# Patient Record
Sex: FEMALE | Race: BLACK OR AFRICAN AMERICAN | NOT HISPANIC OR LATINO | Employment: OTHER | URBAN - METROPOLITAN AREA
[De-identification: names, ages, dates, MRNs, and addresses within clinical notes are randomized per-mention and may not be internally consistent; named-entity substitution may affect disease eponyms.]

---

## 2019-09-10 ENCOUNTER — OFFICE VISIT (OUTPATIENT)
Dept: INTERNAL MEDICINE | Facility: CLINIC | Age: 44
End: 2019-09-10
Payer: MEDICARE

## 2019-09-10 ENCOUNTER — LAB VISIT (OUTPATIENT)
Dept: LAB | Facility: HOSPITAL | Age: 44
End: 2019-09-10
Attending: FAMILY MEDICINE
Payer: MEDICARE

## 2019-09-10 VITALS
HEIGHT: 68 IN | SYSTOLIC BLOOD PRESSURE: 130 MMHG | OXYGEN SATURATION: 99 % | BODY MASS INDEX: 32.58 KG/M2 | TEMPERATURE: 97 F | DIASTOLIC BLOOD PRESSURE: 72 MMHG | WEIGHT: 214.94 LBS | HEART RATE: 74 BPM

## 2019-09-10 DIAGNOSIS — Z79.4 TYPE 2 DIABETES MELLITUS WITHOUT COMPLICATION, WITH LONG-TERM CURRENT USE OF INSULIN: ICD-10-CM

## 2019-09-10 DIAGNOSIS — I10 ESSENTIAL HYPERTENSION: ICD-10-CM

## 2019-09-10 DIAGNOSIS — E11.9 TYPE 2 DIABETES MELLITUS WITHOUT COMPLICATION, WITH LONG-TERM CURRENT USE OF INSULIN: ICD-10-CM

## 2019-09-10 DIAGNOSIS — M32.9 SYSTEMIC LUPUS ERYTHEMATOSUS, UNSPECIFIED SLE TYPE, UNSPECIFIED ORGAN INVOLVEMENT STATUS: ICD-10-CM

## 2019-09-10 DIAGNOSIS — I10 ESSENTIAL HYPERTENSION: Primary | ICD-10-CM

## 2019-09-10 LAB
ALBUMIN SERPL BCP-MCNC: 4.2 G/DL (ref 3.5–5.2)
ALP SERPL-CCNC: 68 U/L (ref 55–135)
ALT SERPL W/O P-5'-P-CCNC: 12 U/L (ref 10–44)
ANION GAP SERPL CALC-SCNC: 9 MMOL/L (ref 8–16)
AST SERPL-CCNC: 16 U/L (ref 10–40)
BILIRUB SERPL-MCNC: 0.4 MG/DL (ref 0.1–1)
BUN SERPL-MCNC: 10 MG/DL (ref 6–20)
CALCIUM SERPL-MCNC: 9.6 MG/DL (ref 8.7–10.5)
CHLORIDE SERPL-SCNC: 104 MMOL/L (ref 95–110)
CO2 SERPL-SCNC: 26 MMOL/L (ref 23–29)
CREAT SERPL-MCNC: 0.8 MG/DL (ref 0.5–1.4)
EST. GFR  (AFRICAN AMERICAN): >60 ML/MIN/1.73 M^2
EST. GFR  (NON AFRICAN AMERICAN): >60 ML/MIN/1.73 M^2
ESTIMATED AVG GLUCOSE: 137 MG/DL (ref 68–131)
GLUCOSE SERPL-MCNC: 185 MG/DL (ref 70–110)
HBA1C MFR BLD HPLC: 6.4 % (ref 4–5.6)
POTASSIUM SERPL-SCNC: 4.2 MMOL/L (ref 3.5–5.1)
PROT SERPL-MCNC: 7.5 G/DL (ref 6–8.4)
SODIUM SERPL-SCNC: 139 MMOL/L (ref 136–145)

## 2019-09-10 PROCEDURE — 80053 COMPREHEN METABOLIC PANEL: CPT

## 2019-09-10 PROCEDURE — 83036 HEMOGLOBIN GLYCOSYLATED A1C: CPT

## 2019-09-10 PROCEDURE — 3044F PR MOST RECENT HEMOGLOBIN A1C LEVEL <7.0%: ICD-10-PCS | Mod: CPTII,S$GLB,, | Performed by: FAMILY MEDICINE

## 2019-09-10 PROCEDURE — 36415 COLL VENOUS BLD VENIPUNCTURE: CPT

## 2019-09-10 PROCEDURE — 3008F BODY MASS INDEX DOCD: CPT | Mod: CPTII,S$GLB,, | Performed by: FAMILY MEDICINE

## 2019-09-10 PROCEDURE — 99204 PR OFFICE/OUTPT VISIT, NEW, LEVL IV, 45-59 MIN: ICD-10-PCS | Mod: S$GLB,,, | Performed by: FAMILY MEDICINE

## 2019-09-10 PROCEDURE — 99204 OFFICE O/P NEW MOD 45 MIN: CPT | Mod: S$GLB,,, | Performed by: FAMILY MEDICINE

## 2019-09-10 PROCEDURE — 99999 PR PBB SHADOW E&M-NEW PATIENT-LVL III: CPT | Mod: PBBFAC,,, | Performed by: FAMILY MEDICINE

## 2019-09-10 PROCEDURE — 3044F HG A1C LEVEL LT 7.0%: CPT | Mod: CPTII,S$GLB,, | Performed by: FAMILY MEDICINE

## 2019-09-10 PROCEDURE — 3008F PR BODY MASS INDEX (BMI) DOCUMENTED: ICD-10-PCS | Mod: CPTII,S$GLB,, | Performed by: FAMILY MEDICINE

## 2019-09-10 PROCEDURE — 99999 PR PBB SHADOW E&M-NEW PATIENT-LVL III: ICD-10-PCS | Mod: PBBFAC,,, | Performed by: FAMILY MEDICINE

## 2019-09-10 RX ORDER — HYDROXYCHLOROQUINE SULFATE 200 MG/1
200 TABLET, FILM COATED ORAL 2 TIMES DAILY
COMMUNITY
Start: 2019-03-13

## 2019-09-10 RX ORDER — HYDROCORTISONE 25 MG/G
OINTMENT TOPICAL
COMMUNITY
Start: 2018-07-27

## 2019-09-10 RX ORDER — LIDOCAINE HYDROCHLORIDE 10 MG/ML
INJECTION INFILTRATION; PERINEURAL
COMMUNITY
End: 2020-02-05

## 2019-09-10 RX ORDER — CLONIDINE HYDROCHLORIDE 0.1 MG/1
0.1 TABLET ORAL
COMMUNITY
End: 2019-12-12 | Stop reason: SDUPTHER

## 2019-09-10 RX ORDER — ZOLPIDEM TARTRATE 10 MG/1
TABLET ORAL
COMMUNITY
Start: 2017-11-27 | End: 2020-11-25 | Stop reason: SDUPTHER

## 2019-09-10 RX ORDER — GABAPENTIN 800 MG/1
TABLET ORAL
COMMUNITY

## 2019-09-10 RX ORDER — AMLODIPINE BESYLATE 10 MG/1
10 TABLET ORAL DAILY
Refills: 11 | COMMUNITY
Start: 2019-08-19 | End: 2020-11-19 | Stop reason: SDUPTHER

## 2019-09-10 RX ORDER — METHYLPREDNISOLONE ACETATE 40 MG/ML
INJECTION, SUSPENSION INTRA-ARTICULAR; INTRALESIONAL; INTRAMUSCULAR; SOFT TISSUE
COMMUNITY
End: 2020-02-05

## 2019-09-10 RX ORDER — DAPSONE 25 MG/1
100 TABLET ORAL
COMMUNITY
End: 2022-05-12

## 2019-09-10 RX ORDER — TRIAMCINOLONE ACETONIDE 1 MG/G
OINTMENT TOPICAL
COMMUNITY
Start: 2018-07-27 | End: 2020-12-15 | Stop reason: SDUPTHER

## 2019-09-10 RX ORDER — LINACLOTIDE 145 UG/1
CAPSULE, GELATIN COATED ORAL
Refills: 0 | COMMUNITY
Start: 2019-08-20 | End: 2020-11-19 | Stop reason: SDUPTHER

## 2019-09-10 RX ORDER — OLMESARTAN MEDOXOMIL 40 MG/1
40 TABLET ORAL DAILY
Qty: 90 TABLET | Refills: 3 | Status: SHIPPED | OUTPATIENT
Start: 2019-09-10 | End: 2020-01-16 | Stop reason: SDUPTHER

## 2019-09-10 RX ORDER — CARISOPRODOL 350 MG/1
TABLET ORAL
Refills: 0 | COMMUNITY
Start: 2019-08-13 | End: 2020-11-10

## 2019-09-10 RX ORDER — HYDROGEN PEROXIDE 3 %
SOLUTION, NON-ORAL MISCELLANEOUS
Refills: 0 | COMMUNITY
Start: 2019-08-20 | End: 2020-03-23 | Stop reason: SDUPTHER

## 2019-09-10 RX ORDER — TELMISARTAN 40 MG/1
40 TABLET ORAL DAILY
Refills: 3 | COMMUNITY
Start: 2019-07-12 | End: 2019-09-10

## 2019-09-10 RX ORDER — PREDNISONE 10 MG/1
7.5 TABLET ORAL DAILY
COMMUNITY
End: 2022-05-12

## 2019-09-10 RX ORDER — PREDNISONE 10 MG/1
10 TABLET ORAL
COMMUNITY
Start: 2019-04-24 | End: 2020-02-05

## 2019-09-10 RX ORDER — INSULIN ASPART 100 [IU]/ML
INJECTION, SUSPENSION SUBCUTANEOUS
COMMUNITY
Start: 2018-07-23 | End: 2019-12-12 | Stop reason: SDUPTHER

## 2019-09-10 RX ORDER — HYDROCODONE BITARTRATE AND ACETAMINOPHEN 10; 325 MG/1; MG/1
TABLET ORAL
COMMUNITY
Start: 2018-06-06

## 2019-09-10 NOTE — PROGRESS NOTES
Subjective:       Patient ID: Solange Daniel is a 43 y.o. female.    Chief Complaint: Establish Care    Pt is a 43 year old here to establish care. Pt has chronic conditions of SLE and is followed by Rheumatoid as well as pain management. She has HTN and DM. Both of which are under control. Pt does need colonoscopy as well as mammogram    Review of Systems   Constitutional: Negative.    Respiratory: Negative.    Cardiovascular: Negative.    Gastrointestinal: Negative.    Genitourinary: Negative.    Musculoskeletal: Negative.    Neurological: Negative.    Hematological: Negative.    Psychiatric/Behavioral: Negative.        Objective:      Physical Exam   Constitutional: She is oriented to person, place, and time. She appears well-developed and well-nourished.   Cardiovascular: Normal rate and regular rhythm. Exam reveals no friction rub.   No murmur heard.  Pulmonary/Chest: Effort normal and breath sounds normal. No stridor. She has no wheezes.   Abdominal: Soft. Bowel sounds are normal. There is no guarding.   Neurological: She is alert and oriented to person, place, and time.   Psychiatric: She has a normal mood and affect. Her behavior is normal.       Assessment:       1. Essential hypertension    2. Type 2 diabetes mellitus without complication, with long-term current use of insulin    3. Systemic lupus erythematosus, unspecified SLE type, unspecified organ involvement status        Plan:       Essential hypertension  Comments:  Pt BP is well controlled today  Orders:  -     Comprehensive metabolic panel; Future; Expected date: 09/10/2019    Type 2 diabetes mellitus without complication, with long-term current use of insulin  Comments:  Will get a A1C  Orders:  -     Hemoglobin A1c; Future; Expected date: 09/10/2019  -     Microalbumin/creatinine urine ratio; Future; Expected date: 09/10/2019    Systemic lupus erythematosus, unspecified SLE type, unspecified organ involvement status    Other orders  -      olmesartan (BENICAR) 40 MG tablet; Take 1 tablet (40 mg total) by mouth once daily.  Dispense: 90 tablet; Refill: 3

## 2019-09-13 ENCOUNTER — TELEPHONE (OUTPATIENT)
Dept: URGENT CARE | Facility: CLINIC | Age: 44
End: 2019-09-13

## 2019-09-13 NOTE — TELEPHONE ENCOUNTER
I received a call back from the pt regarding her labs and was able to assist her with scheduling a 3 mo f/u . She verbalized understanding . fyi //kah

## 2019-11-01 DIAGNOSIS — E11.9 TYPE 2 DIABETES MELLITUS WITHOUT COMPLICATION: ICD-10-CM

## 2019-11-01 DIAGNOSIS — Z12.39 BREAST CANCER SCREENING: ICD-10-CM

## 2019-12-12 ENCOUNTER — OFFICE VISIT (OUTPATIENT)
Dept: INTERNAL MEDICINE | Facility: CLINIC | Age: 44
End: 2019-12-12
Payer: MEDICARE

## 2019-12-12 VITALS
BODY MASS INDEX: 31.87 KG/M2 | OXYGEN SATURATION: 97 % | HEART RATE: 70 BPM | HEIGHT: 68 IN | DIASTOLIC BLOOD PRESSURE: 88 MMHG | TEMPERATURE: 97 F | SYSTOLIC BLOOD PRESSURE: 130 MMHG | WEIGHT: 210.31 LBS

## 2019-12-12 DIAGNOSIS — E11.9 TYPE 2 DIABETES MELLITUS WITHOUT COMPLICATION, WITH LONG-TERM CURRENT USE OF INSULIN: Primary | ICD-10-CM

## 2019-12-12 DIAGNOSIS — Z79.4 TYPE 2 DIABETES MELLITUS WITHOUT COMPLICATION, WITH LONG-TERM CURRENT USE OF INSULIN: Primary | ICD-10-CM

## 2019-12-12 DIAGNOSIS — I10 ESSENTIAL HYPERTENSION: ICD-10-CM

## 2019-12-12 LAB — GLUCOSE SERPL-MCNC: 135 MG/DL (ref 70–110)

## 2019-12-12 PROCEDURE — 82962 GLUCOSE BLOOD TEST: CPT | Mod: HCNC,S$GLB,, | Performed by: FAMILY MEDICINE

## 2019-12-12 PROCEDURE — 99214 PR OFFICE/OUTPT VISIT, EST, LEVL IV, 30-39 MIN: ICD-10-PCS | Mod: HCNC,S$GLB,, | Performed by: FAMILY MEDICINE

## 2019-12-12 PROCEDURE — 99499 UNLISTED E&M SERVICE: CPT | Mod: HCNC,S$GLB,, | Performed by: FAMILY MEDICINE

## 2019-12-12 PROCEDURE — 3008F PR BODY MASS INDEX (BMI) DOCUMENTED: ICD-10-PCS | Mod: HCNC,CPTII,S$GLB, | Performed by: FAMILY MEDICINE

## 2019-12-12 PROCEDURE — 99999 PR PBB SHADOW E&M-EST. PATIENT-LVL III: ICD-10-PCS | Mod: PBBFAC,HCNC,, | Performed by: FAMILY MEDICINE

## 2019-12-12 PROCEDURE — 3008F BODY MASS INDEX DOCD: CPT | Mod: HCNC,CPTII,S$GLB, | Performed by: FAMILY MEDICINE

## 2019-12-12 PROCEDURE — 3044F HG A1C LEVEL LT 7.0%: CPT | Mod: HCNC,CPTII,S$GLB, | Performed by: FAMILY MEDICINE

## 2019-12-12 PROCEDURE — 3044F PR MOST RECENT HEMOGLOBIN A1C LEVEL <7.0%: ICD-10-PCS | Mod: HCNC,CPTII,S$GLB, | Performed by: FAMILY MEDICINE

## 2019-12-12 PROCEDURE — 82962 POCT GLUCOSE, HAND-HELD DEVICE: ICD-10-PCS | Mod: HCNC,S$GLB,, | Performed by: FAMILY MEDICINE

## 2019-12-12 PROCEDURE — 99499 RISK ADDL DX/OHS AUDIT: ICD-10-PCS | Mod: HCNC,S$GLB,, | Performed by: FAMILY MEDICINE

## 2019-12-12 PROCEDURE — 99999 PR PBB SHADOW E&M-EST. PATIENT-LVL III: CPT | Mod: PBBFAC,HCNC,, | Performed by: FAMILY MEDICINE

## 2019-12-12 PROCEDURE — 99214 OFFICE O/P EST MOD 30 MIN: CPT | Mod: HCNC,S$GLB,, | Performed by: FAMILY MEDICINE

## 2019-12-12 RX ORDER — INSULIN ASPART 100 [IU]/ML
15 INJECTION, SUSPENSION SUBCUTANEOUS 2 TIMES DAILY
Qty: 10 UNITS | Refills: 0 | Status: SHIPPED | OUTPATIENT
Start: 2019-12-12 | End: 2019-12-12 | Stop reason: SDUPTHER

## 2019-12-12 RX ORDER — INSULIN ASPART 100 [IU]/ML
15 INJECTION, SUSPENSION SUBCUTANEOUS 2 TIMES DAILY
Qty: 10 UNITS | Refills: 0 | Status: CANCELLED | OUTPATIENT
Start: 2019-12-12

## 2019-12-12 RX ORDER — CLONIDINE HYDROCHLORIDE 0.1 MG/1
0.1 TABLET ORAL 3 TIMES DAILY
Qty: 90 TABLET | Refills: 1 | Status: SHIPPED | OUTPATIENT
Start: 2019-12-12 | End: 2020-01-16 | Stop reason: SDUPTHER

## 2019-12-12 NOTE — PROGRESS NOTES
Subjective:       Patient ID: Solange Daniel is a 44 y.o. female.    Chief Complaint: Follow-up (3 mon)    Pt is a 44 year old who is here for follow-up. Pt last a1c was 6.4 but she has since last visit gone up on steroids due to the lupus. This has raised her sugars and BP a little. Pt was taking a 70/30 mix as needed.    Review of Systems   Constitutional: Negative.    Respiratory: Negative.    Cardiovascular: Negative.    Gastrointestinal: Negative.    Musculoskeletal: Negative.    Psychiatric/Behavioral: Negative.        Objective:      Physical Exam   Constitutional: She is oriented to person, place, and time. She appears well-developed and well-nourished.   Cardiovascular: Normal rate and regular rhythm. Exam reveals no friction rub.   No murmur heard.  Pulmonary/Chest: Effort normal and breath sounds normal. No stridor. She has no wheezes.   Abdominal: Soft. Bowel sounds are normal. There is no tenderness. There is no guarding.   Neurological: She is alert and oriented to person, place, and time.   Skin: Skin is warm and dry.   Psychiatric: She has a normal mood and affect. Her behavior is normal.       Assessment:       1. Type 2 diabetes mellitus without complication, with long-term current use of insulin    2. Essential hypertension        Plan:       Type 2 diabetes mellitus without complication, with long-term current use of insulin  Comments:  Pt is on elevated prednisone and sugars have been elevated. will move to do regular 10 units of 70/30 twice a day. check sugars and will decide if need to do sliding scale insuline if continue to elevate    Essential hypertension  Comments:  Pt BP is controlled to day. Continue to use the clonidine as needed    Other orders  -     blood sugar diagnostic (TRUE METRIX GLUCOSE TEST STRIP) Strp; test AS DIRECTED 3 TIMES A DAY  Dispense: 300 strip; Refill: 0  -     insulin asp prt-insulin aspart, NOVOLOG 70/30, (NOVOLOG MIX 70-30 U-100 INSULN) 100 unit/mL (70-30)  Soln; Inject 15 Units into the skin 2 (two) times daily.  Dispense: 10 Units; Refill: 0  -     cloNIDine (CATAPRES) 0.1 MG tablet; Take 1 tablet (0.1 mg total) by mouth 3 (three) times daily.  Dispense: 90 tablet; Refill: 1

## 2019-12-13 RX ORDER — INSULIN ASPART 100 [IU]/ML
15 INJECTION, SUSPENSION SUBCUTANEOUS 2 TIMES DAILY
Qty: 10 UNITS | Refills: 2 | Status: SHIPPED | OUTPATIENT
Start: 2019-12-13 | End: 2020-01-16 | Stop reason: SDUPTHER

## 2019-12-13 RX ORDER — INSULIN ASPART 100 [IU]/ML
15 INJECTION, SUSPENSION SUBCUTANEOUS 2 TIMES DAILY
Qty: 10 UNITS | Refills: 2 | Status: SHIPPED | OUTPATIENT
Start: 2019-12-13 | End: 2019-12-13 | Stop reason: SDUPTHER

## 2020-01-16 NOTE — TELEPHONE ENCOUNTER
Type:  RX Refill Request     Who Called: Marilynn   Refill or New Rx: Refill   RX Name and Strength:Accu Chek Karine Plus Meter   How is the patient currently taking it? (ex. 1XDay): n/a   Is this a 30 day or 90 day RX: 90   Preferred Pharmacy with phone number:[facility]   Ph: [phone] and Fax: [phone]   Local or Mail Order: Mail order   Ordering Provider: Dr. Dewey   Would the patient rather a call back or a response via [facility]? Call back   Best Call Back Number: Please call her at [phone]   Additional Information: And all supply's, solution, alcohol swabs and lancets

## 2020-01-16 NOTE — TELEPHONE ENCOUNTER
----- Message from Jeanette Miller sent at 1/16/2020  2:25 PM CST -----  Contact: Marina  Type:  RX Refill Request    Who Called: Sasha  Refill or New Rx: Refill  RX Name and Strength: Clonidine .01mg   How is the patient currently taking it? (ex. 1XDay): n/a  Is this a 30 day or 90 day RX: 90  Preferred Pharmacy with phone number: [facility]  Ph: [phone] And Fax: [phone]  Local or Mail Order: Mail order  Ordering Provider: Dr. Dewey   Would the patient rather a call back or a response via [employer]? Call back   Best Call Back Number: Please call her at [phone]  Additional Information: n/a    Type:  RX Refill Request    Who Called: Sasha  Refill or New Rx: Refill   RX Name and Strength: Novolog mix 7030  How is the patient currently taking it? (ex. 1XDay):n/a  Is this a 30 day or 90 day RX: 90  Preferred Pharmacy with phone number: [facility]  Ph: [phone] and Fax: [phone]  Local or Mail Order: Mail order  Ordering Provider: Dr. Dewey  Would the patient rather a call back or a response via [facility]? Call back   Best Call Back Number: Please call her at [phone]  Additional Information: n/a    Type:  RX Refill Request    Who Called: Sasha  Refill or New Rx: Refill   RX Name and Strength: Olmesartan 40 mg  How is the patient currently taking it? (ex. 1XDay):   Is this a 30 day or 90 day RX: 90  Preferred Pharmacy with phone number: [facility]  Ph: [phone] and Fax: [phone]  Local or Mail Order: Mail order   Ordering Provider: Dr. Dewey  Would the patient rather a call back or a response via [facility]? Call back   Best Call Back Number: Please call her at [phone]  Additional Information: n/a    Type:  RX Refill Request    Who Called: Marilynn  Refill or New Rx: Refill   RX Name and Strength:Accu Chek Karine Plus Meter  How is the patient currently taking it? (ex. 1XDay): n/a  Is this a 30 day or 90 day RX: 90  Preferred Pharmacy with phone number:[facility] order  Ph: [phone] and Fax: [phone]  Local or Mail Order: Mail order   Ordering Provider: Dr. Dewey  Would the patient rather a call back or a response via [facility]? Call back   Best Call Back Number: Please call her at [phone]  Additional Information: And all supply's, solution, alcohol swabs and lancets

## 2020-01-17 RX ORDER — CLONIDINE HYDROCHLORIDE 0.1 MG/1
0.1 TABLET ORAL 3 TIMES DAILY
Qty: 90 TABLET | Refills: 1 | Status: SHIPPED | OUTPATIENT
Start: 2020-01-17 | End: 2020-11-19

## 2020-01-17 RX ORDER — INSULIN ASPART 100 [IU]/ML
15 INJECTION, SUSPENSION SUBCUTANEOUS 2 TIMES DAILY
Qty: 10 UNITS | Refills: 2 | OUTPATIENT
Start: 2020-01-17 | End: 2020-08-21

## 2020-01-17 RX ORDER — OLMESARTAN MEDOXOMIL 40 MG/1
40 TABLET ORAL DAILY
Qty: 90 TABLET | Refills: 3 | Status: SHIPPED | OUTPATIENT
Start: 2020-01-17 | End: 2020-07-30

## 2020-02-05 ENCOUNTER — OFFICE VISIT (OUTPATIENT)
Dept: INTERNAL MEDICINE | Facility: CLINIC | Age: 45
End: 2020-02-05
Payer: MEDICARE

## 2020-02-05 ENCOUNTER — HOSPITAL ENCOUNTER (EMERGENCY)
Facility: HOSPITAL | Age: 45
Discharge: HOME | End: 2020-02-05
Attending: EMERGENCY MEDICINE
Payer: MEDICARE

## 2020-02-05 VITALS
WEIGHT: 217.06 LBS | TEMPERATURE: 98 F | BODY MASS INDEX: 32.9 KG/M2 | HEIGHT: 68 IN | RESPIRATION RATE: 20 BRPM | HEART RATE: 103 BPM | DIASTOLIC BLOOD PRESSURE: 88 MMHG | OXYGEN SATURATION: 95 % | SYSTOLIC BLOOD PRESSURE: 190 MMHG

## 2020-02-05 VITALS
HEIGHT: 68 IN | TEMPERATURE: 98 F | SYSTOLIC BLOOD PRESSURE: 138 MMHG | DIASTOLIC BLOOD PRESSURE: 88 MMHG | BODY MASS INDEX: 32.84 KG/M2 | WEIGHT: 216.69 LBS

## 2020-02-05 DIAGNOSIS — R51.9 NONINTRACTABLE HEADACHE, UNSPECIFIED CHRONICITY PATTERN, UNSPECIFIED HEADACHE TYPE: Primary | ICD-10-CM

## 2020-02-05 DIAGNOSIS — Z79.4 TYPE 2 DIABETES MELLITUS WITHOUT COMPLICATION, WITH LONG-TERM CURRENT USE OF INSULIN: ICD-10-CM

## 2020-02-05 DIAGNOSIS — R06.00 DYSPNEA: ICD-10-CM

## 2020-02-05 DIAGNOSIS — N30.00 ACUTE CYSTITIS WITHOUT HEMATURIA: ICD-10-CM

## 2020-02-05 DIAGNOSIS — R51.9 HEADACHE: ICD-10-CM

## 2020-02-05 DIAGNOSIS — M31.0 LEUKOCYTOCLASTIC VASCULITIS: ICD-10-CM

## 2020-02-05 DIAGNOSIS — D64.9 ANEMIA, UNSPECIFIED TYPE: ICD-10-CM

## 2020-02-05 DIAGNOSIS — I10 HYPERTENSION, UNSPECIFIED TYPE: Primary | ICD-10-CM

## 2020-02-05 DIAGNOSIS — E11.9 TYPE 2 DIABETES MELLITUS WITHOUT COMPLICATION, WITH LONG-TERM CURRENT USE OF INSULIN: ICD-10-CM

## 2020-02-05 LAB
ALBUMIN SERPL BCP-MCNC: 3.7 G/DL (ref 3.5–5.2)
ALP SERPL-CCNC: 89 U/L (ref 55–135)
ALT SERPL W/O P-5'-P-CCNC: 11 U/L (ref 10–44)
ANION GAP SERPL CALC-SCNC: 10 MMOL/L (ref 8–16)
APTT BLDCRRT: 24.2 SEC (ref 21–32)
AST SERPL-CCNC: 19 U/L (ref 10–40)
BACTERIA #/AREA URNS HPF: NORMAL /HPF
BASOPHILS # BLD AUTO: 0.03 K/UL (ref 0–0.2)
BASOPHILS NFR BLD: 0.3 % (ref 0–1.9)
BILIRUB SERPL-MCNC: 0.4 MG/DL (ref 0.1–1)
BILIRUB UR QL STRIP: NEGATIVE
BNP SERPL-MCNC: 109 PG/ML (ref 0–99)
BUN SERPL-MCNC: 10 MG/DL (ref 6–20)
CALCIUM SERPL-MCNC: 9.3 MG/DL (ref 8.7–10.5)
CHLORIDE SERPL-SCNC: 106 MMOL/L (ref 95–110)
CLARITY UR: CLEAR
CO2 SERPL-SCNC: 23 MMOL/L (ref 23–29)
COLOR UR: YELLOW
CREAT SERPL-MCNC: 0.9 MG/DL (ref 0.5–1.4)
D DIMER PPP IA.FEU-MCNC: 1.51 MG/L FEU
DIFFERENTIAL METHOD: ABNORMAL
EOSINOPHIL # BLD AUTO: 0.1 K/UL (ref 0–0.5)
EOSINOPHIL NFR BLD: 1.2 % (ref 0–8)
ERYTHROCYTE [DISTWIDTH] IN BLOOD BY AUTOMATED COUNT: 13.9 % (ref 11.5–14.5)
ERYTHROCYTE [SEDIMENTATION RATE] IN BLOOD BY WESTERGREN METHOD: 15 MM/HR (ref 0–20)
EST. GFR  (AFRICAN AMERICAN): >60 ML/MIN/1.73 M^2
EST. GFR  (NON AFRICAN AMERICAN): >60 ML/MIN/1.73 M^2
GLUCOSE SERPL-MCNC: 187 MG/DL (ref 70–110)
GLUCOSE UR QL STRIP: NEGATIVE
HCT VFR BLD AUTO: 28.5 % (ref 37–48.5)
HGB BLD-MCNC: 9.1 G/DL (ref 12–16)
HGB UR QL STRIP: NEGATIVE
IMM GRANULOCYTES # BLD AUTO: 0.06 K/UL (ref 0–0.04)
IMM GRANULOCYTES NFR BLD AUTO: 0.6 % (ref 0–0.5)
INR PPP: 0.9 (ref 0.8–1.2)
KETONES UR QL STRIP: NEGATIVE
LEUKOCYTE ESTERASE UR QL STRIP: ABNORMAL
LYMPHOCYTES # BLD AUTO: 2.6 K/UL (ref 1–4.8)
LYMPHOCYTES NFR BLD: 24.5 % (ref 18–48)
MCH RBC QN AUTO: 28.5 PG (ref 27–31)
MCHC RBC AUTO-ENTMCNC: 31.9 G/DL (ref 32–36)
MCV RBC AUTO: 89 FL (ref 82–98)
MICROSCOPIC COMMENT: NORMAL
MONOCYTES # BLD AUTO: 0.7 K/UL (ref 0.3–1)
MONOCYTES NFR BLD: 7 % (ref 4–15)
NEUTROPHILS # BLD AUTO: 7 K/UL (ref 1.8–7.7)
NEUTROPHILS NFR BLD: 66.4 % (ref 38–73)
NITRITE UR QL STRIP: NEGATIVE
NRBC BLD-RTO: 0 /100 WBC
PH UR STRIP: 7 [PH] (ref 5–8)
PLATELET # BLD AUTO: 258 K/UL (ref 150–350)
PMV BLD AUTO: 10.9 FL (ref 9.2–12.9)
POTASSIUM SERPL-SCNC: 3.8 MMOL/L (ref 3.5–5.1)
PROT SERPL-MCNC: 7.1 G/DL (ref 6–8.4)
PROT UR QL STRIP: NEGATIVE
PROTHROMBIN TIME: 9.8 SEC (ref 9–12.5)
RBC # BLD AUTO: 3.19 M/UL (ref 4–5.4)
RBC #/AREA URNS HPF: 0 /HPF (ref 0–4)
SODIUM SERPL-SCNC: 139 MMOL/L (ref 136–145)
SP GR UR STRIP: 1.01 (ref 1–1.03)
SQUAMOUS #/AREA URNS HPF: 15 /HPF
TROPONIN I SERPL DL<=0.01 NG/ML-MCNC: <0.006 NG/ML (ref 0–0.03)
URN SPEC COLLECT METH UR: ABNORMAL
UROBILINOGEN UR STRIP-ACNC: NEGATIVE EU/DL
WBC # BLD AUTO: 10.57 K/UL (ref 3.9–12.7)
WBC #/AREA URNS HPF: 0 /HPF (ref 0–5)

## 2020-02-05 PROCEDURE — 3044F HG A1C LEVEL LT 7.0%: CPT | Mod: HCNC,CPTII,S$GLB, | Performed by: FAMILY MEDICINE

## 2020-02-05 PROCEDURE — 85025 COMPLETE CBC W/AUTO DIFF WBC: CPT | Mod: HCNC

## 2020-02-05 PROCEDURE — 99999 PR PBB SHADOW E&M-EST. PATIENT-LVL III: CPT | Mod: PBBFAC,HCNC,, | Performed by: FAMILY MEDICINE

## 2020-02-05 PROCEDURE — 96360 HYDRATION IV INFUSION INIT: CPT | Mod: HCNC

## 2020-02-05 PROCEDURE — 96361 HYDRATE IV INFUSION ADD-ON: CPT | Mod: HCNC

## 2020-02-05 PROCEDURE — 99214 OFFICE O/P EST MOD 30 MIN: CPT | Mod: HCNC,S$GLB,, | Performed by: FAMILY MEDICINE

## 2020-02-05 PROCEDURE — 81000 URINALYSIS NONAUTO W/SCOPE: CPT | Mod: HCNC

## 2020-02-05 PROCEDURE — 25500020 PHARM REV CODE 255: Mod: HCNC | Performed by: EMERGENCY MEDICINE

## 2020-02-05 PROCEDURE — 3075F SYST BP GE 130 - 139MM HG: CPT | Mod: HCNC,CPTII,S$GLB, | Performed by: FAMILY MEDICINE

## 2020-02-05 PROCEDURE — 3075F PR MOST RECENT SYSTOLIC BLOOD PRESS GE 130-139MM HG: ICD-10-PCS | Mod: HCNC,CPTII,S$GLB, | Performed by: FAMILY MEDICINE

## 2020-02-05 PROCEDURE — 80053 COMPREHEN METABOLIC PANEL: CPT | Mod: HCNC

## 2020-02-05 PROCEDURE — 3079F DIAST BP 80-89 MM HG: CPT | Mod: HCNC,CPTII,S$GLB, | Performed by: FAMILY MEDICINE

## 2020-02-05 PROCEDURE — 85730 THROMBOPLASTIN TIME PARTIAL: CPT | Mod: HCNC

## 2020-02-05 PROCEDURE — 85610 PROTHROMBIN TIME: CPT | Mod: HCNC

## 2020-02-05 PROCEDURE — 25000003 PHARM REV CODE 250: Mod: HCNC | Performed by: EMERGENCY MEDICINE

## 2020-02-05 PROCEDURE — 84484 ASSAY OF TROPONIN QUANT: CPT | Mod: HCNC

## 2020-02-05 PROCEDURE — 85379 FIBRIN DEGRADATION QUANT: CPT | Mod: HCNC

## 2020-02-05 PROCEDURE — 85651 RBC SED RATE NONAUTOMATED: CPT | Mod: HCNC

## 2020-02-05 PROCEDURE — 36415 COLL VENOUS BLD VENIPUNCTURE: CPT | Mod: HCNC

## 2020-02-05 PROCEDURE — 3044F PR MOST RECENT HEMOGLOBIN A1C LEVEL <7.0%: ICD-10-PCS | Mod: HCNC,CPTII,S$GLB, | Performed by: FAMILY MEDICINE

## 2020-02-05 PROCEDURE — 3079F PR MOST RECENT DIASTOLIC BLOOD PRESSURE 80-89 MM HG: ICD-10-PCS | Mod: HCNC,CPTII,S$GLB, | Performed by: FAMILY MEDICINE

## 2020-02-05 PROCEDURE — 83880 ASSAY OF NATRIURETIC PEPTIDE: CPT | Mod: HCNC

## 2020-02-05 PROCEDURE — 3008F BODY MASS INDEX DOCD: CPT | Mod: HCNC,CPTII,S$GLB, | Performed by: FAMILY MEDICINE

## 2020-02-05 PROCEDURE — 99285 EMERGENCY DEPT VISIT HI MDM: CPT | Mod: 25,HCNC

## 2020-02-05 PROCEDURE — 63600175 PHARM REV CODE 636 W HCPCS: Mod: HCNC | Performed by: EMERGENCY MEDICINE

## 2020-02-05 PROCEDURE — 99214 PR OFFICE/OUTPT VISIT, EST, LEVL IV, 30-39 MIN: ICD-10-PCS | Mod: HCNC,S$GLB,, | Performed by: FAMILY MEDICINE

## 2020-02-05 PROCEDURE — 3008F PR BODY MASS INDEX (BMI) DOCUMENTED: ICD-10-PCS | Mod: HCNC,CPTII,S$GLB, | Performed by: FAMILY MEDICINE

## 2020-02-05 PROCEDURE — 99999 PR PBB SHADOW E&M-EST. PATIENT-LVL III: ICD-10-PCS | Mod: PBBFAC,HCNC,, | Performed by: FAMILY MEDICINE

## 2020-02-05 RX ORDER — HYDROXYZINE PAMOATE 25 MG/1
CAPSULE ORAL
Qty: 25 CAPSULE | Refills: 0 | Status: SHIPPED | OUTPATIENT
Start: 2020-02-05 | End: 2020-11-19 | Stop reason: SDUPTHER

## 2020-02-05 RX ORDER — SODIUM CHLORIDE 9 MG/ML
1000 INJECTION, SOLUTION INTRAVENOUS
Status: COMPLETED | OUTPATIENT
Start: 2020-02-05 | End: 2020-02-05

## 2020-02-05 RX ORDER — HYDROCODONE BITARTRATE AND ACETAMINOPHEN 10; 325 MG/1; MG/1
1 TABLET ORAL
Status: COMPLETED | OUTPATIENT
Start: 2020-02-05 | End: 2020-02-05

## 2020-02-05 RX ORDER — HYDROXYZINE PAMOATE 25 MG/1
50 CAPSULE ORAL
Status: COMPLETED | OUTPATIENT
Start: 2020-02-05 | End: 2020-02-05

## 2020-02-05 RX ORDER — PREDNISONE 20 MG/1
20 TABLET ORAL
Status: COMPLETED | OUTPATIENT
Start: 2020-02-05 | End: 2020-02-05

## 2020-02-05 RX ADMIN — SODIUM CHLORIDE 500 ML: 0.9 INJECTION, SOLUTION INTRAVENOUS at 07:02

## 2020-02-05 RX ADMIN — PREDNISONE 20 MG: 20 TABLET ORAL at 07:02

## 2020-02-05 RX ADMIN — HYDROCODONE BITARTRATE AND ACETAMINOPHEN 1 TABLET: 10; 325 TABLET ORAL at 07:02

## 2020-02-05 RX ADMIN — HYDROXYZINE PAMOATE 50 MG: 25 CAPSULE ORAL at 10:02

## 2020-02-05 RX ADMIN — SODIUM CHLORIDE 1000 ML: 0.9 INJECTION, SOLUTION INTRAVENOUS at 07:02

## 2020-02-05 RX ADMIN — IOHEXOL 100 ML: 350 INJECTION, SOLUTION INTRAVENOUS at 08:02

## 2020-02-05 NOTE — ED PROVIDER NOTES
"SCRIBE #1 NOTE: I, Allyssa Rowe, am scribing for, and in the presence of, Tania Blanton DO. I have scribed the entire note.       History     Chief Complaint   Patient presents with    General Illness     c/o generalized weakness, H/A, lower abdominal and back pain, urinary frequency, and dizziness beginning on Friday; PMHx of Lupus and anemia     Review of patient's allergies indicates:   Allergen Reactions    Oxycodone Itching         History of Present Illness     HPI    2/5/2020, 5:56 PM  History obtained from the patient      History of Present Illness: Solange Daniel is a 44 y.o. female patient with a PMHx of anemia, arthritis, lupus, HTN, vasculitis, and s/p cardiac ablation who presents to the Emergency Department for evaluation of malaise which onset gradually over the last 6-7 days. Pt reports that she thinks her Lupus is flared-up and "something is wrong" due to multiple symptoms going on. Pt states that she has had a constant HA since Friday. Pt also reports that her vasculitis occurs when she has a Lupus flare-up and noticed the rash to her BLE. Two days ago the pt developed urinary frequency. Yesterday, the pt developed low back pain. Pt is in the process of a steroid titration and was originally on 30 mg Prednisone, but has been on 5 mg Prednisone over the last 2 weeks. Pt recently was discontinued [facility] due to a prior authorization conflict. Symptoms are constant and moderate in severity. No mitigating or exacerbating factors reported. Associated sxs include rash to the BLE, back pain, SOB, chest tightness, blurred vision, HA, feeling lightheaded, and urinary frequency. Patient denies any fever/ chills, cough, CP, palpations, numbness, dizziness, wound, abdominal pain, n/v/d, neck pain, sore throat, congestion, dysuria, hematuria, localized weakness, easily bruising, and all other sxs at this time. Prior Tx includes steroid treatment. No further complaints or concerns at this time. " "    Arrival mode: Personal vehicle      PCP: Abram Dewey MD   Rheumatologist: Dr. Sanchez     Past Medical History:  Past Medical History:   Diagnosis Date    Anemia     Arthritis     Connective tissue disease 1999    Diabetes mellitus     Hypertension     Lupus 1999    Vasculitis        Past Surgical History:  Past Surgical History:   Procedure Laterality Date    ABLATION      RIGHT HEART CATHETERIZATION      TUBAL LIGATION      WRIST SURGERY Bilateral          Family History:  History reviewed. No pertinent family history.    Social History:  Social History     Tobacco Use    Smoking status: Never Smoker   Substance and Sexual Activity    Alcohol use: Never     Frequency: Never    Drug use: Never    Sexual activity: Unknown        Review of Systems     Review of Systems   Constitutional: Negative for chills and fever.        + Feeling malaise    HENT: Negative for congestion and sore throat.    Eyes: Positive for visual disturbance ("blurry vision").   Respiratory: Positive for chest tightness and shortness of breath. Negative for cough.    Cardiovascular: Negative for chest pain and palpitations.   Gastrointestinal: Negative for abdominal pain, diarrhea, nausea and vomiting.   Genitourinary: Positive for frequency. Negative for dysuria and hematuria.   Musculoskeletal: Positive for back pain. Negative for neck pain.   Skin: Positive for rash (BLE). Negative for wound.   Neurological: Positive for light-headedness and headaches. Negative for dizziness, weakness and numbness.   Hematological: Does not bruise/bleed easily.   All other systems reviewed and are negative.     Physical Exam     Initial Vitals [02/05/20 1748]   BP Pulse Resp Temp SpO2   (S) (!) 200/96 88 20 98.4 °F (36.9 °C) 97 %      MAP       --          Physical Exam  Nursing Notes and Vital Signs Reviewed.  Constitutional: Patient is in no acute distress. Well-developed and well-nourished.  Head: Atraumatic. Normocephalic.  Eyes: " "PERRL. EOM intact. Conjunctivae are not pale. No scleral icterus.  ENT: Mucous membranes are moist. Oropharynx is clear and symmetric.    Neck: Supple. Full ROM. No lymphadenopathy.  Cardiovascular: Regular rate. Regular rhythm. No murmurs, rubs, or gallops. Distal pulses are 2+ and symmetric.  Pulmonary/Chest: No respiratory distress. Clear to auscultation bilaterally. No wheezing or rales.  Abdominal: Soft and non-distended.  There is no tenderness.  No rebound, guarding, or rigidity. Good bowel sounds.  Genitourinary: No CVA tenderness  Musculoskeletal: Moves all extremities. No obvious deformities. No edema. No calf tenderness.  Skin: Warm and dry. Slight pallor.   Non-palpable, non-blanchable rash to the BLE. See picture below.           Neurological:  Alert, awake, and appropriate.  Normal speech.  No acute focal neurological deficits are appreciated. Cranial nerves II through XII intact.   GCS is 15.  Psychiatric: Normal affect. Good eye contact. Appropriate in content.     ED Course   Procedures  ED Vital Signs:  Vitals:    02/05/20 1748 02/05/20 1910 02/05/20 2020 02/05/20 2030   BP: (S) (!) 200/96 (!) 179/84 (!) 152/70 (!) 145/85   Pulse: 88 77 70 68   Resp: 20 13 14 19   Temp: 98.4 °F (36.9 °C)      TempSrc: Oral      SpO2: 97% 100% 100% 99%   Weight: 98.5 kg (217 lb 0.7 oz)      Height: 5' 8" (1.727 m)       02/05/20 2100 02/05/20 2215   BP: (!) 166/101 (!) 190/88   Pulse: 72 103   Resp: 20 20   Temp:  98.2 °F (36.8 °C)   TempSrc:  Oral   SpO2: 96% 95%   Weight:     Height:         Abnormal Lab Results:  Labs Reviewed   CBC W/ AUTO DIFFERENTIAL - Abnormal; Notable for the following components:       Result Value    RBC 3.19 (*)     Hemoglobin 9.1 (*)     Hematocrit 28.5 (*)     Mean Corpuscular Hemoglobin Conc 31.9 (*)     Immature Granulocytes 0.6 (*)     Immature Grans (Abs) 0.06 (*)     All other components within normal limits   COMPREHENSIVE METABOLIC PANEL - Abnormal; Notable for the following " components:    Glucose 187 (*)     All other components within normal limits   D DIMER, QUANTITATIVE - Abnormal; Notable for the following components:    D-Dimer 1.51 (*)     All other components within normal limits   B-TYPE NATRIURETIC PEPTIDE - Abnormal; Notable for the following components:     (*)     All other components within normal limits   URINALYSIS, REFLEX TO URINE CULTURE - Abnormal; Notable for the following components:    Leukocytes, UA 1+ (*)     All other components within normal limits    Narrative:     Preferred Collection Type->Urine, Clean Catch   SEDIMENTATION RATE   PROTIME-INR   APTT   TROPONIN I   URINALYSIS MICROSCOPIC    Narrative:     Preferred Collection Type->Urine, Clean Catch        All Lab Results:  Results for orders placed or performed during the hospital encounter of 02/05/20   CBC auto differential   Result Value Ref Range    WBC 10.57 3.90 - 12.70 K/uL    RBC 3.19 (L) 4.00 - 5.40 M/uL    Hemoglobin 9.1 (L) 12.0 - 16.0 g/dL    Hematocrit 28.5 (L) 37.0 - 48.5 %    Mean Corpuscular Volume 89 82 - 98 fL    Mean Corpuscular Hemoglobin 28.5 27.0 - 31.0 pg    Mean Corpuscular Hemoglobin Conc 31.9 (L) 32.0 - 36.0 g/dL    RDW 13.9 11.5 - 14.5 %    Platelets 258 150 - 350 K/uL    MPV 10.9 9.2 - 12.9 fL    Immature Granulocytes 0.6 (H) 0.0 - 0.5 %    Gran # (ANC) 7.0 1.8 - 7.7 K/uL    Immature Grans (Abs) 0.06 (H) 0.00 - 0.04 K/uL    Lymph # 2.6 1.0 - 4.8 K/uL    Mono # 0.7 0.3 - 1.0 K/uL    Eos # 0.1 0.0 - 0.5 K/uL    Baso # 0.03 0.00 - 0.20 K/uL    nRBC 0 0 /100 WBC    Gran% 66.4 38.0 - 73.0 %    Lymph% 24.5 18.0 - 48.0 %    Mono% 7.0 4.0 - 15.0 %    Eosinophil% 1.2 0.0 - 8.0 %    Basophil% 0.3 0.0 - 1.9 %    Differential Method Automated    Comprehensive metabolic panel   Result Value Ref Range    Sodium 139 136 - 145 mmol/L    Potassium 3.8 3.5 - 5.1 mmol/L    Chloride 106 95 - 110 mmol/L    CO2 23 23 - 29 mmol/L    Glucose 187 (H) 70 - 110 mg/dL    BUN, Bld 10 6 - 20 mg/dL     Creatinine 0.9 0.5 - 1.4 mg/dL    Calcium 9.3 8.7 - 10.5 mg/dL    Total Protein 7.1 6.0 - 8.4 g/dL    Albumin 3.7 3.5 - 5.2 g/dL    Total Bilirubin 0.4 0.1 - 1.0 mg/dL    Alkaline Phosphatase 89 55 - 135 U/L    AST 19 10 - 40 U/L    ALT 11 10 - 44 U/L    Anion Gap 10 8 - 16 mmol/L    eGFR if African American >60 >60 mL/min/1.73 m^2    eGFR if non African American >60 >60 mL/min/1.73 m^2   Sedimentation rate   Result Value Ref Range    Sed Rate 15 0 - 20 mm/Hr   D dimer, quantitative   Result Value Ref Range    D-Dimer 1.51 (H) <0.50 mg/L FEU   Protime-INR   Result Value Ref Range    Prothrombin Time 9.8 9.0 - 12.5 sec    INR 0.9 0.8 - 1.2   APTT   Result Value Ref Range    aPTT 24.2 21.0 - 32.0 sec   Troponin I   Result Value Ref Range    Troponin I <0.006 0.000 - 0.026 ng/mL   Brain natriuretic peptide   Result Value Ref Range     (H) 0 - 99 pg/mL   Urinalysis, Reflex to Urine Culture Urine, Clean Catch   Result Value Ref Range    Specimen UA Urine, Clean Catch     Color, UA Yellow Yellow, Straw, Shala    Appearance, UA Clear Clear    pH, UA 7.0 5.0 - 8.0    Specific Gravity, UA 1.015 1.005 - 1.030    Protein, UA Negative Negative    Glucose, UA Negative Negative    Ketones, UA Negative Negative    Bilirubin (UA) Negative Negative    Occult Blood UA Negative Negative    Nitrite, UA Negative Negative    Urobilinogen, UA Negative <2.0 EU/dL    Leukocytes, UA 1+ (A) Negative   Urinalysis Microscopic   Result Value Ref Range    RBC, UA 0 0 - 4 /hpf    WBC, UA 0 0 - 5 /hpf    Bacteria Occasional None-Occ /hpf    Squam Epithel, UA 15 /hpf    Microscopic Comment SEE COMMENT        Imaging Results:  Imaging Results          CTA Chest Non-Coronary (PE Study) (Final result)  Result time 02/05/20 20:58:05    Final result by Carlo Pyle MD (02/05/20 20:58:05)                 Impression:      No CT evidence of pulmonary embolism. No acute findings.    All CT scans at this facility are performed  using dose modulation  techniques as appropriate to performed exam including the following:  automated exposure control; adjustment of mA and/or kV according to the patients size (this includes techniques or standardized protocols for targeted exams where dose is matched to indication/reason for exam: i.e. extremities or head);  iterative reconstruction technique.      Electronically signed by: Carlo Pyle  Date:    02/05/2020  Time:    20:58             Narrative:    EXAMINATION:  CTA CHEST NON CORONARY    CLINICAL HISTORY:  Dyspnea, cardiac origin suspected;    TECHNIQUE:  Exam performed with multiplanar 3D MIP reformations.    FINDINGS:  The lung fields are clear.    Limited opacification of the distal pulmonary arteries.  No CT evidence of pulmonary embolism is identified.  There is no evidence aortic dissection.    No mediastinal adenopathy is identified.    There is no pneumothorax. No acute osseous abnormality is identified.                               X-Ray Chest AP Portable (Final result)  Result time 02/05/20 19:08:57    Final result by Carlo Pyle MD (02/05/20 19:08:57)                 Impression:      No acute findings.      Electronically signed by: Carlo Plye  Date:    02/05/2020  Time:    19:08             Narrative:    EXAMINATION:  XR CHEST AP PORTABLE    CLINICAL HISTORY:  Dyspnea, unspecified    FINDINGS:  Lung fields are clear.    No pleural fluid or pneumothorax.    No adenopathy is identified.    No significant osseous abnormality.                               CT Head Without Contrast (Final result)  Result time 02/05/20 19:08:38    Final result by Carlo Pyle MD (02/05/20 19:08:38)                 Impression:      No acute intracranial findings.    All CT scans at this facility are performed  using dose modulation techniques as appropriate to performed exam including the following:  automated exposure control; adjustment of mA and/or kV according to the patients size (this includes techniques or standardized  protocols for targeted exams where dose is matched to indication/reason for exam: i.e. extremities or head);  iterative reconstruction technique.      Electronically signed by: Carlo Pyle  Date:    02/05/2020  Time:    19:08             Narrative:    EXAMINATION:  CT HEAD WITHOUT CONTRAST    CLINICAL HISTORY:  HeadacheHeadache, acute, norm neuro exam;    FINDINGS:  No intracranial hemorrhage or acute findings are demonstrated. The visualized paranasal sinuses are clear. The calvarium is intact.                                        The Emergency Provider reviewed the vital signs and test results, which are outlined above.     ED Discussion     5:57 PM: Went into pt's room for examination. Pt was not in room at time.     6:40 PM: Discussed pt's case with Dr. Mendez Vick (Rheumatology) who recommends if no serious infection is found the treat pt with 20 mg prednisone and pt can d/u outpatient.    9:54 PM: Reassessed pt at this time.  Pt states her condition has improved at this time. Discussed with pt all pertinent ED information and results. Discussed pt dx and plan of tx. Gave pt all f/u and return to the ED instructions. All questions and concerns were addressed at this time. Pt expresses understanding of information and instructions, and is comfortable with plan to discharge. Pt is stable for discharge.    I discussed with patient and/or family/caretaker that evaluation in the ED does not suggest any emergent or life threatening medical conditions requiring immediate intervention beyond what was provided in the ED, and I believe patient is safe for discharge.  Regardless, an unremarkable evaluation in the ED does not preclude the development or presence of a serious of life threatening condition. As such, patient was instructed to return immediately for any worsening or change in current symptoms.         Medical Decision Making:   Clinical Tests:   Lab Tests: Ordered and Reviewed  Radiological Study: Ordered  and Reviewed           ED Medication(s):  Medications   sodium chloride 0.9% bolus 500 mL (0 mLs Intravenous Stopped 2/5/20 2147)   0.9%  NaCl infusion (0 mLs Intravenous Stopped 2/5/20 2222)   predniSONE tablet 20 mg (20 mg Oral Given 2/5/20 1905)   HYDROcodone-acetaminophen  mg per tablet 1 tablet (1 tablet Oral Given 2/5/20 1905)   iohexol (OMNIPAQUE 350) injection 100 mL (100 mLs Intravenous Given 2/5/20 2048)   hydrOXYzine pamoate capsule 50 mg (50 mg Oral Given 2/5/20 2222)       Discharge Medication List as of 2/5/2020 10:07 PM      START taking these medications    Details   hydrOXYzine pamoate (VISTARIL) 25 MG Cap Take 1 -2 tablets by mouth every 6 hours as needed for nausea/vomiting/insomnia/anxiety, Print             Follow-up Information     Skyler Sanchez MD In 1 day.    Specialties:  Rheumatology, Internal Medicine  Why:  Return to emergency department for:  Severe headache, vomiting, nausea, numbness or weakness to 1 side, chest pain, chest pressure, or other concerns.  Contact information:  [address] [place] [id number] [phone]                       Scribe Attestation:   Scribe #1: I performed the above scribed service and the documentation accurately describes the services I performed. I attest to the accuracy of the note.     Attending:   Physician Attestation Statement for Scribe #1: I, Tania Blanton DO, personally performed the services described in this documentation, as scribed by Allyssa Rowe, in my presence, and it is both accurate and complete.           Clinical Impression       ICD-10-CM ICD-9-CM   1. Hypertension, unspecified type I10 401.9   2. Dyspnea R06.00 786.09   3. Headache R51 784.0   4. Anemia, unspecified type D64.9 285.9       Disposition:   Disposition: Discharged  Condition: Stable         Tania Blanton DO  02/06/20 0424

## 2020-02-05 NOTE — PROGRESS NOTES
Subjective:       Patient ID: Solange Daniel is a 44 y.o. female.    Chief Complaint: Headache and Abdominal Pain    Pt is a 44 year who is presenting with an array of symptoms from HA to pelvic pain to bradycardia with elevated BP and rash that has been going on for at least 2 weeks. Pt has chronic disease of DM well controlled, HTN usually well controlled and Lupus.     Review of Systems   Constitutional: Negative.    Respiratory: Negative.    Cardiovascular: Negative.    Gastrointestinal: Positive for abdominal pain.   Genitourinary: Positive for dysuria and urgency.   Neurological: Positive for dizziness and headaches.   Psychiatric/Behavioral: Negative.        Objective:      Physical Exam   Constitutional: She is oriented to person, place, and time. She appears well-developed and well-nourished.   Cardiovascular: Normal rate and regular rhythm. Exam reveals no friction rub.   No murmur heard.  Pulmonary/Chest: Effort normal and breath sounds normal. No stridor. She has no wheezes.   Abdominal: There is tenderness.   Neurological: She is alert and oriented to person, place, and time.   Skin: Skin is warm and dry.   Psychiatric: She has a normal mood and affect. Her behavior is normal.       Assessment:       1. Nonintractable headache, unspecified chronicity pattern, unspecified headache type    2. Type 2 diabetes mellitus without complication, with long-term current use of insulin    3. Leukocytoclastic vasculitis    4. Acute cystitis without hematuria        Plan:       Nonintractable headache, unspecified chronicity pattern, unspecified headache type  Comments:  Pressure has been up and down per pt. No changes in medications sugars are well controlled  Orders:  -     CBC auto differential; Future; Expected date: 02/05/2020  -     Comprehensive metabolic panel; Future; Expected date: 02/05/2020    Type 2 diabetes mellitus without complication, with long-term current use of insulin  Comments:  DM is well  controlled    Leukocytoclastic vasculitis  Comments:  Rash on skin unclear if this is origin    Acute cystitis without hematuria  Comments:  Possible bladder infection that has gone systemic  Orders:  -     Urinalysis; Future; Expected date: 02/05/2020  -     Urine culture; Future; Expected date: 02/05/2020    Given the array of symptoms and pt coexisting disorders recommend ER evaluation.

## 2020-02-06 PROBLEM — N30.00 ACUTE CYSTITIS WITHOUT HEMATURIA: Status: ACTIVE | Noted: 2020-02-06

## 2020-02-06 PROBLEM — M31.0 LEUKOCYTOCLASTIC VASCULITIS: Status: ACTIVE | Noted: 2020-02-06

## 2020-02-06 PROBLEM — R51.9 NONINTRACTABLE HEADACHE: Status: ACTIVE | Noted: 2020-02-06

## 2020-03-17 ENCOUNTER — TELEPHONE (OUTPATIENT)
Dept: INTERNAL MEDICINE | Facility: CLINIC | Age: 45
End: 2020-03-17

## 2020-03-18 NOTE — TELEPHONE ENCOUNTER
----- Message from Heaven Burks sent at 3/17/2020  3:02 PM CDT -----  Contact: self/[phone]  Type:  Patient Returning Call    Who Called:Solange Daniel  Who Left Message for Patient:nurse  Does the patient know what this is regarding?:lab results  Would the patient rather a call back or a response via [facility]? Call back   Best Call Back Number:[phone]  Additional Information: Patient is waiting on lab results to be fax over Dr Kennedy. Please call back at [phone]. Thanks/ar

## 2020-03-23 ENCOUNTER — OFFICE VISIT (OUTPATIENT)
Dept: INTERNAL MEDICINE | Facility: CLINIC | Age: 45
End: 2020-03-23
Payer: MEDICARE

## 2020-03-23 DIAGNOSIS — K21.9 GASTROESOPHAGEAL REFLUX DISEASE, ESOPHAGITIS PRESENCE NOT SPECIFIED: Primary | ICD-10-CM

## 2020-03-23 DIAGNOSIS — M32.9 SYSTEMIC LUPUS ERYTHEMATOSUS, UNSPECIFIED SLE TYPE, UNSPECIFIED ORGAN INVOLVEMENT STATUS: ICD-10-CM

## 2020-03-23 PROCEDURE — 99214 OFFICE O/P EST MOD 30 MIN: CPT | Mod: HCNC,95,, | Performed by: FAMILY MEDICINE

## 2020-03-23 PROCEDURE — 99214 PR OFFICE/OUTPT VISIT, EST, LEVL IV, 30-39 MIN: ICD-10-PCS | Mod: HCNC,95,, | Performed by: FAMILY MEDICINE

## 2020-03-23 RX ORDER — ESOMEPRAZOLE MAGNESIUM 40 MG/1
40 CAPSULE, DELAYED RELEASE ORAL
Qty: 90 CAPSULE | Refills: 1 | Status: SHIPPED | OUTPATIENT
Start: 2020-03-23 | End: 2020-06-09 | Stop reason: SDUPTHER

## 2020-03-23 RX ORDER — HYDROGEN PEROXIDE 3 %
SOLUTION, NON-ORAL MISCELLANEOUS
Qty: 90 CAPSULE | Refills: 1 | Status: SHIPPED | OUTPATIENT
Start: 2020-03-23 | End: 2020-11-10

## 2020-03-23 NOTE — TELEPHONE ENCOUNTER
I returned call in regards to chest tightness. I informed pt would could get her set up or a video visit. Pt stated she would like to be set up. I sent her the link to her mobile device and she will contact us once set up to schedule appointment. //BJ

## 2020-03-23 NOTE — TELEPHONE ENCOUNTER
----- Message from Smiley Bird sent at 3/23/2020  7:52 AM CDT -----  Contact: self  Type:  Needs Medical Advice    Who Called: Solange  Symptoms (please be specific): constant tightness in chest   How long has patient had these symptoms:  sunday  Pharmacy name and phone #:      [facility] - [facility] [address]  [address] [facility] [address]  Phone: [phone] Fax: [phone]    Would the patient rather a call back or a response via [facility]? call  Best Call Back Number: [phone]  Additional Information:

## 2020-03-24 ENCOUNTER — HOSPITAL ENCOUNTER (EMERGENCY)
Facility: HOSPITAL | Age: 45
Discharge: HOME | End: 2020-03-24
Attending: EMERGENCY MEDICINE
Payer: MEDICARE

## 2020-03-24 ENCOUNTER — TELEPHONE (OUTPATIENT)
Dept: INTERNAL MEDICINE | Facility: CLINIC | Age: 45
End: 2020-03-24

## 2020-03-24 VITALS
HEART RATE: 76 BPM | DIASTOLIC BLOOD PRESSURE: 74 MMHG | WEIGHT: 215.94 LBS | OXYGEN SATURATION: 99 % | SYSTOLIC BLOOD PRESSURE: 136 MMHG | TEMPERATURE: 98 F | BODY MASS INDEX: 32.83 KG/M2 | RESPIRATION RATE: 18 BRPM

## 2020-03-24 DIAGNOSIS — T38.0X5A IMMUNOSUPPRESSION DUE TO CHRONIC STEROID USE: ICD-10-CM

## 2020-03-24 DIAGNOSIS — R07.9 CHEST PAIN, UNSPECIFIED TYPE: Primary | ICD-10-CM

## 2020-03-24 DIAGNOSIS — D72.829 LEUKOCYTOSIS, UNSPECIFIED TYPE: ICD-10-CM

## 2020-03-24 DIAGNOSIS — Z79.52 IMMUNOSUPPRESSION DUE TO CHRONIC STEROID USE: ICD-10-CM

## 2020-03-24 DIAGNOSIS — D84.821 IMMUNOSUPPRESSION DUE TO CHRONIC STEROID USE: ICD-10-CM

## 2020-03-24 DIAGNOSIS — R11.2 NON-INTRACTABLE VOMITING WITH NAUSEA, UNSPECIFIED VOMITING TYPE: ICD-10-CM

## 2020-03-24 DIAGNOSIS — R07.9 CHEST PAIN: ICD-10-CM

## 2020-03-24 LAB
ALBUMIN SERPL BCP-MCNC: 4.3 G/DL (ref 3.5–5.2)
ALP SERPL-CCNC: 91 U/L (ref 55–135)
ALT SERPL W/O P-5'-P-CCNC: 16 U/L (ref 10–44)
ANION GAP SERPL CALC-SCNC: 13 MMOL/L (ref 8–16)
APTT BLDCRRT: <21 SEC (ref 21–32)
AST SERPL-CCNC: 23 U/L (ref 10–40)
BASOPHILS # BLD AUTO: 0.03 K/UL (ref 0–0.2)
BASOPHILS NFR BLD: 0.2 % (ref 0–1.9)
BILIRUB SERPL-MCNC: 0.3 MG/DL (ref 0.1–1)
BILIRUB UR QL STRIP: NEGATIVE
BUN SERPL-MCNC: 10 MG/DL (ref 6–20)
CALCIUM SERPL-MCNC: 10.2 MG/DL (ref 8.7–10.5)
CHLORIDE SERPL-SCNC: 99 MMOL/L (ref 95–110)
CLARITY UR: CLEAR
CO2 SERPL-SCNC: 24 MMOL/L (ref 23–29)
COLOR UR: YELLOW
CREAT SERPL-MCNC: 1 MG/DL (ref 0.5–1.4)
DIFFERENTIAL METHOD: ABNORMAL
EOSINOPHIL # BLD AUTO: 0.2 K/UL (ref 0–0.5)
EOSINOPHIL NFR BLD: 0.8 % (ref 0–8)
ERYTHROCYTE [DISTWIDTH] IN BLOOD BY AUTOMATED COUNT: 13.2 % (ref 11.5–14.5)
EST. GFR  (AFRICAN AMERICAN): >60 ML/MIN/1.73 M^2
EST. GFR  (NON AFRICAN AMERICAN): >60 ML/MIN/1.73 M^2
GLUCOSE SERPL-MCNC: 266 MG/DL (ref 70–110)
GLUCOSE UR QL STRIP: ABNORMAL
HCT VFR BLD AUTO: 31.4 % (ref 37–48.5)
HGB BLD-MCNC: 10.2 G/DL (ref 12–16)
HGB UR QL STRIP: NEGATIVE
IMM GRANULOCYTES # BLD AUTO: 0.09 K/UL (ref 0–0.04)
IMM GRANULOCYTES NFR BLD AUTO: 0.5 % (ref 0–0.5)
INR PPP: 1.1 (ref 0.8–1.2)
KETONES UR QL STRIP: NEGATIVE
LEUKOCYTE ESTERASE UR QL STRIP: NEGATIVE
LIPASE SERPL-CCNC: 14 U/L (ref 4–60)
LYMPHOCYTES # BLD AUTO: 1.2 K/UL (ref 1–4.8)
LYMPHOCYTES NFR BLD: 6 % (ref 18–48)
MCH RBC QN AUTO: 28.6 PG (ref 27–31)
MCHC RBC AUTO-ENTMCNC: 32.5 G/DL (ref 32–36)
MCV RBC AUTO: 88 FL (ref 82–98)
MONOCYTES # BLD AUTO: 0.9 K/UL (ref 0.3–1)
MONOCYTES NFR BLD: 4.4 % (ref 4–15)
NEUTROPHILS # BLD AUTO: 17.5 K/UL (ref 1.8–7.7)
NEUTROPHILS NFR BLD: 88.1 % (ref 38–73)
NITRITE UR QL STRIP: NEGATIVE
NRBC BLD-RTO: 0 /100 WBC
PH UR STRIP: 6 [PH] (ref 5–8)
PLATELET # BLD AUTO: 233 K/UL (ref 150–350)
PMV BLD AUTO: 11.4 FL (ref 9.2–12.9)
POTASSIUM SERPL-SCNC: 4.6 MMOL/L (ref 3.5–5.1)
PROT SERPL-MCNC: 8 G/DL (ref 6–8.4)
PROT UR QL STRIP: NEGATIVE
PROTHROMBIN TIME: 11.4 SEC (ref 9–12.5)
RBC # BLD AUTO: 3.57 M/UL (ref 4–5.4)
SODIUM SERPL-SCNC: 136 MMOL/L (ref 136–145)
SP GR UR STRIP: 1.01 (ref 1–1.03)
TROPONIN I SERPL DL<=0.01 NG/ML-MCNC: 0.01 NG/ML (ref 0–0.03)
URN SPEC COLLECT METH UR: ABNORMAL
UROBILINOGEN UR STRIP-ACNC: NEGATIVE EU/DL
WBC # BLD AUTO: 19.8 K/UL (ref 3.9–12.7)

## 2020-03-24 PROCEDURE — 93005 ELECTROCARDIOGRAM TRACING: CPT | Mod: HCNC

## 2020-03-24 PROCEDURE — 99285 EMERGENCY DEPT VISIT HI MDM: CPT | Mod: 25,HCNC

## 2020-03-24 PROCEDURE — 25000003 PHARM REV CODE 250: Mod: HCNC | Performed by: EMERGENCY MEDICINE

## 2020-03-24 PROCEDURE — 96374 THER/PROPH/DIAG INJ IV PUSH: CPT | Mod: HCNC

## 2020-03-24 PROCEDURE — C9113 INJ PANTOPRAZOLE SODIUM, VIA: HCPCS | Mod: HCNC | Performed by: EMERGENCY MEDICINE

## 2020-03-24 PROCEDURE — 93010 ELECTROCARDIOGRAM REPORT: CPT | Mod: HCNC,,, | Performed by: INTERNAL MEDICINE

## 2020-03-24 PROCEDURE — 80053 COMPREHEN METABOLIC PANEL: CPT | Mod: HCNC

## 2020-03-24 PROCEDURE — 85025 COMPLETE CBC W/AUTO DIFF WBC: CPT | Mod: HCNC

## 2020-03-24 PROCEDURE — 25500020 PHARM REV CODE 255: Mod: HCNC | Performed by: EMERGENCY MEDICINE

## 2020-03-24 PROCEDURE — 83690 ASSAY OF LIPASE: CPT | Mod: HCNC

## 2020-03-24 PROCEDURE — 36415 COLL VENOUS BLD VENIPUNCTURE: CPT | Mod: HCNC

## 2020-03-24 PROCEDURE — 93010 EKG 12-LEAD: ICD-10-PCS | Mod: HCNC,,, | Performed by: INTERNAL MEDICINE

## 2020-03-24 PROCEDURE — 85610 PROTHROMBIN TIME: CPT | Mod: HCNC

## 2020-03-24 PROCEDURE — 84484 ASSAY OF TROPONIN QUANT: CPT | Mod: HCNC

## 2020-03-24 PROCEDURE — 85730 THROMBOPLASTIN TIME PARTIAL: CPT | Mod: HCNC

## 2020-03-24 PROCEDURE — 81003 URINALYSIS AUTO W/O SCOPE: CPT | Mod: HCNC

## 2020-03-24 PROCEDURE — 63600175 PHARM REV CODE 636 W HCPCS: Mod: HCNC | Performed by: EMERGENCY MEDICINE

## 2020-03-24 RX ORDER — BACLOFEN 10 MG/1
TABLET ORAL
COMMUNITY
End: 2020-11-10

## 2020-03-24 RX ORDER — PANTOPRAZOLE SODIUM 40 MG/10ML
40 INJECTION, POWDER, LYOPHILIZED, FOR SOLUTION INTRAVENOUS
Status: COMPLETED | OUTPATIENT
Start: 2020-03-24 | End: 2020-03-24

## 2020-03-24 RX ORDER — METHOCARBAMOL 500 MG/1
TABLET, FILM COATED ORAL
COMMUNITY
End: 2020-03-24

## 2020-03-24 RX ORDER — BUPRENORPHINE HYDROCHLORIDE 300 UG/1
FILM, SOLUBLE BUCCAL
COMMUNITY
End: 2020-03-24

## 2020-03-24 RX ORDER — PENTOXIFYLLINE 400 MG/1
400 TABLET, EXTENDED RELEASE ORAL
COMMUNITY
Start: 2020-02-24 | End: 2022-05-12

## 2020-03-24 RX ORDER — BUPRENORPHINE HCL 450 MCG
FILM, MEDICATED (EA) BUCCAL
COMMUNITY
End: 2020-11-10

## 2020-03-24 RX ORDER — LEVOFLOXACIN 750 MG/1
750 TABLET ORAL DAILY
Qty: 6 TABLET | Refills: 0 | Status: SHIPPED | OUTPATIENT
Start: 2020-03-24 | End: 2020-03-30

## 2020-03-24 RX ORDER — LEVOFLOXACIN 750 MG/1
750 TABLET ORAL
Status: COMPLETED | OUTPATIENT
Start: 2020-03-24 | End: 2020-03-24

## 2020-03-24 RX ORDER — ONDANSETRON 8 MG/1
8 TABLET, ORALLY DISINTEGRATING ORAL
Status: COMPLETED | OUTPATIENT
Start: 2020-03-24 | End: 2020-03-24

## 2020-03-24 RX ADMIN — ONDANSETRON 8 MG: 8 TABLET, ORALLY DISINTEGRATING ORAL at 01:03

## 2020-03-24 RX ADMIN — IOHEXOL 75 ML: 350 INJECTION, SOLUTION INTRAVENOUS at 03:03

## 2020-03-24 RX ADMIN — ALUMINUM HYDROXIDE, MAGNESIUM HYDROXIDE, AND SIMETHICONE: 200; 200; 20 SUSPENSION ORAL at 01:03

## 2020-03-24 RX ADMIN — PANTOPRAZOLE SODIUM 40 MG: 40 INJECTION, POWDER, LYOPHILIZED, FOR SOLUTION INTRAVENOUS at 03:03

## 2020-03-24 RX ADMIN — LEVOFLOXACIN 750 MG: 750 TABLET, FILM COATED ORAL at 05:03

## 2020-03-24 NOTE — TELEPHONE ENCOUNTER
Pt states that she was prescribed nexium yesterday after video visit due to her thinking that she was having gas. Pt ended up going to ER due to symtpoms not going away and she started to have complications with breathing. She states that she did have blood work and urine done in the ER. She stated that her WBC was elevated. She stated that she was prescribed Levaquin after hospital stay. Pt states she would like to know if she needs to be testing for COVID-19. I informed pt I would get her message to Dr. Dewey and give her a call back with his response. //BJ

## 2020-03-24 NOTE — ED PROVIDER NOTES
SCRIBE #1 NOTE: I, Pilar Rivera, am scribing for, and in the presence of, Sanket Young MD. I have scribed the entire note.       History     Chief Complaint   Patient presents with    Chest Pain     Pt c/o sharp chest pain that radiates to back, worse w/ inspiration.      Review of patient's allergies indicates:   Allergen Reactions    Oxycodone Itching         History of Present Illness     HPI    3/24/2020, 12:58 AM  History obtained from the patient      History of Present Illness: Marilou Daniel is a 44 y.o. female patient with a PMHx of anemia, DM, HTN, and Lupus who presents to the Emergency Department for evaluation of nonpositional and nonexertional CP which onset gradually 3 days ago but worsened today. Pt believes sxs are related to gas because she's belching a lot. Symptoms are constant and moderate in severity. Sxs are exacerbated with inspiration. No mitigating factors reported. Associated sxs include SOB, which onset around 6PM today. She reports N/V ever since her pain management doctor increased her Belbuca 300 mcg to 450 mcg a few days ago. She was told n/v may be a side effect. Patient denies any fever, chills, diaphoresis, palpitations, extremity weakness/numbness, leg swelling, dizziness, abdominal pain, cough, and all other sxs at this time. Pt reports heart cath with no blockage about 7 months ago. Pt is on daily steroids. Recently increased steroid from 10 to 20 daily. No further complaints or concerns at this time.         Arrival mode: Personal vehicle      PCP: Abram Dewey MD        Past Medical History:  Past Medical History:   Diagnosis Date    Anemia     Arthritis     Connective tissue disease 1999    Diabetes mellitus     Gastroesophageal reflux disease 3/23/2020    Hypertension     Lupus 1999    Vasculitis        Past Surgical History:  Past Surgical History:   Procedure Laterality Date    ABLATION      RIGHT HEART CATHETERIZATION      TUBAL LIGATION       WRIST SURGERY Bilateral          Family History:  History reviewed. No pertinent family history.    Social History:  Social History     Tobacco Use    Smoking status: Never Smoker   Substance and Sexual Activity    Alcohol use: Never     Frequency: Never    Drug use: Never    Sexual activity: Unknown        Review of Systems     Review of Systems   Constitutional: Negative for chills, diaphoresis and fever.   HENT: Negative for sore throat.    Respiratory: Positive for shortness of breath. Negative for cough.    Cardiovascular: Positive for chest pain. Negative for palpitations and leg swelling.   Gastrointestinal: Positive for nausea and vomiting.   Genitourinary: Negative for dysuria.   Musculoskeletal: Negative for back pain.   Skin: Negative for rash.   Neurological: Negative for dizziness, weakness and numbness.   Hematological: Does not bruise/bleed easily.   All other systems reviewed and are negative.       Physical Exam     Initial Vitals [03/24/20 0044]   BP Pulse Resp Temp SpO2   (!) 173/83 92 (!) 22 98.5 °F (36.9 °C) 98 %      MAP       --          Physical Exam  Nursing Notes and Vital Signs Reviewed.   Constitutional: Patient is in no acute distress. Well-developed and well-nourished.  Head: Atraumatic. Normocephalic.  Eyes: PERRL. EOM intact. Conjunctivae are not pale. No scleral icterus.  ENT: Mucous membranes are moist. Oropharynx is clear and symmetric.    Neck: Supple. Full ROM. No lymphadenopathy.  Cardiovascular: Regular rate. Regular rhythm. No murmurs, rubs, or gallops. Distal pulses are 2+ and symmetric.  Pulmonary/Chest: No respiratory distress. Clear to auscultation bilaterally. No wheezing or rales.  Abdominal: Soft and non-distended.  There is no tenderness.  No rebound, guarding, or rigidity. Good bowel sounds.  Genitourinary: No CVA tenderness  Musculoskeletal: Moves all extremities. No obvious deformities. No edema. No calf tenderness.  Skin: Warm and dry.  Neurological:  Alert,  awake, and appropriate.  Normal speech.  No acute focal neurological deficits are appreciated.  Psychiatric: Normal affect. Good eye contact. Appropriate in content.     ED Course   Procedures  ED Vital Signs:  Vitals:    03/24/20 0044 03/24/20 0110 03/24/20 0202 03/24/20 0230   BP: (!) 173/83  (!) 146/80 (!) 143/86   Pulse: 92 90 89 89   Resp: (!) 22  18 17   Temp: 98.5 °F (36.9 °C)      TempSrc: Oral      SpO2: 98%  99% 96%   Weight: 98 kg (215 lb 15.1 oz)       03/24/20 0300 03/24/20 0330   BP: 110/62 123/72   Pulse: 78 96   Resp: 18 18   Temp:     TempSrc:     SpO2: 97% 97%   Weight:         Abnormal Lab Results:  Labs Reviewed   COMPREHENSIVE METABOLIC PANEL - Abnormal; Notable for the following components:       Result Value    Glucose 266 (*)     All other components within normal limits   CBC W/ AUTO DIFFERENTIAL - Abnormal; Notable for the following components:    WBC 19.80 (*)     RBC 3.57 (*)     Hemoglobin 10.2 (*)     Hematocrit 31.4 (*)     Gran # (ANC) 17.5 (*)     Immature Grans (Abs) 0.09 (*)     Gran% 88.1 (*)     Lymph% 6.0 (*)     All other components within normal limits   URINALYSIS, REFLEX TO URINE CULTURE - Abnormal; Notable for the following components:    Glucose, UA 2+ (*)     All other components within normal limits    Narrative:     Preferred Collection Type->Urine, Clean Catch   TROPONIN I   APTT   PROTIME-INR   LIPASE   LIPASE        All Lab Results:  Results for orders placed or performed during the hospital encounter of 03/24/20   Comprehensive metabolic panel   Result Value Ref Range    Sodium 136 136 - 145 mmol/L    Potassium 4.6 3.5 - 5.1 mmol/L    Chloride 99 95 - 110 mmol/L    CO2 24 23 - 29 mmol/L    Glucose 266 (H) 70 - 110 mg/dL    BUN, Bld 10 6 - 20 mg/dL    Creatinine 1.0 0.5 - 1.4 mg/dL    Calcium 10.2 8.7 - 10.5 mg/dL    Total Protein 8.0 6.0 - 8.4 g/dL    Albumin 4.3 3.5 - 5.2 g/dL    Total Bilirubin 0.3 0.1 - 1.0 mg/dL    Alkaline Phosphatase 91 55 - 135 U/L    AST 23  10 - 40 U/L    ALT 16 10 - 44 U/L    Anion Gap 13 8 - 16 mmol/L    eGFR if African American >60 >60 mL/min/1.73 m^2    eGFR if non African American >60 >60 mL/min/1.73 m^2   Troponin I   Result Value Ref Range    Troponin I 0.007 0.000 - 0.026 ng/mL   APTT   Result Value Ref Range    aPTT <21.0 21.0 - 32.0 sec   Protime-INR   Result Value Ref Range    Prothrombin Time 11.4 9.0 - 12.5 sec    INR 1.1 0.8 - 1.2   CBC auto differential   Result Value Ref Range    WBC 19.80 (H) 3.90 - 12.70 K/uL    RBC 3.57 (L) 4.00 - 5.40 M/uL    Hemoglobin 10.2 (L) 12.0 - 16.0 g/dL    Hematocrit 31.4 (L) 37.0 - 48.5 %    Mean Corpuscular Volume 88 82 - 98 fL    Mean Corpuscular Hemoglobin 28.6 27.0 - 31.0 pg    Mean Corpuscular Hemoglobin Conc 32.5 32.0 - 36.0 g/dL    RDW 13.2 11.5 - 14.5 %    Platelets 233 150 - 350 K/uL    MPV 11.4 9.2 - 12.9 fL    Immature Granulocytes 0.5 0.0 - 0.5 %    Gran # (ANC) 17.5 (H) 1.8 - 7.7 K/uL    Immature Grans (Abs) 0.09 (H) 0.00 - 0.04 K/uL    Lymph # 1.2 1.0 - 4.8 K/uL    Mono # 0.9 0.3 - 1.0 K/uL    Eos # 0.2 0.0 - 0.5 K/uL    Baso # 0.03 0.00 - 0.20 K/uL    nRBC 0 0 /100 WBC    Gran% 88.1 (H) 38.0 - 73.0 %    Lymph% 6.0 (L) 18.0 - 48.0 %    Mono% 4.4 4.0 - 15.0 %    Eosinophil% 0.8 0.0 - 8.0 %    Basophil% 0.2 0.0 - 1.9 %    Differential Method Automated    Lipase   Result Value Ref Range    Lipase 14 4 - 60 U/L   Urinalysis, Reflex to Urine Culture Urine, Clean Catch   Result Value Ref Range    Specimen UA Urine, Clean Catch     Color, UA Yellow Yellow, Straw, Shala    Appearance, UA Clear Clear    pH, UA 6.0 5.0 - 8.0    Specific Gravity, UA 1.010 1.005 - 1.030    Protein, UA Negative Negative    Glucose, UA 2+ (A) Negative    Ketones, UA Negative Negative    Bilirubin (UA) Negative Negative    Occult Blood UA Negative Negative    Nitrite, UA Negative Negative    Urobilinogen, UA Negative <2.0 EU/dL    Leukocytes, UA Negative Negative         Imaging Results:  Imaging Results          CT Abdomen  Pelvis With Contrast (In process)                X-Ray Chest AP Portable (Preliminary result)  Result time 03/24/20 04:43:24    ED Interpretation by Sanket Young MD (03/24/20 04:43:24, [facility] - , Emergency Medicine)    No acute cardiopulmonary process                             4:36 AM: Per STAT radiology, pt's CT results: Mild asymmetric prominence of the R ureter when compared to L, which may represent a recently passes stone. Correlate clinically.    The EKG was ordered, reviewed, and independently interpreted by the ED provider.  Interpretation time: 0105  Rate: 86 BPM  Rhythm: normal sinus rhythm  Interpretation: Normal axis. Normal intervals. No ST depression/elevation. No STEMI.    The Emergency Provider reviewed the vital signs and test results, which are outlined above.     ED Discussion       4:57 AM: Reassessed pt at this time.  Pt states her condition has improved at this time. Discussed with pt all pertinent ED information and results. Discussed pt dx and plan of tx. Gave pt all f/u and return to the ED instructions. All questions and concerns were addressed at this time. Pt expresses understanding of information and instructions, and is comfortable with plan to discharge. Pt is stable for discharge.    I discussed with patient and/or family/caretaker that evaluation in the ED does not suggest any emergent or life threatening medical conditions requiring immediate intervention beyond what was provided in the ED, and I believe patient is safe for discharge.  Regardless, an unremarkable evaluation in the ED does not preclude the development or presence of a serious of life threatening condition. As such, patient was instructed to return immediately for any worsening or change in current symptoms.         Medical Decision Making:   Clinical Tests:   Lab Tests: Ordered and Reviewed  Radiological Study: Ordered and Reviewed  Medical Tests: Ordered and Reviewed  44-year-old female whose  initial chief complaint was pleuritic chest pain with belching; labs revealed leukocytosis of 19; CT of the abdomen was performed given the leukocytosis and reports of vomiting; CT abdomen within normal limits; on re-examination and further discussion with patient, patient does state this is similar to how she felt in the past when she had pneumonia; she denies any cough or fever/chills, but states she did not have the symptoms the last time she had pneumonia; patient is immunosuppressed on daily steroids with a white count 19; no definitive infection including PNA identified in ED workup, but given her symptoms and immunosuppression, will place patient on trial of levofloxacin; vital signs stable; patient is stable for close follow-up with primary care physician; ED return precautions ready for any new or worsening symptoms           ED Medication(s):  Medications   levoFLOXacin tablet 750 mg (has no administration in time range)   ondansetron disintegrating tablet 8 mg (8 mg Oral Given 3/24/20 0127)   GI cocktail Modified (mylanta 30 mL and lidocaine 2% 10 mL) 40 mL ( Oral Given 3/24/20 0154)   pantoprazole injection 40 mg (40 mg Intravenous Given 3/24/20 0330)   iohexoL (OMNIPAQUE 350) injection 100 mL (75 mLs Intravenous Given 3/24/20 0352)       New Prescriptions    LEVOFLOXACIN (LEVAQUIN) 750 MG TABLET    Take 1 tablet (750 mg total) by mouth once daily. for 6 days       Follow-up Information     Abram Dewey MD In 3 days.    Specialty:  Family Medicine  Contact information:  [address]  [place] [address] [phone]             [facility]    Specialty:  Emergency Medicine  Why:  As needed, If symptoms worsen  Contact information:  [address] [address] [phone]                     Scribe Attestation:   Scribe #1: I performed the above scribed service and the documentation accurately describes the services I performed. I attest to  the accuracy of the note.     Attending:   Physician Attestation Statement for Scribe #1: I, Sanket Young MD, personally performed the services described in this documentation, as scribed by Pilar Rivera, in my presence, and it is both accurate and complete.           Clinical Impression       ICD-10-CM ICD-9-CM   1. Chest pain, unspecified type R07.9 786.50   2. Non-intractable vomiting with nausea, unspecified vomiting type R11.2 787.01   3. Leukocytosis, unspecified type D72.829 288.60   4. Immunosuppression due to chronic steroid use Z79.52 V58.69       Disposition:   Disposition: Discharged  Condition: Stable         Sanket Young MD  03/24/20 0553

## 2020-03-24 NOTE — TELEPHONE ENCOUNTER
----- Message from Tran Dumont sent at 3/24/2020 12:53 PM CDT -----  Contact: pt  .Type:  Needs Medical Advice    Who Called: ТАТЬЯНА GARCIA   Symptoms (please be specific):   How long has patient had these symptoms:    Pharmacy name and phone #:    Would the patient rather a call back or a response via [facility]? call  Best Call Back Number: [phone] (home)   Additional Information:   Pt is requesting a call back from the nurse in regards to the pt hospital stay this morning and the pt med please

## 2020-03-25 NOTE — TELEPHONE ENCOUNTER
I s/w pt informing her that Dr. Dewey stated with no fever she does not need testing to monitor symptoms and give us a call back to inform us how she's feeling. //BJ

## 2020-04-01 ENCOUNTER — TELEPHONE (OUTPATIENT)
Dept: INTERNAL MEDICINE | Facility: CLINIC | Age: 45
End: 2020-04-01

## 2020-04-01 NOTE — TELEPHONE ENCOUNTER
I returned pt's call in regards to getting letter stating that she has a compromised immune system so that she would be able to go in to the grocery store on Tuesdays and Thursdays from 7-8 a.m. I informed pt I would get message forward to Dr. Dewey and give her a return call. //BJ

## 2020-04-01 NOTE — TELEPHONE ENCOUNTER
----- Message from Colleen Lowry sent at 4/1/2020  1:38 PM CDT -----  Pt is requesting a call back from the doctor, pt has some question and concern's     Please call and advise              Thank you

## 2020-06-04 ENCOUNTER — TELEPHONE (OUTPATIENT)
Dept: INTERNAL MEDICINE | Facility: CLINIC | Age: 45
End: 2020-06-04

## 2020-06-04 NOTE — TELEPHONE ENCOUNTER
----- Message from Denise Larkin sent at 6/4/2020  9:56 AM CDT -----  Contact: pt  Pt request call back to verify if pt needs to come in office or schedule virtual appt  ... Call back: [phone] (qwsa)

## 2020-06-04 NOTE — TELEPHONE ENCOUNTER
----- Message from Denise Larkin sent at 6/4/2020  9:56 AM CDT -----  Contact: pt  Pt request call back to verify if pt needs to come in office or schedule virtual appt  ... Call back: [phone] (fftv)

## 2020-06-04 NOTE — TELEPHONE ENCOUNTER
----- Message from Daina Dowling sent at 6/4/2020 12:13 PM CDT -----  Contact: pt  Type:  Patient Returning Call    Who Called:pt  Who Left Message for Patient:nurse  Does the patient know what this is regarding?:  Would the patient rather a call back or a response via [facility]? Call back  Best Call Back Number:[phone]  Additional Information:

## 2020-06-05 DIAGNOSIS — Z20.822 CLOSE EXPOSURE TO COVID-19 VIRUS: Primary | ICD-10-CM

## 2020-06-09 ENCOUNTER — OFFICE VISIT (OUTPATIENT)
Dept: GASTROENTEROLOGY | Facility: CLINIC | Age: 45
End: 2020-06-09
Payer: MEDICARE

## 2020-06-09 ENCOUNTER — OFFICE VISIT (OUTPATIENT)
Dept: INTERNAL MEDICINE | Facility: CLINIC | Age: 45
End: 2020-06-09
Payer: MEDICARE

## 2020-06-09 ENCOUNTER — LAB VISIT (OUTPATIENT)
Dept: LAB | Facility: HOSPITAL | Age: 45
End: 2020-06-09
Attending: FAMILY MEDICINE
Payer: MEDICARE

## 2020-06-09 VITALS
DIASTOLIC BLOOD PRESSURE: 76 MMHG | HEIGHT: 68 IN | SYSTOLIC BLOOD PRESSURE: 120 MMHG | RESPIRATION RATE: 78 BRPM | WEIGHT: 213.38 LBS | BODY MASS INDEX: 32.34 KG/M2

## 2020-06-09 VITALS
HEIGHT: 68 IN | DIASTOLIC BLOOD PRESSURE: 68 MMHG | OXYGEN SATURATION: 98 % | BODY MASS INDEX: 32.48 KG/M2 | HEART RATE: 70 BPM | TEMPERATURE: 96 F | SYSTOLIC BLOOD PRESSURE: 118 MMHG | WEIGHT: 214.31 LBS

## 2020-06-09 DIAGNOSIS — M32.9 SYSTEMIC LUPUS ERYTHEMATOSUS, UNSPECIFIED SLE TYPE, UNSPECIFIED ORGAN INVOLVEMENT STATUS: ICD-10-CM

## 2020-06-09 DIAGNOSIS — E11.9 TYPE 2 DIABETES MELLITUS WITHOUT COMPLICATION: ICD-10-CM

## 2020-06-09 DIAGNOSIS — K21.9 GASTROESOPHAGEAL REFLUX DISEASE, ESOPHAGITIS PRESENCE NOT SPECIFIED: ICD-10-CM

## 2020-06-09 DIAGNOSIS — E11.9 TYPE 2 DIABETES MELLITUS WITHOUT COMPLICATION, WITH LONG-TERM CURRENT USE OF INSULIN: ICD-10-CM

## 2020-06-09 DIAGNOSIS — Z12.31 VISIT FOR SCREENING MAMMOGRAM: ICD-10-CM

## 2020-06-09 DIAGNOSIS — R07.89 ATYPICAL CHEST PAIN: ICD-10-CM

## 2020-06-09 DIAGNOSIS — R51.9 NONINTRACTABLE HEADACHE, UNSPECIFIED CHRONICITY PATTERN, UNSPECIFIED HEADACHE TYPE: ICD-10-CM

## 2020-06-09 DIAGNOSIS — Z20.822 CLOSE EXPOSURE TO COVID-19 VIRUS: ICD-10-CM

## 2020-06-09 DIAGNOSIS — I10 ESSENTIAL HYPERTENSION: Primary | ICD-10-CM

## 2020-06-09 DIAGNOSIS — Z12.11 COLON CANCER SCREENING: ICD-10-CM

## 2020-06-09 DIAGNOSIS — K21.9 GASTROESOPHAGEAL REFLUX DISEASE, ESOPHAGITIS PRESENCE NOT SPECIFIED: Primary | ICD-10-CM

## 2020-06-09 DIAGNOSIS — Z79.4 TYPE 2 DIABETES MELLITUS WITHOUT COMPLICATION, WITH LONG-TERM CURRENT USE OF INSULIN: ICD-10-CM

## 2020-06-09 LAB
ALBUMIN SERPL BCP-MCNC: 4 G/DL (ref 3.5–5.2)
ALP SERPL-CCNC: 104 U/L (ref 55–135)
ALT SERPL W/O P-5'-P-CCNC: 12 U/L (ref 10–44)
ANION GAP SERPL CALC-SCNC: 7 MMOL/L (ref 8–16)
AST SERPL-CCNC: 13 U/L (ref 10–40)
BASOPHILS # BLD AUTO: 0.02 K/UL (ref 0–0.2)
BASOPHILS NFR BLD: 0.3 % (ref 0–1.9)
BILIRUB SERPL-MCNC: 0.5 MG/DL (ref 0.1–1)
BUN SERPL-MCNC: 11 MG/DL (ref 6–20)
CALCIUM SERPL-MCNC: 9.4 MG/DL (ref 8.7–10.5)
CHLORIDE SERPL-SCNC: 105 MMOL/L (ref 95–110)
CHOLEST SERPL-MCNC: 145 MG/DL (ref 120–199)
CHOLEST/HDLC SERPL: 3.2 {RATIO} (ref 2–5)
CO2 SERPL-SCNC: 26 MMOL/L (ref 23–29)
CREAT SERPL-MCNC: 0.8 MG/DL (ref 0.5–1.4)
DIFFERENTIAL METHOD: ABNORMAL
EOSINOPHIL # BLD AUTO: 0.3 K/UL (ref 0–0.5)
EOSINOPHIL NFR BLD: 4.1 % (ref 0–8)
ERYTHROCYTE [DISTWIDTH] IN BLOOD BY AUTOMATED COUNT: 12.7 % (ref 11.5–14.5)
EST. GFR  (AFRICAN AMERICAN): >60 ML/MIN/1.73 M^2
EST. GFR  (NON AFRICAN AMERICAN): >60 ML/MIN/1.73 M^2
GLUCOSE SERPL-MCNC: 205 MG/DL (ref 70–110)
HCT VFR BLD AUTO: 34.3 % (ref 37–48.5)
HDLC SERPL-MCNC: 45 MG/DL (ref 40–75)
HDLC SERPL: 31 % (ref 20–50)
HGB BLD-MCNC: 10.5 G/DL (ref 12–16)
IMM GRANULOCYTES # BLD AUTO: 0.01 K/UL (ref 0–0.04)
IMM GRANULOCYTES NFR BLD AUTO: 0.1 % (ref 0–0.5)
LDLC SERPL CALC-MCNC: 87.4 MG/DL (ref 63–159)
LYMPHOCYTES # BLD AUTO: 2.5 K/UL (ref 1–4.8)
LYMPHOCYTES NFR BLD: 31.5 % (ref 18–48)
MCH RBC QN AUTO: 27.9 PG (ref 27–31)
MCHC RBC AUTO-ENTMCNC: 30.6 G/DL (ref 32–36)
MCV RBC AUTO: 91 FL (ref 82–98)
MONOCYTES # BLD AUTO: 0.5 K/UL (ref 0.3–1)
MONOCYTES NFR BLD: 6.8 % (ref 4–15)
NEUTROPHILS # BLD AUTO: 4.5 K/UL (ref 1.8–7.7)
NEUTROPHILS NFR BLD: 57.2 % (ref 38–73)
NONHDLC SERPL-MCNC: 100 MG/DL
NRBC BLD-RTO: 0 /100 WBC
PLATELET # BLD AUTO: 253 K/UL (ref 150–350)
PMV BLD AUTO: 12.4 FL (ref 9.2–12.9)
POTASSIUM SERPL-SCNC: 3.9 MMOL/L (ref 3.5–5.1)
PROT SERPL-MCNC: 7.3 G/DL (ref 6–8.4)
RBC # BLD AUTO: 3.77 M/UL (ref 4–5.4)
SARS-COV-2 IGG SERPLBLD QL IA.RAPID: NEGATIVE
SODIUM SERPL-SCNC: 138 MMOL/L (ref 136–145)
TRIGL SERPL-MCNC: 63 MG/DL (ref 30–150)
WBC # BLD AUTO: 7.78 K/UL (ref 3.9–12.7)

## 2020-06-09 PROCEDURE — 3074F PR MOST RECENT SYSTOLIC BLOOD PRESSURE < 130 MM HG: ICD-10-PCS | Mod: HCNC,CPTII,S$GLB, | Performed by: NURSE PRACTITIONER

## 2020-06-09 PROCEDURE — 83036 HEMOGLOBIN GLYCOSYLATED A1C: CPT | Mod: HCNC

## 2020-06-09 PROCEDURE — 3074F SYST BP LT 130 MM HG: CPT | Mod: HCNC,CPTII,S$GLB, | Performed by: FAMILY MEDICINE

## 2020-06-09 PROCEDURE — 3078F PR MOST RECENT DIASTOLIC BLOOD PRESSURE < 80 MM HG: ICD-10-PCS | Mod: HCNC,CPTII,S$GLB, | Performed by: FAMILY MEDICINE

## 2020-06-09 PROCEDURE — 99499 RISK ADDL DX/OHS AUDIT: ICD-10-PCS | Mod: HCNC,S$GLB,, | Performed by: NURSE PRACTITIONER

## 2020-06-09 PROCEDURE — 3074F SYST BP LT 130 MM HG: CPT | Mod: HCNC,CPTII,S$GLB, | Performed by: NURSE PRACTITIONER

## 2020-06-09 PROCEDURE — 99214 PR OFFICE/OUTPT VISIT, EST, LEVL IV, 30-39 MIN: ICD-10-PCS | Mod: HCNC,S$GLB,, | Performed by: FAMILY MEDICINE

## 2020-06-09 PROCEDURE — 3078F DIAST BP <80 MM HG: CPT | Mod: HCNC,CPTII,S$GLB, | Performed by: NURSE PRACTITIONER

## 2020-06-09 PROCEDURE — 3044F PR MOST RECENT HEMOGLOBIN A1C LEVEL <7.0%: ICD-10-PCS | Mod: HCNC,CPTII,S$GLB, | Performed by: FAMILY MEDICINE

## 2020-06-09 PROCEDURE — 3008F PR BODY MASS INDEX (BMI) DOCUMENTED: ICD-10-PCS | Mod: HCNC,CPTII,S$GLB, | Performed by: FAMILY MEDICINE

## 2020-06-09 PROCEDURE — 99999 PR PBB SHADOW E&M-EST. PATIENT-LVL III: ICD-10-PCS | Mod: PBBFAC,HCNC,, | Performed by: FAMILY MEDICINE

## 2020-06-09 PROCEDURE — 99499 UNLISTED E&M SERVICE: CPT | Mod: HCNC,S$GLB,, | Performed by: NURSE PRACTITIONER

## 2020-06-09 PROCEDURE — 3008F PR BODY MASS INDEX (BMI) DOCUMENTED: ICD-10-PCS | Mod: HCNC,CPTII,S$GLB, | Performed by: NURSE PRACTITIONER

## 2020-06-09 PROCEDURE — 99204 PR OFFICE/OUTPT VISIT, NEW, LEVL IV, 45-59 MIN: ICD-10-PCS | Mod: HCNC,S$GLB,, | Performed by: NURSE PRACTITIONER

## 2020-06-09 PROCEDURE — 86769 SARS-COV-2 COVID-19 ANTIBODY: CPT | Mod: HCNC

## 2020-06-09 PROCEDURE — 99499 RISK ADDL DX/OHS AUDIT: ICD-10-PCS | Mod: HCNC,S$GLB,, | Performed by: FAMILY MEDICINE

## 2020-06-09 PROCEDURE — 3008F BODY MASS INDEX DOCD: CPT | Mod: HCNC,CPTII,S$GLB, | Performed by: FAMILY MEDICINE

## 2020-06-09 PROCEDURE — 99204 OFFICE O/P NEW MOD 45 MIN: CPT | Mod: HCNC,S$GLB,, | Performed by: NURSE PRACTITIONER

## 2020-06-09 PROCEDURE — 99499 UNLISTED E&M SERVICE: CPT | Mod: HCNC,S$GLB,, | Performed by: FAMILY MEDICINE

## 2020-06-09 PROCEDURE — 99999 PR PBB SHADOW E&M-EST. PATIENT-LVL III: CPT | Mod: PBBFAC,HCNC,, | Performed by: FAMILY MEDICINE

## 2020-06-09 PROCEDURE — 3074F PR MOST RECENT SYSTOLIC BLOOD PRESSURE < 130 MM HG: ICD-10-PCS | Mod: HCNC,CPTII,S$GLB, | Performed by: FAMILY MEDICINE

## 2020-06-09 PROCEDURE — 99214 OFFICE O/P EST MOD 30 MIN: CPT | Mod: HCNC,S$GLB,, | Performed by: FAMILY MEDICINE

## 2020-06-09 PROCEDURE — 3078F PR MOST RECENT DIASTOLIC BLOOD PRESSURE < 80 MM HG: ICD-10-PCS | Mod: HCNC,CPTII,S$GLB, | Performed by: NURSE PRACTITIONER

## 2020-06-09 PROCEDURE — 80061 LIPID PANEL: CPT | Mod: HCNC

## 2020-06-09 PROCEDURE — 3044F HG A1C LEVEL LT 7.0%: CPT | Mod: HCNC,CPTII,S$GLB, | Performed by: FAMILY MEDICINE

## 2020-06-09 PROCEDURE — 3078F DIAST BP <80 MM HG: CPT | Mod: HCNC,CPTII,S$GLB, | Performed by: FAMILY MEDICINE

## 2020-06-09 PROCEDURE — 99999 PR PBB SHADOW E&M-EST. PATIENT-LVL III: ICD-10-PCS | Mod: PBBFAC,HCNC,, | Performed by: NURSE PRACTITIONER

## 2020-06-09 PROCEDURE — 80053 COMPREHEN METABOLIC PANEL: CPT | Mod: HCNC

## 2020-06-09 PROCEDURE — 36415 COLL VENOUS BLD VENIPUNCTURE: CPT | Mod: HCNC

## 2020-06-09 PROCEDURE — 85025 COMPLETE CBC W/AUTO DIFF WBC: CPT | Mod: HCNC

## 2020-06-09 PROCEDURE — 99999 PR PBB SHADOW E&M-EST. PATIENT-LVL III: CPT | Mod: PBBFAC,HCNC,, | Performed by: NURSE PRACTITIONER

## 2020-06-09 PROCEDURE — 3008F BODY MASS INDEX DOCD: CPT | Mod: HCNC,CPTII,S$GLB, | Performed by: NURSE PRACTITIONER

## 2020-06-09 RX ORDER — ESOMEPRAZOLE MAGNESIUM 40 MG/1
40 CAPSULE, DELAYED RELEASE ORAL
Qty: 90 CAPSULE | Refills: 1 | Status: SHIPPED | OUTPATIENT
Start: 2020-06-09 | End: 2020-10-26

## 2020-06-09 RX ORDER — SODIUM, POTASSIUM,MAG SULFATES 17.5-3.13G
SOLUTION, RECONSTITUTED, ORAL ORAL
Qty: 354 ML | Refills: 0 | Status: ON HOLD | OUTPATIENT
Start: 2020-06-09 | End: 2020-06-24 | Stop reason: HOSPADM

## 2020-06-09 NOTE — H&P (VIEW-ONLY)
Clinic Consult:  [facility] Consultation Note    Reason for Consult:  The primary encounter diagnosis was Gastroesophageal reflux disease, esophagitis presence not specified. Diagnoses of Atypical chest pain and Colon cancer screening were also pertinent to this visit.    PCP: Abram Dewey   [id number] [facility] / [place] [id number]    HPI:  This is a 44 y.o. female here for evaluation of the above. She presents to clinic with long history of GERD. Previously well controlled on Nexium. She had actually been out of Nexium for a while and became symptomatic. She was placed on Nexium 20 mg daily but patient was taking 40 mg daily as the lower dose was not helping. She reports improvements on daily Nexium 40 mg but she does occasionally get breakthrough symptoms. This occurs when she misses a dose or when she is eating late. She was seen in the ER in March for chest pain which was improved by GI cocktail. She denies any dysphagia, hematochezia,or melena. She has never had an EGD before. She is also due for age related colon cancer screening. She is average risk. No family history of colon cancer.     Review of Systems   Constitutional: Negative for fever, malaise/fatigue and weight loss.   HENT: Negative for sore throat.    Respiratory: Negative for cough and wheezing.    Cardiovascular: Positive for chest pain. Negative for palpitations.   Gastrointestinal: Positive for heartburn. Negative for abdominal pain, blood in stool, constipation, diarrhea, melena, nausea and vomiting.   Genitourinary: Negative for dysuria and frequency.   Musculoskeletal: Negative for back pain, joint pain, myalgias and neck pain.   Skin: Negative for itching and rash.   Neurological: Negative for dizziness, speech change, seizures, loss of consciousness and headaches.   Psychiatric/Behavioral: Negative for depression and substance abuse. The patient is not nervous/anxious.        Medical History:  has a past medical history  of Anemia, Arthritis, Connective tissue disease (1999), Diabetes mellitus, Gastroesophageal reflux disease (3/23/2020), Hypertension, Lupus (1999), and Vasculitis.    Surgical History:  has a past surgical history that includes Wrist surgery (Bilateral); Tubal ligation; Ablation; and Right heart catheterization.    Family History: family history is not on file..     Social History:  reports that she has never smoked. She does not have any smokeless tobacco history on file. She reports that she does not drink alcohol or use drugs.    Allergies: Reviewed    Home Medications:   Current Outpatient Medications on File Prior to Visit   Medication Sig Dispense Refill    amLODIPine (NORVASC) 10 MG tablet Take 10 mg by mouth once daily.  11    baclofen (LIORESAL) 10 MG tablet baclofen 10 mg tablet   Take 1 tablet 3 times a day by oral route as needed for 30 days.      blood sugar diagnostic (TRUE METRIX GLUCOSE TEST STRIP) Strp test AS DIRECTED 3 TIMES A  strip 0    cloNIDine (CATAPRES) 0.1 MG tablet Take 1 tablet (0.1 mg total) by mouth 3 (three) times daily. 90 tablet 1    dapsone 25 MG Tab Take 75 mg by mouth.       esomeprazole (NEXIUM) 40 MG capsule Take 1 capsule (40 mg total) by mouth before breakfast. 90 capsule 1    gabapentin (NEURONTIN) 800 MG tablet Neurontin 800 mg tablet   Take 1 tablet 3 times a day by oral route.      HYDROcodone-acetaminophen (NORCO)  mg per tablet Norco 10 mg-325 mg tablet   Take 1 tablet 3 times a day by oral route as needed.      hydrocortisone 2.5 % ointment APPLY TOPICALLY TO FACE TWICE A DAY AS NEEDED FOR 1 TO 2 WEEKS AT A TIME      hydroxychloroquine (PLAQUENIL) 200 mg tablet Take 200 mg by mouth.      hydrOXYzine pamoate (VISTARIL) 25 MG Cap Take 1 -2 tablets by mouth every 6 hours as needed for nausea/vomiting/insomnia/anxiety 25 capsule 0    insulin asp prt-insulin aspart, NOVOLOG 70/30, (NOVOLOG MIX 70-30 U-100 INSULN) 100 unit/mL (70-30) Soln Inject 15  "Units into the skin 2 (two) times daily. 10 Units 2    LINZESS 145 mcg Cap capsule TAKE 1 CAPSULE BY MOUTH DAILY MD SAID MAKE APPOINTMENT  0    olmesartan (BENICAR) 40 MG tablet Take 1 tablet (40 mg total) by mouth once daily. 90 tablet 3    pentoxifylline (TRENTAL) 400 mg TbSR Take 400 mg by mouth.      predniSONE (DELTASONE) 10 MG tablet Take 10 mg by mouth once daily. Scale from 40 to 10 when having outbreak      triamcinolone acetonide 0.1% (KENALOG) 0.1 % ointment APPLY TOPICALLY TWICE A DAY FOR 1 TO 2 WEEKS DO NOT APPLY TO FACE OR GROIN      zolpidem (AMBIEN) 10 mg Tab Ambien 10 mg tablet   Take 1 tablet every day by oral route at bedtime.      buprenorphine HCL (BELBUCA) 450 mcg Film Belbuca 450 mcg buccal film   Place 1 film twice a day by buccal route.      carisoprodol (SOMA) 350 MG tablet Take 1 tablet(s) 3 times a day by oral route as needed.  0    esomeprazole (NEXIUM) 20 MG capsule TAKE 1 CAPSULE BY MOUTH DAILY MD SAID MAKE APPOINTMENT (Patient not taking: Reported on 6/9/2020) 90 capsule 1    [DISCONTINUED] esomeprazole (NEXIUM) 40 MG capsule Take 1 capsule (40 mg total) by mouth before breakfast. 90 capsule 1     No current facility-administered medications on file prior to visit.        Physical Exam:  /76   Resp (!) 78   Ht 5' 8" (1.727 m)   Wt 96.8 kg (213 lb 6.5 oz)   BMI 32.45 kg/m²   Body mass index is 32.45 kg/m².  Physical Exam   Constitutional: She is oriented to person, place, and time and well-developed, well-nourished, and in no distress. No distress.   HENT:   Head: Normocephalic.   Eyes: Pupils are equal, round, and reactive to light. Conjunctivae are normal.   Cardiovascular: Normal rate, regular rhythm and normal heart sounds.   Pulmonary/Chest: Effort normal and breath sounds normal. No respiratory distress.   Abdominal: Soft. Bowel sounds are normal. She exhibits no distension. There is no tenderness.   Neurological: She is alert and oriented to person, place, " and time. No cranial nerve deficit.   Skin: Skin is warm and dry. No rash noted.   Psychiatric: Mood and affect normal.       Labs: Pertinent labs reviewed.  CRC Screening: due    Assessment and Plan:   Gastroesophageal reflux disease, esophagitis presence not specified  Atypical chest pain  - patient with long history of GERD with worsening symptoms with breakthrough symptoms and development of chest pain.   - plan for EGD for further evaluation.   -     Case request GI: ESOPHAGOGASTRODUODENOSCOPY (EGD), COLONOSCOPY  -     COVID-19 Routine Screening; Future; Expected date: 06/09/2020  -     SUPREP BOWEL PREP KIT 17.5-3.13-1.6 gram SolR; Use as directed  Dispense: 354 mL; Refill: 0    Colon cancer screening  - plan for screening colonoscopy at time of EGD.   -     Case request GI: ESOPHAGOGASTRODUODENOSCOPY (EGD), COLONOSCOPY  -     COVID-19 Routine Screening; Future; Expected date: 06/09/2020  -     SUPREP BOWEL PREP KIT 17.5-3.13-1.6 gram SolR; Use as directed  Dispense: 354 mL; Refill: 0    Follow up to be determined after procedure.    Thank you so much for allowing me to participate in the care of AGNES Hermosillo

## 2020-06-09 NOTE — PROGRESS NOTES
Subjective:       Patient ID: Marilou Daniel is a 44 y.o. female.    Chief Complaint: Follow-up    Pt is a 44 year old who is here to follow-up. Pt has DM that is well controlled. Pt HTN is well controlled. Pt has Lupus and is seen by Rheumatology.    Review of Systems   Constitutional: Negative.    Respiratory: Negative.    Cardiovascular: Negative.    Musculoskeletal: Negative.    Psychiatric/Behavioral: Negative.        Objective:      Physical Exam   Constitutional: She appears well-developed and well-nourished.   Cardiovascular: Normal rate and regular rhythm.   Pulmonary/Chest: Effort normal and breath sounds normal. No stridor. She has no wheezes.   Psychiatric: She has a normal mood and affect. Her behavior is normal.       Assessment:       1. Essential hypertension    2. Type 2 diabetes mellitus without complication, with long-term current use of insulin    3. Systemic lupus erythematosus, unspecified SLE type, unspecified organ involvement status    4. Gastroesophageal reflux disease, esophagitis presence not specified    5. Visit for screening mammogram       Plan:       Essential hypertension  Comments:  BP is well controlled    Type 2 diabetes mellitus without complication, with long-term current use of insulin  Comments:  Will continue with current meds and do HgA1c  Orders:  -     Hemoglobin A1C; Future; Expected date: 06/09/2020  -     Microalbumin/creatinine urine ratio; Future; Expected date: 06/09/2020    Systemic lupus erythematosus, unspecified SLE type, unspecified organ involvement status  Comments:  Stable    Gastroesophageal reflux disease, esophagitis presence not specified  Comments:  Will send pt to GI and continue on the GERD  Orders:  -     Ambulatory referral/consult to Gastroenterology; Future; Expected date: 06/16/2020    Visit for screening mammogram  Comments:  Will continue to do mammogram  Orders:  -     Mammo Digital Screening Bilat; Future; Expected date:  06/09/2020    Other orders  -     esomeprazole (NEXIUM) 40 MG capsule; Take 1 capsule (40 mg total) by mouth before breakfast.  Dispense: 90 capsule; Refill: 1

## 2020-06-09 NOTE — LETTER
June 9, 2020      Abram Dewey MD  [address] [place] [address]           [facility]  [address] [facility] [place] [address]  Phone: [phone]  Fax: [phone]          Patient: Marilou Daniel   MR Number: [id number]   YOB: 1975   Date of Visit: 6/9/2020       Dear Dr. Abram Dewey:    Thank you for referring Marilou Daniel to me for evaluation. Attached you will find relevant portions of my assessment and plan of care.    If you have questions, please do not hesitate to call me. I look forward to following Marilou Daniel along with you.    Sincerely,    Ly Jack, NP    Enclosure  CC:  No Recipients    If you would like to receive this communication electronically, please contact [email or web address] or [phone] to request more information on [employer] Link access.    For providers and/or their staff who would like to refer a patient to [facility], please contact us through our one-stop-shop provider referral line, [facility] , at [phone].    If you feel you have received this communication in error or would no longer like to receive these types of communications, please e-mail [email or web address]

## 2020-06-10 LAB
ESTIMATED AVG GLUCOSE: 157 MG/DL (ref 68–131)
HBA1C MFR BLD HPLC: 7.1 % (ref 4–5.6)

## 2020-06-21 ENCOUNTER — LAB VISIT (OUTPATIENT)
Dept: OTOLARYNGOLOGY | Facility: CLINIC | Age: 45
End: 2020-06-21
Payer: MEDICARE

## 2020-06-21 DIAGNOSIS — R07.89 ATYPICAL CHEST PAIN: ICD-10-CM

## 2020-06-21 DIAGNOSIS — Z12.11 COLON CANCER SCREENING: ICD-10-CM

## 2020-06-21 DIAGNOSIS — K21.9 GASTROESOPHAGEAL REFLUX DISEASE, ESOPHAGITIS PRESENCE NOT SPECIFIED: ICD-10-CM

## 2020-06-21 PROCEDURE — U0003 INFECTIOUS AGENT DETECTION BY NUCLEIC ACID (DNA OR RNA); SEVERE ACUTE RESPIRATORY SYNDROME CORONAVIRUS 2 (SARS-COV-2) (CORONAVIRUS DISEASE [COVID-19]), AMPLIFIED PROBE TECHNIQUE, MAKING USE OF HIGH THROUGHPUT TECHNOLOGIES AS DESCRIBED BY CMS-2020-01-R: HCPCS | Mod: HCNC

## 2020-06-22 ENCOUNTER — HOSPITAL ENCOUNTER (OUTPATIENT)
Dept: RADIOLOGY | Facility: HOSPITAL | Age: 45
Discharge: HOME | End: 2020-06-22
Attending: FAMILY MEDICINE
Payer: MEDICARE

## 2020-06-22 VITALS — BODY MASS INDEX: 32.34 KG/M2 | WEIGHT: 213.38 LBS | HEIGHT: 68 IN

## 2020-06-22 DIAGNOSIS — Z12.31 VISIT FOR SCREENING MAMMOGRAM: ICD-10-CM

## 2020-06-22 LAB — SARS-COV-2 RNA RESP QL NAA+PROBE: NOT DETECTED

## 2020-06-22 PROCEDURE — 77063 MAMMO DIGITAL SCREENING BILAT WITH TOMOSYNTHESIS_CAD: ICD-10-PCS | Mod: 26,HCNC,, | Performed by: RADIOLOGY

## 2020-06-22 PROCEDURE — 77067 MAMMO DIGITAL SCREENING BILAT WITH TOMOSYNTHESIS_CAD: ICD-10-PCS | Mod: 26,HCNC,, | Performed by: RADIOLOGY

## 2020-06-22 PROCEDURE — 77067 SCR MAMMO BI INCL CAD: CPT | Mod: 26,HCNC,, | Performed by: RADIOLOGY

## 2020-06-22 PROCEDURE — 77067 SCR MAMMO BI INCL CAD: CPT | Mod: TC,HCNC

## 2020-06-22 PROCEDURE — 77063 BREAST TOMOSYNTHESIS BI: CPT | Mod: 26,HCNC,, | Performed by: RADIOLOGY

## 2020-06-23 ENCOUNTER — ANESTHESIA EVENT (OUTPATIENT)
Dept: ENDOSCOPY | Facility: HOSPITAL | Age: 45
End: 2020-06-23
Payer: MEDICARE

## 2020-06-23 NOTE — ANESTHESIA PREPROCEDURE EVALUATION
06/23/2020  Marilou Daniel is a 44 y.o., female.    Anesthesia Evaluation    I have reviewed the Patient Summary Reports.    I have reviewed the Nursing Notes. I have reviewed the NPO Status.   I have reviewed the Medications.     Review of Systems  Anesthesia Hx:  No problems with previous Anesthesia  Denies Family Hx of Anesthesia complications.   Denies Personal Hx of Anesthesia complications.   Social:  Non-Smoker    Hematology/Oncology:  Hematology Normal        Cardiovascular:   Hypertension ECG has been reviewed.    Pulmonary:  Pulmonary Normal    Renal/:  Renal/ Normal     Hepatic/GI:   GERD    Musculoskeletal:   SLE.  Connective tissue disorder with vasculitis.   Neurological:   Headaches    Endocrine:   Diabetes    Psych:  Psychiatric Normal           Physical Exam  General:  Obesity    Airway/Jaw/Neck:  Airway Findings: Mouth Opening: Normal Tongue: Normal  General Airway Assessment: Adult  Mallampati: II  TM Distance: Normal, at least 6 cm  Jaw/Neck Findings:  Neck ROM: Normal ROM  Neck Findings:     Eyes/Ears/Nose:  Eyes/Ears/Nose Findings:    Dental:  Dental Findings: In tact   Chest/Lungs:  Chest/Lungs Findings: Clear to auscultation, Normal Respiratory Rate     Heart/Vascular:  Heart Findings: Rate: Normal  Rhythm: Regular Rhythm  Sounds: Normal  Heart murmur: negative Vascular Findings: Normal    Abdomen:  Abdomen Findings: Normal    Musculoskeletal:  Musculoskeletal Findings: Normal   Skin:  Skin Findings: Normal    Mental Status:  Mental Status Findings:  Alert and Oriented         Anesthesia Plan  Type of Anesthesia, risks & benefits discussed:  Anesthesia Type:  general  Patient's Preference:   Intra-op Monitoring Plan: standard ASA monitors  Intra-op Monitoring Plan Comments:   Post Op Pain Control Plan: per primary service following discharge from PACU  Post Op Pain Control  Plan Comments:   Induction:   IV  Beta Blocker:  Patient is not currently on a Beta-Blocker (No further documentation required).       Informed Consent: Patient understands risks and agrees with Anesthesia plan.  Questions answered. Anesthesia consent signed with patient.  ASA Score: 2     Day of Surgery Review of History & Physical:    H&P update referred to the surgeon.         Ready For Surgery From Anesthesia Perspective.

## 2020-06-23 NOTE — PRE-PROCEDURE INSTRUCTIONS
PAT call completed and patient educated on the bowel prep, clear liquid diet and procedure instructions. Medical history discussed and patient informed of arrival times 0830 and 2nd prep dose 0500. Pt will be accompanied by her  and is made aware of limited-visitor policy, and is made aware that  is to remain during entire visit. All questions and concerns addressed. Bowel prep and instructions rev'd. Informed to take AM medications of olmesartan. Patient informed me that she takes clonidine and amlodipine prn. I instructed her to bring these medications in with her. NPO after 2nd bowel prep. Patient verbalizes understanding of teaching and all instructions. Instructed to monitor blood sugars frequently; will only dose per sliding scale as needed (as ordered by MD). Pre-procedure Covid testing complete.

## 2020-06-24 ENCOUNTER — HOSPITAL ENCOUNTER (OUTPATIENT)
Facility: HOSPITAL | Age: 45
Discharge: HOME | End: 2020-06-24
Attending: INTERNAL MEDICINE | Admitting: INTERNAL MEDICINE
Payer: MEDICARE

## 2020-06-24 ENCOUNTER — ANESTHESIA (OUTPATIENT)
Dept: ENDOSCOPY | Facility: HOSPITAL | Age: 45
End: 2020-06-24
Payer: MEDICARE

## 2020-06-24 VITALS
RESPIRATION RATE: 17 BRPM | OXYGEN SATURATION: 99 % | HEART RATE: 67 BPM | WEIGHT: 209.13 LBS | BODY MASS INDEX: 31.79 KG/M2 | DIASTOLIC BLOOD PRESSURE: 79 MMHG | TEMPERATURE: 98 F | SYSTOLIC BLOOD PRESSURE: 122 MMHG

## 2020-06-24 DIAGNOSIS — K21.9 GASTROESOPHAGEAL REFLUX DISEASE WITHOUT ESOPHAGITIS: Primary | ICD-10-CM

## 2020-06-24 DIAGNOSIS — Z12.11 ENCOUNTER FOR SCREENING COLONOSCOPY: ICD-10-CM

## 2020-06-24 LAB — POCT GLUCOSE: 159 MG/DL (ref 70–110)

## 2020-06-24 PROCEDURE — 82962 GLUCOSE BLOOD TEST: CPT | Mod: HCNC | Performed by: INTERNAL MEDICINE

## 2020-06-24 PROCEDURE — 43239 PR EGD, FLEX, W/BIOPSY, SGL/MULTI: ICD-10-PCS | Mod: 51,HCNC,, | Performed by: INTERNAL MEDICINE

## 2020-06-24 PROCEDURE — D9220A PRA ANESTHESIA: Mod: PT,HCNC,CRNA, | Performed by: NURSE ANESTHETIST, CERTIFIED REGISTERED

## 2020-06-24 PROCEDURE — D9220A PRA ANESTHESIA: Mod: PT,HCNC,ANES, | Performed by: ANESTHESIOLOGY

## 2020-06-24 PROCEDURE — 37000009 HC ANESTHESIA EA ADD 15 MINS: Mod: HCNC | Performed by: INTERNAL MEDICINE

## 2020-06-24 PROCEDURE — D9220A PRA ANESTHESIA: ICD-10-PCS | Mod: PT,HCNC,ANES, | Performed by: ANESTHESIOLOGY

## 2020-06-24 PROCEDURE — 43239 EGD BIOPSY SINGLE/MULTIPLE: CPT | Mod: 51,HCNC,, | Performed by: INTERNAL MEDICINE

## 2020-06-24 PROCEDURE — 88305 TISSUE EXAM BY PATHOLOGIST: CPT | Mod: 26,HCNC,, | Performed by: PATHOLOGY

## 2020-06-24 PROCEDURE — 25000003 PHARM REV CODE 250: Mod: HCNC | Performed by: NURSE ANESTHETIST, CERTIFIED REGISTERED

## 2020-06-24 PROCEDURE — 45380 COLONOSCOPY AND BIOPSY: CPT | Mod: 59,HCNC | Performed by: INTERNAL MEDICINE

## 2020-06-24 PROCEDURE — 88305 TISSUE EXAM BY PATHOLOGIST: ICD-10-PCS | Mod: 26,HCNC,, | Performed by: PATHOLOGY

## 2020-06-24 PROCEDURE — 63600175 PHARM REV CODE 636 W HCPCS: Mod: HCNC | Performed by: ANESTHESIOLOGY

## 2020-06-24 PROCEDURE — D9220A PRA ANESTHESIA: ICD-10-PCS | Mod: PT,HCNC,CRNA, | Performed by: NURSE ANESTHETIST, CERTIFIED REGISTERED

## 2020-06-24 PROCEDURE — 45380 COLONOSCOPY AND BIOPSY: CPT | Mod: 59,HCNC,, | Performed by: INTERNAL MEDICINE

## 2020-06-24 PROCEDURE — 88305 TISSUE EXAM BY PATHOLOGIST: CPT | Mod: HCNC | Performed by: PATHOLOGY

## 2020-06-24 PROCEDURE — 43239 EGD BIOPSY SINGLE/MULTIPLE: CPT | Mod: HCNC | Performed by: INTERNAL MEDICINE

## 2020-06-24 PROCEDURE — 45380 PR COLONOSCOPY,BIOPSY: ICD-10-PCS | Mod: 59,HCNC,, | Performed by: INTERNAL MEDICINE

## 2020-06-24 PROCEDURE — 37000008 HC ANESTHESIA 1ST 15 MINUTES: Mod: HCNC | Performed by: INTERNAL MEDICINE

## 2020-06-24 PROCEDURE — 27201012 HC FORCEPS, HOT/COLD, DISP: Mod: HCNC | Performed by: INTERNAL MEDICINE

## 2020-06-24 PROCEDURE — 27201089 HC SNARE, DISP (ANY): Mod: HCNC | Performed by: INTERNAL MEDICINE

## 2020-06-24 PROCEDURE — 45385 COLONOSCOPY W/LESION REMOVAL: CPT | Mod: PT,HCNC,, | Performed by: INTERNAL MEDICINE

## 2020-06-24 PROCEDURE — 45385 COLONOSCOPY W/LESION REMOVAL: CPT | Mod: HCNC | Performed by: INTERNAL MEDICINE

## 2020-06-24 PROCEDURE — 45385 PR COLONOSCOPY,REMV LESN,SNARE: ICD-10-PCS | Mod: PT,HCNC,, | Performed by: INTERNAL MEDICINE

## 2020-06-24 PROCEDURE — 63600175 PHARM REV CODE 636 W HCPCS: Mod: HCNC | Performed by: NURSE ANESTHETIST, CERTIFIED REGISTERED

## 2020-06-24 RX ORDER — LIDOCAINE HYDROCHLORIDE 20 MG/ML
INJECTION, SOLUTION EPIDURAL; INFILTRATION; INTRACAUDAL; PERINEURAL
Status: DISCONTINUED | OUTPATIENT
Start: 2020-06-24 | End: 2020-06-24

## 2020-06-24 RX ORDER — PROPOFOL 10 MG/ML
VIAL (ML) INTRAVENOUS
Status: DISCONTINUED | OUTPATIENT
Start: 2020-06-24 | End: 2020-06-24

## 2020-06-24 RX ORDER — SODIUM CHLORIDE, SODIUM LACTATE, POTASSIUM CHLORIDE, CALCIUM CHLORIDE 600; 310; 30; 20 MG/100ML; MG/100ML; MG/100ML; MG/100ML
INJECTION, SOLUTION INTRAVENOUS CONTINUOUS
Status: DISCONTINUED | OUTPATIENT
Start: 2020-06-24 | End: 2020-06-24 | Stop reason: HOSPADM

## 2020-06-24 RX ORDER — FENTANYL CITRATE 50 UG/ML
INJECTION, SOLUTION INTRAMUSCULAR; INTRAVENOUS
Status: DISCONTINUED | OUTPATIENT
Start: 2020-06-24 | End: 2020-06-24

## 2020-06-24 RX ORDER — ONDANSETRON 2 MG/ML
4 INJECTION INTRAMUSCULAR; INTRAVENOUS DAILY PRN
Status: DISCONTINUED | OUTPATIENT
Start: 2020-06-24 | End: 2020-06-24 | Stop reason: HOSPADM

## 2020-06-24 RX ORDER — LIDOCAINE HYDROCHLORIDE 10 MG/ML
1 INJECTION, SOLUTION EPIDURAL; INFILTRATION; INTRACAUDAL; PERINEURAL ONCE
Status: DISCONTINUED | OUTPATIENT
Start: 2020-06-24 | End: 2020-06-24 | Stop reason: HOSPADM

## 2020-06-24 RX ADMIN — PROPOFOL 30 MG: 10 INJECTION, EMULSION INTRAVENOUS at 10:06

## 2020-06-24 RX ADMIN — PROPOFOL 50 MG: 10 INJECTION, EMULSION INTRAVENOUS at 10:06

## 2020-06-24 RX ADMIN — PROPOFOL 100 MG: 10 INJECTION, EMULSION INTRAVENOUS at 10:06

## 2020-06-24 RX ADMIN — PROPOFOL 200 MG: 10 INJECTION, EMULSION INTRAVENOUS at 10:06

## 2020-06-24 RX ADMIN — SODIUM CHLORIDE, SODIUM LACTATE, POTASSIUM CHLORIDE, AND CALCIUM CHLORIDE: 600; 310; 30; 20 INJECTION, SOLUTION INTRAVENOUS at 10:06

## 2020-06-24 RX ADMIN — FENTANYL CITRATE 50 MCG: 50 INJECTION, SOLUTION INTRAMUSCULAR; INTRAVENOUS at 10:06

## 2020-06-24 RX ADMIN — LIDOCAINE HYDROCHLORIDE 100 MG: 20 INJECTION, SOLUTION EPIDURAL; INFILTRATION; INTRACAUDAL; PERINEURAL at 10:06

## 2020-06-24 NOTE — OR NURSING
EG junction 38, normal  Normal esophagus  Proximal ascending colon polyp  Descending polyps x 2  Internal hemorrhoids

## 2020-06-24 NOTE — PLAN OF CARE
PT is AAOx4. VSS. NAD. IV discontinued. Discharge instructions given, verbalized understanding. Denies pain or nausea. Discharged home with family.

## 2020-06-24 NOTE — PLAN OF CARE
Pt prepped and ready for procedure, all questions and concerns addressed. Will continue to monitor.

## 2020-06-24 NOTE — OR NURSING
Final time out performed, patient and procedure verification agreed on by all staff - RN, Tech, MD and CRNA. Pt still adequately sedated.

## 2020-06-24 NOTE — DISCHARGE INSTRUCTIONS
Colonoscopy     A camera attached to a flexible tube with a viewing lens is used to take video pictures.     Colonoscopy is a test to view the inside of your lower digestive tract (colon and rectum). Sometimes it can show the last part of the small intestine (ileum). During the test, small pieces of tissue may be removed for testing. This is called a biopsy. Small growths, such as polyps, may also be removed.   Why is colonoscopy done?  The test is done to help look for colon cancer. And it can help find the source of abdominal pain, bleeding, and changes in bowel habits. It may be needed once a year, depending on factors such as your:  · Age  · Health history  · Family health history  · Symptoms  · Results from any prior colonoscopy  Risks and possible complications  These include:  · Bleeding               · A puncture or tear in the colon   · Risks of anesthesia  · A cancer lesion not being seen  Getting ready   To prepare for the test:  · Talk with your healthcare provider about the risks of the test (see below). Also ask your healthcare provider about alternatives to the test.  · Tell your healthcare provider about any medicines you take. Also tell him or her about any health conditions you may have.  · Make sure your rectum and colon are empty for the test. Follow the diet and bowel prep instructions exactly. If you dont, the test may need to be rescheduled.  · Plan for a friend or family member to drive you home after the test.     Colonoscopy provides an inside view of the entire colon.     You may discuss the results with your doctor right away or at a future visit.  During the test   The test is usually done in the hospital on an outpatient basis. This means you go home the same day. The procedure takes about 30 minutes. During that time:  · You are given relaxing (sedating) medicine through an IV line. You may be drowsy, or fully asleep.  · The healthcare provider will first give you a physical  exam to check for anal and rectal problems.  · Then the anus is lubricated and the scope inserted.  · If you are awake, you may have a feeling similar to needing to have a bowel movement. You may also feel pressure as air is pumped into the colon. Its OK to pass gas during the procedure.  · Biopsy, polyp removal, or other treatments may be done during the test.  After the test   You may have gas right after the test. It can help to try to pass it to help prevent later bloating. Your healthcare provider may discuss the results with you right away. Or you may need to schedule a follow-up visit to talk about the results. After the test, you can go back to your normal eating and other activities. You may be tired from the sedation and need to rest for a few hours.  Date Last Reviewed: 11/1/2016  © 7216-0777 [employer]. [address] [place] [address]. All rights reserved. This information is not intended as a substitute for professional medical care. Always follow your healthcare professional's instructions.          Upper GI Endoscopy     During endoscopy, a long, flexible tube is used to view the inside of your upper GI tract.      Upper GI endoscopy allows your healthcare provider to look directly into the beginning of your gastrointestinal (GI) tract. The esophagus, stomach, and duodenum (the first part of the small intestine) make up the upper GI tract.   Before the exam  Follow these and any other instructions you are given before your endoscopy. If you dont follow the healthcare providers instructions carefully, the test may need to be canceled or done over:  · Don't eat or drink anything after midnight the night before your exam. If your exam is in the afternoon, drink only clear liquids in the morning. Don't eat or drink anything for 8 hours before the exam. In some cases, you may be able to take medicines with sips of water until 2 hours before the procedure. Speak with your  healthcare provider about this.   · Bring your X-rays and any other test results you have.  · Because you will be sedated, arrange for an adult to drive you home after the exam.  · Tell your healthcare provider before the exam if you are taking any medicines or have any medical problems.  The procedure  Here is what to expect:  · You will lie on the endoscopy table. Usually patients lie on the left side.  · You will be monitored and given oxygen.  · Your throat may be numbed with a spray or gargle. You are given medicine through an intravenous (IV) line that will help you relax and remain comfortable. You may be awake or asleep during the procedure.  · The healthcare provider will put the endoscope in your mouth and down your esophagus. It is thinner than most pieces of food that you swallow. It will not affect your breathing. The medicine helps keep you from gagging.  · Air is put into your GI tract to expand it. It can make you burp.  · During the procedure, the healthcare provider can take biopsies (tissue samples), remove abnormalities, such as polyps, or treat abnormalities through a variety of devices placed through the endoscope. You will not feel this.   · The endoscope carries images of your upper GI tract to a video screen. If you are awake, you may be able to look at the images.  · After the procedure is done, you will rest for a time. An adult must drive you home.  When to call your healthcare provider  Contact your healthcare provider if you have:  · Black or tarry stools, or blood in your stool  · Fever  · Pain in your belly that does not go away  · Nausea and vomiting, or vomiting blood   Date Last Reviewed: 7/1/2016  © 5395-1535 [employer]. [address] [place] [address]. All rights reserved. This information is not intended as a substitute for professional medical care. Always follow your healthcare professional's instructions.          Hemorrhoids    Hemorrhoids are  swollen and inflamed veins inside the rectum and near the anus. The rectum is the last several inches of the colon. The anus is the passage between the rectum and the outside of the body.  Causes  The veins can become swollen due to increased pressure in them. This is most often caused by:  · Chronic constipation or diarrhea  · Straining when having a bowel movement  · Sitting too long on the toilet  · A low-fiber diet  · Pregnancy  Symptoms  · Bleeding from the rectum (this may be noticeable after bowel movements)  · Lump near the anus  · Itching around the anus  · Pain around the anus  There are different types of hemorrhoids. Depending on the type you have and the severity, you may be able to treat yourself at home. In some cases, a procedure may be the best treatment option. Your healthcare provider can tell you more about this, if needed.  Home care  General care  · To get relief from pain or itching, try:  ¨ Topical products. Your healthcare provider may prescribe or recommend creams, ointments, or pads that can be applied to the hemorrhoid. Use these exactly as directed.  ¨ Medicines. Your healthcare provider may recommend stool softeners, suppositories, or laxatives to help manage constipation. Use these exactly as directed.  ¨ Sitz baths. A sitz bath involves sitting in a few inches of warm bath water. Be careful not to make the water so hot that you burn yourself--test it before sitting in it. Soak for about 10 to 15 minutes a few times a day. This may help relieve pain.  Tips to help prevent hemorrhoids  · Eat more fiber. Fiber adds bulk to stool and absorbs water as it moves through your colon. This makes stool softer and easier to pass.  ¨ Increase the fiber in your diet with more fiber-rich foods. These include fresh fruit, vegetables, and whole grains.  ¨ Take a fiber supplement or bulking agent, if advised to by your provider. These include products such as psyllium or methylcellulose.  · Drink  plenty of water, if directed to by your provider. This can help keep stool soft.  · Be more active. Frequent exercise aids digestion and helps prevent constipation. It may also help make bowel movements more regular.  · Dont strain during bowel movements. This can make hemorrhoids more likely. Also, dont sit on the toilet for long periods of time.  Follow-up care  Follow up with your healthcare provider, or as advised. If a culture or imaging tests were done, you will be notified of the results when they are ready. This may take a few days or longer.  When to seek medical advice  Call your healthcare provider right away if any of these occur:  · Increased bleeding from the rectum  · Increased pain around the rectum or anus  · Weakness or dizziness  Call 911  Call 911 or return to the emergency department right away if any of these occur:  · Trouble breathing or swallowing  · Fainting or loss of consciousness  · Unusually fast heart rate  · Vomiting blood  · Large amounts of blood in stool  Date Last Reviewed: 6/22/2015  © 5599-3917 [employer]. [address] [place] [address]. All rights reserved. This information is not intended as a substitute for professional medical care. Always follow your healthcare professional's instructions.        Understanding Colon and Rectal Polyps    The colon (also called the large intestine) is a muscular tube that forms the last part of the digestive tract. It absorbs water and stores food waste. The colon is about 4 to 6 feet long. The rectum is the last 6 inches of the colon. The colon and rectum have a smooth lining composed of millions of cells. Changes in these cells can lead to growths in the colon that can become cancerous and should be removed. Multiple tests are available to screen for colon cancer, but the colonoscopy is the most recommended test. During colonoscopy, these polyps can be removed. How often you need this test depends on many things  including your condition, your family history, symptoms, and what the findings were at the previous colonoscopy.   When the colon lining changes  Changes that happen in the cells that line the colon or rectum can lead to growths called polyps. Over a period of years, polyps can turn cancerous. Removing polyps early may prevent cancer from ever forming.  Polyps  Polyps are fleshy clumps of tissue that form on the lining of the colon or rectum. Small polyps are usually benign (not cancerous). However, over time, cells in a polyp can change and become cancerous. Certain types of polyps known as adenomatous polyps are premalignant. The risk for invasive cancer increases with the size of the polyp and certain cell and gene features. This means that they can become cancerous if they're not removed. Hyperplastic polyps are benign. They can grow quite large and not turn cancerous.   Cancer  Almost all colorectal cancers start when polyp cells begin growing abnormally. As a cancerous tumor grows, it may involve more and more of the colon or rectum. In time, cancer can also grow beyond the colon or rectum and spread to nearby organs or to glands called lymph nodes. The cells can also travel to other parts of the body. This is known as metastasis. The earlier a cancerous tumor is removed, the better the chance of preventing its spread.    Date Last Reviewed: 8/1/2016  © 6261-8241 [employer]. [address] [place] [address], [place] [address]. All rights reserved. This information is not intended as a substitute for professional medical care. Always follow your healthcare professional's instructions.        Upper GI Endoscopy     During endoscopy, a long, flexible tube is used to view the inside of your upper GI tract.      Upper GI endoscopy allows your healthcare provider to look directly into the beginning of your gastrointestinal (GI) tract. The esophagus, stomach, and duodenum (the first part of the small intestine)  make up the upper GI tract.   Before the exam  Follow these and any other instructions you are given before your endoscopy. If you dont follow the healthcare providers instructions carefully, the test may need to be canceled or done over:  · Don't eat or drink anything after midnight the night before your exam. If your exam is in the afternoon, drink only clear liquids in the morning. Don't eat or drink anything for 8 hours before the exam. In some cases, you may be able to take medicines with sips of water until 2 hours before the procedure. Speak with your healthcare provider about this.   · Bring your X-rays and any other test results you have.  · Because you will be sedated, arrange for an adult to drive you home after the exam.  · Tell your healthcare provider before the exam if you are taking any medicines or have any medical problems.  The procedure  Here is what to expect:  · You will lie on the endoscopy table. Usually patients lie on the left side.  · You will be monitored and given oxygen.  · Your throat may be numbed with a spray or gargle. You are given medicine through an intravenous (IV) line that will help you relax and remain comfortable. You may be awake or asleep during the procedure.  · The healthcare provider will put the endoscope in your mouth and down your esophagus. It is thinner than most pieces of food that you swallow. It will not affect your breathing. The medicine helps keep you from gagging.  · Air is put into your GI tract to expand it. It can make you burp.  · During the procedure, the healthcare provider can take biopsies (tissue samples), remove abnormalities, such as polyps, or treat abnormalities through a variety of devices placed through the endoscope. You will not feel this.   · The endoscope carries images of your upper GI tract to a video screen. If you are awake, you may be able to look at the images.  · After the procedure is done, you will rest for a time. An adult must  drive you home.  When to call your healthcare provider  Contact your healthcare provider if you have:  · Black or tarry stools, or blood in your stool  · Fever  · Pain in your belly that does not go away  · Nausea and vomiting, or vomiting blood   Date Last Reviewed: 7/1/2016  © 9070-5983 [employer] [address] [place] [address], [place] [address]. All rights reserved. This information is not intended as a substitute for professional medical care. Always follow your healthcare professional's instructions.

## 2020-06-24 NOTE — [employer] PATIENT INSTRUCTIONS
Discharge Summary/Instructions after an Endoscopic Procedure  Patient Name: Marilou Daniel  Patient MRN: [id number]  Patient YOB: 1975 Wednesday, June 24, 2020  Nevin Penny MD  RESTRICTIONS:  During your procedure today, you received medications for sedation.  These   medications may affect your judgment, balance and coordination.  Therefore,   for 24 hours, you have the following restrictions:   - DO NOT drive a car, operate machinery, make legal/financial decisions,   sign important papers or drink alcohol.    ACTIVITY:  Today: no heavy lifting, straining or running due to procedural   sedation/anesthesia.  The following day: return to full activity including work.  DIET:  Eat and drink normally unless instructed otherwise.     TREATMENT FOR COMMON SIDE EFFECTS:  - Mild abdominal pain, nausea, belching, bloating or excessive gas:  rest,   eat lightly and use a heating pad.  - Sore Throat: treat with throat lozenges and/or gargle with warm salt   water.  - Because air was used during the procedure, expelling large amounts of air   from your rectum or belching is normal.  - If a bowel prep was taken, you may not have a bowel movement for 1-3 days.    This is normal.  SYMPTOMS TO WATCH FOR AND REPORT TO YOUR PHYSICIAN:  1. Abdominal pain or bloating, other than gas cramps.  2. Chest pain.  3. Back pain.  4. Signs of infection such as: chills or fever occurring within 24 hours   after the procedure.  5. Rectal bleeding, which would show as bright red, maroon, or black stools.   (A tablespoon of blood from the rectum is not serious, especially if   hemorrhoids are present.)  6. Vomiting.  7. Weakness or dizziness.  GO DIRECTLY TO THE NEAREST EMERGENCY ROOM IF YOU HAVE ANY OF THE FOLLOWING:      Difficulty breathing              Chills and/or fever over 101 F   Persistent vomiting and/or vomiting blood   Severe abdominal pain   Severe chest pain   Black, tarry stools   Bleeding- more than one  tablespoon   Any other symptom or condition that you feel may need urgent attention  Your doctor recommends these additional instructions:  If any biopsies were taken, your doctors clinic will contact you in 1 to 2   weeks with any results.  - Discharge patient to home (with escort).   - Await pathology results.   - Repeat colonoscopy in 3 years for surveillance based on pathology results.     - Resume previous diet.   - Continue present medications.   - Patient has a contact number available for emergencies.  The signs and   symptoms of potential delayed complications were discussed with the   patient.  Return to normal activities tomorrow.  Written discharge   instructions were provided to the patient.  For questions, problems or results please call your physician Nevin Penny MD at Work:  [phone]  If you have any questions about the above instructions, call the GI   department at [phone] or call the endoscopy unit at [phone]   from 7am until 3 pm.  [facility] [place], EMERGENCY ROOM PHONE NUMBER:   [phone]  IF A COMPLICATION OR EMERGENCY SITUATION ARISES AND YOU ARE UNABLE TO REACH   YOUR PHYSICIAN - GO DIRECTLY TO THE EMERGENCY ROOM.  I have read or have had read to me these discharge instructions for my   procedure and have received a written copy.  I understand these   instructions and will follow-up with my physician if I have any questions.     __________________________________       _____________________________________  Nurse Signature                                          Patient/Designated   Responsible Party Signature  MD Nevin Contreras MD  6/24/2020 10:58:00 AM  This report has been verified and signed electronically.  [employer]

## 2020-06-24 NOTE — PROVATION PATIENT INSTRUCTIONS
Discharge Summary/Instructions after an Endoscopic Procedure  Patient Name: Marilou Daniel  Patient MRN: [id number]  Patient YOB: 1975 Wednesday, June 24, 2020  Nevin Penny MD  RESTRICTIONS:  During your procedure today, you received medications for sedation.  These   medications may affect your judgment, balance and coordination.  Therefore,   for 24 hours, you have the following restrictions:   - DO NOT drive a car, operate machinery, make legal/financial decisions,   sign important papers or drink alcohol.    ACTIVITY:  Today: no heavy lifting, straining or running due to procedural   sedation/anesthesia.  The following day: return to full activity including work.  DIET:  Eat and drink normally unless instructed otherwise.     TREATMENT FOR COMMON SIDE EFFECTS:  - Mild abdominal pain, nausea, belching, bloating or excessive gas:  rest,   eat lightly and use a heating pad.  - Sore Throat: treat with throat lozenges and/or gargle with warm salt   water.  - Because air was used during the procedure, expelling large amounts of air   from your rectum or belching is normal.  - If a bowel prep was taken, you may not have a bowel movement for 1-3 days.    This is normal.  SYMPTOMS TO WATCH FOR AND REPORT TO YOUR PHYSICIAN:  1. Abdominal pain or bloating, other than gas cramps.  2. Chest pain.  3. Back pain.  4. Signs of infection such as: chills or fever occurring within 24 hours   after the procedure.  5. Rectal bleeding, which would show as bright red, maroon, or black stools.   (A tablespoon of blood from the rectum is not serious, especially if   hemorrhoids are present.)  6. Vomiting.  7. Weakness or dizziness.  GO DIRECTLY TO THE NEAREST EMERGENCY ROOM IF YOU HAVE ANY OF THE FOLLOWING:      Difficulty breathing              Chills and/or fever over 101 F   Persistent vomiting and/or vomiting blood   Severe abdominal pain   Severe chest pain   Black, tarry stools   Bleeding- more than one  tablespoon   Any other symptom or condition that you feel may need urgent attention  Your doctor recommends these additional instructions:  If any biopsies were taken, your doctors clinic will contact you in 1 to 2   weeks with any results.  - Await pathology results.   - Discharge patient to home after colonoscopy.   - Proceed with colonoscopy.  - Continue Nexium at current dose.  - Return to GI clinic if symptoms worsen or do not improve.  For questions, problems or results please call your physician Nevin Penny MD at Work:  [phone]  If you have any questions about the above instructions, call the GI   department at [phone] or call the endoscopy unit at [phone]   from 7am until 3 pm.  [facility], EMERGENCY ROOM PHONE NUMBER:   [phone]  IF A COMPLICATION OR EMERGENCY SITUATION ARISES AND YOU ARE UNABLE TO REACH   YOUR PHYSICIAN - GO DIRECTLY TO THE EMERGENCY ROOM.  I have read or have had read to me these discharge instructions for my   procedure and have received a written copy.  I understand these   instructions and will follow-up with my physician if I have any questions.     __________________________________       _____________________________________  Nurse Signature                                          Patient/Designated   Responsible Party Signature  MD Nevin Contreras MD  6/24/2020 10:51:24 AM  This report has been verified and signed electronically.  [employer]

## 2020-06-24 NOTE — TRANSFER OF CARE
Anesthesia Transfer of Care Note    Patient: Marilou Daniel    Procedure(s) Performed: Procedure(s) (LRB):  ESOPHAGOGASTRODUODENOSCOPY (EGD) (N/A)  COLONOSCOPY (N/A)    Anesthesia Type: general    Transport from OR: Transported from OR on room air with adequate spontaneous ventilation    Post pain: adequate analgesia    Post assessment: no apparent anesthetic complications and tolerated procedure well    Post vital signs: stable    Level of consciousness: awake    Nausea/Vomiting: no nausea/vomiting    Complications: none    Transfer of care protocol was followed      Last vitals:   Visit Vitals  BP (P) 104/66   Pulse (P) 81   Temp (P) 36.4 °C (97.5 °F) (Temporal)   Resp (P) 19   Wt 94.8 kg (209 lb 1.7 oz)   SpO2 (P) 100%   Breastfeeding No   BMI 31.79 kg/m²

## 2020-06-24 NOTE — ANESTHESIA POSTPROCEDURE EVALUATION
Anesthesia Post Evaluation    Patient: Marilou Daniel    Procedure(s) Performed: Procedure(s) (LRB):  ESOPHAGOGASTRODUODENOSCOPY (EGD) (N/A)  COLONOSCOPY (N/A)    Final Anesthesia Type: general    Patient location during evaluation: PACU  Patient participation: Yes- Able to Participate  Level of consciousness: awake and alert and oriented  Post-procedure vital signs: reviewed and stable  Pain management: adequate  Airway patency: patent    PONV status at discharge: No PONV  Anesthetic complications: no      Cardiovascular status: blood pressure returned to baseline, stable and hemodynamically stable  Respiratory status: unassisted  Hydration status: euvolemic  Follow-up not needed.          Vitals Value Taken Time   /79 06/24/20 1110   Temp 36.4 °C (97.5 °F) 06/24/20 1048   Pulse 62 06/24/20 1114   Resp 30 06/24/20 1114   SpO2 99 % 06/24/20 1110   Vitals shown include unvalidated device data.      Event Time   Out of Recovery 11:13:02         Pain/Apoorva Score: Apoorva Score: 10 (6/24/2020 11:10 AM)

## 2020-06-26 LAB
FINAL PATHOLOGIC DIAGNOSIS: NORMAL
GROSS: NORMAL

## 2020-06-26 NOTE — PROGRESS NOTES
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 It was completely removed. Recommend repeat colonoscopy in 3 years.

## 2020-07-17 DIAGNOSIS — E11.9 TYPE 2 DIABETES MELLITUS WITHOUT COMPLICATION: ICD-10-CM

## 2020-07-30 RX ORDER — OLMESARTAN MEDOXOMIL 40 MG/1
TABLET ORAL
Qty: 90 TABLET | Refills: 3 | Status: SHIPPED | OUTPATIENT
Start: 2020-07-30 | End: 2020-11-19 | Stop reason: SDUPTHER

## 2020-08-03 ENCOUNTER — PATIENT OUTREACH (OUTPATIENT)
Dept: ADMINISTRATIVE | Facility: OTHER | Age: 45
End: 2020-08-03

## 2020-08-21 RX ORDER — INSULIN ASPART 100 [IU]/ML
INJECTION, SUSPENSION SUBCUTANEOUS
Qty: 10 ML | Refills: 2 | Status: SHIPPED | OUTPATIENT
Start: 2020-08-21 | End: 2020-11-10 | Stop reason: SDUPTHER

## 2020-08-31 NOTE — PROGRESS NOTES
Diabetes Education  Author: Joe Yo RD  Date: 9/1/2020    Diabetes Care Specialist Virtual Visit Note   It was in the patient's best interest to receive diabetes self-management education and support services in this format to prevent the exposure to COVID-19.        The patient location is: home   The chief complaint leading to consultation is: Diabetes  Visit type: audiovisual  Total time spent with patient: 60 min   Each patient to whom he or she provides medical services by telemedicine is:  (1) informed of the relationship between the physician and patient and the respective role of any other health care provider with respect to management of the patient; and (2) notified that he or she may decline to receive medical services by telemedicine and may withdraw from such care at any time.    Diabetes Care Management Summary  Diabetes Education Record Assessment/Progress: Initial  Current Diabetes Risk Level: Low     Referring Provider: Abram Dewey MD  44 y.o. female in clinic today for diabetes education. Patient has not taken diabetes education courses in the past.       no weight since visit was virtual      There is no height or weight on file to calculate BMI.    Lab Results   Component Value Date    HGBA1C 7.1 (H) 06/09/2020       Diabetes Type  Diabetes Type : Type II    Diabetes History  Diabetes Diagnosis: >10 years  Current Treatment: Diet, Insulin(Novolog 70/30, 15units bid but pt doesn't take at night if BG is normal)  Reviewed Problem List with Patient: Yes    Health Maintenance was reviewed today with patient. Discussed with patient importance of routine eye exams, foot exams/foot care, blood work (i.e.: A1c, microalbumin, and lipid), dental visits, yearly flu vaccine, and pneumonia vaccine as indicated by PCP. Patient verbalized understanding.     Health Maintenance Topics with due status: Not Due       Topic Last Completion Date    Lipid Panel 06/09/2020    Hemoglobin A1c 06/09/2020     Mammogram 06/22/2020    Colonoscopy 06/24/2020     Health Maintenance Due   Topic Date Due    Hepatitis C Screening  1975    Foot Exam  11/23/1983    Eye Exam  11/23/1983    HIV Screening  11/23/1988    TETANUS VACCINE  11/23/1991    Pneumococcal Vaccine (Medium Risk) (1 of 1 - PPSV23) 11/23/1992    Low Dose Statin  11/23/1994    Cervical Cancer Screening  11/23/1994    Influenza Vaccine (1) 09/01/2020       Nutrition  Meal Planning: 3 meals per day, water, drinks regular soda(pt has not been feeling well, emesis // was also fasting on fruits and vegetables for [employer]  // normally drinks water and tea with sugar)  What type of beverages do you drink?: juice, water, diet soda/tea  *Yesterday ate cantaloupe and plums but threw up. She has been sick for the past 4 days.   Pt has virtual appt with PCP today      Monitoring   Self Monitoring : checks BG 3x/day // per pt recall, fst usually  // if BG is elevated, it's usully at night  Blood Glucose Logs: No  Do you use a personal continuous glucose monitor?: No  In the last month, how often have you had a low blood sugar reaction?: never(has happened in the past but not in the past month)  Can you tell when your blood sugar is too high?: sometimes  How do you treat high blood sugar?: usually time to take insulin    Exercise   Exercise Type: none(no exercise at the gym since COVID19)    Current Diabetes Treatment   Current Treatment: Diet, Insulin(Novolog 70/30, 15units bid but pt doesn't take at night if BG is normal)    Social History  Primary Support: Self  Occupation: not employed                                Barriers to Change  Barriers to Change: None  Learning Challenges : None    Readiness to Learn   Readiness to Learn : Acceptance    Cultural Influences  Cultural Influences: No    Diabetes Education Assessment/Progress  Diabetes Disease Process (diabetes disease process and treatment options): Discussion, Individual Session  Nutrition  (Incorporating nutritional management into one's lifestyle): Demonstration, Discussion, Individual Session, Written Materials Provided, Comprehends Key Points  Physical Activity (incorporating physical activity into one's lifestyle): Discussion, Individual Session  Medications (states correct name, dose, onset, peak, duration, side effects & timing of meds): Discussion, Instructed, Individual Session, Comprehends Key Points  Monitoring (monitoring blood glucose/other parameters & using results): Discussion, Individual Session, Demonstrates Understanding/Competency (verbalizes/demonstrates), Written Materials Provided  Acute Complications (preventing, detecting, and treating acute complications): Discussion, Instructed, Individual Session, Comprehends Key Points, Written Materials Provided  Chronic Complications (preventing, detecting, and treating chronic complications): Discussion, Individual Session  Clinical (diabetes, other pertinent medical history, and relevant comorbidities reviewed during visit): Discussion, Individual Session  Cognitive (knowledge of self-management skills, functional health literacy): Discussion, Individual Session  Psychosocial (emotional response to diabetes): Discussion, Individual Session  Diabetes Distress and Support Systems: Not Covered/Deferred  Behavioral (readiness for change, lifestyle practices, self-care behaviors): Discussion, Individual Session    Goals  Patient has selected/evaluated goals during today's session: Yes, selected  Healthy Eating: Set(keep carb intake consistent at 2-3 servings per meal // if not feeling hungry, must consume carbs through beverages or fruit to prevent hypoglycemia)  Start Date: 09/01/20  Target Date: 12/01/20  Monitoring: Set(Cont to check BG tid; start keeping log)  Start Date: 09/01/20  Target Date: 12/01/20  Medications: Set(Take Novolog 70/30 bid - Do not skip even when BG is WNL)  Start Date: 09/01/20  Target Date: 12/01/20          Diabetes Care Plan/Intervention  Education Plan/Intervention: Individual Follow-Up MNT, Endocrine Provider Visit Set Up(f/u in 4 wks with BG log // requested appt with diomedes for DM medication managment - feel pt would benefit from taking long-acting and short-acting insulin separately rather than mixed insulin)    Diabetes Meal Plan  Calories: 1800  Carbohydrate Per Meal: 30-45g  Carbohydrate Per Snack : 15-20g    Today's Self-Management Care Plan was developed with the patient's input and is based on barriers identified during today's assessment.    The long and short-term goals in the care plan were written with the patient/caregiver's input. The patient has agreed to work toward these goals to improve her overall diabetes control.      The patient received a copy of today's self-management plan and verbalized understanding of the care plan, goals, and all of today's instructions.      The patient was encouraged to communicate with her physician and care team regarding her condition(s) and treatment.  I provided the patient with my contact information today and encouraged her to contact me via phone or patient portal as needed.     Education Units of Time   Time Spent: 60 min

## 2020-09-01 ENCOUNTER — TELEPHONE (OUTPATIENT)
Dept: INTERNAL MEDICINE | Facility: CLINIC | Age: 45
End: 2020-09-01

## 2020-09-01 ENCOUNTER — CLINICAL SUPPORT (OUTPATIENT)
Dept: DIABETES | Facility: CLINIC | Age: 45
End: 2020-09-01
Payer: MEDICARE

## 2020-09-01 ENCOUNTER — OFFICE VISIT (OUTPATIENT)
Dept: INTERNAL MEDICINE | Facility: CLINIC | Age: 45
End: 2020-09-01
Payer: MEDICARE

## 2020-09-01 DIAGNOSIS — E11.9 TYPE 2 DIABETES MELLITUS WITHOUT COMPLICATION, WITH LONG-TERM CURRENT USE OF INSULIN: Primary | ICD-10-CM

## 2020-09-01 DIAGNOSIS — E11.9 TYPE 2 DIABETES MELLITUS WITHOUT COMPLICATION: ICD-10-CM

## 2020-09-01 DIAGNOSIS — Z79.4 TYPE 2 DIABETES MELLITUS WITHOUT COMPLICATION, WITH LONG-TERM CURRENT USE OF INSULIN: Primary | ICD-10-CM

## 2020-09-01 DIAGNOSIS — R11.2 NAUSEA AND VOMITING, INTRACTABILITY OF VOMITING NOT SPECIFIED, UNSPECIFIED VOMITING TYPE: Primary | ICD-10-CM

## 2020-09-01 PROCEDURE — G0108 PR DIAB MANAGE TRN  PER INDIV: ICD-10-PCS | Mod: HCNC,95,, | Performed by: DIETITIAN, REGISTERED

## 2020-09-01 PROCEDURE — 99214 OFFICE O/P EST MOD 30 MIN: CPT | Mod: HCNC,95,, | Performed by: FAMILY MEDICINE

## 2020-09-01 PROCEDURE — 99214 PR OFFICE/OUTPT VISIT, EST, LEVL IV, 30-39 MIN: ICD-10-PCS | Mod: HCNC,95,, | Performed by: FAMILY MEDICINE

## 2020-09-01 PROCEDURE — G0108 DIAB MANAGE TRN  PER INDIV: HCPCS | Mod: HCNC,95,, | Performed by: DIETITIAN, REGISTERED

## 2020-09-01 RX ORDER — ONDANSETRON 8 MG/1
8 TABLET, ORALLY DISINTEGRATING ORAL EVERY 8 HOURS
Qty: 30 TABLET | Refills: 0 | Status: SHIPPED | OUTPATIENT
Start: 2020-09-01 | End: 2020-11-19 | Stop reason: ALTCHOICE

## 2020-09-01 NOTE — TELEPHONE ENCOUNTER
I returned call to pt in regards to virtual visit, I informed her that it was my mistake that appointment was scheduled as an office visit instead of a virtual. Pt stated she would like to schedule for later this afternoon, pt rescheduled for 2pm. Pt thanked me and ended call. //BJ

## 2020-09-01 NOTE — PROGRESS NOTES
Subjective:       Patient ID: Marilou Daniel is a 44 y.o. female.    Chief Complaint: No chief complaint on file.    The patient location is: home  The chief complaint leading to consultation is: Light-headed    Visit type: audiovisual    Face to Face time with patient: 10  minutes of total time spent on the encounter, which includes face to face time and non-face to face time preparing to see the patient (eg, review of tests), Obtaining and/or reviewing separately obtained history, Documenting clinical information in the electronic or other health record, Independently interpreting results (not separately reported) and communicating results to the patient/family/caregiver, or Care coordination (not separately reported).         Each patient to whom he or she provides medical services by telemedicine is:  (1) informed of the relationship between the physician and patient and the respective role of any other health care provider with respect to management of the patient; and (2) notified that he or she may decline to receive medical services by telemedicine and may withdraw from such care at any time.    Notes: Pt is a 44 year who reports 4 days ago light-headed and fatigued. Pt also started some vomiting over the weekend. Pt reports Sunday not had any symptoms. Pt reports yesterday was feeling weak. No cough, no fever. Pt reports diarrhea without any blood. Pt reports blood in stools in emesis.     Review of Systems   Constitutional: Positive for activity change. Negative for unexpected weight change.   HENT: Negative for hearing loss, rhinorrhea and trouble swallowing.    Eyes: Negative for discharge and visual disturbance.   Respiratory: Negative for chest tightness and wheezing.    Cardiovascular: Negative for chest pain and palpitations.   Gastrointestinal: Positive for diarrhea and vomiting. Negative for blood in stool and constipation.   Endocrine: Negative for polydipsia and polyuria.   Genitourinary:  Negative for difficulty urinating, dysuria, hematuria and menstrual problem.   Musculoskeletal: Positive for arthralgias, joint swelling and neck pain.   Neurological: Positive for weakness and headaches.   Psychiatric/Behavioral: Negative for confusion and dysphoric mood.         Objective:      Physical Exam  Constitutional:       Appearance: Normal appearance.   Neurological:      General: No focal deficit present.      Mental Status: She is alert and oriented to person, place, and time.   Psychiatric:         Mood and Affect: Mood normal.         Behavior: Behavior normal.         Assessment:       1. Nausea and vomiting, intractability of vomiting not specified, unspecified vomiting type        Plan:       Nausea and vomiting, intractability of vomiting not specified, unspecified vomiting type  -     COVID-19 Routine Screening; Future; Expected date: 09/01/2020  -     CBC auto differential; Future; Expected date: 09/01/2020  -     Comprehensive metabolic panel; Future; Expected date: 09/01/2020    Other orders  -     ondansetron (ZOFRAN-ODT) 8 MG TbDL; Take 1 tablet (8 mg total) by mouth every 8 (eight) hours.  Dispense: 30 tablet; Refill: 0

## 2020-09-01 NOTE — TELEPHONE ENCOUNTER
----- Message from Olga Hook sent at 9/1/2020  9:05 AM CDT -----  Regarding: covdi screening  Type:  Needs Medical Advice    Who Called: pt  Symptoms (please be specific): vomiting, not feeling well, dry mouth   How long has patient had these symptoms:  n/a  Pharmacy name and phone #:  n/a  Would the patient rather a call back or a response via [facility]? Call back  Best Call Back Number: [phone]  Additional Information: Please call back.Thanks

## 2020-09-01 NOTE — TELEPHONE ENCOUNTER
----- Message from Olive Ring sent at 9/1/2020 10:40 AM CDT -----  States she was schedule today at 10:20 for a Virtual Visit with Dr Dewey. The appt shows it was to come in the office. Nothing available for a Virtual Visit until 7/9. She would like the nurse to give her a call. Please call pt [phone]. Thank you

## 2020-09-01 NOTE — TELEPHONE ENCOUNTER
I returned call to pt in regards to not feeling well. She stated that she has been feeling bad since Friday. Pt stated that she has been vomiting, light headedness, dry mouth. She states that her BP has been around 112/72 and she has been very fatigue. Pt scheduled virtual visit with Dr. Dewey for today. Pt thanked me for the call and ended call. //BJ

## 2020-09-01 NOTE — LETTER
September 1, 2020        Abram Dewey MD  [address] [place] [address]             [facility]  [address] [facility] [place] [address]  Phone: [phone]  Fax: [phone]   Patient: Marilou Daniel   MR Number: [id number]   YOB: 1975   Date of Visit: 9/1/2020       Dear Dr. Dewey:    Thank you for referring Marilou Daniel to me for evaluation. Below are the relevant portions of my assessment and plan of care.            If you have questions, please do not hesitate to call me. I look forward to following Marilou along with you.    Sincerely,      Joe Yo RD           CC  No Recipients

## 2020-09-02 ENCOUNTER — LAB VISIT (OUTPATIENT)
Dept: LAB | Facility: HOSPITAL | Age: 45
End: 2020-09-02
Attending: FAMILY MEDICINE
Payer: MEDICARE

## 2020-09-02 DIAGNOSIS — R11.2 NAUSEA AND VOMITING, INTRACTABILITY OF VOMITING NOT SPECIFIED, UNSPECIFIED VOMITING TYPE: ICD-10-CM

## 2020-09-02 LAB
ALBUMIN SERPL BCP-MCNC: 4.2 G/DL (ref 3.5–5.2)
ALP SERPL-CCNC: 89 U/L (ref 55–135)
ALT SERPL W/O P-5'-P-CCNC: 16 U/L (ref 10–44)
ANION GAP SERPL CALC-SCNC: 7 MMOL/L (ref 8–16)
AST SERPL-CCNC: 15 U/L (ref 10–40)
BASOPHILS # BLD AUTO: 0.05 K/UL (ref 0–0.2)
BASOPHILS NFR BLD: 0.6 % (ref 0–1.9)
BILIRUB SERPL-MCNC: 0.3 MG/DL (ref 0.1–1)
BUN SERPL-MCNC: 20 MG/DL (ref 6–20)
CALCIUM SERPL-MCNC: 9.5 MG/DL (ref 8.7–10.5)
CHLORIDE SERPL-SCNC: 102 MMOL/L (ref 95–110)
CO2 SERPL-SCNC: 29 MMOL/L (ref 23–29)
CREAT SERPL-MCNC: 1.1 MG/DL (ref 0.5–1.4)
DIFFERENTIAL METHOD: ABNORMAL
EOSINOPHIL # BLD AUTO: 0.2 K/UL (ref 0–0.5)
EOSINOPHIL NFR BLD: 2.5 % (ref 0–8)
ERYTHROCYTE [DISTWIDTH] IN BLOOD BY AUTOMATED COUNT: 12.5 % (ref 11.5–14.5)
EST. GFR  (AFRICAN AMERICAN): >60 ML/MIN/1.73 M^2
EST. GFR  (NON AFRICAN AMERICAN): >60 ML/MIN/1.73 M^2
GLUCOSE SERPL-MCNC: 213 MG/DL (ref 70–110)
HCT VFR BLD AUTO: 35.9 % (ref 37–48.5)
HGB BLD-MCNC: 11.4 G/DL (ref 12–16)
IMM GRANULOCYTES # BLD AUTO: 0.02 K/UL (ref 0–0.04)
IMM GRANULOCYTES NFR BLD AUTO: 0.2 % (ref 0–0.5)
LYMPHOCYTES # BLD AUTO: 3.4 K/UL (ref 1–4.8)
LYMPHOCYTES NFR BLD: 41.6 % (ref 18–48)
MCH RBC QN AUTO: 27.7 PG (ref 27–31)
MCHC RBC AUTO-ENTMCNC: 31.8 G/DL (ref 32–36)
MCV RBC AUTO: 87 FL (ref 82–98)
MONOCYTES # BLD AUTO: 0.6 K/UL (ref 0.3–1)
MONOCYTES NFR BLD: 7.1 % (ref 4–15)
NEUTROPHILS # BLD AUTO: 3.9 K/UL (ref 1.8–7.7)
NEUTROPHILS NFR BLD: 48 % (ref 38–73)
NRBC BLD-RTO: 0 /100 WBC
PLATELET # BLD AUTO: 249 K/UL (ref 150–350)
PMV BLD AUTO: 12.3 FL (ref 9.2–12.9)
POTASSIUM SERPL-SCNC: 4.8 MMOL/L (ref 3.5–5.1)
PROT SERPL-MCNC: 7.6 G/DL (ref 6–8.4)
RBC # BLD AUTO: 4.12 M/UL (ref 4–5.4)
SODIUM SERPL-SCNC: 138 MMOL/L (ref 136–145)
WBC # BLD AUTO: 8.07 K/UL (ref 3.9–12.7)

## 2020-09-02 PROCEDURE — 80053 COMPREHEN METABOLIC PANEL: CPT | Mod: HCNC

## 2020-09-02 PROCEDURE — 36415 COLL VENOUS BLD VENIPUNCTURE: CPT | Mod: HCNC

## 2020-09-02 PROCEDURE — 85025 COMPLETE CBC W/AUTO DIFF WBC: CPT | Mod: HCNC

## 2020-09-10 ENCOUNTER — PATIENT OUTREACH (OUTPATIENT)
Dept: ADMINISTRATIVE | Facility: HOSPITAL | Age: 45
End: 2020-09-10

## 2020-09-10 NOTE — PROGRESS NOTES
Working eye exam report; I called pt to find out where she gets her eye exam done at. Pt states she goes to [facility]. I faxed eye exam request to [facility] [phone].

## 2020-09-23 DIAGNOSIS — Z79.4 TYPE 2 DIABETES MELLITUS WITHOUT COMPLICATION, WITH LONG-TERM CURRENT USE OF INSULIN: Primary | ICD-10-CM

## 2020-09-23 DIAGNOSIS — E11.9 TYPE 2 DIABETES MELLITUS WITHOUT COMPLICATION, WITH LONG-TERM CURRENT USE OF INSULIN: Primary | ICD-10-CM

## 2020-09-28 ENCOUNTER — PATIENT OUTREACH (OUTPATIENT)
Dept: ADMINISTRATIVE | Facility: OTHER | Age: 45
End: 2020-09-28

## 2020-09-29 ENCOUNTER — CLINICAL SUPPORT (OUTPATIENT)
Dept: DIABETES | Facility: CLINIC | Age: 45
End: 2020-09-29
Payer: MEDICARE

## 2020-09-29 ENCOUNTER — PATIENT MESSAGE (OUTPATIENT)
Dept: OTHER | Facility: OTHER | Age: 45
End: 2020-09-29

## 2020-09-29 VITALS — BODY MASS INDEX: 32.98 KG/M2 | WEIGHT: 217.63 LBS | HEIGHT: 68 IN

## 2020-09-29 DIAGNOSIS — E11.9 TYPE 2 DIABETES MELLITUS WITHOUT COMPLICATION, WITH LONG-TERM CURRENT USE OF INSULIN: ICD-10-CM

## 2020-09-29 DIAGNOSIS — Z79.4 TYPE 2 DIABETES MELLITUS WITHOUT COMPLICATION, WITH LONG-TERM CURRENT USE OF INSULIN: ICD-10-CM

## 2020-09-29 PROCEDURE — G0108 DIAB MANAGE TRN  PER INDIV: HCPCS | Mod: HCNC,S$GLB,, | Performed by: DIETITIAN, REGISTERED

## 2020-09-29 PROCEDURE — 99999 PR PBB SHADOW E&M-EST. PATIENT-LVL III: ICD-10-PCS | Mod: PBBFAC,HCNC,, | Performed by: DIETITIAN, REGISTERED

## 2020-09-29 PROCEDURE — G0108 PR DIAB MANAGE TRN  PER INDIV: ICD-10-PCS | Mod: HCNC,S$GLB,, | Performed by: DIETITIAN, REGISTERED

## 2020-09-29 PROCEDURE — 99999 PR PBB SHADOW E&M-EST. PATIENT-LVL III: CPT | Mod: PBBFAC,HCNC,, | Performed by: DIETITIAN, REGISTERED

## 2020-09-29 NOTE — PROGRESS NOTES
"Diabetes Education  Author: Joe Yo, JIMY  Date: 10/1/2020      Diabetes Care Management Summary  Diabetes Education Record Assessment/Progress: (follow up)  Current Diabetes Risk Level: Low     Referring Provider: Abram Dewey MD  44 y.o. female in clinic today for diabetes education f/u.  Pt has lupus, which affects her ability to manage her DM. She is often overwhelmed.      Weight: 98.7 kg (217 lb 9.5 oz)   Height: 5' 8" (172.7 cm)   Body mass index is 33.09 kg/m².    Lab Results   Component Value Date    HGBA1C 7.1 (H) 06/09/2020       Diabetes Type  Diabetes Type : Type II    Diabetes History  Diabetes Diagnosis: >10 years  Current Treatment: Insulin, Diet  Reviewed Problem List with Patient: Yes   CURRENT DM MEDICATIONS:  Novolog 70/30, 15units bid     Health Maintenance was reviewed today with patient. Discussed with patient importance of routine eye exams, foot exams/foot care, blood work (i.e.: A1c, microalbumin, and lipid), dental visits, yearly flu vaccine, and pneumonia vaccine as indicated by PCP. Patient verbalized understanding.     Health Maintenance Topics with due status: Not Due       Topic Last Completion Date    Lipid Panel 06/09/2020    Hemoglobin A1c 06/09/2020    Mammogram 06/22/2020    Colonoscopy 06/24/2020    Eye Exam 09/21/2020     Health Maintenance Due   Topic Date Due    Hepatitis C Screening  1975    Foot Exam  11/23/1983    HIV Screening  11/23/1988    TETANUS VACCINE  11/23/1991    Pneumococcal Vaccine (Medium Risk) (1 of 1 - PPSV23) 11/23/1992    Low Dose Statin  11/23/1994    Cervical Cancer Screening  11/23/1994    Influenza Vaccine (1) 08/01/2020       Nutrition  Meal Planning: skipping meals  What type of beverages do you drink?: juice, water, diet soda/tea  Meal Plan 24 Hour Recall - Breakfast: 8:45a - oatmeal, sausage, 1 cup milk(Took morning insulin before breakfast)  Meal Plan 24 Hour Recall - Lunch: cabbage, cornbread, fried chicken, regular " fruit punch  Meal Plan 24 Hour Recall - Dinner: none  Meal Plan 24 Hour Recall - Snack: 8p - popcorn(took evening insulin after 8p)    Monitoring   Self Monitoring : pt reports checking BG 2-3x/day, but meter review indicates inconsistent data // per meter review, fst 81, 194 // BG readings before evening insulin (varying times) - 202, 145, 145, 252, 153, 183, 203, 137, 142, 119  Blood Glucose Logs: No  Do you use a personal continuous glucose monitor?: No  In the last month, how often have you had a low blood sugar reaction?: never  Can you tell when your blood sugar is too high?: no  Pt takes steroids daily; at times she takes higher doses, which raises her BG.     Exercise   Exercise Type: none    Current Diabetes Treatment   Current Treatment: Insulin, Diet    Social History  Occupation: does not work                                Barriers to Change  Barriers to Change: None  Learning Challenges : None              Diabetes Education Assessment/Progress  Diabetes Disease Process (diabetes disease process and treatment options): Discussion, Individual Session  Nutrition (Incorporating nutritional management into one's lifestyle): Demonstration, Discussion, Individual Session, Written Materials Provided, Comprehends Key Points  Physical Activity (incorporating physical activity into one's lifestyle): Discussion, Individual Session  Medications (states correct name, dose, onset, peak, duration, side effects & timing of meds): Discussion, Instructed, Individual Session, Comprehends Key Points  Monitoring (monitoring blood glucose/other parameters & using results): Discussion, Individual Session, Demonstrates Understanding/Competency (verbalizes/demonstrates), Written Materials Provided  Acute Complications (preventing, detecting, and treating acute complications): Discussion, Instructed, Individual Session, Comprehends Key Points, Written Materials Provided  Chronic Complications (preventing, detecting, and  treating chronic complications): Discussion, Individual Session  Clinical (diabetes, other pertinent medical history, and relevant comorbidities reviewed during visit): Discussion, Individual Session  Cognitive (knowledge of self-management skills, functional health literacy): Discussion, Individual Session  Psychosocial (emotional response to diabetes): Discussion, Individual Session  Diabetes Distress and Support Systems: Not Covered/Deferred  Behavioral (readiness for change, lifestyle practices, self-care behaviors): Discussion, Individual Session    Goals  Patient has selected/evaluated goals during today's session: Yes, selected  Healthy Eating: (keep carb intake consistent at 2-3 servings per meal // if not feeling hungry, must consume carbs through beverages or fruit to prevent hypoglycemia)  Start Date: 09/29/20(Replace sugary beverages with SF drinks or water)  Monitoring: Set(check BG tid and keep log)  Start Date: 09/29/20  Target Date: 12/01/20  Medications: In Progress(Take Novolog 70/30 bid - Do not skip even when BG is WNL)   Pt has done a much better job with taking her insulin consistently.          Diabetes Care Plan/Intervention  Education Plan/Intervention: Individual Follow-Up MNT, Endocrine Provider Visit Set Up(f/u in 2 months // Appt with Ishmael scheduled for 10/30/20 -  feel pt would benefit from taking long-acting and short-acting insulin separately rather than mixed insulin)    Diabetes Meal Plan  Calories: 1800  Carbohydrate Per Meal: 30-45g  Carbohydrate Per Snack : 15-20g    Today's Self-Management Care Plan was developed with the patient's input and is based on barriers identified during today's assessment.    The long and short-term goals in the care plan were written with the patient/caregiver's input. The patient has agreed to work toward these goals to improve her overall diabetes control.      The patient received a copy of today's self-management plan and verbalized  understanding of the care plan, goals, and all of today's instructions.      The patient was encouraged to communicate with her physician and care team regarding her condition(s) and treatment.  I provided the patient with my contact information today and encouraged her to contact me via phone or patient portal as needed.     Education Units of Time   Time Spent: 60 min

## 2020-10-14 ENCOUNTER — TELEPHONE (OUTPATIENT)
Dept: INTERNAL MEDICINE | Facility: CLINIC | Age: 45
End: 2020-10-14

## 2020-10-14 NOTE — TELEPHONE ENCOUNTER
----- Message from Liseth Douglas sent at 10/14/2020  1:32 PM CDT -----  Contact: Carolin Daniel  Is calling rg following up on a medication recommendation that was sent over in the past few days and can be reached at [phone]

## 2020-10-26 RX ORDER — ESOMEPRAZOLE MAGNESIUM 40 MG/1
40 CAPSULE, DELAYED RELEASE ORAL
Qty: 90 CAPSULE | Refills: 1 | Status: SHIPPED | OUTPATIENT
Start: 2020-10-26 | End: 2020-11-19 | Stop reason: SDUPTHER

## 2020-11-02 ENCOUNTER — TELEPHONE (OUTPATIENT)
Dept: DIABETES | Facility: CLINIC | Age: 45
End: 2020-11-02

## 2020-11-02 NOTE — TELEPHONE ENCOUNTER
----- Message from Ivon De La Torre sent at 11/2/2020  8:33 AM CST -----  Regarding: appt  Contact: pt  Type:  Sooner Apoointment Request    Caller is requesting a sooner appointment.  Caller declined first available appointment listed below.  Caller will not accept being placed on the waitlist and is requesting a message be sent to doctor.  Name of Caller:pt  When is the first available appointment? 02/2021  Symptoms: 2mth f/u  Would the patient rather a call back or a response via [facility]? Call back  Best Call Back Number:[phone]  Additional Information: n/a

## 2020-11-03 ENCOUNTER — TELEPHONE (OUTPATIENT)
Dept: INTERNAL MEDICINE | Facility: CLINIC | Age: 45
End: 2020-11-03

## 2020-11-03 NOTE — TELEPHONE ENCOUNTER
----- Message from Olga Hook sent at 11/3/2020 10:09 AM CST -----  Regarding: medication assement  Type:  Needs Medical Advice    Who Called:  Harleen  Symptoms (please be specific): n/a   How long has patient had these symptoms:  n/a  Pharmacy name and phone #:  n/a  Would the patient rather a call back or a response via [facility]? Call back   Best Call Back Number: [phone]  Additional Information: Caller also needs to confirm fax that was sent over. Please call back. To update pt chart. Thanks

## 2020-11-10 ENCOUNTER — OFFICE VISIT (OUTPATIENT)
Dept: INTERNAL MEDICINE | Facility: CLINIC | Age: 45
End: 2020-11-10
Payer: MEDICARE

## 2020-11-10 VITALS
SYSTOLIC BLOOD PRESSURE: 138 MMHG | WEIGHT: 221.56 LBS | HEART RATE: 75 BPM | TEMPERATURE: 99 F | OXYGEN SATURATION: 99 % | BODY MASS INDEX: 33.58 KG/M2 | DIASTOLIC BLOOD PRESSURE: 76 MMHG | HEIGHT: 68 IN

## 2020-11-10 DIAGNOSIS — M54.2 NECK PAIN: Primary | ICD-10-CM

## 2020-11-10 DIAGNOSIS — H91.90 HEARING LOSS, UNSPECIFIED HEARING LOSS TYPE, UNSPECIFIED LATERALITY: ICD-10-CM

## 2020-11-10 PROCEDURE — 99999 PR PBB SHADOW E&M-EST. PATIENT-LVL V: CPT | Mod: PBBFAC,HCNC,, | Performed by: FAMILY MEDICINE

## 2020-11-10 PROCEDURE — 3008F PR BODY MASS INDEX (BMI) DOCUMENTED: ICD-10-PCS | Mod: HCNC,CPTII,S$GLB, | Performed by: FAMILY MEDICINE

## 2020-11-10 PROCEDURE — 99213 PR OFFICE/OUTPT VISIT, EST, LEVL III, 20-29 MIN: ICD-10-PCS | Mod: HCNC,S$GLB,, | Performed by: FAMILY MEDICINE

## 2020-11-10 PROCEDURE — 3078F PR MOST RECENT DIASTOLIC BLOOD PRESSURE < 80 MM HG: ICD-10-PCS | Mod: HCNC,CPTII,S$GLB, | Performed by: FAMILY MEDICINE

## 2020-11-10 PROCEDURE — 99213 OFFICE O/P EST LOW 20 MIN: CPT | Mod: HCNC,S$GLB,, | Performed by: FAMILY MEDICINE

## 2020-11-10 PROCEDURE — 3078F DIAST BP <80 MM HG: CPT | Mod: HCNC,CPTII,S$GLB, | Performed by: FAMILY MEDICINE

## 2020-11-10 PROCEDURE — 99999 PR PBB SHADOW E&M-EST. PATIENT-LVL V: ICD-10-PCS | Mod: PBBFAC,HCNC,, | Performed by: FAMILY MEDICINE

## 2020-11-10 PROCEDURE — 3075F PR MOST RECENT SYSTOLIC BLOOD PRESS GE 130-139MM HG: ICD-10-PCS | Mod: HCNC,CPTII,S$GLB, | Performed by: FAMILY MEDICINE

## 2020-11-10 PROCEDURE — 3075F SYST BP GE 130 - 139MM HG: CPT | Mod: HCNC,CPTII,S$GLB, | Performed by: FAMILY MEDICINE

## 2020-11-10 PROCEDURE — 3008F BODY MASS INDEX DOCD: CPT | Mod: HCNC,CPTII,S$GLB, | Performed by: FAMILY MEDICINE

## 2020-11-10 RX ORDER — SYRING-NEEDL,DISP,INSUL,0.3 ML 30 GX5/16"
SYRINGE, EMPTY DISPOSABLE MISCELLANEOUS
COMMUNITY
Start: 2020-09-23 | End: 2021-05-17

## 2020-11-10 RX ORDER — TIZANIDINE 4 MG/1
TABLET ORAL
COMMUNITY
Start: 2020-10-22

## 2020-11-10 RX ORDER — BACLOFEN 20 MG/1
TABLET ORAL
COMMUNITY
Start: 2020-09-21 | End: 2022-05-12

## 2020-11-10 RX ORDER — LANCETS 33 GAUGE
EACH MISCELLANEOUS
COMMUNITY
Start: 2020-09-23 | End: 2021-06-03 | Stop reason: SDUPTHER

## 2020-11-10 RX ORDER — INSULIN ASPART 100 [IU]/ML
INJECTION, SUSPENSION SUBCUTANEOUS
Qty: 10 ML | Refills: 2 | Status: SHIPPED | OUTPATIENT
Start: 2020-11-10 | End: 2020-11-20

## 2020-11-10 RX ORDER — PREDNISONE 10 MG/1
TABLET ORAL
Qty: 10 TABLET | Refills: 0 | Status: SHIPPED | OUTPATIENT
Start: 2020-11-10 | End: 2021-03-11

## 2020-11-10 RX ORDER — ERENUMAB-AOOE 70 MG/ML
INJECTION SUBCUTANEOUS
COMMUNITY
Start: 2020-10-23 | End: 2022-05-12

## 2020-11-10 NOTE — PROGRESS NOTES
Subjective:       Patient ID: Marilou Daniel is a 44 y.o. female.    Chief Complaint: Neck Pain and Headache    Pt is a 44 year old who is here for neck pain. Pt is seeing pain management. Pt is complaining of neck and feels like a sharp pains down her side. Pt is also complaining of hearing loss. Pt symptoms have been happening for over 3 weeks.     Review of Systems   Constitutional: Negative.    Respiratory: Negative.    Cardiovascular: Negative.    Genitourinary: Negative.    Hematological: Negative.    Psychiatric/Behavioral: Negative.          Objective:      Physical Exam  Constitutional:       Appearance: Normal appearance.   Neck:      Musculoskeletal: Normal range of motion and neck supple.   Cardiovascular:      Rate and Rhythm: Normal rate and regular rhythm.      Pulses: Normal pulses.      Heart sounds: Normal heart sounds.   Abdominal:      General: Abdomen is flat.      Palpations: Abdomen is soft.   Neurological:      Mental Status: She is alert.         Assessment:       1. Neck pain    2. Hearing loss, unspecified hearing loss type, unspecified laterality        Plan:       Neck pain  Comments:  Will send pt back to Pain management    Hearing loss, unspecified hearing loss type, unspecified laterality  Comments:  Will set up with ENT    Other orders  -     predniSONE (DELTASONE) 10 MG tablet; Day 1: take 1 tab every 6 hours  Day 2: take 1 tab every 8 hours  Day 3: take 1 tab every 12 hour  Day 4: take 1 tab in am  Dispense: 10 tablet; Refill: 0  -     insulin asp prt-insulin aspart, NOVOLOG 70/30, (NOVOLOG MIX 70-30 U-100 INSULN) 100 unit/mL (70-30) Soln; INJECT 15 UNITS SUBCUTANEOUSLY TWICE A DAY  Dispense: 10 mL; Refill: 2       Pt symptoms consistent with neck pain and cervical discomfort. Unclear why the hearling loss. No symptoms of congestion but like for ENT to provide 2nd opinion.

## 2020-11-19 ENCOUNTER — INITIAL CONSULT (OUTPATIENT)
Dept: PRIMARY CARE CLINIC | Facility: CLINIC | Age: 45
End: 2020-11-19
Payer: MEDICARE

## 2020-11-19 VITALS
RESPIRATION RATE: 18 BRPM | SYSTOLIC BLOOD PRESSURE: 136 MMHG | DIASTOLIC BLOOD PRESSURE: 72 MMHG | WEIGHT: 211.44 LBS | HEART RATE: 72 BPM | TEMPERATURE: 98 F | OXYGEN SATURATION: 98 % | BODY MASS INDEX: 32.05 KG/M2 | HEIGHT: 68 IN

## 2020-11-19 DIAGNOSIS — Z79.4 TYPE 2 DIABETES MELLITUS WITHOUT COMPLICATION, WITH LONG-TERM CURRENT USE OF INSULIN: ICD-10-CM

## 2020-11-19 DIAGNOSIS — K59.03 DRUG-INDUCED CONSTIPATION: ICD-10-CM

## 2020-11-19 DIAGNOSIS — Z79.52 CURRENT CHRONIC USE OF SYSTEMIC STEROIDS: ICD-10-CM

## 2020-11-19 DIAGNOSIS — H91.90 HEARING LOSS, UNSPECIFIED HEARING LOSS TYPE, UNSPECIFIED LATERALITY: Primary | ICD-10-CM

## 2020-11-19 DIAGNOSIS — F19.982 DRUG-INDUCED INSOMNIA: ICD-10-CM

## 2020-11-19 DIAGNOSIS — Z11.4 ENCOUNTER FOR SCREENING FOR HIV: ICD-10-CM

## 2020-11-19 DIAGNOSIS — E11.9 TYPE 2 DIABETES MELLITUS WITHOUT COMPLICATION, WITH LONG-TERM CURRENT USE OF INSULIN: ICD-10-CM

## 2020-11-19 DIAGNOSIS — K21.9 GASTROESOPHAGEAL REFLUX DISEASE WITHOUT ESOPHAGITIS: ICD-10-CM

## 2020-11-19 DIAGNOSIS — E04.1 THYROID NODULE GREATER THAN OR EQUAL TO 1 CM IN DIAMETER INCIDENTALLY NOTED ON IMAGING STUDY: ICD-10-CM

## 2020-11-19 DIAGNOSIS — I10 ESSENTIAL HYPERTENSION: ICD-10-CM

## 2020-11-19 DIAGNOSIS — M31.0 LEUKOCYTOCLASTIC VASCULITIS: ICD-10-CM

## 2020-11-19 DIAGNOSIS — Z11.59 ENCOUNTER FOR HEPATITIS C SCREENING TEST FOR LOW RISK PATIENT: ICD-10-CM

## 2020-11-19 DIAGNOSIS — M32.9 SYSTEMIC LUPUS ERYTHEMATOSUS, UNSPECIFIED SLE TYPE, UNSPECIFIED ORGAN INVOLVEMENT STATUS: ICD-10-CM

## 2020-11-19 PROCEDURE — 99999 PR PBB SHADOW E&M-EST. PATIENT-LVL V: CPT | Mod: PBBFAC,HCNC,, | Performed by: NURSE PRACTITIONER

## 2020-11-19 PROCEDURE — 99358 PROLONG SERVICE W/O CONTACT: CPT | Mod: HCNC,S$GLB,, | Performed by: NURSE PRACTITIONER

## 2020-11-19 PROCEDURE — 3078F PR MOST RECENT DIASTOLIC BLOOD PRESSURE < 80 MM HG: ICD-10-PCS | Mod: HCNC,CPTII,S$GLB, | Performed by: NURSE PRACTITIONER

## 2020-11-19 PROCEDURE — 3051F PR MOST RECENT HEMOGLOBIN A1C LEVEL 7.0 - < 8.0%: ICD-10-PCS | Mod: HCNC,CPTII,S$GLB, | Performed by: NURSE PRACTITIONER

## 2020-11-19 PROCEDURE — 3078F DIAST BP <80 MM HG: CPT | Mod: HCNC,CPTII,S$GLB, | Performed by: NURSE PRACTITIONER

## 2020-11-19 PROCEDURE — 3075F SYST BP GE 130 - 139MM HG: CPT | Mod: HCNC,CPTII,S$GLB, | Performed by: NURSE PRACTITIONER

## 2020-11-19 PROCEDURE — 99358 PR PROLONGED SERV,NO CONTACT,1ST HR: ICD-10-PCS | Mod: HCNC,S$GLB,, | Performed by: NURSE PRACTITIONER

## 2020-11-19 PROCEDURE — 99499 UNLISTED E&M SERVICE: CPT | Mod: S$PBB,,, | Performed by: NURSE PRACTITIONER

## 2020-11-19 PROCEDURE — 99999 PR PBB SHADOW E&M-EST. PATIENT-LVL V: ICD-10-PCS | Mod: PBBFAC,HCNC,, | Performed by: NURSE PRACTITIONER

## 2020-11-19 PROCEDURE — 3075F PR MOST RECENT SYSTOLIC BLOOD PRESS GE 130-139MM HG: ICD-10-PCS | Mod: HCNC,CPTII,S$GLB, | Performed by: NURSE PRACTITIONER

## 2020-11-19 PROCEDURE — 99215 PR OFFICE/OUTPT VISIT, EST, LEVL V, 40-54 MIN: ICD-10-PCS | Mod: HCNC,S$GLB,, | Performed by: NURSE PRACTITIONER

## 2020-11-19 PROCEDURE — 99499 RISK ADDL DX/OHS AUDIT: ICD-10-PCS | Mod: S$PBB,,, | Performed by: NURSE PRACTITIONER

## 2020-11-19 PROCEDURE — 3051F HG A1C>EQUAL 7.0%<8.0%: CPT | Mod: HCNC,CPTII,S$GLB, | Performed by: NURSE PRACTITIONER

## 2020-11-19 PROCEDURE — 99215 OFFICE O/P EST HI 40 MIN: CPT | Mod: HCNC,S$GLB,, | Performed by: NURSE PRACTITIONER

## 2020-11-19 RX ORDER — CLONIDINE HYDROCHLORIDE 0.1 MG/1
0.1 TABLET ORAL 3 TIMES DAILY
Qty: 90 TABLET | Refills: 1 | Status: SHIPPED | OUTPATIENT
Start: 2020-11-19 | End: 2021-03-11 | Stop reason: DRUGHIGH

## 2020-11-19 RX ORDER — LINACLOTIDE 145 UG/1
145 CAPSULE, GELATIN COATED ORAL DAILY PRN
Qty: 30 CAPSULE | Refills: 5 | Status: SHIPPED | OUTPATIENT
Start: 2020-11-19 | End: 2020-12-19

## 2020-11-19 RX ORDER — HYDROXYZINE PAMOATE 25 MG/1
CAPSULE ORAL
Qty: 25 CAPSULE | Refills: 0 | OUTPATIENT
Start: 2020-11-19 | End: 2021-03-11 | Stop reason: SDUPTHER

## 2020-11-19 RX ORDER — ZOLPIDEM TARTRATE 10 MG/1
10 TABLET ORAL NIGHTLY PRN
Qty: 30 TABLET | Refills: 0 | Status: CANCELLED | OUTPATIENT
Start: 2020-11-19 | End: 2020-12-19

## 2020-11-19 RX ORDER — ESOMEPRAZOLE MAGNESIUM 40 MG/1
40 CAPSULE, DELAYED RELEASE ORAL
Qty: 90 CAPSULE | Refills: 1 | Status: SHIPPED | OUTPATIENT
Start: 2020-11-19 | End: 2021-05-17 | Stop reason: SDUPTHER

## 2020-11-19 RX ORDER — OLMESARTAN MEDOXOMIL 40 MG/1
40 TABLET ORAL DAILY
Qty: 90 TABLET | Refills: 1 | Status: SHIPPED | OUTPATIENT
Start: 2020-11-19 | End: 2021-03-01

## 2020-11-19 RX ORDER — AMLODIPINE BESYLATE 10 MG/1
10 TABLET ORAL DAILY
Qty: 30 TABLET | Refills: 5 | Status: SHIPPED | OUTPATIENT
Start: 2020-11-19 | End: 2021-03-11 | Stop reason: SDUPTHER

## 2020-11-19 RX ORDER — CLONIDINE HYDROCHLORIDE 0.1 MG/1
0.1 TABLET ORAL 3 TIMES DAILY
Qty: 90 TABLET | Refills: 1 | Status: SHIPPED | OUTPATIENT
Start: 2020-11-19 | End: 2020-11-19 | Stop reason: SDUPTHER

## 2020-11-19 NOTE — ASSESSMENT & PLAN NOTE
Prescribed Plaquenil, dapsone and prednisone.   F/U with rheumatology, Dr Elena or Laura at [facility].  DEXA Scan???

## 2020-11-19 NOTE — LETTER
November 19, 2020      Smiley Miller MD  [address] [place] [address]           [address] [address]  Phone: [phone]  Fax: [phone]          Patient: Marilou Daniel   MR Number: [id number]   YOB: 1975   Date of Visit: 11/19/2020       Dear Dr. Smiley Miller:    Thank you for referring Marilou Daniel to me for evaluation. Attached you will find relevant portions of my assessment and plan of care.    If you have questions, please do not hesitate to call me. I look forward to following Marilou Daniel along with you.    Sincerely,    Diane Dominguez, NP    Enclosure  CC:  No Recipients    If you would like to receive this communication electronically, please contact [email or web address] or [phone] to request more information on [employer] Link access.    For providers and/or their staff who would like to refer a patient to [facility], please contact us through our one-stop-shop provider referral line, [facility], at [phone].    If you feel you have received this communication in error or would no longer like to receive these types of communications, please e-mail [email or web address]

## 2020-11-19 NOTE — PROGRESS NOTES
"Primary Care Provider Appointment - CLEM Dominguez, AGUSTIN-RUDDY    Subjective:      Patient ID: Marilou Daniel is a 44 y.o. female with SLE, DMII related to chronic steroid use, HTN,  Peripheral neuropathy, GERD.    Chief Complaint: No chief complaint on file.    Pt seen today to establish care with  Clem due to multiple chronic health problems. She was recently seen by PCP for h/a, neck pain and hearing loss and was prescribed zanaflex, prednisone taper, referred to ENT and pain mgmt.   She has SLE and DMII related to chronic steroid use. States her DMII has been increasingly difficult to manage due to frequent steroid bursts in addition to daily prescribed steroid doses. She checks her CBGs regularly and uses Nov 70/30 BID.     Dx with SLE about 20 years ago. Has tried many other medications but they lowered her immune system too much and she had developed secondary infections. Has significant skin manifestations r/t SLE.    Has chronic headaches. Previously took Soma but can no longer access. Now current POC is less effective. Increased pain causes BP to elevate so she takes clonidine when BP is significantly elevated.     Aimovig not effective for headaches. Some blurry vision, recent eye exam by Dr Milligan was "okay" per pt.     Supposed to use CPAP and has tried to refit it several times but cannot tolerate it.                 Specialty appts:  Rheumatology:  Dr Elena 2/2020 - seen in Dr Dr Taylor's absence.   [facility]    [facility]  [facility] 2019 Lymphadenitis, thyroid nodules      Dermatology  Dr Lemos for SLE      Dr Ortiz for anemia      Dr Clark - Cardiology  Seen for intermittent dyspnea  Stress test, abnormal nuclear stress test, normal heart cath per pt.  Follow up annually.      Pain Management - Dr Qamar izaguirre [facility]  Sees monthly, prescribed Ambien.          Past Surgical History:   Procedure Laterality Date    ABLATION      COLONOSCOPY N/A 6/24/2020    Procedure: COLONOSCOPY; "  Surgeon: Nevin Penny MD;  Location: [facility] ENDO;  Service: Endoscopy;  Laterality: N/A;    ESOPHAGOGASTRODUODENOSCOPY N/A 6/24/2020    Procedure: ESOPHAGOGASTRODUODENOSCOPY (EGD);  Surgeon: Nevin Penny MD;  Location: [facility] ENDO;  Service: Endoscopy;  Laterality: N/A;    RIGHT HEART CATHETERIZATION      TUBAL LIGATION      WRIST SURGERY Bilateral        Past Medical History:   Diagnosis Date    Anemia     Arthritis     Connective tissue disease 1999    Diabetes mellitus     Gastroesophageal reflux disease 3/23/2020    Hypertension     Lupus 1999    Thyroid nodule greater than or equal to 1 cm in diameter incidentally noted on imaging study 11/19/2020    Vasculitis        Social History     Socioeconomic History    Marital status: Single     Spouse name: Not on file    Number of children: Not on file    Years of education: Not on file    Highest education level: Not on file   Occupational History    Not on file   Social Needs    Financial resource strain: Not on file    Food insecurity     Worry: Not on file     Inability: Not on file    Transportation needs     Medical: Not on file     Non-medical: Not on file   Tobacco Use    Smoking status: Never Smoker   Substance and Sexual Activity    Alcohol use: Never     Frequency: Never    Drug use: Never    Sexual activity: Not on file   Lifestyle    Physical activity     Days per week: Not on file     Minutes per session: Not on file    Stress: Not on file   Relationships    Social connections     Talks on phone: Not on file     Gets together: Not on file     Attends Christianity service: Not on file     Active member of club or organization: Not on file     Attends meetings of clubs or organizations: Not on file     Relationship status: Not on file   Other Topics Concern    Not on file   Social History Narrative    Not on file       Review of Systems   HENT: Positive for hearing loss (Right ear hearing muffled, sudden onset about two  weeks ago. ) and mouth sores (h/o stomatitis, starting to get mouth sores again, would like magic mouth again.). Negative for postnasal drip, rhinorrhea, sinus pressure and sore throat.    Eyes: Positive for visual disturbance (Increased blurred vision). Negative for pain.   Respiratory: Negative for cough, shortness of breath and wheezing.    Cardiovascular: Negative for chest pain, palpitations and leg swelling.   Gastrointestinal: Positive for constipation.   Neurological: Positive for headaches (temporal, bilateral, radiates to ears then neck.).   Psychiatric/Behavioral: Positive for sleep disturbance (takes Ambien, relates to chronic steroid use. ). The patient is nervous/anxious (at times).                Objective:   There were no vitals taken for this visit.          There is no height or weight on file to calculate BMI.    Physical Exam    Lab Results   Component Value Date    WBC 8.07 09/02/2020    HGB 11.4 (L) 09/02/2020    HCT 35.9 (L) 09/02/2020     09/02/2020    CHOL 145 06/09/2020    TRIG 63 06/09/2020    HDL 45 06/09/2020    ALT 16 09/02/2020    AST 15 09/02/2020     09/02/2020    K 4.8 09/02/2020     09/02/2020    CREATININE 1.1 09/02/2020    BUN 20 09/02/2020    CO2 29 09/02/2020    INR 1.1 03/24/2020    HGBA1C 7.1 (H) 06/09/2020       Current Outpatient Medications on File Prior to Visit   Medication Sig Dispense Refill    AIMOVIG AUTOINJECTOR 70 mg/mL autoinjector INJECT 1 SRYINGE SUB-Q EVERY MONTH      amLODIPine (NORVASC) 10 MG tablet Take 10 mg by mouth once daily.  11    baclofen (LIORESAL) 20 MG tablet TK 1 T PO Q 8 H PRN      blood sugar diagnostic (TRUE METRIX GLUCOSE TEST STRIP) Strp test AS DIRECTED 3 TIMES A  strip 0    cloNIDine (CATAPRES) 0.1 MG tablet Take 1 tablet (0.1 mg total) by mouth 3 (three) times daily. 90 tablet 1    dapsone 25 MG Tab Take 75 mg by mouth.       esomeprazole (NEXIUM) 40 MG capsule TAKE 1 CAPSULE (40 MG TOTAL) BY MOUTH BEFORE  "BREAKFAST. 90 capsule 1    gabapentin (NEURONTIN) 800 MG tablet Neurontin 800 mg tablet   Take 1 tablet 3 times a day by oral route.      HYDROcodone-acetaminophen (NORCO)  mg per tablet Norco 10 mg-325 mg tablet   Take 1 tablet 3 times a day by oral route as needed.      hydrocortisone 2.5 % ointment APPLY TOPICALLY TO FACE TWICE A DAY AS NEEDED FOR 1 TO 2 WEEKS AT A TIME      hydroxychloroquine (PLAQUENIL) 200 mg tablet Take 200 mg by mouth.      hydrOXYzine pamoate (VISTARIL) 25 MG Cap Take 1 -2 tablets by mouth every 6 hours as needed for nausea/vomiting/insomnia/anxiety 25 capsule 0    insulin asp prt-insulin aspart, NOVOLOG 70/30, (NOVOLOG MIX 70-30 U-100 INSULN) 100 unit/mL (70-30) Soln INJECT 15 UNITS SUBCUTANEOUSLY TWICE A DAY 10 mL 2    LINZESS 145 mcg Cap capsule TAKE 1 CAPSULE BY MOUTH DAILY MD SAID MAKE APPOINTMENT  0    olmesartan (BENICAR) 40 MG tablet TAKE 1 TABLET EVERY DAY 90 tablet 3    ondansetron (ZOFRAN-ODT) 8 MG TbDL Take 1 tablet (8 mg total) by mouth every 8 (eight) hours. 30 tablet 0    pentoxifylline (TRENTAL) 400 mg TbSR Take 400 mg by mouth.      predniSONE (DELTASONE) 10 MG tablet Take 10 mg by mouth once daily. Scale from 40 to 10 when having outbreak      predniSONE (DELTASONE) 10 MG tablet Day 1: take 1 tab every 6 hours  Day 2: take 1 tab every 8 hours  Day 3: take 1 tab every 12 hour  Day 4: take 1 tab in am 10 tablet 0    SURE COMFORT INSULIN SYRINGE 0.5 mL 30 gauge x 5/16" Syrg use as directed      tiZANidine (ZANAFLEX) 4 MG tablet TK 1 T PO TID PRN      triamcinolone acetonide 0.1% (KENALOG) 0.1 % ointment APPLY TOPICALLY TWICE A DAY FOR 1 TO 2 WEEKS DO NOT APPLY TO FACE OR GROIN      TRUEPLUS LANCETS 33 gauge Misc test AS DIRECTED      zolpidem (AMBIEN) 10 mg Tab Ambien 10 mg tablet   Take 1 tablet every day by oral route at bedtime.       No current facility-administered medications on file prior to visit.          Assessment:   44 y.o. female with " multiple co-morbid illnesses here to follow-up with PCP and continue work-up of chronic issues notably SLE, DMII related to chronic steroid use, HTN,  Peripheral neuropathy, GERD.     Plan:     Problem List Items Addressed This Visit        ENT    Hearing loss - Primary     ENT referral            Cardiac/Vascular    Essential hypertension     Prescribed olmesartan, clonidine, amlodipine.  Sept 2020 GFR >60 with creat 1.1.              Immunology/Multi System    Systemic lupus erythematosus     Prescribed Plaquenil, dapsone and prednisone.   F/U with rheumatology, Dr Elena or Laura at [facility].  DEXA Scan???           Leukocytoclastic vasculitis        F/U with rheumatology, Dr Elena or Laura at [facility].            Endocrine    Type 2 diabetes mellitus without complication, with long-term current use of insulin     Most recent A1C 7.1 in June. Repeat.  Continues Nov 70/30 insulin.  Check CBGs BID. Return and review log in 2 weeks.             GI    Gastroesophageal reflux disease     Likely R/T chronic steroid use.  Continue Nexium.            Other    Thyroid nodule greater than or equal to 1 cm in diameter incidentally noted on imaging study     Check TSH, free T3/T4.  Repeat thyroid U/S.           Other Visit Diagnoses     Current chronic use of systemic steroids        Encounter for hepatitis C screening test for low risk patient        Encounter for screening for HIV              Health Maintenance       Date Due Completion Date    Hepatitis C Screening 1973 ---    Foot Exam 11/23/1983 ---    HIV Screening 11/23/1988 ---    TETANUS VACCINE 11/23/1991 ---    Pneumococcal Vaccine (Medium Risk) (1 of 1 - PPSV23) 11/23/1992 ---    Low Dose Statin 11/23/1994 ---    Cervical Cancer Screening 11/23/1994 ---    Influenza Vaccine (1) 08/01/2020 ---    Hemoglobin A1c 12/09/2020 6/9/2020    Lipid Panel 06/09/2021 6/9/2020    Eye Exam 09/21/2021 9/21/2020    Mammogram 06/22/2022 6/22/2020    Colonoscopy  06/24/2023 6/24/2020        Due flu shot today.  Pap?      No follow-ups on file.     Total face-to-face time was 60 min, >50% of this was spent on counseling and coordination of care. The following issues were discussed: SLE, DMII related to chronic steroid use, HTN,  Peripheral neuropathy, GERD.    Health maintenance needs, recent test results and goals of care discussed with pt and questions answered.    Comprehensive chart review completed prior to exam lasting  35 minutes. Review included labs, hospital admissions, medical progress notes from the past two years that were available to me. Findings documented in this note and updated in pt's chart.        AGNES Louis  [facility]  [phone]

## 2020-11-19 NOTE — ASSESSMENT & PLAN NOTE
R/T steroid dependence.   Most recent A1C 7.1 in June.   Change insulin to lantus 20 units daily plus Nov Aspart 4 units TID with meals, hold if CBG <200.   Check CBGs QID. Video visit to f/u on 11/30.

## 2020-11-20 RX ORDER — INSULIN GLARGINE 100 [IU]/ML
20 INJECTION, SOLUTION SUBCUTANEOUS DAILY
Qty: 1 BOX | Refills: 5 | Status: SHIPPED | OUTPATIENT
Start: 2020-11-20 | End: 2020-12-30

## 2020-11-20 RX ORDER — INSULIN ASPART 100 [IU]/ML
4 INJECTION, SOLUTION INTRAVENOUS; SUBCUTANEOUS
Qty: 1 BOX | Refills: 0 | Status: SHIPPED | OUTPATIENT
Start: 2020-11-20 | End: 2020-12-15

## 2020-11-20 RX ORDER — INSULIN ASPART 100 [IU]/ML
4 INJECTION, SOLUTION INTRAVENOUS; SUBCUTANEOUS
Qty: 1 BOX | Refills: 2 | Status: SHIPPED | OUTPATIENT
Start: 2020-11-20 | End: 2020-11-20

## 2020-11-24 ENCOUNTER — HOSPITAL ENCOUNTER (OUTPATIENT)
Dept: RADIOLOGY | Facility: HOSPITAL | Age: 45
Discharge: HOME | End: 2020-11-24
Attending: NURSE PRACTITIONER
Payer: MEDICARE

## 2020-11-24 DIAGNOSIS — E04.1 THYROID NODULE GREATER THAN OR EQUAL TO 1 CM IN DIAMETER INCIDENTALLY NOTED ON IMAGING STUDY: ICD-10-CM

## 2020-11-24 DIAGNOSIS — E11.9 TYPE 2 DIABETES MELLITUS WITHOUT COMPLICATION, WITH LONG-TERM CURRENT USE OF INSULIN: Primary | ICD-10-CM

## 2020-11-24 DIAGNOSIS — Z79.4 TYPE 2 DIABETES MELLITUS WITHOUT COMPLICATION, WITH LONG-TERM CURRENT USE OF INSULIN: Primary | ICD-10-CM

## 2020-11-24 DIAGNOSIS — H91.90 HEARING LOSS, UNSPECIFIED HEARING LOSS TYPE, UNSPECIFIED LATERALITY: ICD-10-CM

## 2020-11-24 PROCEDURE — 76536 US EXAM OF HEAD AND NECK: CPT | Mod: TC,HCNC

## 2020-11-24 PROCEDURE — 76536 US SOFT TISSUE HEAD NECK THYROID: ICD-10-PCS | Mod: 26,HCNC,, | Performed by: RADIOLOGY

## 2020-11-24 PROCEDURE — 76536 US EXAM OF HEAD AND NECK: CPT | Mod: 26,HCNC,, | Performed by: RADIOLOGY

## 2020-11-24 RX ORDER — PEN NEEDLE, DIABETIC 30 GX3/16"
1 NEEDLE, DISPOSABLE MISCELLANEOUS DAILY
Qty: 30 EACH | Refills: 5 | Status: SHIPPED | OUTPATIENT
Start: 2020-11-24 | End: 2020-12-24

## 2020-11-25 DIAGNOSIS — F19.982 DRUG-INDUCED INSOMNIA: Primary | ICD-10-CM

## 2020-11-25 RX ORDER — ZOLPIDEM TARTRATE 10 MG/1
TABLET ORAL
Qty: 30 TABLET | Refills: 1 | Status: SHIPPED | OUTPATIENT
Start: 2020-11-25 | End: 2020-11-25 | Stop reason: SDUPTHER

## 2020-11-25 RX ORDER — ZOLPIDEM TARTRATE 10 MG/1
TABLET ORAL
Qty: 30 TABLET | Refills: 1 | Status: SHIPPED | OUTPATIENT
Start: 2020-11-25 | End: 2020-12-15 | Stop reason: SDUPTHER

## 2020-11-25 RX ORDER — ZOLPIDEM TARTRATE 10 MG/1
TABLET ORAL
Qty: 30 TABLET | Refills: 1 | Status: SHIPPED | OUTPATIENT
Start: 2020-11-25 | End: 2020-11-25

## 2020-11-25 NOTE — PROGRESS NOTES
Pt requires Ambien to sleep at night due to chronic prednisone use. Have discussed risks/benefits.  [place].

## 2020-12-01 RX ORDER — BLOOD-GLUCOSE,RECEIVER,CONT
EACH MISCELLANEOUS
Qty: 1 EACH | Refills: 11 | OUTPATIENT
Start: 2020-12-01 | End: 2021-09-29

## 2020-12-03 ENCOUNTER — PATIENT OUTREACH (OUTPATIENT)
Dept: ADMINISTRATIVE | Facility: HOSPITAL | Age: 45
End: 2020-12-03

## 2020-12-03 NOTE — PROGRESS NOTES
Working [facility] Diabetes/statin report:     Pt previously seeing Dr. Guerrero, but now seeing external pcp. Pt is diabetic, no history of statin.

## 2020-12-14 NOTE — PROGRESS NOTES
"Primary Care Provider Appointment - AGNES Schafer    Subjective:      Patient ID: Marilou Daniel is a 45 y.o. female with thyroid nodules, SLE, DMII related to chronic steroid use, HTN,  Peripheral neuropathy, GERD.     Chief Complaint: No chief complaint on file.    Pt seen in clinic to follow up DMII care and thyroid nodules. Insulin changed to lantus after last visit with TID regular insulin;  She started using lantus 12/7/20, checking CBGs QID. Reports improved control. Still elevated CBGs ac lunch and supper (180s-190s).     She was seen by ENT for thyroid nodules and had FNA done Friday. Had Thyroid FNA done at [facility] last week, specimen not adequate. Bx was very painful during and after procedure.    Started taking PRN prednisone 20 mg daily two days ago for SLE related pain and swelling to BLEs. Elevated CBGs since.     Pain mgmt planning on "burning nerves" in her back. She recently had MRI and is awaiting results.           Past Surgical History:   Procedure Laterality Date    ABLATION      COLONOSCOPY N/A 6/24/2020    Procedure: COLONOSCOPY;  Surgeon: Nevin Penny MD;  Location: [facility];  Service: Endoscopy;  Laterality: N/A;    ESOPHAGOGASTRODUODENOSCOPY N/A 6/24/2020    Procedure: ESOPHAGOGASTRODUODENOSCOPY (EGD);  Surgeon: Nevin Penny MD;  Location: [facility];  Service: Endoscopy;  Laterality: N/A;    RIGHT HEART CATHETERIZATION      TUBAL LIGATION      WRIST SURGERY Bilateral        Past Medical History:   Diagnosis Date    Anemia     Arthritis     Connective tissue disease 1999    Diabetes mellitus     Gastroesophageal reflux disease 3/23/2020    Hypertension     Lupus 1999    Thyroid nodule greater than or equal to 1 cm in diameter incidentally noted on imaging study 11/19/2020    Vasculitis        Social History     Socioeconomic History    Marital status: Single     Spouse name: Not on file    Number of children: Not on file    Years " of education: Not on file    Highest education level: Not on file   Occupational History    Not on file   Social Needs    Financial resource strain: Not on file    Food insecurity     Worry: Not on file     Inability: Not on file    Transportation needs     Medical: Not on file     Non-medical: Not on file   Tobacco Use    Smoking status: Never Smoker   Substance and Sexual Activity    Alcohol use: Never     Frequency: Never    Drug use: Never    Sexual activity: Not on file   Lifestyle    Physical activity     Days per week: Not on file     Minutes per session: Not on file    Stress: Not on file   Relationships    Social connections     Talks on phone: Not on file     Gets together: Not on file     Attends Anglican service: Not on file     Active member of club or organization: Not on file     Attends meetings of clubs or organizations: Not on file     Relationship status: Not on file   Other Topics Concern    Not on file   Social History Narrative    Not on file       Review of Systems   HENT: Positive for hearing loss (Right ear hearing muffled, lss often.) and mouth sores (intermittent stomatitis, would like Kenalog/Orabase - states this is most effective applied on ulcers. ). Negative for postnasal drip, rhinorrhea, sinus pressure and sore throat.    Eyes: Negative for pain and visual disturbance.   Respiratory: Negative for cough, shortness of breath and wheezing.    Cardiovascular: Positive for leg swelling (R/t SLE flare). Negative for chest pain and palpitations.   Gastrointestinal: Positive for constipation.   Musculoskeletal: Positive for arthralgias (bilat feet r/t SLE ).   Neurological: Positive for headaches (temporal, bilateral, radiates to ears then neck., chronic).   Psychiatric/Behavioral: Positive for sleep disturbance (takes Ambien, relates to chronic steroid use. ). The patient is not nervous/anxious (at times).        Objective:   BP (!) 162/90   Temp 96.9 °F (36.1 °C)   Wt  100.6 kg (221 lb 12.5 oz)   BMI 33.72 kg/m²       Weight: 100.6 kg (221 lb 12.5 oz)   Body mass index is 33.72 kg/m².    Physical Exam  Constitutional:       General: She is not in acute distress.     Appearance: She is not ill-appearing.   HENT:      Head: Normocephalic.      Right Ear: Tympanic membrane and ear canal normal.      Left Ear: Tympanic membrane and ear canal normal.      Nose: Nose normal.      Mouth/Throat:      Mouth: Mucous membranes are moist.      Pharynx: No oropharyngeal exudate.   Eyes:      General: No scleral icterus.  Neck:      Musculoskeletal: Muscular tenderness present.      Thyroid: Thyromegaly present.      Comments: Symmetrical thyromegaly that is tender.   Cardiovascular:      Rate and Rhythm: Normal rate and regular rhythm.      Heart sounds: Normal heart sounds. No murmur.   Pulmonary:      Effort: Pulmonary effort is normal.      Breath sounds: Normal breath sounds. No wheezing, rhonchi or rales.   Abdominal:      Tenderness: There is no abdominal tenderness.   Musculoskeletal:         General: No swelling.   Lymphadenopathy:      Cervical: No cervical adenopathy.   Skin:     General: Skin is warm and dry.   Neurological:      Mental Status: She is alert. Mental status is at baseline.   Psychiatric:         Mood and Affect: Mood normal.         Behavior: Behavior normal.         Thought Content: Thought content normal.                 Lab Results   Component Value Date    WBC 8.07 09/02/2020    HGB 11.4 (L) 09/02/2020    HCT 35.9 (L) 09/02/2020     09/02/2020    CHOL 145 06/09/2020    TRIG 63 06/09/2020    HDL 45 06/09/2020    ALT 16 09/02/2020    AST 15 09/02/2020     09/02/2020    K 4.8 09/02/2020     09/02/2020    CREATININE 1.1 09/02/2020    BUN 20 09/02/2020    CO2 29 09/02/2020    INR 1.1 03/24/2020    HGBA1C 7.1 (H) 06/09/2020       Current Outpatient Medications on File Prior to Visit   Medication Sig Dispense Refill    insulin (LANTUS SOLOSTAR U-100  "INSULIN) glargine 100 units/mL (3mL) SubQ pen Inject 20 Units into the skin once daily. 1 Box 5    LINZESS 145 mcg Cap capsule Take 1 capsule (145 mcg total) by mouth daily as needed. 30 capsule 5    olmesartan (BENICAR) 40 MG tablet Take 1 tablet (40 mg total) by mouth once daily. 90 tablet 1    pen needle, diabetic 31 gauge x 5/16" Ndle 1 Units by Misc.(Non-Drug; Combo Route) route once daily. 30 each 5    pentoxifylline (TRENTAL) 400 mg TbSR Take 400 mg by mouth.      predniSONE (DELTASONE) 10 MG tablet Take 10 mg by mouth once daily. Scale from 40 to 10 when having outbreak      TRUEPLUS LANCETS 33 gauge Misc test AS DIRECTED      [DISCONTINUED] insulin aspart U-100 (NOVOLOG FLEXPEN U-100 INSULIN) 100 unit/mL (3 mL) InPn pen Inject 4 Units into the skin 3 (three) times daily with meals. Do not give if before meal blood sugar < 200. 1 Box 0    [DISCONTINUED] triamcinolone acetonide 0.1% (KENALOG) 0.1 % ointment APPLY TOPICALLY TWICE A DAY FOR 1 TO 2 WEEKS DO NOT APPLY TO FACE OR GROIN      [DISCONTINUED] zolpidem (AMBIEN) 10 mg Tab Ambien 10 mg tablet   Take 1 tablet every day by oral route at bedtime as needed for sleep. 30 tablet 1    AIMOVIG AUTOINJECTOR 70 mg/mL autoinjector INJECT 1 SRYINGE SUB-Q EVERY MONTH      amLODIPine (NORVASC) 10 MG tablet Take 1 tablet (10 mg total) by mouth once daily. 30 tablet 5    baclofen (LIORESAL) 20 MG tablet TK 1 T PO Q 8 H PRN      blood sugar diagnostic (TRUE METRIX GLUCOSE TEST STRIP) Strp test AS DIRECTED 3 TIMES A  strip 0    blood-glucose meter,continuous (DEXCOM G6 ) Misc Use DexCom as directed 1 each 11    cloNIDine (CATAPRES) 0.1 MG tablet Take 1 tablet (0.1 mg total) by mouth 3 (three) times daily. 90 tablet 1    dapsone 25 MG Tab Take 75 mg by mouth.       esomeprazole (NEXIUM) 40 MG capsule Take 1 capsule (40 mg total) by mouth before breakfast. 90 capsule 1    gabapentin (NEURONTIN) 800 MG tablet Neurontin 800 mg tablet   Take 1 " "tablet 3 times a day by oral route.      HYDROcodone-acetaminophen (NORCO)  mg per tablet Norco 10 mg-325 mg tablet   Take 1 tablet 3 times a day by oral route as needed.      hydrocortisone 2.5 % ointment APPLY TOPICALLY TO FACE TWICE A DAY AS NEEDED FOR 1 TO 2 WEEKS AT A TIME      hydroxychloroquine (PLAQUENIL) 200 mg tablet Take 200 mg by mouth.      hydrOXYzine pamoate (VISTARIL) 25 MG Cap Take 1 -2 tablets by mouth every 6 hours as needed for nausea/vomiting/insomnia/anxiety 25 capsule 0    predniSONE (DELTASONE) 10 MG tablet Day 1: take 1 tab every 6 hours  Day 2: take 1 tab every 8 hours  Day 3: take 1 tab every 12 hour  Day 4: take 1 tab in am (Patient not taking: Reported on 11/19/2020) 10 tablet 0    SURE COMFORT INSULIN SYRINGE 0.5 mL 30 gauge x 5/16" Syrg use as directed      tiZANidine (ZANAFLEX) 4 MG tablet TK 1 T PO TID PRN       No current facility-administered medications on file prior to visit.          Assessment:   45 y.o. female with multiple co-morbid illnesses here to follow-up with PCP and continue work-up of chronic issues notably thyroid nodules, SLE, DMII related to chronic steroid use, HTN,  Peripheral neuropathy, GERD.       Plan:     Problem List Items Addressed This Visit        Psychiatric    Drug induced insomnia     Sx relieved by Ambien.  Discussed risks/benefits of Ambien.   Sleep hygiene.            ENT    Hearing loss     Recent evaluation by ENT at  Clinic, she will schedule f/u appt to discuss recent biopsy.            Immunology/Multi System    Systemic lupus erythematosus     With acute flare.  Continue POC per rheumatology.         Leukocytoclastic vasculitis     With current flare.   Managed with oral prednisone.             Endocrine    Type 2 diabetes mellitus without complication, with long-term current use of insulin     Continue QID CBGs.  Change SS to 2u SubQ TID, hold if CBG < 150.    Would benefit from continuous monitoring since pt takes varied " doses of steroids and requires frequent CBG monitoring and insulin dose adjustments.         Relevant Medications    insulin aspart U-100 (NOVOLOG FLEXPEN U-100 INSULIN) 100 unit/mL (3 mL) InPn pen    blood-glucose meter,continuous (DEXCOM G6 ) Misc       Other    Thyroid nodule greater than or equal to 1 cm in diameter incidentally noted on imaging study     FNA very painful, would like sedation if needs to be repeated.  Check thyroid studies.  Endocrinology referral.           Other Visit Diagnoses     Goiter    -  Primary    Relevant Orders    Ambulatory referral/consult to Endocrinology    Iron deficiency anemia, unspecified iron deficiency anemia type        Relevant Orders    CBC Auto Differential    Stomatitis        Relevant Medications    triamcinolone acetonide 0.1% (KENALOG) 0.1 % paste    Insomnia, unspecified type        Relevant Medications    zolpidem (AMBIEN) 10 mg Tab          Health Maintenance       Date Due Completion Date    Hepatitis C Screening 1973 ---    Foot Exam 11/23/1983 ---    HIV Screening 11/23/1988 ---    TETANUS VACCINE 11/23/1991 ---    Pneumococcal Vaccine (Medium Risk) (1 of 1 - PPSV23) 11/23/1992 ---    Low Dose Statin 11/23/1994 ---    Cervical Cancer Screening 11/23/1994 ---    Influenza Vaccine (1) 08/01/2020 ---    Hemoglobin A1c 12/09/2020 6/9/2020    Lipid Panel 06/09/2021 6/9/2020    Eye Exam 09/21/2021 9/21/2020    Mammogram 06/22/2022 6/22/2020    Colonoscopy 06/24/2023 6/24/2020        Does not usually take vaccines - felt ill after most recent vaccine.    Foot exam done in February at podiatrist in [place].  Opth - Dr Travis Milligan on [address].      Follow up in about 4 weeks (around 1/12/2021).     Total face-to-face time was 60 min, >50% of this was spent on counseling and coordination of care.     The following issues were discussed: thyroid nodules, SLE, DMII related to chronic steroid use, HTN,  Peripheral neuropathy, GERD.     Health maintenance  needs, recent test results and goals of care discussed with pt and questions answered.          AGUSTIN Louis-C  [facility]  [phone]

## 2020-12-15 ENCOUNTER — LAB VISIT (OUTPATIENT)
Dept: LAB | Facility: HOSPITAL | Age: 45
End: 2020-12-15
Attending: NURSE PRACTITIONER
Payer: MEDICARE

## 2020-12-15 ENCOUNTER — OFFICE VISIT (OUTPATIENT)
Dept: PRIMARY CARE CLINIC | Facility: CLINIC | Age: 45
End: 2020-12-15
Payer: MEDICARE

## 2020-12-15 VITALS
TEMPERATURE: 97 F | BODY MASS INDEX: 33.72 KG/M2 | WEIGHT: 221.81 LBS | DIASTOLIC BLOOD PRESSURE: 90 MMHG | SYSTOLIC BLOOD PRESSURE: 162 MMHG

## 2020-12-15 DIAGNOSIS — E04.1 THYROID NODULE GREATER THAN OR EQUAL TO 1 CM IN DIAMETER INCIDENTALLY NOTED ON IMAGING STUDY: ICD-10-CM

## 2020-12-15 DIAGNOSIS — F19.982 DRUG INDUCED INSOMNIA: ICD-10-CM

## 2020-12-15 DIAGNOSIS — Z79.4 TYPE 2 DIABETES MELLITUS WITHOUT COMPLICATION, WITH LONG-TERM CURRENT USE OF INSULIN: ICD-10-CM

## 2020-12-15 DIAGNOSIS — E11.9 TYPE 2 DIABETES MELLITUS WITHOUT COMPLICATION, WITH LONG-TERM CURRENT USE OF INSULIN: ICD-10-CM

## 2020-12-15 DIAGNOSIS — K12.1 STOMATITIS: ICD-10-CM

## 2020-12-15 DIAGNOSIS — D50.9 IRON DEFICIENCY ANEMIA, UNSPECIFIED IRON DEFICIENCY ANEMIA TYPE: ICD-10-CM

## 2020-12-15 DIAGNOSIS — M32.8 OTHER FORMS OF SYSTEMIC LUPUS ERYTHEMATOSUS, UNSPECIFIED ORGAN INVOLVEMENT STATUS: ICD-10-CM

## 2020-12-15 DIAGNOSIS — G47.00 INSOMNIA, UNSPECIFIED TYPE: ICD-10-CM

## 2020-12-15 DIAGNOSIS — H91.90 HEARING LOSS, UNSPECIFIED HEARING LOSS TYPE, UNSPECIFIED LATERALITY: ICD-10-CM

## 2020-12-15 DIAGNOSIS — E04.9 GOITER: Primary | ICD-10-CM

## 2020-12-15 DIAGNOSIS — M31.0 LEUKOCYTOCLASTIC VASCULITIS: ICD-10-CM

## 2020-12-15 LAB
BASOPHILS # BLD AUTO: 0.03 K/UL (ref 0–0.2)
BASOPHILS NFR BLD: 0.3 % (ref 0–1.9)
DIFFERENTIAL METHOD: ABNORMAL
EOSINOPHIL # BLD AUTO: 0 K/UL (ref 0–0.5)
EOSINOPHIL NFR BLD: 0.4 % (ref 0–8)
ERYTHROCYTE [DISTWIDTH] IN BLOOD BY AUTOMATED COUNT: 13.6 % (ref 11.5–14.5)
ESTIMATED AVG GLUCOSE: 111 MG/DL (ref 68–131)
HBA1C MFR BLD HPLC: 5.5 % (ref 4–5.6)
HCT VFR BLD AUTO: 32 % (ref 37–48.5)
HGB BLD-MCNC: 9.9 G/DL (ref 12–16)
IMM GRANULOCYTES # BLD AUTO: 0.04 K/UL (ref 0–0.04)
IMM GRANULOCYTES NFR BLD AUTO: 0.4 % (ref 0–0.5)
LYMPHOCYTES # BLD AUTO: 1.6 K/UL (ref 1–4.8)
LYMPHOCYTES NFR BLD: 16.4 % (ref 18–48)
MCH RBC QN AUTO: 28.1 PG (ref 27–31)
MCHC RBC AUTO-ENTMCNC: 30.9 G/DL (ref 32–36)
MCV RBC AUTO: 91 FL (ref 82–98)
MONOCYTES # BLD AUTO: 0.4 K/UL (ref 0.3–1)
MONOCYTES NFR BLD: 4.1 % (ref 4–15)
NEUTROPHILS # BLD AUTO: 7.6 K/UL (ref 1.8–7.7)
NEUTROPHILS NFR BLD: 78.4 % (ref 38–73)
NRBC BLD-RTO: 0 /100 WBC
PLATELET # BLD AUTO: 288 K/UL (ref 150–350)
PMV BLD AUTO: 11.8 FL (ref 9.2–12.9)
RBC # BLD AUTO: 3.52 M/UL (ref 4–5.4)
WBC # BLD AUTO: 9.68 K/UL (ref 3.9–12.7)

## 2020-12-15 PROCEDURE — 84443 ASSAY THYROID STIM HORMONE: CPT | Mod: HCNC

## 2020-12-15 PROCEDURE — 3080F DIAST BP >= 90 MM HG: CPT | Mod: HCNC,CPTII,S$GLB, | Performed by: NURSE PRACTITIONER

## 2020-12-15 PROCEDURE — 3051F HG A1C>EQUAL 7.0%<8.0%: CPT | Mod: HCNC,CPTII,S$GLB, | Performed by: NURSE PRACTITIONER

## 2020-12-15 PROCEDURE — 3008F PR BODY MASS INDEX (BMI) DOCUMENTED: ICD-10-PCS | Mod: HCNC,CPTII,S$GLB, | Performed by: NURSE PRACTITIONER

## 2020-12-15 PROCEDURE — 36415 COLL VENOUS BLD VENIPUNCTURE: CPT | Mod: HCNC

## 2020-12-15 PROCEDURE — 1125F AMNT PAIN NOTED PAIN PRSNT: CPT | Mod: HCNC,S$GLB,, | Performed by: NURSE PRACTITIONER

## 2020-12-15 PROCEDURE — 3077F SYST BP >= 140 MM HG: CPT | Mod: HCNC,CPTII,S$GLB, | Performed by: NURSE PRACTITIONER

## 2020-12-15 PROCEDURE — 3077F PR MOST RECENT SYSTOLIC BLOOD PRESSURE >= 140 MM HG: ICD-10-PCS | Mod: HCNC,CPTII,S$GLB, | Performed by: NURSE PRACTITIONER

## 2020-12-15 PROCEDURE — 83036 HEMOGLOBIN GLYCOSYLATED A1C: CPT | Mod: HCNC

## 2020-12-15 PROCEDURE — 3008F BODY MASS INDEX DOCD: CPT | Mod: HCNC,CPTII,S$GLB, | Performed by: NURSE PRACTITIONER

## 2020-12-15 PROCEDURE — 85025 COMPLETE CBC W/AUTO DIFF WBC: CPT | Mod: HCNC

## 2020-12-15 PROCEDURE — 3051F PR MOST RECENT HEMOGLOBIN A1C LEVEL 7.0 - < 8.0%: ICD-10-PCS | Mod: HCNC,CPTII,S$GLB, | Performed by: NURSE PRACTITIONER

## 2020-12-15 PROCEDURE — 99999 PR PBB SHADOW E&M-EST. PATIENT-LVL III: ICD-10-PCS | Mod: PBBFAC,HCNC,, | Performed by: NURSE PRACTITIONER

## 2020-12-15 PROCEDURE — 99215 PR OFFICE/OUTPT VISIT, EST, LEVL V, 40-54 MIN: ICD-10-PCS | Mod: HCNC,S$GLB,, | Performed by: NURSE PRACTITIONER

## 2020-12-15 PROCEDURE — 1125F PR PAIN SEVERITY QUANTIFIED, PAIN PRESENT: ICD-10-PCS | Mod: HCNC,S$GLB,, | Performed by: NURSE PRACTITIONER

## 2020-12-15 PROCEDURE — 84481 FREE ASSAY (FT-3): CPT | Mod: HCNC

## 2020-12-15 PROCEDURE — 84439 ASSAY OF FREE THYROXINE: CPT | Mod: HCNC

## 2020-12-15 PROCEDURE — 3080F PR MOST RECENT DIASTOLIC BLOOD PRESSURE >= 90 MM HG: ICD-10-PCS | Mod: HCNC,CPTII,S$GLB, | Performed by: NURSE PRACTITIONER

## 2020-12-15 PROCEDURE — 99999 PR PBB SHADOW E&M-EST. PATIENT-LVL III: CPT | Mod: PBBFAC,HCNC,, | Performed by: NURSE PRACTITIONER

## 2020-12-15 PROCEDURE — 99215 OFFICE O/P EST HI 40 MIN: CPT | Mod: HCNC,S$GLB,, | Performed by: NURSE PRACTITIONER

## 2020-12-15 RX ORDER — ZOLPIDEM TARTRATE 10 MG/1
10 TABLET ORAL NIGHTLY PRN
Qty: 30 TABLET | Refills: 5 | Status: SHIPPED | OUTPATIENT
Start: 2020-12-15 | End: 2021-05-17

## 2020-12-15 RX ORDER — TRIAMCINOLONE ACETONIDE 1 MG/G
PASTE DENTAL 2 TIMES DAILY
Qty: 5 G | Refills: 11 | Status: SHIPPED | OUTPATIENT
Start: 2020-12-15 | End: 2021-01-14

## 2020-12-15 RX ORDER — TRIAMCINOLONE ACETONIDE 1 MG/G
PASTE DENTAL 2 TIMES DAILY
Qty: 5 G | Refills: 11 | Status: SHIPPED | OUTPATIENT
Start: 2020-12-15 | End: 2020-12-15 | Stop reason: SDUPTHER

## 2020-12-15 RX ORDER — INSULIN ASPART 100 [IU]/ML
2 INJECTION, SOLUTION INTRAVENOUS; SUBCUTANEOUS
Qty: 1 BOX | Refills: 0 | Status: SHIPPED | OUTPATIENT
Start: 2020-12-15 | End: 2021-03-11 | Stop reason: SDUPTHER

## 2020-12-15 RX ORDER — BLOOD-GLUCOSE,RECEIVER,CONT
1 EACH MISCELLANEOUS CONTINUOUS
Qty: 1 EACH | Refills: 0 | Status: SHIPPED | OUTPATIENT
Start: 2020-12-15 | End: 2023-09-08 | Stop reason: SDUPTHER

## 2020-12-15 NOTE — PATIENT INSTRUCTIONS
Diabtetes:  · Continue Lantus same dose.    · Change Novolog (short-acting) insulin to 2 units SubQ with meals three times daily if blood sugar less than 150.   · Continue blood sugar checks and call me in one week with results.      Thyroid nodules:  · Checking thyroid labs  · Will contact you regarding further testing.   · Referral to endocrinology place. (Dr Jarvis?)    Get labs done today.    Call me in one week or send message on portal with blood sugar results.  Schedule virtual visit in one month.

## 2020-12-15 NOTE — ASSESSMENT & PLAN NOTE
FNA very painful, would like sedation if needs to be repeated.  Check thyroid studies.  Endocrinology referral.

## 2020-12-15 NOTE — ASSESSMENT & PLAN NOTE
Continue QID CBGs.  Change SS to 2u SubQ TID, hold if CBG < 150.    Would benefit from continuous monitoring since pt takes varied doses of steroids and requires frequent CBG monitoring and insulin dose adjustments to manage drug induced DMII.    Dexcom reordered, orders faxed.

## 2020-12-16 ENCOUNTER — TELEPHONE (OUTPATIENT)
Dept: PRIMARY CARE CLINIC | Facility: CLINIC | Age: 45
End: 2020-12-16

## 2020-12-16 ENCOUNTER — PATIENT MESSAGE (OUTPATIENT)
Dept: PRIMARY CARE CLINIC | Facility: CLINIC | Age: 45
End: 2020-12-16

## 2020-12-16 DIAGNOSIS — E04.1 THYROID NODULE GREATER THAN OR EQUAL TO 1 CM IN DIAMETER INCIDENTALLY NOTED ON IMAGING STUDY: ICD-10-CM

## 2020-12-16 DIAGNOSIS — D64.9 ANEMIA, UNSPECIFIED TYPE: ICD-10-CM

## 2020-12-16 DIAGNOSIS — E04.9 GOITER: Primary | ICD-10-CM

## 2020-12-16 LAB
T3FREE SERPL-MCNC: 2.8 PG/ML (ref 2.3–4.2)
T4 FREE SERPL-MCNC: 0.92 NG/DL (ref 0.71–1.51)
TSH SERPL DL<=0.005 MIU/L-ACNC: 0.26 UIU/ML (ref 0.4–4)

## 2020-12-16 NOTE — TELEPHONE ENCOUNTER
Discussed lab results, management options with pt and addressed questions/concerns. Will schedule thyroid uptake scan, referral to endocrinology. Additional labs ordered for anemia.

## 2020-12-22 ENCOUNTER — PES CALL (OUTPATIENT)
Dept: ADMINISTRATIVE | Facility: CLINIC | Age: 45
End: 2020-12-22

## 2020-12-30 ENCOUNTER — TELEPHONE (OUTPATIENT)
Dept: PRIMARY CARE CLINIC | Facility: CLINIC | Age: 45
End: 2020-12-30

## 2020-12-30 DIAGNOSIS — Z79.4 TYPE 2 DIABETES MELLITUS WITHOUT COMPLICATION, WITH LONG-TERM CURRENT USE OF INSULIN: ICD-10-CM

## 2020-12-30 DIAGNOSIS — E11.9 TYPE 2 DIABETES MELLITUS WITHOUT COMPLICATION, WITH LONG-TERM CURRENT USE OF INSULIN: ICD-10-CM

## 2020-12-30 RX ORDER — INSULIN GLARGINE 100 [IU]/ML
22 INJECTION, SOLUTION SUBCUTANEOUS DAILY
Qty: 1 BOX | Refills: 5 | Status: SHIPPED | OUTPATIENT
Start: 2020-12-30 | End: 2021-05-17 | Stop reason: SDUPTHER

## 2020-12-30 NOTE — TELEPHONE ENCOUNTER
Pt reports CBGs in 200s lately.   She is taking oral prednisone for SL plus has received ESIs lately for herniated disc.    CBGs as follows:       12/25 12/26 12/27 12/28 12/29 12/30     98 118 128 132 128  Lunch 175 105 170 177 204 205   188 202 241 240      Advised to increase lantus to 22u subQ daily and continue additional 2u regular if CBG>150.    Pt still has not received DexCom device. Samm Cueva with [employer] contacted and is following up.

## 2021-01-06 ENCOUNTER — TELEPHONE (OUTPATIENT)
Dept: PRIMARY CARE CLINIC | Facility: CLINIC | Age: 46
End: 2021-01-06

## 2021-01-06 DIAGNOSIS — M32.8 OTHER FORMS OF SYSTEMIC LUPUS ERYTHEMATOSUS, UNSPECIFIED ORGAN INVOLVEMENT STATUS: Primary | ICD-10-CM

## 2021-01-06 RX ORDER — COLCHICINE 0.6 MG/1
0.6 TABLET ORAL DAILY
Qty: 30 TABLET | Refills: 1 | Status: SHIPPED | OUTPATIENT
Start: 2021-01-06 | End: 2021-08-25

## 2021-01-07 ENCOUNTER — LAB VISIT (OUTPATIENT)
Dept: LAB | Facility: HOSPITAL | Age: 46
End: 2021-01-07
Attending: NURSE PRACTITIONER
Payer: MEDICARE

## 2021-01-07 DIAGNOSIS — E04.9 GOITER: ICD-10-CM

## 2021-01-07 DIAGNOSIS — D64.9 ANEMIA, UNSPECIFIED TYPE: ICD-10-CM

## 2021-01-07 LAB
ALBUMIN SERPL BCP-MCNC: 4.5 G/DL (ref 3.5–5.2)
ANION GAP SERPL CALC-SCNC: 12 MMOL/L (ref 8–16)
BUN SERPL-MCNC: 19 MG/DL (ref 6–20)
CALCIUM SERPL-MCNC: 9.8 MG/DL (ref 8.7–10.5)
CHLORIDE SERPL-SCNC: 103 MMOL/L (ref 95–110)
CO2 SERPL-SCNC: 24 MMOL/L (ref 23–29)
CREAT SERPL-MCNC: 1.3 MG/DL (ref 0.5–1.4)
EST. GFR  (AFRICAN AMERICAN): 56 ML/MIN/1.73 M^2
EST. GFR  (NON AFRICAN AMERICAN): 49 ML/MIN/1.73 M^2
GLUCOSE SERPL-MCNC: 185 MG/DL (ref 70–110)
PHOSPHATE SERPL-MCNC: 4.1 MG/DL (ref 2.7–4.5)
POTASSIUM SERPL-SCNC: 4.6 MMOL/L (ref 3.5–5.1)
RETICS/RBC NFR AUTO: 1 % (ref 0.5–2.5)
SODIUM SERPL-SCNC: 139 MMOL/L (ref 136–145)

## 2021-01-07 PROCEDURE — 83540 ASSAY OF IRON: CPT | Mod: HCNC

## 2021-01-07 PROCEDURE — 85045 AUTOMATED RETICULOCYTE COUNT: CPT | Mod: HCNC

## 2021-01-07 PROCEDURE — 82728 ASSAY OF FERRITIN: CPT | Mod: HCNC

## 2021-01-07 PROCEDURE — 80069 RENAL FUNCTION PANEL: CPT | Mod: HCNC

## 2021-01-07 PROCEDURE — 83921 ORGANIC ACID SINGLE QUANT: CPT | Mod: HCNC

## 2021-01-07 PROCEDURE — 36415 COLL VENOUS BLD VENIPUNCTURE: CPT | Mod: HCNC

## 2021-01-08 LAB
FERRITIN SERPL-MCNC: 373 NG/ML (ref 20–300)
IRON SERPL-MCNC: 41 UG/DL (ref 30–160)
SATURATED IRON: 12 % (ref 20–50)
TOTAL IRON BINDING CAPACITY: 339 UG/DL (ref 250–450)
TRANSFERRIN SERPL-MCNC: 229 MG/DL (ref 200–375)

## 2021-01-12 LAB — METHYLMALONATE SERPL-SCNC: 0.2 UMOL/L

## 2021-01-13 ENCOUNTER — HOSPITAL ENCOUNTER (OUTPATIENT)
Dept: RADIOLOGY | Facility: HOSPITAL | Age: 46
Discharge: HOME | End: 2021-01-13
Attending: NURSE PRACTITIONER
Payer: MEDICARE

## 2021-01-13 DIAGNOSIS — E04.9 GOITER: ICD-10-CM

## 2021-01-13 PROCEDURE — A9509 IODINE I-123 SOD IODIDE MIL: HCPCS | Mod: HCNC

## 2021-01-14 ENCOUNTER — PATIENT MESSAGE (OUTPATIENT)
Dept: INTERNAL MEDICINE | Facility: CLINIC | Age: 46
End: 2021-01-14

## 2021-01-15 ENCOUNTER — OFFICE VISIT (OUTPATIENT)
Dept: PRIMARY CARE CLINIC | Facility: CLINIC | Age: 46
End: 2021-01-15
Payer: MEDICARE

## 2021-01-15 DIAGNOSIS — E04.1 THYROID NODULE GREATER THAN OR EQUAL TO 1 CM IN DIAMETER INCIDENTALLY NOTED ON IMAGING STUDY: ICD-10-CM

## 2021-01-15 DIAGNOSIS — Z79.4 TYPE 2 DIABETES MELLITUS WITHOUT COMPLICATION, WITH LONG-TERM CURRENT USE OF INSULIN: ICD-10-CM

## 2021-01-15 DIAGNOSIS — E11.9 TYPE 2 DIABETES MELLITUS WITHOUT COMPLICATION, WITH LONG-TERM CURRENT USE OF INSULIN: ICD-10-CM

## 2021-01-15 PROCEDURE — 99214 OFFICE O/P EST MOD 30 MIN: CPT | Mod: HCNC,95,, | Performed by: NURSE PRACTITIONER

## 2021-01-15 PROCEDURE — 99214 PR OFFICE/OUTPT VISIT, EST, LEVL IV, 30-39 MIN: ICD-10-PCS | Mod: HCNC,95,, | Performed by: NURSE PRACTITIONER

## 2021-01-15 PROCEDURE — 3044F PR MOST RECENT HEMOGLOBIN A1C LEVEL <7.0%: ICD-10-PCS | Mod: HCNC,CPTII,95, | Performed by: NURSE PRACTITIONER

## 2021-01-15 PROCEDURE — 3044F HG A1C LEVEL LT 7.0%: CPT | Mod: HCNC,CPTII,95, | Performed by: NURSE PRACTITIONER

## 2021-01-22 ENCOUNTER — PATIENT OUTREACH (OUTPATIENT)
Dept: ADMINISTRATIVE | Facility: HOSPITAL | Age: 46
End: 2021-01-22

## 2021-01-22 ENCOUNTER — TELEPHONE (OUTPATIENT)
Dept: INTERNAL MEDICINE | Facility: CLINIC | Age: 46
End: 2021-01-22

## 2021-01-22 VITALS — DIASTOLIC BLOOD PRESSURE: 76 MMHG | SYSTOLIC BLOOD PRESSURE: 122 MMHG

## 2021-02-04 ENCOUNTER — OFFICE VISIT (OUTPATIENT)
Dept: PRIMARY CARE CLINIC | Facility: CLINIC | Age: 46
End: 2021-02-04
Payer: MEDICARE

## 2021-02-04 ENCOUNTER — HOSPITAL ENCOUNTER (OUTPATIENT)
Dept: RADIOLOGY | Facility: HOSPITAL | Age: 46
Discharge: HOME | End: 2021-02-04
Attending: NURSE PRACTITIONER
Payer: MEDICARE

## 2021-02-04 DIAGNOSIS — F19.982 DRUG INDUCED INSOMNIA: ICD-10-CM

## 2021-02-04 DIAGNOSIS — R05.9 COUGH: ICD-10-CM

## 2021-02-04 DIAGNOSIS — U07.1 COVID-19 IN IMMUNOCOMPROMISED PATIENT: ICD-10-CM

## 2021-02-04 DIAGNOSIS — D84.9 COVID-19 IN IMMUNOCOMPROMISED PATIENT: ICD-10-CM

## 2021-02-04 DIAGNOSIS — Z79.4 TYPE 2 DIABETES MELLITUS WITHOUT COMPLICATION, WITH LONG-TERM CURRENT USE OF INSULIN: ICD-10-CM

## 2021-02-04 DIAGNOSIS — Z20.822 SUSPECTED COVID-19 VIRUS INFECTION: ICD-10-CM

## 2021-02-04 DIAGNOSIS — E11.9 TYPE 2 DIABETES MELLITUS WITHOUT COMPLICATION, WITH LONG-TERM CURRENT USE OF INSULIN: ICD-10-CM

## 2021-02-04 DIAGNOSIS — M31.0 LEUKOCYTOCLASTIC VASCULITIS: ICD-10-CM

## 2021-02-04 DIAGNOSIS — R05.9 COUGH: Primary | ICD-10-CM

## 2021-02-04 DIAGNOSIS — R11.2 NAUSEA AND VOMITING, INTRACTABILITY OF VOMITING NOT SPECIFIED, UNSPECIFIED VOMITING TYPE: ICD-10-CM

## 2021-02-04 PROCEDURE — 3044F HG A1C LEVEL LT 7.0%: CPT | Mod: CPTII,S$GLB,, | Performed by: NURSE PRACTITIONER

## 2021-02-04 PROCEDURE — 71046 X-RAY EXAM CHEST 2 VIEWS: CPT | Mod: TC,HCNC

## 2021-02-04 PROCEDURE — U0003 INFECTIOUS AGENT DETECTION BY NUCLEIC ACID (DNA OR RNA); SEVERE ACUTE RESPIRATORY SYNDROME CORONAVIRUS 2 (SARS-COV-2) (CORONAVIRUS DISEASE [COVID-19]), AMPLIFIED PROBE TECHNIQUE, MAKING USE OF HIGH THROUGHPUT TECHNOLOGIES AS DESCRIBED BY CMS-2020-01-R: HCPCS

## 2021-02-04 PROCEDURE — 99215 PR OFFICE/OUTPT VISIT, EST, LEVL V, 40-54 MIN: ICD-10-PCS | Mod: S$GLB,,, | Performed by: NURSE PRACTITIONER

## 2021-02-04 PROCEDURE — 99999 PR PBB SHADOW E&M-EST. PATIENT-LVL V: ICD-10-PCS | Mod: PBBFAC,,, | Performed by: NURSE PRACTITIONER

## 2021-02-04 PROCEDURE — 99999 PR PBB SHADOW E&M-EST. PATIENT-LVL V: CPT | Mod: PBBFAC,,, | Performed by: NURSE PRACTITIONER

## 2021-02-04 PROCEDURE — 3044F PR MOST RECENT HEMOGLOBIN A1C LEVEL <7.0%: ICD-10-PCS | Mod: CPTII,S$GLB,, | Performed by: NURSE PRACTITIONER

## 2021-02-04 PROCEDURE — 71046 X-RAY EXAM CHEST 2 VIEWS: CPT | Mod: 26,HCNC,, | Performed by: RADIOLOGY

## 2021-02-04 PROCEDURE — 71046 XR CHEST PA AND LATERAL: ICD-10-PCS | Mod: 26,HCNC,, | Performed by: RADIOLOGY

## 2021-02-04 PROCEDURE — 99215 OFFICE O/P EST HI 40 MIN: CPT | Mod: S$GLB,,, | Performed by: NURSE PRACTITIONER

## 2021-02-04 RX ORDER — ONDANSETRON 4 MG/1
4 TABLET, ORALLY DISINTEGRATING ORAL EVERY 12 HOURS PRN
Qty: 14 TABLET | Refills: 0 | Status: SHIPPED | OUTPATIENT
Start: 2021-02-04 | End: 2021-02-11

## 2021-02-05 ENCOUNTER — TELEPHONE (OUTPATIENT)
Dept: ADMINISTRATIVE | Facility: CLINIC | Age: 46
End: 2021-02-05

## 2021-02-05 ENCOUNTER — PATIENT MESSAGE (OUTPATIENT)
Dept: ADMINISTRATIVE | Facility: OTHER | Age: 46
End: 2021-02-05

## 2021-02-05 VITALS
SYSTOLIC BLOOD PRESSURE: 138 MMHG | TEMPERATURE: 99 F | DIASTOLIC BLOOD PRESSURE: 80 MMHG | HEART RATE: 98 BPM | RESPIRATION RATE: 20 BRPM | OXYGEN SATURATION: 95 %

## 2021-02-05 PROBLEM — U07.1 COVID-19 IN IMMUNOCOMPROMISED PATIENT: Status: ACTIVE | Noted: 2021-02-05

## 2021-02-05 PROBLEM — D84.9 COVID-19 IN IMMUNOCOMPROMISED PATIENT: Status: ACTIVE | Noted: 2021-02-05

## 2021-02-05 PROBLEM — Z20.822 SUSPECTED COVID-19 VIRUS INFECTION: Status: ACTIVE | Noted: 2021-02-05

## 2021-02-05 LAB — SARS-COV-2 RNA RESP QL NAA+PROBE: DETECTED

## 2021-02-06 ENCOUNTER — NURSE TRIAGE (OUTPATIENT)
Dept: ADMINISTRATIVE | Facility: CLINIC | Age: 46
End: 2021-02-06

## 2021-02-06 ENCOUNTER — INFUSION (OUTPATIENT)
Dept: INFECTIOUS DISEASES | Facility: HOSPITAL | Age: 46
End: 2021-02-06
Attending: NURSE PRACTITIONER
Payer: MEDICARE

## 2021-02-06 ENCOUNTER — PATIENT MESSAGE (OUTPATIENT)
Dept: ADMINISTRATIVE | Facility: OTHER | Age: 46
End: 2021-02-06

## 2021-02-06 ENCOUNTER — PATIENT MESSAGE (OUTPATIENT)
Dept: ADMINISTRATIVE | Facility: CLINIC | Age: 46
End: 2021-02-06

## 2021-02-06 VITALS
SYSTOLIC BLOOD PRESSURE: 157 MMHG | DIASTOLIC BLOOD PRESSURE: 74 MMHG | RESPIRATION RATE: 18 BRPM | HEART RATE: 86 BPM | OXYGEN SATURATION: 99 % | TEMPERATURE: 98 F

## 2021-02-06 DIAGNOSIS — U07.1 COVID-19 IN IMMUNOCOMPROMISED PATIENT: Primary | ICD-10-CM

## 2021-02-06 DIAGNOSIS — D84.9 COVID-19 IN IMMUNOCOMPROMISED PATIENT: Primary | ICD-10-CM

## 2021-02-06 PROCEDURE — 25000003 PHARM REV CODE 250: Performed by: INTERNAL MEDICINE

## 2021-02-06 PROCEDURE — M0239 BAMLANIVIMAB-XXXX INFUSION: HCPCS | Performed by: INTERNAL MEDICINE

## 2021-02-06 PROCEDURE — 63600175 PHARM REV CODE 636 W HCPCS: Performed by: INTERNAL MEDICINE

## 2021-02-06 RX ORDER — DIPHENHYDRAMINE HYDROCHLORIDE 50 MG/ML
25 INJECTION INTRAMUSCULAR; INTRAVENOUS ONCE AS NEEDED
Status: DISCONTINUED | OUTPATIENT
Start: 2021-02-06 | End: 2021-03-11

## 2021-02-06 RX ORDER — ONDANSETRON 4 MG/1
4 TABLET, ORALLY DISINTEGRATING ORAL ONCE AS NEEDED
Status: DISCONTINUED | OUTPATIENT
Start: 2021-02-06 | End: 2021-03-11

## 2021-02-06 RX ORDER — EPINEPHRINE 0.1 MG/ML
0.3 INJECTION INTRAVENOUS
Status: DISCONTINUED | OUTPATIENT
Start: 2021-02-06 | End: 2021-03-11

## 2021-02-06 RX ORDER — SODIUM CHLORIDE 0.9 % (FLUSH) 0.9 %
10 SYRINGE (ML) INJECTION
Status: DISCONTINUED | OUTPATIENT
Start: 2021-02-06 | End: 2021-03-11

## 2021-02-06 RX ORDER — ACETAMINOPHEN 325 MG/1
650 TABLET ORAL ONCE AS NEEDED
Status: DISCONTINUED | OUTPATIENT
Start: 2021-02-06 | End: 2023-07-14

## 2021-02-06 RX ORDER — ALBUTEROL SULFATE 90 UG/1
2 AEROSOL, METERED RESPIRATORY (INHALATION)
Status: DISCONTINUED | OUTPATIENT
Start: 2021-02-06 | End: 2022-03-29

## 2021-02-06 RX ADMIN — SODIUM CHLORIDE: 0.9 INJECTION, SOLUTION INTRAVENOUS at 12:02

## 2021-02-06 RX ADMIN — SODIUM CHLORIDE 700 MG: 0.9 INJECTION, SOLUTION INTRAVENOUS at 12:02

## 2021-02-07 ENCOUNTER — PATIENT MESSAGE (OUTPATIENT)
Dept: ADMINISTRATIVE | Facility: OTHER | Age: 46
End: 2021-02-07

## 2021-02-08 ENCOUNTER — TELEPHONE (OUTPATIENT)
Dept: ADMINISTRATIVE | Facility: CLINIC | Age: 46
End: 2021-02-08

## 2021-03-01 ENCOUNTER — TELEPHONE (OUTPATIENT)
Dept: PRIMARY CARE CLINIC | Facility: CLINIC | Age: 46
End: 2021-03-01

## 2021-03-10 ENCOUNTER — TELEPHONE (OUTPATIENT)
Dept: PRIMARY CARE CLINIC | Facility: CLINIC | Age: 46
End: 2021-03-10

## 2021-03-11 ENCOUNTER — OFFICE VISIT (OUTPATIENT)
Dept: PRIMARY CARE CLINIC | Facility: CLINIC | Age: 46
End: 2021-03-11
Payer: MEDICARE

## 2021-03-11 VITALS
SYSTOLIC BLOOD PRESSURE: 136 MMHG | HEIGHT: 68 IN | HEART RATE: 83 BPM | BODY MASS INDEX: 32.58 KG/M2 | DIASTOLIC BLOOD PRESSURE: 82 MMHG | WEIGHT: 214.94 LBS | TEMPERATURE: 97 F | OXYGEN SATURATION: 96 %

## 2021-03-11 DIAGNOSIS — G89.4 CHRONIC PAIN SYNDROME: ICD-10-CM

## 2021-03-11 DIAGNOSIS — Z79.4 TYPE 2 DIABETES MELLITUS WITHOUT COMPLICATION, WITH LONG-TERM CURRENT USE OF INSULIN: Primary | ICD-10-CM

## 2021-03-11 DIAGNOSIS — K21.9 GASTROESOPHAGEAL REFLUX DISEASE WITHOUT ESOPHAGITIS: ICD-10-CM

## 2021-03-11 DIAGNOSIS — Z11.59 ENCOUNTER FOR HEPATITIS C SCREENING TEST FOR LOW RISK PATIENT: ICD-10-CM

## 2021-03-11 DIAGNOSIS — E11.9 TYPE 2 DIABETES MELLITUS WITHOUT COMPLICATION, WITH LONG-TERM CURRENT USE OF INSULIN: Primary | ICD-10-CM

## 2021-03-11 DIAGNOSIS — M32.8 OTHER FORMS OF SYSTEMIC LUPUS ERYTHEMATOSUS, UNSPECIFIED ORGAN INVOLVEMENT STATUS: ICD-10-CM

## 2021-03-11 DIAGNOSIS — I10 ESSENTIAL HYPERTENSION: ICD-10-CM

## 2021-03-11 DIAGNOSIS — M31.0 LEUKOCYTOCLASTIC VASCULITIS: ICD-10-CM

## 2021-03-11 DIAGNOSIS — Z11.4 ENCOUNTER FOR SCREENING FOR HIV: ICD-10-CM

## 2021-03-11 PROBLEM — N30.00 ACUTE CYSTITIS WITHOUT HEMATURIA: Status: RESOLVED | Noted: 2020-02-06 | Resolved: 2021-03-11

## 2021-03-11 PROCEDURE — 3075F SYST BP GE 130 - 139MM HG: CPT | Mod: CPTII,S$GLB,, | Performed by: NURSE PRACTITIONER

## 2021-03-11 PROCEDURE — 3008F PR BODY MASS INDEX (BMI) DOCUMENTED: ICD-10-PCS | Mod: CPTII,S$GLB,, | Performed by: NURSE PRACTITIONER

## 2021-03-11 PROCEDURE — 3044F HG A1C LEVEL LT 7.0%: CPT | Mod: CPTII,S$GLB,, | Performed by: NURSE PRACTITIONER

## 2021-03-11 PROCEDURE — 99999 PR PBB SHADOW E&M-EST. PATIENT-LVL V: ICD-10-PCS | Mod: PBBFAC,,, | Performed by: NURSE PRACTITIONER

## 2021-03-11 PROCEDURE — 99999 PR PBB SHADOW E&M-EST. PATIENT-LVL V: CPT | Mod: PBBFAC,,, | Performed by: NURSE PRACTITIONER

## 2021-03-11 PROCEDURE — 3075F PR MOST RECENT SYSTOLIC BLOOD PRESS GE 130-139MM HG: ICD-10-PCS | Mod: CPTII,S$GLB,, | Performed by: NURSE PRACTITIONER

## 2021-03-11 PROCEDURE — 1125F PR PAIN SEVERITY QUANTIFIED, PAIN PRESENT: ICD-10-PCS | Mod: S$GLB,,, | Performed by: NURSE PRACTITIONER

## 2021-03-11 PROCEDURE — 3008F BODY MASS INDEX DOCD: CPT | Mod: CPTII,S$GLB,, | Performed by: NURSE PRACTITIONER

## 2021-03-11 PROCEDURE — 99215 OFFICE O/P EST HI 40 MIN: CPT | Mod: S$GLB,,, | Performed by: NURSE PRACTITIONER

## 2021-03-11 PROCEDURE — 3079F DIAST BP 80-89 MM HG: CPT | Mod: CPTII,S$GLB,, | Performed by: NURSE PRACTITIONER

## 2021-03-11 PROCEDURE — 99499 RISK ADDL DX/OHS AUDIT: ICD-10-PCS | Mod: S$PBB,HCNC,, | Performed by: NURSE PRACTITIONER

## 2021-03-11 PROCEDURE — 99499 UNLISTED E&M SERVICE: CPT | Mod: S$PBB,HCNC,, | Performed by: NURSE PRACTITIONER

## 2021-03-11 PROCEDURE — 3044F PR MOST RECENT HEMOGLOBIN A1C LEVEL <7.0%: ICD-10-PCS | Mod: CPTII,S$GLB,, | Performed by: NURSE PRACTITIONER

## 2021-03-11 PROCEDURE — 99215 PR OFFICE/OUTPT VISIT, EST, LEVL V, 40-54 MIN: ICD-10-PCS | Mod: S$GLB,,, | Performed by: NURSE PRACTITIONER

## 2021-03-11 PROCEDURE — 1125F AMNT PAIN NOTED PAIN PRSNT: CPT | Mod: S$GLB,,, | Performed by: NURSE PRACTITIONER

## 2021-03-11 PROCEDURE — 3079F PR MOST RECENT DIASTOLIC BLOOD PRESSURE 80-89 MM HG: ICD-10-PCS | Mod: CPTII,S$GLB,, | Performed by: NURSE PRACTITIONER

## 2021-03-11 RX ORDER — HYDROXYZINE PAMOATE 25 MG/1
CAPSULE ORAL
Qty: 25 CAPSULE | Refills: 0 | Status: SHIPPED | OUTPATIENT
Start: 2021-03-11 | End: 2022-05-12

## 2021-03-11 RX ORDER — HYDROXYZINE PAMOATE 25 MG/1
CAPSULE ORAL
Qty: 25 CAPSULE | Refills: 0 | Status: SHIPPED | OUTPATIENT
Start: 2021-03-11 | End: 2021-03-11

## 2021-03-11 RX ORDER — SUB-Q INSULIN DEVICE, 40 UNIT
1 EACH MISCELLANEOUS DAILY
Qty: 30 EACH | Refills: 11 | Status: SHIPPED | OUTPATIENT
Start: 2021-03-11 | End: 2021-05-17

## 2021-03-11 RX ORDER — AMLODIPINE BESYLATE 10 MG/1
10 TABLET ORAL DAILY
Qty: 30 TABLET | Refills: 5 | Status: SHIPPED | OUTPATIENT
Start: 2021-03-11 | End: 2021-03-11

## 2021-03-11 RX ORDER — INSULIN ASPART 100 [IU]/ML
INJECTION, SOLUTION INTRAVENOUS; SUBCUTANEOUS
Qty: 6 VIAL | Refills: 3 | Status: SHIPPED | OUTPATIENT
Start: 2021-03-11 | End: 2021-03-23 | Stop reason: SDUPTHER

## 2021-03-11 RX ORDER — CLONIDINE HYDROCHLORIDE 0.1 MG/1
0.1 TABLET ORAL 2 TIMES DAILY
Qty: 60 TABLET | Refills: 11 | Status: SHIPPED | OUTPATIENT
Start: 2021-03-11 | End: 2021-03-11

## 2021-03-17 ENCOUNTER — TELEPHONE (OUTPATIENT)
Dept: PRIMARY CARE CLINIC | Facility: CLINIC | Age: 46
End: 2021-03-17

## 2021-03-22 ENCOUNTER — TELEPHONE (OUTPATIENT)
Dept: PRIMARY CARE CLINIC | Facility: CLINIC | Age: 46
End: 2021-03-22

## 2021-03-23 ENCOUNTER — TELEPHONE (OUTPATIENT)
Dept: INTERNAL MEDICINE | Facility: CLINIC | Age: 46
End: 2021-03-23

## 2021-03-23 ENCOUNTER — TELEPHONE (OUTPATIENT)
Dept: DIABETES | Facility: CLINIC | Age: 46
End: 2021-03-23

## 2021-03-23 DIAGNOSIS — Z79.4 TYPE 2 DIABETES MELLITUS WITHOUT COMPLICATION, WITH LONG-TERM CURRENT USE OF INSULIN: ICD-10-CM

## 2021-03-23 DIAGNOSIS — E11.9 TYPE 2 DIABETES MELLITUS WITHOUT COMPLICATION, WITH LONG-TERM CURRENT USE OF INSULIN: ICD-10-CM

## 2021-03-23 RX ORDER — INSULIN ASPART 100 [IU]/ML
INJECTION, SOLUTION INTRAVENOUS; SUBCUTANEOUS
Qty: 60 ML | Refills: 3 | Status: SHIPPED | OUTPATIENT
Start: 2021-03-23 | End: 2021-05-17

## 2021-03-24 ENCOUNTER — CLINICAL SUPPORT (OUTPATIENT)
Dept: DIABETES | Facility: CLINIC | Age: 46
End: 2021-03-24
Payer: MEDICARE

## 2021-03-24 VITALS — HEIGHT: 68 IN | WEIGHT: 211.63 LBS | BODY MASS INDEX: 32.07 KG/M2

## 2021-03-24 DIAGNOSIS — Z79.4 TYPE 2 DIABETES MELLITUS WITHOUT COMPLICATION, WITH LONG-TERM CURRENT USE OF INSULIN: Primary | ICD-10-CM

## 2021-03-24 DIAGNOSIS — E11.9 TYPE 2 DIABETES MELLITUS WITHOUT COMPLICATION, WITH LONG-TERM CURRENT USE OF INSULIN: Primary | ICD-10-CM

## 2021-03-24 PROCEDURE — 99999 PR PBB SHADOW E&M-EST. PATIENT-LVL II: ICD-10-PCS | Mod: PBBFAC,,, | Performed by: DIETITIAN, REGISTERED

## 2021-03-24 PROCEDURE — 99999 PR PBB SHADOW E&M-EST. PATIENT-LVL II: CPT | Mod: PBBFAC,,, | Performed by: DIETITIAN, REGISTERED

## 2021-03-24 PROCEDURE — G0108 PR DIAB MANAGE TRN  PER INDIV: ICD-10-PCS | Mod: S$GLB,,, | Performed by: DIETITIAN, REGISTERED

## 2021-03-24 PROCEDURE — G0108 DIAB MANAGE TRN  PER INDIV: HCPCS | Mod: S$GLB,,, | Performed by: DIETITIAN, REGISTERED

## 2021-03-29 ENCOUNTER — OFFICE VISIT (OUTPATIENT)
Dept: PRIMARY CARE CLINIC | Facility: CLINIC | Age: 46
End: 2021-03-29
Payer: MEDICARE

## 2021-03-29 DIAGNOSIS — M31.0 LEUKOCYTOCLASTIC VASCULITIS: ICD-10-CM

## 2021-03-29 DIAGNOSIS — E11.9 TYPE 2 DIABETES MELLITUS WITHOUT COMPLICATION, WITH LONG-TERM CURRENT USE OF INSULIN: ICD-10-CM

## 2021-03-29 DIAGNOSIS — Z79.4 TYPE 2 DIABETES MELLITUS WITHOUT COMPLICATION, WITH LONG-TERM CURRENT USE OF INSULIN: ICD-10-CM

## 2021-03-29 DIAGNOSIS — M32.8 OTHER FORMS OF SYSTEMIC LUPUS ERYTHEMATOSUS, UNSPECIFIED ORGAN INVOLVEMENT STATUS: ICD-10-CM

## 2021-03-29 DIAGNOSIS — I10 ESSENTIAL HYPERTENSION: Primary | ICD-10-CM

## 2021-03-29 PROCEDURE — 99214 PR OFFICE/OUTPT VISIT, EST, LEVL IV, 30-39 MIN: ICD-10-PCS | Mod: 95,,, | Performed by: NURSE PRACTITIONER

## 2021-03-29 PROCEDURE — 3044F HG A1C LEVEL LT 7.0%: CPT | Mod: CPTII,95,, | Performed by: NURSE PRACTITIONER

## 2021-03-29 PROCEDURE — 99214 OFFICE O/P EST MOD 30 MIN: CPT | Mod: 95,,, | Performed by: NURSE PRACTITIONER

## 2021-03-29 PROCEDURE — 3044F PR MOST RECENT HEMOGLOBIN A1C LEVEL <7.0%: ICD-10-PCS | Mod: CPTII,95,, | Performed by: NURSE PRACTITIONER

## 2021-03-29 RX ORDER — CLONIDINE HYDROCHLORIDE 0.1 MG/1
0.1 TABLET ORAL 3 TIMES DAILY
Qty: 90 TABLET | Refills: 11 | Status: SHIPPED | OUTPATIENT
Start: 2021-03-29 | End: 2021-05-13 | Stop reason: SDUPTHER

## 2021-03-29 RX ORDER — AMLODIPINE BESYLATE 10 MG/1
10 TABLET ORAL DAILY
Qty: 90 TABLET | Refills: 3 | Status: SHIPPED | OUTPATIENT
Start: 2021-03-29 | End: 2021-08-24

## 2021-03-30 ENCOUNTER — PATIENT MESSAGE (OUTPATIENT)
Dept: PRIMARY CARE CLINIC | Facility: CLINIC | Age: 46
End: 2021-03-30

## 2021-03-30 DIAGNOSIS — I10 ESSENTIAL HYPERTENSION: Primary | ICD-10-CM

## 2021-03-30 RX ORDER — DOXAZOSIN 1 MG/1
1 TABLET ORAL NIGHTLY
Qty: 30 TABLET | Refills: 11 | Status: SHIPPED | OUTPATIENT
Start: 2021-03-30 | End: 2021-08-24 | Stop reason: SDUPTHER

## 2021-03-31 ENCOUNTER — PATIENT MESSAGE (OUTPATIENT)
Dept: PRIMARY CARE CLINIC | Facility: CLINIC | Age: 46
End: 2021-03-31

## 2021-04-07 ENCOUNTER — CLINICAL SUPPORT (OUTPATIENT)
Dept: DIABETES | Facility: CLINIC | Age: 46
End: 2021-04-07
Payer: MEDICARE

## 2021-04-07 DIAGNOSIS — E11.9 TYPE 2 DIABETES MELLITUS WITHOUT COMPLICATION, WITH LONG-TERM CURRENT USE OF INSULIN: Primary | ICD-10-CM

## 2021-04-07 DIAGNOSIS — Z79.4 TYPE 2 DIABETES MELLITUS WITHOUT COMPLICATION, WITH LONG-TERM CURRENT USE OF INSULIN: Primary | ICD-10-CM

## 2021-04-07 PROCEDURE — G0108 DIAB MANAGE TRN  PER INDIV: HCPCS | Mod: 95,,, | Performed by: DIETITIAN, REGISTERED

## 2021-04-07 PROCEDURE — G0108 PR DIAB MANAGE TRN  PER INDIV: ICD-10-PCS | Mod: 95,,, | Performed by: DIETITIAN, REGISTERED

## 2021-04-27 ENCOUNTER — TELEPHONE (OUTPATIENT)
Dept: PRIMARY CARE CLINIC | Facility: CLINIC | Age: 46
End: 2021-04-27

## 2021-04-29 ENCOUNTER — TELEPHONE (OUTPATIENT)
Dept: PRIMARY CARE CLINIC | Facility: CLINIC | Age: 46
End: 2021-04-29

## 2021-04-29 ENCOUNTER — PATIENT MESSAGE (OUTPATIENT)
Dept: PRIMARY CARE CLINIC | Facility: CLINIC | Age: 46
End: 2021-04-29

## 2021-04-29 DIAGNOSIS — R60.9 EDEMA, UNSPECIFIED TYPE: Primary | ICD-10-CM

## 2021-04-30 ENCOUNTER — HOSPITAL ENCOUNTER (OUTPATIENT)
Dept: RADIOLOGY | Facility: HOSPITAL | Age: 46
Discharge: HOME | End: 2021-04-30
Attending: NURSE PRACTITIONER
Payer: MEDICARE

## 2021-04-30 DIAGNOSIS — R60.9 EDEMA, UNSPECIFIED TYPE: ICD-10-CM

## 2021-04-30 PROCEDURE — 93970 US LOWER EXTREMITY VEINS BILATERAL: ICD-10-PCS | Mod: 26,,, | Performed by: RADIOLOGY

## 2021-04-30 PROCEDURE — 93970 EXTREMITY STUDY: CPT | Mod: 26,,, | Performed by: RADIOLOGY

## 2021-04-30 PROCEDURE — 93970 EXTREMITY STUDY: CPT | Mod: TC

## 2021-05-13 DIAGNOSIS — I10 ESSENTIAL HYPERTENSION: ICD-10-CM

## 2021-05-13 RX ORDER — PEN NEEDLE, DIABETIC 30 GX3/16"
1 NEEDLE, DISPOSABLE MISCELLANEOUS 4 TIMES DAILY
Qty: 360 EACH | Refills: 3 | Status: SHIPPED | OUTPATIENT
Start: 2021-05-13 | End: 2021-05-17 | Stop reason: SDUPTHER

## 2021-05-13 RX ORDER — CLONIDINE HYDROCHLORIDE 0.1 MG/1
0.1 TABLET ORAL 3 TIMES DAILY
Qty: 90 TABLET | Refills: 11 | Status: SHIPPED | OUTPATIENT
Start: 2021-05-13 | End: 2021-05-17 | Stop reason: SDUPTHER

## 2021-05-13 RX ORDER — PEN NEEDLE, DIABETIC 30 GX3/16"
NEEDLE, DISPOSABLE MISCELLANEOUS
COMMUNITY
End: 2021-05-13 | Stop reason: SDUPTHER

## 2021-05-17 DIAGNOSIS — E11.9 TYPE 2 DIABETES MELLITUS WITHOUT COMPLICATION, WITH LONG-TERM CURRENT USE OF INSULIN: ICD-10-CM

## 2021-05-17 DIAGNOSIS — F19.982 DRUG-INDUCED INSOMNIA: Primary | ICD-10-CM

## 2021-05-17 DIAGNOSIS — K21.9 GASTROESOPHAGEAL REFLUX DISEASE WITHOUT ESOPHAGITIS: ICD-10-CM

## 2021-05-17 DIAGNOSIS — I10 ESSENTIAL HYPERTENSION: ICD-10-CM

## 2021-05-17 DIAGNOSIS — K59.03 DRUG-INDUCED CONSTIPATION: ICD-10-CM

## 2021-05-17 DIAGNOSIS — Z79.4 TYPE 2 DIABETES MELLITUS WITHOUT COMPLICATION, WITH LONG-TERM CURRENT USE OF INSULIN: ICD-10-CM

## 2021-05-17 RX ORDER — ESOMEPRAZOLE MAGNESIUM 40 MG/1
40 CAPSULE, DELAYED RELEASE ORAL
Qty: 90 CAPSULE | Refills: 1 | Status: SHIPPED | OUTPATIENT
Start: 2021-05-17 | End: 2021-08-26

## 2021-05-17 RX ORDER — INSULIN GLARGINE 100 [IU]/ML
22 INJECTION, SOLUTION SUBCUTANEOUS DAILY
Qty: 1 BOX | Refills: 5 | Status: SHIPPED | OUTPATIENT
Start: 2021-05-17 | End: 2021-07-26

## 2021-05-17 RX ORDER — INSULIN ASPART 100 [IU]/ML
4 INJECTION, SOLUTION INTRAVENOUS; SUBCUTANEOUS
Qty: 3.6 ML | Refills: 11 | Status: SHIPPED | OUTPATIENT
Start: 2021-05-17 | End: 2021-08-24

## 2021-05-17 RX ORDER — PEN NEEDLE, DIABETIC 30 GX3/16"
1 NEEDLE, DISPOSABLE MISCELLANEOUS 4 TIMES DAILY
Qty: 360 EACH | Refills: 3 | Status: SHIPPED | OUTPATIENT
Start: 2021-05-17 | End: 2021-05-25 | Stop reason: SDUPTHER

## 2021-05-17 RX ORDER — ZOLPIDEM TARTRATE 10 MG/1
10 TABLET ORAL NIGHTLY PRN
Qty: 30 TABLET | Refills: 5 | Status: SHIPPED | OUTPATIENT
Start: 2021-05-17 | End: 2021-12-03 | Stop reason: SDUPTHER

## 2021-05-17 RX ORDER — CLONIDINE HYDROCHLORIDE 0.1 MG/1
0.1 TABLET ORAL 3 TIMES DAILY
Qty: 90 TABLET | Refills: 11 | Status: SHIPPED | OUTPATIENT
Start: 2021-05-17 | End: 2022-03-29 | Stop reason: SDUPTHER

## 2021-05-17 RX ORDER — SUB-Q INSULIN DEVICE, 40 UNIT
1 EACH MISCELLANEOUS DAILY
Qty: 30 EACH | Refills: 11 | Status: SHIPPED | OUTPATIENT
Start: 2021-05-17 | End: 2021-08-24

## 2021-05-25 DIAGNOSIS — Z79.4 TYPE 2 DIABETES MELLITUS WITHOUT COMPLICATION, WITH LONG-TERM CURRENT USE OF INSULIN: ICD-10-CM

## 2021-05-25 DIAGNOSIS — E11.9 TYPE 2 DIABETES MELLITUS WITHOUT COMPLICATION, WITH LONG-TERM CURRENT USE OF INSULIN: ICD-10-CM

## 2021-05-25 RX ORDER — PEN NEEDLE, DIABETIC 30 GX3/16"
1 NEEDLE, DISPOSABLE MISCELLANEOUS 4 TIMES DAILY
Qty: 360 EACH | Refills: 3 | Status: SHIPPED | OUTPATIENT
Start: 2021-05-25 | End: 2021-06-03 | Stop reason: SDUPTHER

## 2021-06-03 ENCOUNTER — TELEPHONE (OUTPATIENT)
Dept: PRIMARY CARE CLINIC | Facility: CLINIC | Age: 46
End: 2021-06-03

## 2021-06-03 DIAGNOSIS — Z79.4 TYPE 2 DIABETES MELLITUS WITHOUT COMPLICATION, WITH LONG-TERM CURRENT USE OF INSULIN: Primary | ICD-10-CM

## 2021-06-03 DIAGNOSIS — E11.9 TYPE 2 DIABETES MELLITUS WITHOUT COMPLICATION, WITH LONG-TERM CURRENT USE OF INSULIN: ICD-10-CM

## 2021-06-03 DIAGNOSIS — E11.9 TYPE 2 DIABETES MELLITUS WITHOUT COMPLICATION, WITH LONG-TERM CURRENT USE OF INSULIN: Primary | ICD-10-CM

## 2021-06-03 DIAGNOSIS — Z79.4 TYPE 2 DIABETES MELLITUS WITHOUT COMPLICATION, WITH LONG-TERM CURRENT USE OF INSULIN: ICD-10-CM

## 2021-06-03 RX ORDER — BLOOD-GLUCOSE SENSOR
1 EACH MISCELLANEOUS
Qty: 3 EACH | Refills: 11 | Status: SHIPPED | OUTPATIENT
Start: 2021-06-03 | End: 2021-07-09 | Stop reason: SDUPTHER

## 2021-06-03 RX ORDER — LANCETS 33 GAUGE
1 EACH MISCELLANEOUS 4 TIMES DAILY
Qty: 100 EACH | Refills: 11 | Status: SHIPPED | OUTPATIENT
Start: 2021-06-03

## 2021-06-03 RX ORDER — PEN NEEDLE, DIABETIC 30 GX3/16"
1 NEEDLE, DISPOSABLE MISCELLANEOUS 4 TIMES DAILY
Qty: 300 EACH | Refills: 3 | Status: SHIPPED | OUTPATIENT
Start: 2021-06-03 | End: 2022-06-03

## 2021-06-06 NOTE — PROGRESS NOTES
Patient Education     Cat Bite    A cat bite can cause a wound deep enough to break the skin. In such cases, the wound is cleaned and then sometimes closed. If the wound is closed it is usually not closed completely. This is so that fluid can drain if the wound becomes infected. Often the wound is left open to heal. In addition to wound care, a tetanus shot may be given, if needed.  Home care    Wash your hands well with soap and warm water before and after caring for the wound. This helps lower the risk of infection.    Care for the wound as directed. If a dressing was applied to the wound, be sure to change it as directed.    If the wound bleeds, place a clean, soft cloth on the wound. Then firmly apply pressure until the bleeding stops. This may take up to 5 minutes. Don't release the pressure and look at the wound during this time.    Always get medical attention for cat bites on the hand. They are highly likely to become infected.    Most wounds heal within 10 days. But an infection can occur even with proper treatment. So be sure to check the wound daily for signs of infection (see below).    Antibiotics may be prescribed. These help prevent or treat infection. If you re given antibiotics, take them as directed. Also be sure to complete the medicines.  Rabies prevention  Rabies is a virus that can be carried in certain animals. These can include domestic animals such as cats and dogs. Pets fully vaccinated against rabies (2 shots) are at very low risk of infection. But because human rabies is almost always fatal, any biting pet should be confined for 10 days as an extra precaution. In general, if there is a risk for rabies, the following steps may need to be taken:    If someone s pet cat has bitten you, it should be kept in a secure area for the next 10 days to watch for signs of illness. If the pet owner won t allow this, contact your local animal control center. If the cat becomes ill or dies during that  Clinic Consult:  [facility] Consultation Note    Reason for Consult:  The primary encounter diagnosis was Gastroesophageal reflux disease, esophagitis presence not specified. Diagnoses of Atypical chest pain and Colon cancer screening were also pertinent to this visit.    PCP: Abram Dewey   [id number] [facility] / [place] [id number]    HPI:  This is a 44 y.o. female here for evaluation of the above. She presents to clinic with long history of GERD. Previously well controlled on Nexium. She had actually been out of Nexium for a while and became symptomatic. She was placed on Nexium 20 mg daily but patient was taking 40 mg daily as the lower dose was not helping. She reports improvements on daily Nexium 40 mg but she does occasionally get breakthrough symptoms. This occurs when she misses a dose or when she is eating late. She was seen in the ER in March for chest pain which was improved by GI cocktail. She denies any dysphagia, hematochezia,or melena. She has never had an EGD before. She is also due for age related colon cancer screening. She is average risk. No family history of colon cancer.     Review of Systems   Constitutional: Negative for fever, malaise/fatigue and weight loss.   HENT: Negative for sore throat.    Respiratory: Negative for cough and wheezing.    Cardiovascular: Positive for chest pain. Negative for palpitations.   Gastrointestinal: Positive for heartburn. Negative for abdominal pain, blood in stool, constipation, diarrhea, melena, nausea and vomiting.   Genitourinary: Negative for dysuria and frequency.   Musculoskeletal: Negative for back pain, joint pain, myalgias and neck pain.   Skin: Negative for itching and rash.   Neurological: Negative for dizziness, speech change, seizures, loss of consciousness and headaches.   Psychiatric/Behavioral: Negative for depression and substance abuse. The patient is not nervous/anxious.        Medical History:  has a past medical history  of Anemia, Arthritis, Connective tissue disease (1999), Diabetes mellitus, Gastroesophageal reflux disease (3/23/2020), Hypertension, Lupus (1999), and Vasculitis.    Surgical History:  has a past surgical history that includes Wrist surgery (Bilateral); Tubal ligation; Ablation; and Right heart catheterization.    Family History: family history is not on file..     Social History:  reports that she has never smoked. She does not have any smokeless tobacco history on file. She reports that she does not drink alcohol or use drugs.    Allergies: Reviewed    Home Medications:   Current Outpatient Medications on File Prior to Visit   Medication Sig Dispense Refill    amLODIPine (NORVASC) 10 MG tablet Take 10 mg by mouth once daily.  11    baclofen (LIORESAL) 10 MG tablet baclofen 10 mg tablet   Take 1 tablet 3 times a day by oral route as needed for 30 days.      blood sugar diagnostic (TRUE METRIX GLUCOSE TEST STRIP) Strp test AS DIRECTED 3 TIMES A  strip 0    cloNIDine (CATAPRES) 0.1 MG tablet Take 1 tablet (0.1 mg total) by mouth 3 (three) times daily. 90 tablet 1    dapsone 25 MG Tab Take 75 mg by mouth.       esomeprazole (NEXIUM) 40 MG capsule Take 1 capsule (40 mg total) by mouth before breakfast. 90 capsule 1    gabapentin (NEURONTIN) 800 MG tablet Neurontin 800 mg tablet   Take 1 tablet 3 times a day by oral route.      HYDROcodone-acetaminophen (NORCO)  mg per tablet Norco 10 mg-325 mg tablet   Take 1 tablet 3 times a day by oral route as needed.      hydrocortisone 2.5 % ointment APPLY TOPICALLY TO FACE TWICE A DAY AS NEEDED FOR 1 TO 2 WEEKS AT A TIME      hydroxychloroquine (PLAQUENIL) 200 mg tablet Take 200 mg by mouth.      hydrOXYzine pamoate (VISTARIL) 25 MG Cap Take 1 -2 tablets by mouth every 6 hours as needed for nausea/vomiting/insomnia/anxiety 25 capsule 0    insulin asp prt-insulin aspart, NOVOLOG 70/30, (NOVOLOG MIX 70-30 U-100 INSULN) 100 unit/mL (70-30) Soln Inject 15  time, contact your local animal control center at once so the animal may be tested for rabies. If the cat stays healthy for the next 10 days, there is no danger of rabies in the animal or you.    If a stray cat bit you, contact your local animal control center. They can give information on capture, quarantine, and animal rabies testing.    If you can t find the animal that bit you in the next 2 days, and if rabies exists in your area, you may need to receive the rabies vaccine series. Call your healthcare provider right away. Or return to the emergency department promptly.    All animal bites should be reported to the local animal control center. If you were not given a form to fill out, you can report this yourself.  Follow-up care  Follow up with your healthcare provider, or as directed.  When to seek medical advice  Call your healthcare provider right away if any of these occur:    Signs of infection:  ? Spreading redness or warmth from the wound  ? Increased pain or swelling  ? Fever of 100.4 F (38 C) or higher, or as directed by your healthcare provider  ? Colored fluid or pus draining from the wound  ? Enlarged lymph nodes above the area that was bitten, such as lymph nodes in the armpit if you were bitten on the hand or arm. This may be a sign of cat-scratch disease (cat-scratch fever).    Signs of rabies infection:  ? Headache  ? Confusion  ? Strange behavior  ? Increased salivating or drooling  ? Seizure    Decreased ability to move any body part near the bite area    Bleeding that can't be stopped after 5 minutes of firm pressure  [employer] last reviewed this educational content on 5/1/2018 2000-2021 [employer]. All rights reserved. This information is not intended as a substitute for professional medical care. Always follow your healthcare professional's instructions.            "Units into the skin 2 (two) times daily. 10 Units 2    LINZESS 145 mcg Cap capsule TAKE 1 CAPSULE BY MOUTH DAILY MD SAID MAKE APPOINTMENT  0    olmesartan (BENICAR) 40 MG tablet Take 1 tablet (40 mg total) by mouth once daily. 90 tablet 3    pentoxifylline (TRENTAL) 400 mg TbSR Take 400 mg by mouth.      predniSONE (DELTASONE) 10 MG tablet Take 10 mg by mouth once daily. Scale from 40 to 10 when having outbreak      triamcinolone acetonide 0.1% (KENALOG) 0.1 % ointment APPLY TOPICALLY TWICE A DAY FOR 1 TO 2 WEEKS DO NOT APPLY TO FACE OR GROIN      zolpidem (AMBIEN) 10 mg Tab Ambien 10 mg tablet   Take 1 tablet every day by oral route at bedtime.      buprenorphine HCL (BELBUCA) 450 mcg Film Belbuca 450 mcg buccal film   Place 1 film twice a day by buccal route.      carisoprodol (SOMA) 350 MG tablet Take 1 tablet(s) 3 times a day by oral route as needed.  0    esomeprazole (NEXIUM) 20 MG capsule TAKE 1 CAPSULE BY MOUTH DAILY MD SAID MAKE APPOINTMENT (Patient not taking: Reported on 6/9/2020) 90 capsule 1    [DISCONTINUED] esomeprazole (NEXIUM) 40 MG capsule Take 1 capsule (40 mg total) by mouth before breakfast. 90 capsule 1     No current facility-administered medications on file prior to visit.        Physical Exam:  /76   Resp (!) 78   Ht 5' 8" (1.727 m)   Wt 96.8 kg (213 lb 6.5 oz)   BMI 32.45 kg/m²   Body mass index is 32.45 kg/m².  Physical Exam   Constitutional: She is oriented to person, place, and time and well-developed, well-nourished, and in no distress. No distress.   HENT:   Head: Normocephalic.   Eyes: Pupils are equal, round, and reactive to light. Conjunctivae are normal.   Cardiovascular: Normal rate, regular rhythm and normal heart sounds.   Pulmonary/Chest: Effort normal and breath sounds normal. No respiratory distress.   Abdominal: Soft. Bowel sounds are normal. She exhibits no distension. There is no tenderness.   Neurological: She is alert and oriented to person, place, " and time. No cranial nerve deficit.   Skin: Skin is warm and dry. No rash noted.   Psychiatric: Mood and affect normal.       Labs: Pertinent labs reviewed.  CRC Screening: due    Assessment and Plan:   Gastroesophageal reflux disease, esophagitis presence not specified  Atypical chest pain  - patient with long history of GERD with worsening symptoms with breakthrough symptoms and development of chest pain.   - plan for EGD for further evaluation.   -     Case request GI: ESOPHAGOGASTRODUODENOSCOPY (EGD), COLONOSCOPY  -     COVID-19 Routine Screening; Future; Expected date: 06/09/2020  -     SUPREP BOWEL PREP KIT 17.5-3.13-1.6 gram SolR; Use as directed  Dispense: 354 mL; Refill: 0    Colon cancer screening  - plan for screening colonoscopy at time of EGD.   -     Case request GI: ESOPHAGOGASTRODUODENOSCOPY (EGD), COLONOSCOPY  -     COVID-19 Routine Screening; Future; Expected date: 06/09/2020  -     SUPREP BOWEL PREP KIT 17.5-3.13-1.6 gram SolR; Use as directed  Dispense: 354 mL; Refill: 0    Follow up to be determined after procedure.    Thank you so much for allowing me to participate in the care of AGNES Hermosillo

## 2021-06-16 DIAGNOSIS — E11.9 TYPE 2 DIABETES MELLITUS WITHOUT COMPLICATION: ICD-10-CM

## 2021-06-30 DIAGNOSIS — E11.9 TYPE 2 DIABETES MELLITUS WITHOUT COMPLICATION: ICD-10-CM

## 2021-07-09 ENCOUNTER — TELEPHONE (OUTPATIENT)
Dept: PRIMARY CARE CLINIC | Facility: CLINIC | Age: 46
End: 2021-07-09

## 2021-07-09 DIAGNOSIS — E11.9 TYPE 2 DIABETES MELLITUS WITHOUT COMPLICATION, WITH LONG-TERM CURRENT USE OF INSULIN: ICD-10-CM

## 2021-07-09 DIAGNOSIS — Z79.4 TYPE 2 DIABETES MELLITUS WITHOUT COMPLICATION, WITH LONG-TERM CURRENT USE OF INSULIN: ICD-10-CM

## 2021-07-09 RX ORDER — BLOOD-GLUCOSE SENSOR
1 EACH MISCELLANEOUS
Qty: 3 EACH | Refills: 11 | Status: SHIPPED | OUTPATIENT
Start: 2021-07-09 | End: 2023-09-08 | Stop reason: SDUPTHER

## 2021-07-09 RX ORDER — BLOOD-GLUCOSE TRANSMITTER
1 EACH MISCELLANEOUS
Qty: 1 DEVICE | Refills: 3 | Status: SHIPPED | OUTPATIENT
Start: 2021-07-09 | End: 2023-09-08 | Stop reason: SDUPTHER

## 2021-07-15 DIAGNOSIS — Z79.4 TYPE 2 DIABETES MELLITUS WITHOUT COMPLICATION, WITH LONG-TERM CURRENT USE OF INSULIN: Primary | ICD-10-CM

## 2021-07-15 DIAGNOSIS — E11.9 TYPE 2 DIABETES MELLITUS WITHOUT COMPLICATION, WITH LONG-TERM CURRENT USE OF INSULIN: Primary | ICD-10-CM

## 2021-07-26 ENCOUNTER — TELEPHONE (OUTPATIENT)
Dept: PRIMARY CARE CLINIC | Facility: CLINIC | Age: 46
End: 2021-07-26

## 2021-07-26 ENCOUNTER — OFFICE VISIT (OUTPATIENT)
Dept: PRIMARY CARE CLINIC | Facility: CLINIC | Age: 46
End: 2021-07-26
Payer: MEDICARE

## 2021-07-26 DIAGNOSIS — E11.9 TYPE 2 DIABETES MELLITUS WITHOUT COMPLICATION, WITH LONG-TERM CURRENT USE OF INSULIN: Primary | ICD-10-CM

## 2021-07-26 DIAGNOSIS — Z12.39 ENCOUNTER FOR SCREENING FOR MALIGNANT NEOPLASM OF BREAST, UNSPECIFIED SCREENING MODALITY: ICD-10-CM

## 2021-07-26 DIAGNOSIS — M32.8 OTHER FORMS OF SYSTEMIC LUPUS ERYTHEMATOSUS, UNSPECIFIED ORGAN INVOLVEMENT STATUS: ICD-10-CM

## 2021-07-26 DIAGNOSIS — Z12.31 ENCOUNTER FOR SCREENING MAMMOGRAM FOR MALIGNANT NEOPLASM OF BREAST: ICD-10-CM

## 2021-07-26 DIAGNOSIS — Z79.4 TYPE 2 DIABETES MELLITUS WITHOUT COMPLICATION, WITH LONG-TERM CURRENT USE OF INSULIN: Primary | ICD-10-CM

## 2021-07-26 DIAGNOSIS — R51.9 NONINTRACTABLE HEADACHE, UNSPECIFIED CHRONICITY PATTERN, UNSPECIFIED HEADACHE TYPE: ICD-10-CM

## 2021-07-26 DIAGNOSIS — E04.1 THYROID NODULE GREATER THAN OR EQUAL TO 1 CM IN DIAMETER INCIDENTALLY NOTED ON IMAGING STUDY: ICD-10-CM

## 2021-07-26 PROCEDURE — 99213 OFFICE O/P EST LOW 20 MIN: CPT | Mod: 95,,, | Performed by: NURSE PRACTITIONER

## 2021-07-26 PROCEDURE — 99213 PR OFFICE/OUTPT VISIT, EST, LEVL III, 20-29 MIN: ICD-10-PCS | Mod: 95,,, | Performed by: NURSE PRACTITIONER

## 2021-07-26 RX ORDER — INSULIN GLARGINE 100 [IU]/ML
30 INJECTION, SOLUTION SUBCUTANEOUS DAILY
Qty: 1 BOX | Refills: 11 | Status: SHIPPED | OUTPATIENT
Start: 2021-07-26 | End: 2021-08-24

## 2021-07-29 ENCOUNTER — PATIENT MESSAGE (OUTPATIENT)
Dept: PRIMARY CARE CLINIC | Facility: CLINIC | Age: 46
End: 2021-07-29

## 2021-08-04 ENCOUNTER — PATIENT MESSAGE (OUTPATIENT)
Dept: ADMINISTRATIVE | Facility: HOSPITAL | Age: 46
End: 2021-08-04

## 2021-08-12 ENCOUNTER — HOSPITAL ENCOUNTER (OUTPATIENT)
Dept: RADIOLOGY | Facility: HOSPITAL | Age: 46
Discharge: HOME | End: 2021-08-12
Attending: NURSE PRACTITIONER
Payer: MEDICARE

## 2021-08-12 DIAGNOSIS — Z12.31 ENCOUNTER FOR SCREENING MAMMOGRAM FOR MALIGNANT NEOPLASM OF BREAST: ICD-10-CM

## 2021-08-12 DIAGNOSIS — Z12.39 ENCOUNTER FOR SCREENING FOR MALIGNANT NEOPLASM OF BREAST, UNSPECIFIED SCREENING MODALITY: ICD-10-CM

## 2021-08-12 PROCEDURE — 77067 SCR MAMMO BI INCL CAD: CPT | Mod: TC

## 2021-08-12 PROCEDURE — 77063 BREAST TOMOSYNTHESIS BI: CPT | Mod: 26,,, | Performed by: RADIOLOGY

## 2021-08-12 PROCEDURE — 77067 SCR MAMMO BI INCL CAD: CPT | Mod: 26,,, | Performed by: RADIOLOGY

## 2021-08-12 PROCEDURE — 77067 MAMMO DIGITAL SCREENING BILAT WITH TOMO: ICD-10-PCS | Mod: 26,,, | Performed by: RADIOLOGY

## 2021-08-12 PROCEDURE — 77063 MAMMO DIGITAL SCREENING BILAT WITH TOMO: ICD-10-PCS | Mod: 26,,, | Performed by: RADIOLOGY

## 2021-08-24 ENCOUNTER — TELEPHONE (OUTPATIENT)
Dept: RHEUMATOLOGY | Facility: CLINIC | Age: 46
End: 2021-08-24

## 2021-08-24 ENCOUNTER — OFFICE VISIT (OUTPATIENT)
Dept: PRIMARY CARE CLINIC | Facility: CLINIC | Age: 46
End: 2021-08-24
Payer: MEDICARE

## 2021-08-24 VITALS
DIASTOLIC BLOOD PRESSURE: 86 MMHG | HEIGHT: 68 IN | SYSTOLIC BLOOD PRESSURE: 148 MMHG | WEIGHT: 225.31 LBS | BODY MASS INDEX: 34.15 KG/M2 | TEMPERATURE: 98 F

## 2021-08-24 DIAGNOSIS — R51.9 NONINTRACTABLE HEADACHE, UNSPECIFIED CHRONICITY PATTERN, UNSPECIFIED HEADACHE TYPE: ICD-10-CM

## 2021-08-24 DIAGNOSIS — Z79.4 TYPE 2 DIABETES MELLITUS WITHOUT COMPLICATION, WITH LONG-TERM CURRENT USE OF INSULIN: ICD-10-CM

## 2021-08-24 DIAGNOSIS — E11.9 TYPE 2 DIABETES MELLITUS WITHOUT COMPLICATION, WITH LONG-TERM CURRENT USE OF INSULIN: ICD-10-CM

## 2021-08-24 DIAGNOSIS — F41.8 SITUATIONAL ANXIETY: ICD-10-CM

## 2021-08-24 DIAGNOSIS — F19.982 DRUG INDUCED INSOMNIA: ICD-10-CM

## 2021-08-24 DIAGNOSIS — R51.9 CHRONIC INTRACTABLE HEADACHE, UNSPECIFIED HEADACHE TYPE: ICD-10-CM

## 2021-08-24 DIAGNOSIS — D84.821 IMMUNOCOMPROMISED STATE DUE TO DRUG THERAPY: ICD-10-CM

## 2021-08-24 DIAGNOSIS — L98.491 ULCER OF ABDOMEN WALL, LIMITED TO BREAKDOWN OF SKIN: ICD-10-CM

## 2021-08-24 DIAGNOSIS — G89.29 CHRONIC INTRACTABLE HEADACHE, UNSPECIFIED HEADACHE TYPE: ICD-10-CM

## 2021-08-24 DIAGNOSIS — F43.9 STRESS: ICD-10-CM

## 2021-08-24 DIAGNOSIS — M31.0 LEUKOCYTOCLASTIC VASCULITIS: ICD-10-CM

## 2021-08-24 DIAGNOSIS — Z79.899 IMMUNOCOMPROMISED STATE DUE TO DRUG THERAPY: ICD-10-CM

## 2021-08-24 DIAGNOSIS — Z12.4 SCREENING FOR CERVICAL CANCER: ICD-10-CM

## 2021-08-24 DIAGNOSIS — M32.8 OTHER FORMS OF SYSTEMIC LUPUS ERYTHEMATOSUS, UNSPECIFIED ORGAN INVOLVEMENT STATUS: Primary | ICD-10-CM

## 2021-08-24 DIAGNOSIS — I10 ESSENTIAL HYPERTENSION: ICD-10-CM

## 2021-08-24 DIAGNOSIS — E04.1 THYROID NODULE GREATER THAN OR EQUAL TO 1 CM IN DIAMETER INCIDENTALLY NOTED ON IMAGING STUDY: ICD-10-CM

## 2021-08-24 PROBLEM — U07.1 COVID-19 IN IMMUNOCOMPROMISED PATIENT: Status: RESOLVED | Noted: 2021-02-05 | Resolved: 2021-08-24

## 2021-08-24 PROBLEM — D84.9 COVID-19 IN IMMUNOCOMPROMISED PATIENT: Status: RESOLVED | Noted: 2021-02-05 | Resolved: 2021-08-24

## 2021-08-24 PROBLEM — Z20.822 SUSPECTED COVID-19 VIRUS INFECTION: Status: RESOLVED | Noted: 2021-02-05 | Resolved: 2021-08-24

## 2021-08-24 PROCEDURE — 99499 RISK ADDL DX/OHS AUDIT: ICD-10-PCS | Mod: S$GLB,,, | Performed by: NURSE PRACTITIONER

## 2021-08-24 PROCEDURE — 99215 OFFICE O/P EST HI 40 MIN: CPT | Mod: S$GLB,,, | Performed by: NURSE PRACTITIONER

## 2021-08-24 PROCEDURE — 99499 UNLISTED E&M SERVICE: CPT | Mod: S$GLB,,, | Performed by: NURSE PRACTITIONER

## 2021-08-24 PROCEDURE — 99215 PR OFFICE/OUTPT VISIT, EST, LEVL V, 40-54 MIN: ICD-10-PCS | Mod: S$GLB,,, | Performed by: NURSE PRACTITIONER

## 2021-08-24 PROCEDURE — 99999 PR PBB SHADOW E&M-EST. PATIENT-LVL IV: CPT | Mod: PBBFAC,,, | Performed by: NURSE PRACTITIONER

## 2021-08-24 PROCEDURE — 99214 OFFICE O/P EST MOD 30 MIN: CPT | Mod: PBBFAC | Performed by: NURSE PRACTITIONER

## 2021-08-24 PROCEDURE — 99999 PR PBB SHADOW E&M-EST. PATIENT-LVL IV: ICD-10-PCS | Mod: PBBFAC,,, | Performed by: NURSE PRACTITIONER

## 2021-08-24 RX ORDER — INSULIN GLARGINE 100 [IU]/ML
40 INJECTION, SOLUTION SUBCUTANEOUS DAILY
Qty: 1 BOX | Refills: 11 | Status: SHIPPED | OUTPATIENT
Start: 2021-08-24 | End: 2022-03-29

## 2021-08-24 RX ORDER — MUPIROCIN 20 MG/G
OINTMENT TOPICAL 3 TIMES DAILY
Qty: 1 TUBE | Refills: 2 | Status: SHIPPED | OUTPATIENT
Start: 2021-08-24 | End: 2021-11-22

## 2021-08-24 RX ORDER — INSULIN ASPART 100 [IU]/ML
4 INJECTION, SOLUTION INTRAVENOUS; SUBCUTANEOUS
Qty: 3.6 ML | Refills: 11 | Status: SHIPPED | OUTPATIENT
Start: 2021-08-24 | End: 2021-09-03

## 2021-08-24 RX ORDER — LORAZEPAM 0.5 MG/1
0.5 TABLET ORAL EVERY 8 HOURS PRN
Qty: 30 TABLET | Refills: 2 | Status: SHIPPED | OUTPATIENT
Start: 2021-08-24 | End: 2021-08-27

## 2021-08-24 RX ORDER — MYCOPHENOLATE MOFETIL 250 MG/1
3000 CAPSULE ORAL DAILY
COMMUNITY
Start: 2021-03-31

## 2021-08-24 RX ORDER — DOXAZOSIN 1 MG/1
1 TABLET ORAL NIGHTLY
Qty: 30 TABLET | Refills: 11 | Status: SHIPPED | OUTPATIENT
Start: 2021-08-24 | End: 2021-12-15

## 2021-08-26 ENCOUNTER — LAB VISIT (OUTPATIENT)
Dept: LAB | Facility: HOSPITAL | Age: 46
End: 2021-08-26
Attending: NURSE PRACTITIONER
Payer: MEDICARE

## 2021-08-26 DIAGNOSIS — Z79.4 TYPE 2 DIABETES MELLITUS WITHOUT COMPLICATION, WITH LONG-TERM CURRENT USE OF INSULIN: ICD-10-CM

## 2021-08-26 DIAGNOSIS — M32.8 OTHER FORMS OF SYSTEMIC LUPUS ERYTHEMATOSUS, UNSPECIFIED ORGAN INVOLVEMENT STATUS: Primary | ICD-10-CM

## 2021-08-26 DIAGNOSIS — M32.8 OTHER FORMS OF SYSTEMIC LUPUS ERYTHEMATOSUS, UNSPECIFIED ORGAN INVOLVEMENT STATUS: ICD-10-CM

## 2021-08-26 DIAGNOSIS — R51.9 ACUTE INTRACTABLE HEADACHE, UNSPECIFIED HEADACHE TYPE: Primary | ICD-10-CM

## 2021-08-26 DIAGNOSIS — E11.9 TYPE 2 DIABETES MELLITUS WITHOUT COMPLICATION, WITH LONG-TERM CURRENT USE OF INSULIN: ICD-10-CM

## 2021-08-26 LAB
BASOPHILS # BLD AUTO: 0.02 K/UL (ref 0–0.2)
BASOPHILS NFR BLD: 0.2 % (ref 0–1.9)
DIFFERENTIAL METHOD: ABNORMAL
EOSINOPHIL # BLD AUTO: 0.2 K/UL (ref 0–0.5)
EOSINOPHIL NFR BLD: 2 % (ref 0–8)
ERYTHROCYTE [DISTWIDTH] IN BLOOD BY AUTOMATED COUNT: 14 % (ref 11.5–14.5)
ERYTHROCYTE [SEDIMENTATION RATE] IN BLOOD BY WESTERGREN METHOD: <2 MM/HR (ref 0–36)
HCT VFR BLD AUTO: 30.6 % (ref 37–48.5)
HGB BLD-MCNC: 9.6 G/DL (ref 12–16)
IMM GRANULOCYTES # BLD AUTO: 0.02 K/UL (ref 0–0.04)
IMM GRANULOCYTES NFR BLD AUTO: 0.2 % (ref 0–0.5)
LYMPHOCYTES # BLD AUTO: 2.5 K/UL (ref 1–4.8)
LYMPHOCYTES NFR BLD: 30.4 % (ref 18–48)
MCH RBC QN AUTO: 27.3 PG (ref 27–31)
MCHC RBC AUTO-ENTMCNC: 31.4 G/DL (ref 32–36)
MCV RBC AUTO: 87 FL (ref 82–98)
MONOCYTES # BLD AUTO: 0.5 K/UL (ref 0.3–1)
MONOCYTES NFR BLD: 5.8 % (ref 4–15)
NEUTROPHILS # BLD AUTO: 5.1 K/UL (ref 1.8–7.7)
NEUTROPHILS NFR BLD: 61.4 % (ref 38–73)
NRBC BLD-RTO: 0 /100 WBC
PLATELET # BLD AUTO: 281 K/UL (ref 150–450)
PMV BLD AUTO: 11.7 FL (ref 9.2–12.9)
RBC # BLD AUTO: 3.52 M/UL (ref 4–5.4)
WBC # BLD AUTO: 8.32 K/UL (ref 3.9–12.7)

## 2021-08-26 PROCEDURE — 80061 LIPID PANEL: CPT | Performed by: NURSE PRACTITIONER

## 2021-08-26 PROCEDURE — 85652 RBC SED RATE AUTOMATED: CPT | Performed by: NURSE PRACTITIONER

## 2021-08-26 PROCEDURE — 83036 HEMOGLOBIN GLYCOSYLATED A1C: CPT | Performed by: NURSE PRACTITIONER

## 2021-08-26 PROCEDURE — 36415 COLL VENOUS BLD VENIPUNCTURE: CPT | Performed by: NURSE PRACTITIONER

## 2021-08-26 PROCEDURE — 80053 COMPREHEN METABOLIC PANEL: CPT | Performed by: NURSE PRACTITIONER

## 2021-08-26 PROCEDURE — 86140 C-REACTIVE PROTEIN: CPT | Performed by: NURSE PRACTITIONER

## 2021-08-26 PROCEDURE — 85025 COMPLETE CBC W/AUTO DIFF WBC: CPT | Performed by: NURSE PRACTITIONER

## 2021-08-27 DIAGNOSIS — R51.9 ACUTE INTRACTABLE HEADACHE, UNSPECIFIED HEADACHE TYPE: ICD-10-CM

## 2021-08-27 DIAGNOSIS — F41.8 SITUATIONAL ANXIETY: ICD-10-CM

## 2021-08-27 DIAGNOSIS — M54.2 NECK PAIN: ICD-10-CM

## 2021-08-27 DIAGNOSIS — I10 ESSENTIAL HYPERTENSION: Primary | ICD-10-CM

## 2021-08-27 LAB
ALBUMIN SERPL BCP-MCNC: 3.9 G/DL (ref 3.5–5.2)
ALP SERPL-CCNC: 89 U/L (ref 55–135)
ALT SERPL W/O P-5'-P-CCNC: 8 U/L (ref 10–44)
ANION GAP SERPL CALC-SCNC: 8 MMOL/L (ref 8–16)
AST SERPL-CCNC: 14 U/L (ref 10–40)
BILIRUB SERPL-MCNC: 0.6 MG/DL (ref 0.1–1)
BUN SERPL-MCNC: 9 MG/DL (ref 6–20)
CALCIUM SERPL-MCNC: 9.3 MG/DL (ref 8.7–10.5)
CHLORIDE SERPL-SCNC: 104 MMOL/L (ref 95–110)
CHOLEST SERPL-MCNC: 137 MG/DL (ref 120–199)
CHOLEST/HDLC SERPL: 2.7 {RATIO} (ref 2–5)
CO2 SERPL-SCNC: 27 MMOL/L (ref 23–29)
CREAT SERPL-MCNC: 0.6 MG/DL (ref 0.5–1.4)
CRP SERPL-MCNC: 9.5 MG/L (ref 0–8.2)
EST. GFR  (AFRICAN AMERICAN): >60 ML/MIN/1.73 M^2
EST. GFR  (NON AFRICAN AMERICAN): >60 ML/MIN/1.73 M^2
ESTIMATED AVG GLUCOSE: 123 MG/DL (ref 68–131)
GLUCOSE SERPL-MCNC: 84 MG/DL (ref 70–110)
HBA1C MFR BLD: 5.9 % (ref 4–5.6)
HDLC SERPL-MCNC: 51 MG/DL (ref 40–75)
HDLC SERPL: 37.2 % (ref 20–50)
LDLC SERPL CALC-MCNC: 77.6 MG/DL (ref 63–159)
NONHDLC SERPL-MCNC: 86 MG/DL
POTASSIUM SERPL-SCNC: 3.7 MMOL/L (ref 3.5–5.1)
PROT SERPL-MCNC: 6.9 G/DL (ref 6–8.4)
SODIUM SERPL-SCNC: 139 MMOL/L (ref 136–145)
TRIGL SERPL-MCNC: 42 MG/DL (ref 30–150)

## 2021-08-27 RX ORDER — LORAZEPAM 0.5 MG/1
0.5 TABLET ORAL EVERY 8 HOURS PRN
Qty: 30 TABLET | Refills: 2 | Status: SHIPPED | OUTPATIENT
Start: 2021-08-27 | End: 2021-12-03

## 2021-08-27 RX ORDER — HYDROCHLOROTHIAZIDE 25 MG/1
25 TABLET ORAL DAILY
Qty: 30 TABLET | Refills: 11 | Status: SHIPPED | OUTPATIENT
Start: 2021-08-27 | End: 2022-11-29

## 2021-09-02 ENCOUNTER — TELEPHONE (OUTPATIENT)
Dept: INTERNAL MEDICINE | Facility: CLINIC | Age: 46
End: 2021-09-02

## 2021-09-02 DIAGNOSIS — E11.9 TYPE 2 DIABETES MELLITUS WITHOUT COMPLICATION, WITH LONG-TERM CURRENT USE OF INSULIN: ICD-10-CM

## 2021-09-02 DIAGNOSIS — Z79.4 TYPE 2 DIABETES MELLITUS WITHOUT COMPLICATION, WITH LONG-TERM CURRENT USE OF INSULIN: ICD-10-CM

## 2021-09-03 RX ORDER — INSULIN ASPART 100 [IU]/ML
4 INJECTION, SOLUTION INTRAVENOUS; SUBCUTANEOUS
Qty: 10.8 ML | Refills: 3 | Status: SHIPPED | OUTPATIENT
Start: 2021-09-03 | End: 2023-12-22 | Stop reason: SDUPTHER

## 2021-09-28 ENCOUNTER — PATIENT OUTREACH (OUTPATIENT)
Dept: ADMINISTRATIVE | Facility: OTHER | Age: 46
End: 2021-09-28

## 2021-09-29 ENCOUNTER — LAB VISIT (OUTPATIENT)
Dept: LAB | Facility: HOSPITAL | Age: 46
End: 2021-09-29
Attending: NURSE PRACTITIONER
Payer: MEDICARE

## 2021-09-29 ENCOUNTER — OFFICE VISIT (OUTPATIENT)
Dept: OBSTETRICS AND GYNECOLOGY | Facility: CLINIC | Age: 46
End: 2021-09-29
Payer: MEDICARE

## 2021-09-29 VITALS
DIASTOLIC BLOOD PRESSURE: 80 MMHG | HEIGHT: 68 IN | BODY MASS INDEX: 33.65 KG/M2 | SYSTOLIC BLOOD PRESSURE: 150 MMHG | WEIGHT: 222 LBS

## 2021-09-29 DIAGNOSIS — E11.9 TYPE 2 DIABETES MELLITUS WITHOUT COMPLICATION: ICD-10-CM

## 2021-09-29 DIAGNOSIS — Z11.3 SCREENING FOR STDS (SEXUALLY TRANSMITTED DISEASES): ICD-10-CM

## 2021-09-29 DIAGNOSIS — Z12.4 PAPANICOLAOU SMEAR FOR CERVICAL CANCER SCREENING: ICD-10-CM

## 2021-09-29 DIAGNOSIS — Z01.419 ROUTINE GYNECOLOGICAL EXAMINATION: Primary | ICD-10-CM

## 2021-09-29 PROCEDURE — 87624 HPV HI-RISK TYP POOLED RSLT: CPT | Performed by: NURSE PRACTITIONER

## 2021-09-29 PROCEDURE — 36415 COLL VENOUS BLD VENIPUNCTURE: CPT | Performed by: NURSE PRACTITIONER

## 2021-09-29 PROCEDURE — 3077F SYST BP >= 140 MM HG: CPT | Mod: CPTII,S$GLB,, | Performed by: NURSE PRACTITIONER

## 2021-09-29 PROCEDURE — 3008F PR BODY MASS INDEX (BMI) DOCUMENTED: ICD-10-PCS | Mod: CPTII,S$GLB,, | Performed by: NURSE PRACTITIONER

## 2021-09-29 PROCEDURE — 3008F BODY MASS INDEX DOCD: CPT | Mod: CPTII,S$GLB,, | Performed by: NURSE PRACTITIONER

## 2021-09-29 PROCEDURE — 99999 PR PBB SHADOW E&M-EST. PATIENT-LVL IV: ICD-10-PCS | Mod: PBBFAC,,, | Performed by: NURSE PRACTITIONER

## 2021-09-29 PROCEDURE — 87491 CHLMYD TRACH DNA AMP PROBE: CPT | Performed by: NURSE PRACTITIONER

## 2021-09-29 PROCEDURE — 88142 CYTOPATH C/V THIN LAYER: CPT | Performed by: NURSE PRACTITIONER

## 2021-09-29 PROCEDURE — 1159F PR MEDICATION LIST DOCUMENTED IN MEDICAL RECORD: ICD-10-PCS | Mod: CPTII,S$GLB,, | Performed by: NURSE PRACTITIONER

## 2021-09-29 PROCEDURE — 99999 PR PBB SHADOW E&M-EST. PATIENT-LVL IV: CPT | Mod: PBBFAC,,, | Performed by: NURSE PRACTITIONER

## 2021-09-29 PROCEDURE — 4010F PR ACE/ARB THEARPY RXD/TAKEN: ICD-10-PCS | Mod: CPTII,S$GLB,, | Performed by: NURSE PRACTITIONER

## 2021-09-29 PROCEDURE — G0101 CA SCREEN;PELVIC/BREAST EXAM: HCPCS | Mod: S$GLB,,, | Performed by: NURSE PRACTITIONER

## 2021-09-29 PROCEDURE — 3044F PR MOST RECENT HEMOGLOBIN A1C LEVEL <7.0%: ICD-10-PCS | Mod: CPTII,S$GLB,, | Performed by: NURSE PRACTITIONER

## 2021-09-29 PROCEDURE — 1159F MED LIST DOCD IN RCRD: CPT | Mod: CPTII,S$GLB,, | Performed by: NURSE PRACTITIONER

## 2021-09-29 PROCEDURE — 3079F PR MOST RECENT DIASTOLIC BLOOD PRESSURE 80-89 MM HG: ICD-10-PCS | Mod: CPTII,S$GLB,, | Performed by: NURSE PRACTITIONER

## 2021-09-29 PROCEDURE — 3079F DIAST BP 80-89 MM HG: CPT | Mod: CPTII,S$GLB,, | Performed by: NURSE PRACTITIONER

## 2021-09-29 PROCEDURE — 3044F HG A1C LEVEL LT 7.0%: CPT | Mod: CPTII,S$GLB,, | Performed by: NURSE PRACTITIONER

## 2021-09-29 PROCEDURE — 87591 N.GONORRHOEAE DNA AMP PROB: CPT | Performed by: NURSE PRACTITIONER

## 2021-09-29 PROCEDURE — 87389 HIV-1 AG W/HIV-1&-2 AB AG IA: CPT | Performed by: NURSE PRACTITIONER

## 2021-09-29 PROCEDURE — 4010F ACE/ARB THERAPY RXD/TAKEN: CPT | Mod: CPTII,S$GLB,, | Performed by: NURSE PRACTITIONER

## 2021-09-29 PROCEDURE — 80074 ACUTE HEPATITIS PANEL: CPT | Performed by: NURSE PRACTITIONER

## 2021-09-29 PROCEDURE — 3077F PR MOST RECENT SYSTOLIC BLOOD PRESSURE >= 140 MM HG: ICD-10-PCS | Mod: CPTII,S$GLB,, | Performed by: NURSE PRACTITIONER

## 2021-09-29 PROCEDURE — G0101 PR CA SCREEN;PELVIC/BREAST EXAM: ICD-10-PCS | Mod: S$GLB,,, | Performed by: NURSE PRACTITIONER

## 2021-09-29 PROCEDURE — 1160F PR REVIEW ALL MEDS BY PRESCRIBER/CLIN PHARMACIST DOCUMENTED: ICD-10-PCS | Mod: CPTII,S$GLB,, | Performed by: NURSE PRACTITIONER

## 2021-09-29 PROCEDURE — 86592 SYPHILIS TEST NON-TREP QUAL: CPT | Performed by: NURSE PRACTITIONER

## 2021-09-29 PROCEDURE — 1160F RVW MEDS BY RX/DR IN RCRD: CPT | Mod: CPTII,S$GLB,, | Performed by: NURSE PRACTITIONER

## 2021-09-29 RX ORDER — HYDROXYZINE HYDROCHLORIDE 25 MG/1
TABLET, FILM COATED ORAL
COMMUNITY
Start: 2021-08-12 | End: 2022-08-03 | Stop reason: SDUPTHER

## 2021-09-29 RX ORDER — BETAMETHASONE DIPROPIONATE 0.5 MG/G
CREAM TOPICAL
COMMUNITY
Start: 2021-08-12

## 2021-09-30 LAB
C TRACH DNA SPEC QL NAA+PROBE: NOT DETECTED
HAV IGM SERPL QL IA: NEGATIVE
HBV CORE IGM SERPL QL IA: NEGATIVE
HBV SURFACE AG SERPL QL IA: NEGATIVE
HCV AB SERPL QL IA: NEGATIVE
HIV 1+2 AB+HIV1 P24 AG SERPL QL IA: NEGATIVE
N GONORRHOEA DNA SPEC QL NAA+PROBE: NOT DETECTED
RPR SER QL: NORMAL

## 2021-10-05 LAB
FINAL PATHOLOGIC DIAGNOSIS: NORMAL
Lab: NORMAL

## 2021-10-08 LAB
HPV HR 12 DNA SPEC QL NAA+PROBE: NEGATIVE
HPV16 AG SPEC QL: NEGATIVE
HPV18 DNA SPEC QL NAA+PROBE: NEGATIVE

## 2021-10-11 ENCOUNTER — PATIENT OUTREACH (OUTPATIENT)
Dept: ADMINISTRATIVE | Facility: HOSPITAL | Age: 46
End: 2021-10-11

## 2021-10-14 ENCOUNTER — PATIENT MESSAGE (OUTPATIENT)
Dept: ADMINISTRATIVE | Facility: HOSPITAL | Age: 46
End: 2021-10-14
Payer: MEDICARE

## 2021-10-18 ENCOUNTER — PATIENT MESSAGE (OUTPATIENT)
Dept: ADMINISTRATIVE | Facility: HOSPITAL | Age: 46
End: 2021-10-18
Payer: MEDICARE

## 2021-11-19 ENCOUNTER — TELEPHONE (OUTPATIENT)
Dept: PRIMARY CARE CLINIC | Facility: CLINIC | Age: 46
End: 2021-11-19
Payer: MEDICARE

## 2021-12-03 DIAGNOSIS — F19.982 DRUG-INDUCED INSOMNIA: ICD-10-CM

## 2021-12-03 DIAGNOSIS — F41.9 ANXIETY: Primary | ICD-10-CM

## 2021-12-03 RX ORDER — LORAZEPAM 0.5 MG/1
0.5 TABLET ORAL EVERY 12 HOURS PRN
Qty: 30 TABLET | Refills: 2 | Status: SHIPPED | OUTPATIENT
Start: 2021-12-03 | End: 2022-03-29 | Stop reason: SDUPTHER

## 2021-12-03 RX ORDER — ZOLPIDEM TARTRATE 10 MG/1
10 TABLET ORAL NIGHTLY PRN
Qty: 30 TABLET | Refills: 5 | Status: SHIPPED | OUTPATIENT
Start: 2021-12-03 | End: 2021-12-09

## 2021-12-06 ENCOUNTER — TELEPHONE (OUTPATIENT)
Dept: RHEUMATOLOGY | Facility: CLINIC | Age: 46
End: 2021-12-06
Payer: MEDICARE

## 2021-12-08 DIAGNOSIS — F19.982 DRUG-INDUCED INSOMNIA: ICD-10-CM

## 2021-12-08 RX ORDER — ZOLPIDEM TARTRATE 10 MG/1
10 TABLET ORAL NIGHTLY PRN
Qty: 30 TABLET | Refills: 5 | OUTPATIENT
Start: 2021-12-08 | End: 2022-06-06

## 2021-12-09 ENCOUNTER — TELEPHONE (OUTPATIENT)
Dept: INTERNAL MEDICINE | Facility: CLINIC | Age: 46
End: 2021-12-09
Payer: MEDICARE

## 2021-12-09 RX ORDER — ZOLPIDEM TARTRATE 10 MG/1
10 TABLET ORAL NIGHTLY PRN
Qty: 30 TABLET | Refills: 5 | Status: SHIPPED | OUTPATIENT
Start: 2021-12-09 | End: 2022-01-05 | Stop reason: SDUPTHER

## 2021-12-15 ENCOUNTER — LAB VISIT (OUTPATIENT)
Dept: LAB | Facility: HOSPITAL | Age: 46
End: 2021-12-15
Attending: NURSE PRACTITIONER
Payer: MEDICARE

## 2021-12-15 ENCOUNTER — OFFICE VISIT (OUTPATIENT)
Dept: PRIMARY CARE CLINIC | Facility: CLINIC | Age: 46
End: 2021-12-15
Payer: MEDICARE

## 2021-12-15 VITALS
HEART RATE: 92 BPM | TEMPERATURE: 97 F | WEIGHT: 215.81 LBS | SYSTOLIC BLOOD PRESSURE: 162 MMHG | HEIGHT: 68 IN | DIASTOLIC BLOOD PRESSURE: 84 MMHG | OXYGEN SATURATION: 91 % | BODY MASS INDEX: 32.71 KG/M2

## 2021-12-15 DIAGNOSIS — Z23 ENCOUNTER FOR IMMUNIZATION: ICD-10-CM

## 2021-12-15 DIAGNOSIS — E11.9 TYPE 2 DIABETES MELLITUS WITHOUT COMPLICATION, WITH LONG-TERM CURRENT USE OF INSULIN: ICD-10-CM

## 2021-12-15 DIAGNOSIS — Z79.4 TYPE 2 DIABETES MELLITUS WITHOUT COMPLICATION, WITH LONG-TERM CURRENT USE OF INSULIN: ICD-10-CM

## 2021-12-15 DIAGNOSIS — D64.9 ANEMIA, UNSPECIFIED TYPE: ICD-10-CM

## 2021-12-15 DIAGNOSIS — D64.9 ANEMIA, UNSPECIFIED TYPE: Primary | ICD-10-CM

## 2021-12-15 DIAGNOSIS — I10 ESSENTIAL HYPERTENSION: ICD-10-CM

## 2021-12-15 LAB
BASOPHILS # BLD AUTO: 0.04 K/UL (ref 0–0.2)
BASOPHILS NFR BLD: 0.4 % (ref 0–1.9)
DIFFERENTIAL METHOD: ABNORMAL
EOSINOPHIL # BLD AUTO: 0.2 K/UL (ref 0–0.5)
EOSINOPHIL NFR BLD: 2.3 % (ref 0–8)
ERYTHROCYTE [DISTWIDTH] IN BLOOD BY AUTOMATED COUNT: 14.1 % (ref 11.5–14.5)
ESTIMATED AVG GLUCOSE: 97 MG/DL (ref 68–131)
HBA1C MFR BLD: 5 % (ref 4–5.6)
HCT VFR BLD AUTO: 31.1 % (ref 37–48.5)
HGB BLD-MCNC: 9.5 G/DL (ref 12–16)
IMM GRANULOCYTES # BLD AUTO: 0.02 K/UL (ref 0–0.04)
IMM GRANULOCYTES NFR BLD AUTO: 0.2 % (ref 0–0.5)
LYMPHOCYTES # BLD AUTO: 3.2 K/UL (ref 1–4.8)
LYMPHOCYTES NFR BLD: 33.3 % (ref 18–48)
MCH RBC QN AUTO: 27.1 PG (ref 27–31)
MCHC RBC AUTO-ENTMCNC: 30.5 G/DL (ref 32–36)
MCV RBC AUTO: 89 FL (ref 82–98)
MONOCYTES # BLD AUTO: 0.5 K/UL (ref 0.3–1)
MONOCYTES NFR BLD: 5.4 % (ref 4–15)
NEUTROPHILS # BLD AUTO: 5.7 K/UL (ref 1.8–7.7)
NEUTROPHILS NFR BLD: 58.4 % (ref 38–73)
NRBC BLD-RTO: 0 /100 WBC
PLATELET # BLD AUTO: 256 K/UL (ref 150–450)
PMV BLD AUTO: 11.7 FL (ref 9.2–12.9)
RBC # BLD AUTO: 3.5 M/UL (ref 4–5.4)
WBC # BLD AUTO: 9.73 K/UL (ref 3.9–12.7)

## 2021-12-15 PROCEDURE — 99215 OFFICE O/P EST HI 40 MIN: CPT | Mod: HCNC,S$GLB,, | Performed by: NURSE PRACTITIONER

## 2021-12-15 PROCEDURE — 99499 UNLISTED E&M SERVICE: CPT | Mod: S$GLB,,, | Performed by: NURSE PRACTITIONER

## 2021-12-15 PROCEDURE — 99999 PR PBB SHADOW E&M-EST. PATIENT-LVL V: ICD-10-PCS | Mod: PBBFAC,HCNC,, | Performed by: NURSE PRACTITIONER

## 2021-12-15 PROCEDURE — 99215 PR OFFICE/OUTPT VISIT, EST, LEVL V, 40-54 MIN: ICD-10-PCS | Mod: HCNC,S$GLB,, | Performed by: NURSE PRACTITIONER

## 2021-12-15 PROCEDURE — 99999 PR PBB SHADOW E&M-EST. PATIENT-LVL V: CPT | Mod: PBBFAC,HCNC,, | Performed by: NURSE PRACTITIONER

## 2021-12-15 PROCEDURE — 99215 OFFICE O/P EST HI 40 MIN: CPT | Mod: PBBFAC,HCNC | Performed by: NURSE PRACTITIONER

## 2021-12-15 PROCEDURE — 85025 COMPLETE CBC W/AUTO DIFF WBC: CPT | Performed by: NURSE PRACTITIONER

## 2021-12-15 PROCEDURE — 4010F PR ACE/ARB THEARPY RXD/TAKEN: ICD-10-PCS | Mod: HCNC,CPTII,S$GLB, | Performed by: NURSE PRACTITIONER

## 2021-12-15 PROCEDURE — 83036 HEMOGLOBIN GLYCOSYLATED A1C: CPT | Performed by: NURSE PRACTITIONER

## 2021-12-15 PROCEDURE — 99499 RISK ADDL DX/OHS AUDIT: ICD-10-PCS | Mod: S$GLB,,, | Performed by: NURSE PRACTITIONER

## 2021-12-15 PROCEDURE — 4010F ACE/ARB THERAPY RXD/TAKEN: CPT | Mod: HCNC,CPTII,S$GLB, | Performed by: NURSE PRACTITIONER

## 2021-12-15 PROCEDURE — 36415 COLL VENOUS BLD VENIPUNCTURE: CPT | Performed by: NURSE PRACTITIONER

## 2021-12-15 RX ORDER — DOXAZOSIN 2 MG/1
2 TABLET ORAL NIGHTLY
Qty: 30 TABLET | Refills: 11 | Status: SHIPPED | OUTPATIENT
Start: 2021-12-15 | End: 2022-01-14 | Stop reason: ALTCHOICE

## 2021-12-15 RX ORDER — ATORVASTATIN CALCIUM 10 MG/1
10 TABLET, FILM COATED ORAL DAILY
Qty: 90 TABLET | Refills: 3 | Status: SHIPPED | OUTPATIENT
Start: 2021-12-15 | End: 2022-05-12

## 2021-12-21 ENCOUNTER — TELEPHONE (OUTPATIENT)
Dept: INTERNAL MEDICINE | Facility: CLINIC | Age: 46
End: 2021-12-21
Payer: MEDICARE

## 2022-01-04 DIAGNOSIS — Z23 ENCOUNTER FOR IMMUNIZATION: Primary | ICD-10-CM

## 2022-01-05 ENCOUNTER — TELEPHONE (OUTPATIENT)
Dept: INTERNAL MEDICINE | Facility: CLINIC | Age: 47
End: 2022-01-05
Payer: MEDICARE

## 2022-01-05 ENCOUNTER — OFFICE VISIT (OUTPATIENT)
Dept: PRIMARY CARE CLINIC | Facility: CLINIC | Age: 47
End: 2022-01-05
Payer: MEDICAID

## 2022-01-05 DIAGNOSIS — U07.1 COVID-19 IN IMMUNOCOMPROMISED PATIENT: Primary | ICD-10-CM

## 2022-01-05 DIAGNOSIS — F19.982 DRUG-INDUCED INSOMNIA: ICD-10-CM

## 2022-01-05 DIAGNOSIS — D84.9 COVID-19 IN IMMUNOCOMPROMISED PATIENT: Primary | ICD-10-CM

## 2022-01-05 DIAGNOSIS — I10 ESSENTIAL HYPERTENSION: ICD-10-CM

## 2022-01-05 PROCEDURE — 99214 OFFICE O/P EST MOD 30 MIN: CPT | Mod: 95,,, | Performed by: NURSE PRACTITIONER

## 2022-01-05 PROCEDURE — 99499 RISK ADDL DX/OHS AUDIT: ICD-10-PCS | Mod: 95,,, | Performed by: NURSE PRACTITIONER

## 2022-01-05 PROCEDURE — 99214 PR OFFICE/OUTPT VISIT, EST, LEVL IV, 30-39 MIN: ICD-10-PCS | Mod: 95,,, | Performed by: NURSE PRACTITIONER

## 2022-01-05 PROCEDURE — 99499 UNLISTED E&M SERVICE: CPT | Mod: 95,,, | Performed by: NURSE PRACTITIONER

## 2022-01-05 RX ORDER — LABETALOL 100 MG/1
100 TABLET, FILM COATED ORAL 2 TIMES DAILY
Qty: 60 TABLET | Refills: 11 | Status: SHIPPED | OUTPATIENT
Start: 2022-01-05 | End: 2022-01-14 | Stop reason: SDUPTHER

## 2022-01-05 RX ORDER — ZOLPIDEM TARTRATE 10 MG/1
10 TABLET ORAL NIGHTLY PRN
Qty: 30 TABLET | Refills: 5 | Status: SHIPPED | OUTPATIENT
Start: 2022-01-05 | End: 2022-06-22

## 2022-01-05 NOTE — TELEPHONE ENCOUNTER
Patient was contacted & stated that she was diagnosed with covid (got results this morning). Patient also stated that her BP has been running really high. She has been taking two clonidine & B/P still has been high 180/90 range.
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
## 2022-01-05 NOTE — PROGRESS NOTES
Marilou Daniel  01/27/2022  [id number]    Diane Dominguez NP  Patient Care Team:  Diane Dominguez NP as PCP - General (Family Medicine)  Joe Yo RD as Dietitian (Endocrinology)  Liza Moon RD, CDE as Dietitian (Diabetes)        [facility] Primary Care Note      Chief Complaint:  No chief complaint on file.      History of Present Illness:  HPI    Recent dx with COVID. Previous COVID infection 11 months ago.   Malaise x3 days, exposed through spouse.  Diarrhea, loss of taste yesterday, cough, sneezing.   H/A today, nasal congestion. Cough, yellow sputum.   Tested at local testing center.   Takes Cellcept  Now with elevated BP despite clonidine.   COVID risk score: 4    BP very high despite 0.2mg clonidine BID x 4 days.   DMII controlled. Taking prednisone 5 mg daily.     Had booster.          Review of Systems   Constitutional: Negative for chills, fever and malaise/fatigue.   Eyes: Negative for blurred vision.   Respiratory: Negative for cough and shortness of breath.    Cardiovascular: Negative for palpitations and leg swelling.   Gastrointestinal: Negative for abdominal pain, diarrhea, heartburn, nausea and vomiting.   Genitourinary: Negative for dysuria.   Musculoskeletal: Negative for myalgias.   Neurological: Negative for dizziness and headaches.         The following were reviewed: Active problem list, medication list, allergies, family history, social history, and Health Maintenance.     History:  Past Medical History:   Diagnosis Date    Anemia     Arthritis     Connective tissue disease 1999    COVID-19 in immunocompromised patient 2/5/2021    Diabetes mellitus     Gastroesophageal reflux disease 3/23/2020    Hypertension     Lupus 1999    Situational anxiety 8/24/2021    Suspected COVID-19 virus infection 2/5/2021    Thyroid nodule greater than or equal to 1 cm in diameter incidentally noted on imaging study 11/19/2020    Vasculitis      Past Surgical History:   Procedure  Laterality Date    ABLATION      COLONOSCOPY N/A 6/24/2020    Procedure: COLONOSCOPY;  Surgeon: Nevin Penny MD;  Location: [facility] ENDO;  Service: Endoscopy;  Laterality: N/A;    ESOPHAGOGASTRODUODENOSCOPY N/A 6/24/2020    Procedure: ESOPHAGOGASTRODUODENOSCOPY (EGD);  Surgeon: Nevin Penny MD;  Location: [facility] ENDO;  Service: Endoscopy;  Laterality: N/A;    RIGHT HEART CATHETERIZATION      TUBAL LIGATION      WRIST SURGERY Bilateral      Family History   Problem Relation Age of Onset    Breast cancer Mother     Breast cancer Sister     Breast cancer Sister      Social History     Socioeconomic History    Marital status:    Tobacco Use    Smoking status: Never Smoker    Smokeless tobacco: Never Used   Substance and Sexual Activity    Alcohol use: Never    Drug use: Never    Sexual activity: Yes     Partners: Male     Birth control/protection: See Surgical Hx     Patient Active Problem List   Diagnosis    Essential hypertension    Type 2 diabetes mellitus without complication, with long-term current use of insulin    Systemic lupus erythematosus    Leukocytoclastic vasculitis    Nonintractable headache    Gastroesophageal reflux disease    Encounter for screening colonoscopy    Neck pain    Hearing loss    Thyroid nodule greater than or equal to 1 cm in diameter incidentally noted on imaging study    Drug induced insomnia    COVID-19 in immunocompromised patient    Immunocompromised state due to drug therapy    Situational anxiety     Review of patient's allergies indicates:   Allergen Reactions    Azathioprine Nausea And Vomiting     Nausea, vomiting, diarrhea dose and early in the course of therapy    Olmesartan Shortness Of Breath    Oxycodone Itching       Medications:  Current Outpatient Medications on File Prior to Visit   Medication Sig Dispense Refill    atorvastatin (LIPITOR) 10 MG tablet Take 1 tablet (10 mg total) by mouth once daily. 90 tablet 3     cloNIDine (CATAPRES) 0.1 MG tablet Take 1 tablet (0.1 mg total) by mouth 3 (three) times daily. 90 tablet 11    hydroCHLOROthiazide (HYDRODIURIL) 25 MG tablet Take 1 tablet (25 mg total) by mouth once daily. 30 tablet 11    LORazepam (ATIVAN) 0.5 MG tablet Take 1 tablet (0.5 mg total) by mouth every 12 (twelve) hours as needed for Anxiety. 30 tablet 2    tiZANidine (ZANAFLEX) 4 MG tablet TK 1 T PO TID PRN      TRUEPLUS LANCETS 33 gauge Misc Inject 1 lancet as directed 4 (four) times daily. 100 each 11    AIMOVIG AUTOINJECTOR 70 mg/mL autoinjector INJECT 1 SRYINGE SUB-Q EVERY MONTH      baclofen (LIORESAL) 20 MG tablet TK 1 T PO Q 8 H PRN      betamethasone dipropionate 0.05 % cream APPLY A SMALL AMOUNT TO AFFECTED AREA TOPICALLY TWICE A DAY FOR 2 TO 3 WEEKS AT A TIME AS NEEDED FLARE UPS AVOID FACE & GROIN AREA      blood sugar diagnostic (TRUE METRIX GLUCOSE TEST STRIP) Strp Test 4 times daily. 300 strip 3    blood-glucose meter (TRUE METRIX GLUCOSE METER) Misc Use as directed 1 each 0    blood-glucose meter,continuous (DEXCOM G6 ) Misc 1 Units by Misc.(Non-Drug; Combo Route) route continuous. 1 each 0    blood-glucose sensor (DEXCOM G6 SENSOR) [place]rtle 1 each by Misc.(Non-Drug; Combo Route) route every 10 days. 3 each 11    blood-glucose transmitter (DEXCOM G6 TRANSMITTER) Myrtle 1 each by Misc.(Non-Drug; Combo Route) route every 3 (three) months. 1 Device 3    dapsone 25 MG Tab Take 100 mg by mouth.      gabapentin (NEURONTIN) 800 MG tablet Neurontin 800 mg tablet   Take 1 tablet 3 times a day by oral route.      HYDROcodone-acetaminophen (NORCO)  mg per tablet Norco 10 mg-325 mg tablet   Take 1 tablet 3 times a day by oral route as needed.      hydrocortisone 2.5 % ointment APPLY TOPICALLY TO FACE TWICE A DAY AS NEEDED FOR 1 TO 2 WEEKS AT A TIME      hydroxychloroquine (PLAQUENIL) 200 mg tablet Take 200 mg by mouth 2 (two) times daily.       hydrOXYzine HCL (ATARAX) 25 MG tablet TAKE ONE  "TABLET BY MOUTH EVERY 6 HOURS AS NEEDED FOR ITCHING FOR 7 DAYS      hydrOXYzine pamoate (VISTARIL) 25 MG Cap Take 1 -2 tablets by mouth every 6 hours as needed for nausea/vomiting/insomnia/anxiety 25 capsule 0    insulin (LANTUS SOLOSTAR U-100 INSULIN) glargine 100 units/mL (3mL) SubQ pen Inject 40 Units into the skin once daily. 1 Box 11    insulin aspart U-100 (NOVOLOG) 100 unit/mL injection Inject 4 Units into the skin 3 (three) times daily before meals. 10.8 mL 3    linaCLOtide (LINZESS) 145 mcg Cap capsule Take 1 capsule (145 mcg total) by mouth before breakfast. 90 capsule 3    mycophenolate (CELLCEPT) 250 mg Cap 3,000 mg once daily.      pen needle, diabetic 31 gauge x 5/16" Ndle 1 Units by Misc.(Non-Drug; Combo Route) route 4 (four) times daily. 300 each 3    pentoxifylline (TRENTAL) 400 mg TbSR Take 400 mg by mouth.      predniSONE (DELTASONE) 10 MG tablet Take 7.5 mg by mouth once daily. Scale from 40 to 10 when having outbreak       Current Facility-Administered Medications on File Prior to Visit   Medication Dose Route Frequency Provider Last Rate Last Admin    acetaminophen tablet 650 mg  650 mg Oral Once PRN Reji Dumas MD        albuterol inhaler 2 puff  2 puff Inhalation Q20 Min PRN Reji Dumas MD           Medications have been reviewed and reconciled with patient at visit today.    Barriers to medications present (no )    Adverse reactions to current medications (no)      Exam:  There were no vitals filed for this visit.      There is no height or weight on file to calculate BMI.      BP Readings from Last 3 Encounters:   01/06/22 129/62   12/15/21 (!) 162/84   09/29/21 (!) 150/80     Wt Readings from Last 3 Encounters:   12/15/21 1145 97.9 kg (215 lb 13.3 oz)   09/29/21 1043 100.7 kg (222 lb 0.1 oz)   08/24/21 0941 102.2 kg (225 lb 5 oz)            Physical Exam  Eyes:      General: No scleral icterus.  Pulmonary:      Effort: Pulmonary effort is normal.   Neurological:      " Mental Status: She is alert. Mental status is at baseline.         Laboratory Reviewed: (Yes)  Lab Results   Component Value Date    WBC 9.73 12/15/2021    HGB 9.5 (L) 12/15/2021    HCT 31.1 (L) 12/15/2021     12/15/2021    CHOL 137 08/26/2021    TRIG 42 08/26/2021    HDL 51 08/26/2021    ALT 8 (L) 08/26/2021    AST 14 08/26/2021     08/26/2021    K 3.7 08/26/2021     08/26/2021    CREATININE 0.6 08/26/2021    BUN 9 08/26/2021    CO2 27 08/26/2021    TSH 0.257 (L) 12/15/2020    INR 1.1 03/24/2020    HGBA1C 5.0 12/15/2021           Health Maintenance  Health Maintenance Topics with due status: Not Due       Topic Last Completion Date    Colorectal Cancer Screening 06/24/2020    Mammogram 08/12/2021    Lipid Panel 08/26/2021    Cervical Cancer Screening 09/29/2021    Hemoglobin A1c 12/15/2021    Low Dose Statin 01/05/2022     Health Maintenance Due   Topic Date Due    Pneumococcal Vaccines (Age 0-64) (1 of 4 - PCV13) Never done    Foot Exam  Never done    TETANUS VACCINE  Never done    Diabetes Urine Screening  06/09/2021    Influenza Vaccine (1) Never done    Eye Exam  09/21/2021       Assessment:  Problem List Items Addressed This Visit        Cardiac/Vascular    Essential hypertension      Other Visit Diagnoses     COVID-19 in immunocompromised patient    -  Primary    Relevant Medications    pulse oximeter (PULSE OXIMETER) device    Other Relevant Orders    Ambulatory referral/consult to [facility]    COVID-19 Home Symptom Monitoring  - Duration (days): 14    Drug-induced insomnia        Relevant Medications    zolpidem (AMBIEN) 10 mg Tab            Plan:  COVID-19 in immunocompromised patient  -     Ambulatory referral/consult to [facility]; Future; Expected date: 01/06/2022  -     COVID-19 Home Symptom Monitoring  - Duration (days): 14  -     pulse oximeter (PULSE OXIMETER) device; Use twice daily at 8 AM and 3 PM and record the value in MyChart as directed.  Dispense: 1 each;  Refill: 0    Essential hypertension  -     Discontinue: labetaloL (NORMODYNE) 100 MG tablet; Take 1 tablet (100 mg total) by mouth 2 (two) times daily.  Dispense: 60 tablet; Refill: 11    Drug-induced insomnia  -     zolpidem (AMBIEN) 10 mg Tab; Take 1 tablet (10 mg total) by mouth nightly as needed (insomnia).  Dispense: 30 tablet; Refill: 5      -Patient's lab results were reviewed and discussed with patient  -Treatment options and alternatives were discussed with the patient. Patient expressed understanding. Patient was given the opportunity to ask questions and be an active participant in their medical care. Patient had no further questions or concerns at this time.   -Documentation of patient's health and condition was obtained from family member who was present during visit.  -Patient is an overall moderate risk for health complications from their medical conditions.       Follow up: Follow up in about 2 months (around 3/5/2022).      After visit summary printed and given to patient upon discharge.  Patient goals and care plan are included in After visit summary.    Total medical decision making time was 32 min.  The following issues were discussed: The primary encounter diagnosis was COVID-19 in immunocompromised patient. Diagnoses of Essential hypertension and Drug-induced insomnia were also pertinent to this visit.    Health maintenance needs, recent test results and goals of care discussed with pt and questions answered.

## 2022-01-06 ENCOUNTER — INFUSION (OUTPATIENT)
Dept: INFECTIOUS DISEASES | Facility: HOSPITAL | Age: 47
End: 2022-01-06
Attending: NURSE PRACTITIONER
Payer: MEDICARE

## 2022-01-06 VITALS
SYSTOLIC BLOOD PRESSURE: 129 MMHG | TEMPERATURE: 98 F | OXYGEN SATURATION: 97 % | DIASTOLIC BLOOD PRESSURE: 62 MMHG | HEART RATE: 62 BPM | RESPIRATION RATE: 18 BRPM

## 2022-01-06 DIAGNOSIS — U07.1 COVID-19 IN IMMUNOCOMPROMISED PATIENT: ICD-10-CM

## 2022-01-06 DIAGNOSIS — D84.9 COVID-19 IN IMMUNOCOMPROMISED PATIENT: ICD-10-CM

## 2022-01-06 PROCEDURE — 63600175 PHARM REV CODE 636 W HCPCS: Performed by: INTERNAL MEDICINE

## 2022-01-06 PROCEDURE — 25000003 PHARM REV CODE 250: Performed by: INTERNAL MEDICINE

## 2022-01-06 PROCEDURE — M0243 CASIRIVI AND IMDEVI INFUSION: HCPCS | Performed by: INTERNAL MEDICINE

## 2022-01-06 RX ORDER — DIPHENHYDRAMINE HYDROCHLORIDE 50 MG/ML
25 INJECTION INTRAMUSCULAR; INTRAVENOUS ONCE AS NEEDED
Status: DISCONTINUED | OUTPATIENT
Start: 2022-01-06 | End: 2022-05-12

## 2022-01-06 RX ORDER — SODIUM CHLORIDE 0.9 % (FLUSH) 0.9 %
10 SYRINGE (ML) INJECTION
Status: DISCONTINUED | OUTPATIENT
Start: 2022-01-06 | End: 2023-07-14

## 2022-01-06 RX ORDER — ONDANSETRON 4 MG/1
4 TABLET, ORALLY DISINTEGRATING ORAL ONCE AS NEEDED
Status: DISCONTINUED | OUTPATIENT
Start: 2022-01-06 | End: 2022-05-12

## 2022-01-06 RX ORDER — ALBUTEROL SULFATE 90 UG/1
2 AEROSOL, METERED RESPIRATORY (INHALATION)
Status: DISCONTINUED | OUTPATIENT
Start: 2022-01-06 | End: 2022-03-29

## 2022-01-06 RX ORDER — ACETAMINOPHEN 325 MG/1
650 TABLET ORAL ONCE AS NEEDED
Status: DISCONTINUED | OUTPATIENT
Start: 2022-01-06 | End: 2023-07-14

## 2022-01-06 RX ORDER — EPINEPHRINE 0.3 MG/.3ML
0.3 INJECTION SUBCUTANEOUS
Status: DISCONTINUED | OUTPATIENT
Start: 2022-01-06 | End: 2022-05-12

## 2022-01-06 RX ADMIN — CASIRIVIMAB AND IMDEVIMAB 600 MG: 600; 600 INJECTION, SOLUTION, CONCENTRATE INTRAVENOUS at 11:01

## 2022-01-07 ENCOUNTER — TELEPHONE (OUTPATIENT)
Dept: PRIMARY CARE CLINIC | Facility: CLINIC | Age: 47
End: 2022-01-07
Payer: MEDICARE

## 2022-01-07 NOTE — TELEPHONE ENCOUNTER
Patient called in to get a prescription fro Clotrimazole 1 mg. She stated that she has really bad mouth sores from covid. She says she usually get mouth sores because of her Lupus, but it's really bad, her mouth/tounge is raw.   Lov: 01/05/2022.

## 2022-01-07 NOTE — TELEPHONE ENCOUNTER
----- Message from Williams Conde sent at 1/7/2022  2:10 PM CST -----  Contact: Marilou  Patient is calling to speak with the nurse regarding orders for a prescription. Patient reports mouth ulcers due to Covid and is requesting Clotrimazole 10 Mg tablets. Preferred Pharmacy is below. Please give patient a call back at [phone] today when possible.   [facility] - [facility] [address]  [address] [place] [address]  Phone: [phone] Fax: [phone]  Thanks,  SHILA

## 2022-01-09 ENCOUNTER — NURSE TRIAGE (OUTPATIENT)
Dept: ADMINISTRATIVE | Facility: CLINIC | Age: 47
End: 2022-01-09
Payer: MEDICARE

## 2022-01-09 NOTE — TELEPHONE ENCOUNTER
Attempted to call x 2 no answer.  Phone goes straight to voicemail.  LVM to call back if further assistance is needed.     Reason for Disposition   Second attempt to contact caller AND no contact made. Phone number verified.    Protocols used: NO CONTACT OR DUPLICATE CONTACT CALL-A-AH

## 2022-01-11 ENCOUNTER — TELEPHONE (OUTPATIENT)
Dept: PRIMARY CARE CLINIC | Facility: CLINIC | Age: 47
End: 2022-01-11
Payer: MEDICARE

## 2022-01-11 ENCOUNTER — PATIENT MESSAGE (OUTPATIENT)
Dept: PRIMARY CARE CLINIC | Facility: CLINIC | Age: 47
End: 2022-01-11
Payer: MEDICARE

## 2022-01-11 DIAGNOSIS — K12.1 STOMATITIS: Primary | ICD-10-CM

## 2022-01-11 RX ORDER — DEXAMETHASONE 0.5 MG/5ML
SOLUTION ORAL
Qty: 240 ML | Refills: 0 | Status: SHIPPED | OUTPATIENT
Start: 2022-01-11 | End: 2022-05-12

## 2022-01-12 ENCOUNTER — TELEPHONE (OUTPATIENT)
Dept: PRIMARY CARE CLINIC | Facility: CLINIC | Age: 47
End: 2022-01-12
Payer: MEDICARE

## 2022-01-12 RX ORDER — CLOTRIMAZOLE 10 MG/1
10 LOZENGE ORAL; TOPICAL
Qty: 50 TABLET | Refills: 0 | Status: SHIPPED | OUTPATIENT
Start: 2022-01-12 | End: 2022-01-22

## 2022-01-12 RX ORDER — CLOTRIMAZOLE 10 MG/1
10 LOZENGE ORAL; TOPICAL
Qty: 150 TABLET | Refills: 0 | Status: SHIPPED | OUTPATIENT
Start: 2022-01-12 | End: 2022-01-12

## 2022-01-12 NOTE — TELEPHONE ENCOUNTER
Pharmacist called in to get clarification on the Clotrimazole Rx. He stated that there were two different dosage scripts sent to the pharmacy. He was informed that after check the patient med list, it showed that the Clotrimazole 10 mg with a dispense of 150 was d/c by the provider. Pharmacist verbally understood the information.

## 2022-01-12 NOTE — TELEPHONE ENCOUNTER
Patient contacted & informed that the clotrimazole (MYCELEX) 10 mg jerica prescription was sent to her pharmacy. Patient verbally understood the information.

## 2022-01-12 NOTE — TELEPHONE ENCOUNTER
----- Message from Angela Kelsey sent at 1/12/2022  1:49 PM CST -----  Contact: Carlo/[facility]  Pharmacy would like a call back at  in regards  to getting clarification on the patient's medication.      [facility] - [facility] [address]  [address] [address]  Phone: [phone] Fax: [phone]    Thanks

## 2022-01-14 ENCOUNTER — OFFICE VISIT (OUTPATIENT)
Dept: PRIMARY CARE CLINIC | Facility: CLINIC | Age: 47
End: 2022-01-14
Payer: MEDICARE

## 2022-01-14 DIAGNOSIS — D84.9 COVID-19 IN IMMUNOCOMPROMISED PATIENT: ICD-10-CM

## 2022-01-14 DIAGNOSIS — Z79.4 TYPE 2 DIABETES MELLITUS WITHOUT COMPLICATION, WITH LONG-TERM CURRENT USE OF INSULIN: ICD-10-CM

## 2022-01-14 DIAGNOSIS — E11.9 TYPE 2 DIABETES MELLITUS WITHOUT COMPLICATION, WITH LONG-TERM CURRENT USE OF INSULIN: ICD-10-CM

## 2022-01-14 DIAGNOSIS — U07.1 COVID-19 IN IMMUNOCOMPROMISED PATIENT: ICD-10-CM

## 2022-01-14 DIAGNOSIS — I10 ESSENTIAL HYPERTENSION: ICD-10-CM

## 2022-01-14 DIAGNOSIS — K21.9 GASTROESOPHAGEAL REFLUX DISEASE WITHOUT ESOPHAGITIS: ICD-10-CM

## 2022-01-14 DIAGNOSIS — M32.8 OTHER FORMS OF SYSTEMIC LUPUS ERYTHEMATOSUS, UNSPECIFIED ORGAN INVOLVEMENT STATUS: ICD-10-CM

## 2022-01-14 PROCEDURE — 1160F RVW MEDS BY RX/DR IN RCRD: CPT | Mod: HCNC,CPTII,95, | Performed by: NURSE PRACTITIONER

## 2022-01-14 PROCEDURE — 1159F PR MEDICATION LIST DOCUMENTED IN MEDICAL RECORD: ICD-10-PCS | Mod: HCNC,CPTII,95, | Performed by: NURSE PRACTITIONER

## 2022-01-14 PROCEDURE — 99215 PR OFFICE/OUTPT VISIT, EST, LEVL V, 40-54 MIN: ICD-10-PCS | Mod: HCNC,95,, | Performed by: NURSE PRACTITIONER

## 2022-01-14 PROCEDURE — 99215 OFFICE O/P EST HI 40 MIN: CPT | Mod: HCNC,95,, | Performed by: NURSE PRACTITIONER

## 2022-01-14 PROCEDURE — 1159F MED LIST DOCD IN RCRD: CPT | Mod: HCNC,CPTII,95, | Performed by: NURSE PRACTITIONER

## 2022-01-14 PROCEDURE — 1160F PR REVIEW ALL MEDS BY PRESCRIBER/CLIN PHARMACIST DOCUMENTED: ICD-10-PCS | Mod: HCNC,CPTII,95, | Performed by: NURSE PRACTITIONER

## 2022-01-14 RX ORDER — LABETALOL 200 MG/1
200 TABLET, FILM COATED ORAL 2 TIMES DAILY
Qty: 60 TABLET | Refills: 11 | Status: SHIPPED | OUTPATIENT
Start: 2022-01-14 | End: 2022-05-12

## 2022-01-14 RX ORDER — ESOMEPRAZOLE MAGNESIUM 40 MG/1
40 CAPSULE, DELAYED RELEASE ORAL
Qty: 90 CAPSULE | Refills: 3 | Status: SHIPPED | OUTPATIENT
Start: 2022-01-14 | End: 2022-03-29 | Stop reason: SDUPTHER

## 2022-01-14 NOTE — ASSESSMENT & PLAN NOTE
Difficult to control.   Continues HCTZ, clonidine.  Did not start aldactone d/t high dose steroid use and diff to control DMII.  Amlodipine stopped 8/21 d/t swelling  ARBs/ACE I allergy/angioedema.  Limited response to doxazosin.  Improving with low dose labetalol.    Will increase labetalol dose, cont HCTZ and clonidine TID  Keep appt with Dr Clark

## 2022-01-14 NOTE — ASSESSMENT & PLAN NOTE
Improved control since on lower dose steroid.   Xiomara POC.  Lab Results   Component Value Date    HGBA1C 5.0 12/15/2021

## 2022-01-14 NOTE — PROGRESS NOTES
"Marilou Daniel  01/14/2022  [id number]    Diane Dominguez NP  Patient Care Team:  Diane Dominguez NP as PCP - General (Family Medicine)  Joe Yo RD as Dietitian (Endocrinology)  Liza Moon RD, CDE as Dietitian (Diabetes)      [facility] Care Note      The patient location is:  Patient Home   The chief complaint leading to consultation is: f/u COVID, HTN      Visit type: Virtual visit with synchronous audio and video  Each patient to whom he or she provides medical services by telemedicine is:  (1) informed of the relationship between the physician and patient and the respective role of any other health care provider with respect to management of the patient; and (2) notified that he or she may decline to receive medical services by telemedicine and may withdraw from such care at any time.    Chief Complaint:  No chief complaint on file.      History of Present Illness:  HPI    F/U COVID infection, HTN.  Received clotrimatrazole.   Started labetalol.   Also taking 2 doxazosin, clonidine, HCTZ.  Still elevated BP.   Hasn't been taking clonidine TID. 150/80  Has scheduled f/u with Dr Schmidt, not until February.   Has missed couple doses of HCTZ.     BP typically 140s-150/80    Currently 120/70 twenty minutes after labetalol.   Had antibody infusion.  Occasional cough, no dyspnea.  No fever. Still sore throat. "Mumps on my tongue."   Painful swallowing. Improving with clomitrazole.  No improvement with decadron liquid.     Mouth sores.    Still having headaches.     Amlodipine - stopped 8/21 d/t swelling  ARBs/ACE-I allergy  Limited response to doxazosin.  Improving with low dose labetalol.        Review of Systems   HENT: Positive for sore throat.    Eyes: Negative for blurred vision.   Respiratory: Negative for cough.    Cardiovascular: Negative for chest pain and palpitations.   Genitourinary: Negative for dysuria.   Musculoskeletal: Negative for myalgias.   Neurological: Positive for " headaches. Negative for dizziness.   Psychiatric/Behavioral: Negative for depression.         The following were reviewed: Active problem list, medication list, allergies, family history, social history, and Health Maintenance.     History:  Past Medical History:   Diagnosis Date    Anemia     Arthritis     Connective tissue disease 1999    COVID-19 in immunocompromised patient 2/5/2021    Diabetes mellitus     Gastroesophageal reflux disease 3/23/2020    Hypertension     Lupus 1999    Situational anxiety 8/24/2021    Suspected COVID-19 virus infection 2/5/2021    Thyroid nodule greater than or equal to 1 cm in diameter incidentally noted on imaging study 11/19/2020    Vasculitis      Past Surgical History:   Procedure Laterality Date    ABLATION      COLONOSCOPY N/A 6/24/2020    Procedure: COLONOSCOPY;  Surgeon: Nevin Penny MD;  Location: [facility];  Service: Endoscopy;  Laterality: N/A;    ESOPHAGOGASTRODUODENOSCOPY N/A 6/24/2020    Procedure: ESOPHAGOGASTRODUODENOSCOPY (EGD);  Surgeon: Nevin Penny MD;  Location: [facility];  Service: Endoscopy;  Laterality: N/A;    RIGHT HEART CATHETERIZATION      TUBAL LIGATION      WRIST SURGERY Bilateral      Family History   Problem Relation Age of Onset    Breast cancer Mother     Breast cancer Sister     Breast cancer Sister      Social History     Socioeconomic History    Marital status:    Tobacco Use    Smoking status: Never Smoker    Smokeless tobacco: Never Used   Substance and Sexual Activity    Alcohol use: Never    Drug use: Never    Sexual activity: Yes     Partners: Male     Birth control/protection: See Surgical Hx     Patient Active Problem List   Diagnosis    Essential hypertension    Type 2 diabetes mellitus without complication, with long-term current use of insulin    Systemic lupus erythematosus    Leukocytoclastic vasculitis    Nonintractable headache    Gastroesophageal reflux disease    Encounter for  screening colonoscopy    Neck pain    Hearing loss    Thyroid nodule greater than or equal to 1 cm in diameter incidentally noted on imaging study    Drug induced insomnia    COVID-19 in immunocompromised patient    Immunocompromised state due to drug therapy    Situational anxiety     Review of patient's allergies indicates:   Allergen Reactions    Azathioprine Nausea And Vomiting     Nausea, vomiting, diarrhea dose and early in the course of therapy    Olmesartan Shortness Of Breath    Oxycodone Itching       Medications:  Current Outpatient Medications on File Prior to Visit   Medication Sig Dispense Refill    AIMOVIG AUTOINJECTOR 70 mg/mL autoinjector INJECT 1 SRYINGE SUB-Q EVERY MONTH      atorvastatin (LIPITOR) 10 MG tablet Take 1 tablet (10 mg total) by mouth once daily. 90 tablet 3    baclofen (LIORESAL) 20 MG tablet TK 1 T PO Q 8 H PRN      betamethasone dipropionate 0.05 % cream APPLY A SMALL AMOUNT TO AFFECTED AREA TOPICALLY TWICE A DAY FOR 2 TO 3 WEEKS AT A TIME AS NEEDED FLARE UPS AVOID FACE & GROIN AREA      blood sugar diagnostic (TRUE METRIX GLUCOSE TEST STRIP) Strp Test 4 times daily. 300 strip 3    blood-glucose meter (TRUE METRIX GLUCOSE METER) Misc Use as directed 1 each 0    blood-glucose meter,continuous (DEXCOM G6 ) Misc 1 Units by Misc.(Non-Drug; Combo Route) route continuous. 1 each 0    blood-glucose sensor (DEXCOM G6 SENSOR) Myrtle 1 each by Misc.(Non-Drug; Combo Route) route every 10 days. 3 each 11    blood-glucose transmitter (DEXCOM G6 TRANSMITTER) Myrtle 1 each by Misc.(Non-Drug; Combo Route) route every 3 (three) months. 1 Device 3    cloNIDine (CATAPRES) 0.1 MG tablet Take 1 tablet (0.1 mg total) by mouth 3 (three) times daily. 90 tablet 11    clotrimazole (MYCELEX) 10 mg jerica Take 1 tablet (10 mg total) by mouth 5 (five) times daily. for 10 days 50 tablet 0    dapsone 25 MG Tab Take 100 mg by mouth.      dexAMETHasone 0.5 mg/5 mL Soln 5 mL swish and  "spit four times daily 240 mL 0    gabapentin (NEURONTIN) 800 MG tablet Neurontin 800 mg tablet   Take 1 tablet 3 times a day by oral route.      hydroCHLOROthiazide (HYDRODIURIL) 25 MG tablet Take 1 tablet (25 mg total) by mouth once daily. 30 tablet 11    HYDROcodone-acetaminophen (NORCO)  mg per tablet Norco 10 mg-325 mg tablet   Take 1 tablet 3 times a day by oral route as needed.      hydrocortisone 2.5 % ointment APPLY TOPICALLY TO FACE TWICE A DAY AS NEEDED FOR 1 TO 2 WEEKS AT A TIME      hydroxychloroquine (PLAQUENIL) 200 mg tablet Take 200 mg by mouth 2 (two) times daily.       hydrOXYzine HCL (ATARAX) 25 MG tablet TAKE ONE TABLET BY MOUTH EVERY 6 HOURS AS NEEDED FOR ITCHING FOR 7 DAYS      hydrOXYzine pamoate (VISTARIL) 25 MG Cap Take 1 -2 tablets by mouth every 6 hours as needed for nausea/vomiting/insomnia/anxiety 25 capsule 0    insulin (LANTUS SOLOSTAR U-100 INSULIN) glargine 100 units/mL (3mL) SubQ pen Inject 40 Units into the skin once daily. 1 Box 11    insulin aspart U-100 (NOVOLOG) 100 unit/mL injection Inject 4 Units into the skin 3 (three) times daily before meals. 10.8 mL 3    linaCLOtide (LINZESS) 145 mcg Cap capsule Take 1 capsule (145 mcg total) by mouth before breakfast. 90 capsule 3    LORazepam (ATIVAN) 0.5 MG tablet Take 1 tablet (0.5 mg total) by mouth every 12 (twelve) hours as needed for Anxiety. 30 tablet 2    mycophenolate (CELLCEPT) 250 mg Cap 3,000 mg once daily.      pen needle, diabetic 31 gauge x 5/16" Ndle 1 Units by Misc.(Non-Drug; Combo Route) route 4 (four) times daily. 300 each 3    pentoxifylline (TRENTAL) 400 mg TbSR Take 400 mg by mouth.      predniSONE (DELTASONE) 10 MG tablet Take 7.5 mg by mouth once daily. Scale from 40 to 10 when having outbreak      pulse oximeter (PULSE OXIMETER) device Use twice daily at 8 AM and 3 PM and [facility] directed. 1 each 0    tiZANidine (ZANAFLEX) 4 MG tablet TK 1 T PO TID PRN      TRUEPLUS " LANCETS 33 gauge Misc Inject 1 lancet as directed 4 (four) times daily. 100 each 11    zolpidem (AMBIEN) 10 mg Tab Take 1 tablet (10 mg total) by mouth nightly as needed (insomnia). 30 tablet 5    [DISCONTINUED] doxazosin (CARDURA) 2 MG tablet Take 1 tablet (2 mg total) by mouth every evening. 30 tablet 11    [DISCONTINUED] esomeprazole (NEXIUM) 40 MG capsule TAKE 1 CAPSULE (40 MG TOTAL) BY MOUTH BEFORE BREAKFAST. 90 capsule 1    [DISCONTINUED] labetaloL (NORMODYNE) 100 MG tablet Take 1 tablet (100 mg total) by mouth 2 (two) times daily. 60 tablet 11     Current Facility-Administered Medications on File Prior to Visit   Medication Dose Route Frequency Provider Last Rate Last Admin    acetaminophen tablet 650 mg  650 mg Oral Once PRN Reji Dumas MD        acetaminophen tablet 650 mg  650 mg Oral Once PRN Reji Dumas MD        albuterol inhaler 2 puff  2 puff Inhalation Q20 Min PRN Reji Dumas MD        albuterol inhaler 2 puff  2 puff Inhalation Q20 Min PRN Reji Dumas MD        diphenhydrAMINE injection 25 mg  25 mg Intravenous Once PRN Reji Dumas MD        EPINEPHrine (EPIPEN) 0.3 mg/0.3 mL pen injection 0.3 mg  0.3 mg Intramuscular PRN Reji Dumas MD        methylPREDNISolone sodium succinate injection 40 mg  40 mg Intravenous Once PRN Reji Dumas MD        ondansetron disintegrating tablet 4 mg  4 mg Oral Once PRN Reji Dumas MD        sodium chloride 0.9% 500 mL flush bag   Intravenous PRN Reji Dumas MD        sodium chloride 0.9% flush 10 mL  10 mL Intravenous PRN Reji Dumas MD           Medications have been reviewed and reconciled with patient at visit today.    Barriers to medications present (no )    Adverse reactions to current medications (no)      Exam:  There were no vitals filed for this visit.      There is no height or weight on file to calculate BMI.      BP Readings from Last 3 Encounters:   01/06/22 129/62   12/15/21 (!)  162/84   09/29/21 (!) 150/80     Wt Readings from Last 3 Encounters:   12/15/21 1145 97.9 kg (215 lb 13.3 oz)   09/29/21 1043 100.7 kg (222 lb 0.1 oz)   08/24/21 0941 102.2 kg (225 lb 5 oz)            Physical Exam  Constitutional:       General: She is not in acute distress.  Eyes:      General: No scleral icterus.  Pulmonary:      Effort: Pulmonary effort is normal.   Neurological:      Mental Status: She is alert. Mental status is at baseline.         Laboratory Reviewed: (Yes)  Lab Results   Component Value Date    WBC 9.73 12/15/2021    HGB 9.5 (L) 12/15/2021    HCT 31.1 (L) 12/15/2021     12/15/2021    CHOL 137 08/26/2021    TRIG 42 08/26/2021    HDL 51 08/26/2021    ALT 8 (L) 08/26/2021    AST 14 08/26/2021     08/26/2021    K 3.7 08/26/2021     08/26/2021    CREATININE 0.6 08/26/2021    BUN 9 08/26/2021    CO2 27 08/26/2021    TSH 0.257 (L) 12/15/2020    INR 1.1 03/24/2020    HGBA1C 5.0 12/15/2021           Health Maintenance  Health Maintenance Topics with due status: Not Due       Topic Last Completion Date    Colorectal Cancer Screening 06/24/2020    Mammogram 08/12/2021    Lipid Panel 08/26/2021    Cervical Cancer Screening 09/29/2021    Hemoglobin A1c 12/15/2021    Low Dose Statin 01/05/2022     Health Maintenance Due   Topic Date Due    Pneumococcal Vaccines (Age 0-64) (1 of 4 - PCV13) Never done    Foot Exam  Never done    TETANUS VACCINE  Never done    Diabetes Urine Screening  06/09/2021    Influenza Vaccine (1) Never done    Eye Exam  09/21/2021       Assessment:  Problem List Items Addressed This Visit        Cardiac/Vascular    Essential hypertension     Difficult to control.   Continues HCTZ, clonidine.  Did not start aldactone d/t high dose steroid use and diff to control DMII.  Amlodipine stopped 8/21 d/t swelling  ARBs/ACE I allergy/angioedema.  Limited response to doxazosin.  Improving with low dose labetalol.    Will increase labetalol dose, cont HCTZ and clonidine  TID  Keep appt with Dr Clark         Relevant Medications    labetaloL (NORMODYNE) 200 MG tablet       Immunology/Multi System    Systemic lupus erythematosus     Much improved with current POC.  Continues f/u with Dr Wayne.            Endocrine    Type 2 diabetes mellitus without complication, with long-term current use of insulin     Improved control since on lower dose steroid.   Cotninue POC.  Lab Results   Component Value Date    HGBA1C 5.0 12/15/2021                 GI    Gastroesophageal reflux disease    Relevant Medications    esomeprazole (NEXIUM) 40 MG capsule       Other    COVID-19 in immunocompromised patient     Received ab infusion.   Now with some stomatitis that is improving with clotrimazole.  Continue POC                 Plan:  Essential hypertension  -     labetaloL (NORMODYNE) 200 MG tablet; Take 1 tablet (200 mg total) by mouth 2 (two) times daily.  Dispense: 60 tablet; Refill: 11    Gastroesophageal reflux disease without esophagitis  -     esomeprazole (NEXIUM) 40 MG capsule; Take 1 capsule (40 mg total) by mouth before breakfast.  Dispense: 90 capsule; Refill: 3    COVID-19 in immunocompromised patient    Other forms of systemic lupus erythematosus, unspecified organ involvement status    Type 2 diabetes mellitus without complication, with long-term current use of insulin      -Patient's lab results were reviewed and discussed with patient  -Treatment options and alternatives were discussed with the patient. Patient expressed understanding. Patient was given the opportunity to ask questions and be an active participant in their medical care. Patient had no further questions or concerns at this time.   -Documentation of patient's health and condition was obtained from family member who was present during visit.  -Patient is an overall moderate risk for health complications from their medical conditions.       Follow up: Follow up in about 3 months (around 4/14/2022) for HTN management, Diabetes  management.      Total medical decision making time was 56 min.  The following issues were discussed: Diagnoses of Essential hypertension, Gastroesophageal reflux disease without esophagitis, COVID-19 in immunocompromised patient, Other forms of systemic lupus erythematosus, unspecified organ involvement status, and Type 2 diabetes mellitus without complication, with long-term current use of insulin were pertinent to this visit.    Health maintenance needs, recent test results and goals of care discussed with pt and questions answered.

## 2022-01-14 NOTE — ASSESSMENT & PLAN NOTE
Received ab infusion.   Now with some stomatitis that is improving with clotrimazole.  Continue POC

## 2022-03-01 ENCOUNTER — TELEPHONE (OUTPATIENT)
Dept: PRIMARY CARE CLINIC | Facility: CLINIC | Age: 47
End: 2022-03-01
Payer: MEDICARE

## 2022-03-18 ENCOUNTER — TELEPHONE (OUTPATIENT)
Dept: PRIMARY CARE CLINIC | Facility: CLINIC | Age: 47
End: 2022-03-18
Payer: MEDICARE

## 2022-03-18 DIAGNOSIS — K12.1 STOMATITIS: ICD-10-CM

## 2022-03-18 RX ORDER — TRIAMCINOLONE ACETONIDE 1 MG/G
PASTE DENTAL 2 TIMES DAILY
Qty: 5 G | Refills: 11 | Status: SHIPPED | OUTPATIENT
Start: 2022-03-18 | End: 2022-04-17

## 2022-03-18 NOTE — TELEPHONE ENCOUNTER
----- Message from Marichuy Alvarez sent at 3/18/2022 10:08 AM CDT -----  Pt is calling in regards to refills. Pt stated that she would like to speak to someone in the office. Please call [phone].

## 2022-03-18 NOTE — TELEPHONE ENCOUNTER
Spoke to pharmacist at [facility]. Rx orabase from Dec 2020. He is refilling today.

## 2022-03-18 NOTE — TELEPHONE ENCOUNTER
Patient called in wanting to get a refill on her mouth paste. She says that the pharmacy informed her twice that they sent over a refill request for this medication, I've never received anything on it. I don't see a mouth paste on her medication list.   Lov: 01/14/2022

## 2022-03-18 NOTE — TELEPHONE ENCOUNTER
Patient informed that the triamcinolone acetonide 0.1% (KENALOG) 0.1 % paste was sent to her pharmacy & verbally understood the information.

## 2022-03-29 ENCOUNTER — OFFICE VISIT (OUTPATIENT)
Dept: PRIMARY CARE CLINIC | Facility: CLINIC | Age: 47
End: 2022-03-29
Payer: MEDICARE

## 2022-03-29 VITALS
HEIGHT: 68 IN | TEMPERATURE: 97 F | BODY MASS INDEX: 32.21 KG/M2 | WEIGHT: 212.5 LBS | OXYGEN SATURATION: 95 % | HEART RATE: 93 BPM

## 2022-03-29 DIAGNOSIS — I10 ESSENTIAL HYPERTENSION: ICD-10-CM

## 2022-03-29 DIAGNOSIS — M87.052 AVASCULAR NECROSIS OF BONES OF BOTH HIPS: ICD-10-CM

## 2022-03-29 DIAGNOSIS — M87.051 AVASCULAR NECROSIS OF BONES OF BOTH HIPS: ICD-10-CM

## 2022-03-29 DIAGNOSIS — Z79.4 TYPE 2 DIABETES MELLITUS WITHOUT COMPLICATION, WITH LONG-TERM CURRENT USE OF INSULIN: Primary | ICD-10-CM

## 2022-03-29 DIAGNOSIS — K21.9 GASTROESOPHAGEAL REFLUX DISEASE WITHOUT ESOPHAGITIS: ICD-10-CM

## 2022-03-29 DIAGNOSIS — F41.9 ANXIETY: ICD-10-CM

## 2022-03-29 DIAGNOSIS — E11.9 TYPE 2 DIABETES MELLITUS WITHOUT COMPLICATION, WITH LONG-TERM CURRENT USE OF INSULIN: Primary | ICD-10-CM

## 2022-03-29 DIAGNOSIS — E08.8 DIABETES MELLITUS DUE TO UNDERLYING CONDITION WITH COMPLICATION, WITH LONG-TERM CURRENT USE OF INSULIN: ICD-10-CM

## 2022-03-29 DIAGNOSIS — Z79.4 DIABETES MELLITUS DUE TO UNDERLYING CONDITION WITH COMPLICATION, WITH LONG-TERM CURRENT USE OF INSULIN: ICD-10-CM

## 2022-03-29 PROCEDURE — 3008F PR BODY MASS INDEX (BMI) DOCUMENTED: ICD-10-PCS | Mod: CPTII,S$GLB,, | Performed by: NURSE PRACTITIONER

## 2022-03-29 PROCEDURE — 1159F PR MEDICATION LIST DOCUMENTED IN MEDICAL RECORD: ICD-10-PCS | Mod: CPTII,S$GLB,, | Performed by: NURSE PRACTITIONER

## 2022-03-29 PROCEDURE — 99215 PR OFFICE/OUTPT VISIT, EST, LEVL V, 40-54 MIN: ICD-10-PCS | Mod: S$GLB,,, | Performed by: NURSE PRACTITIONER

## 2022-03-29 PROCEDURE — 99999 PR PBB SHADOW E&M-EST. PATIENT-LVL IV: CPT | Mod: PBBFAC,,, | Performed by: NURSE PRACTITIONER

## 2022-03-29 PROCEDURE — 99417 PR PROLONGED SVC, OUTPT, W/WO DIRECT PT CONTACT,  EA ADDTL 15 MIN: ICD-10-PCS | Mod: S$GLB,,, | Performed by: NURSE PRACTITIONER

## 2022-03-29 PROCEDURE — 99499 RISK ADDL DX/OHS AUDIT: ICD-10-PCS | Mod: HCNC,S$GLB,, | Performed by: NURSE PRACTITIONER

## 2022-03-29 PROCEDURE — 99499 UNLISTED E&M SERVICE: CPT | Mod: HCNC,S$GLB,, | Performed by: NURSE PRACTITIONER

## 2022-03-29 PROCEDURE — 99417 PROLNG OP E/M EACH 15 MIN: CPT | Mod: S$GLB,,, | Performed by: NURSE PRACTITIONER

## 2022-03-29 PROCEDURE — 99215 OFFICE O/P EST HI 40 MIN: CPT | Mod: S$GLB,,, | Performed by: NURSE PRACTITIONER

## 2022-03-29 PROCEDURE — 1159F MED LIST DOCD IN RCRD: CPT | Mod: CPTII,S$GLB,, | Performed by: NURSE PRACTITIONER

## 2022-03-29 PROCEDURE — 99999 PR PBB SHADOW E&M-EST. PATIENT-LVL IV: ICD-10-PCS | Mod: PBBFAC,,, | Performed by: NURSE PRACTITIONER

## 2022-03-29 PROCEDURE — 3008F BODY MASS INDEX DOCD: CPT | Mod: CPTII,S$GLB,, | Performed by: NURSE PRACTITIONER

## 2022-03-29 RX ORDER — CLONIDINE HYDROCHLORIDE 0.1 MG/1
0.1 TABLET ORAL EVERY 6 HOURS PRN
Qty: 90 TABLET | Refills: 11 | Status: SHIPPED | OUTPATIENT
Start: 2022-03-29 | End: 2023-05-05

## 2022-03-29 RX ORDER — LORAZEPAM 0.5 MG/1
0.5 TABLET ORAL EVERY 12 HOURS PRN
Qty: 30 TABLET | Refills: 2 | Status: SHIPPED | OUTPATIENT
Start: 2022-03-29 | End: 2022-05-23

## 2022-03-29 RX ORDER — CLONIDINE 0.1 MG/24H
1 PATCH, EXTENDED RELEASE TRANSDERMAL
Qty: 4 PATCH | Refills: 11 | Status: SHIPPED | OUTPATIENT
Start: 2022-03-29 | End: 2023-07-14

## 2022-03-29 RX ORDER — ESOMEPRAZOLE MAGNESIUM 40 MG/1
40 CAPSULE, DELAYED RELEASE ORAL
Qty: 90 CAPSULE | Refills: 3 | Status: SHIPPED | OUTPATIENT
Start: 2022-03-29 | End: 2022-08-03 | Stop reason: SDUPTHER

## 2022-03-29 RX ORDER — INSULIN GLARGINE 100 [IU]/ML
5 INJECTION, SOLUTION SUBCUTANEOUS DAILY
Qty: 1.5 ML | Refills: 11 | Status: SHIPPED | OUTPATIENT
Start: 2022-03-29 | End: 2023-05-05 | Stop reason: SDUPTHER

## 2022-03-29 NOTE — PROGRESS NOTES
Marilou Mendozaentine  03/29/2022  [id number]    Diane Dominguez NP  Patient Care Team:  Diane Dominguez NP as PCP - General (Family Medicine)  Joe Yo RD as Dietitian (Endocrinology)  Liza Moon RD, CDE as Dietitian (Diabetes)        [facility] Primary Care Note      Chief Complaint:  No chief complaint on file.      History of Present Illness:  HPI    F/U DMII, HTN, SLE.    Continues to f/u with Dr Wayne for SLE. Was to follow up in January but I do not see note. Dr Milligan?    Labetalol dose increased 1/14/22 and doxazosin stopped.     Due Foot exam and eye exam, flu shot.     Left hip pain 3/7/22:  Impression    1.  Avascular necrosis left greater the right hip.    Narrative    EXAMINATION: MRI PELVIS WO CONTRAST     CLINICAL HISTORY:  Left hip pain     FINDINGS:   There is bilateral avascular necrosis involving the femoral heads. There is avascular necrosis extending to the left intratrochanteric location.   There is no evidence of fracture or fragmentation at this time. Joint spaces are well-maintained.         Soft tissues are normal.   There is a high body mass index.   There are nonspecific inguinal lymph nodes measuring maximum dimension 15 mm.    Procedure Note    King Lama MD - 03/07/2022   Formatting of this note might be different from the original.   EXAMINATION: MRI PELVIS WO CONTRAST     CLINICAL HISTORY:  Left hip pain     FINDINGS:   There is bilateral avascular necrosis involving the femoral heads. There is avascular necrosis extending to the left intratrochanteric location.   There is no evidence of fracture or fragmentation at this time. Joint spaces are well-maintained.         Soft tissues are normal.   There is a high body mass index.   There are nonspecific inguinal lymph nodes measuring maximum dimension 15 mm.       IMPRESSION:   1.  Avascular necrosis left greater the right hip.  Exam End: 03/07/22 10:16 AM       Seen by Dr Schmidt. Restarted amlodipine. Had significant  edema afterward so she stopped it. Also some right knee swelling and pain.    Paused labetalol while on amlodipine. BP still elevated. Unable to follow up with cardiology yet. Restarted labetalol and continues clonidine, HCTZ. Taking 6 clonidine daily. BP still elevated.     Still taking prednisone 10 mg daily.  Has appt with Dr Wayne tomorrow. Still taking cellcept and Dapsone with Plaquenil with good SLE control. Tried decreased dose of prednisone but had increased inflammation.     Trying conservative mgmt of AVN prior to replacement. Dr Galvan ([facility]). Shoulder pain from bulging cervical discs/bursitis, knee pain from AVN hips.     Using CGM. 90s, low 100s. Higher after taking higher dose of prednisone. Using SS, 2 or 4 units. Not using lantus!    Will need procedure bilateral hips May-June.     Used steroids for SLE control for 22 years.     Significant left knee swelling x 3 weeks, becoming worse. IA injection left knee at pain mgmt yesterday.     HTN Mgmt:   Amlodipine - stopped due to pitting edema. Impeded   ambulation combined with SLE sx.    Olmesartan - stopped due to throat tightness.    Labetalol - recommended to stop by cardiology and resume   amlodipine. Couldn't tolerate amlodipine. .   Doxazosin - no improvment.     CBGs mid 100s - 200s.     Ambien not as effective. Failed Ambien 5 mg dose.   Ambien XR more effective but not covered by insurance.         Review of Systems   Constitutional: Negative for chills, fever and weight loss.   Respiratory: Negative for shortness of breath.    Cardiovascular: Negative for chest pain and leg swelling.   Genitourinary: Negative for dysuria.   Musculoskeletal: Positive for joint pain and myalgias. Negative for falls.   Neurological: Negative for dizziness and headaches.         The following were reviewed: Active problem list, medication list, allergies, family history, social history, and Health Maintenance.     History:  Past Medical History:   Diagnosis  Date    Anemia     Arthritis     Connective tissue disease 1999    COVID-19 in immunocompromised patient 2/5/2021    Diabetes mellitus     Gastroesophageal reflux disease 3/23/2020    Hypertension     Lupus 1999    Situational anxiety 8/24/2021    Suspected COVID-19 virus infection 2/5/2021    Thyroid nodule greater than or equal to 1 cm in diameter incidentally noted on imaging study 11/19/2020    Vasculitis      Past Surgical History:   Procedure Laterality Date    ABLATION      COLONOSCOPY N/A 6/24/2020    Procedure: COLONOSCOPY;  Surgeon: Nevin Penny MD;  Location: [facility];  Service: Endoscopy;  Laterality: N/A;    ESOPHAGOGASTRODUODENOSCOPY N/A 6/24/2020    Procedure: ESOPHAGOGASTRODUODENOSCOPY (EGD);  Surgeon: Nevin Penny MD;  Location: [facility];  Service: Endoscopy;  Laterality: N/A;    RIGHT HEART CATHETERIZATION      TUBAL LIGATION      WRIST SURGERY Bilateral      Family History   Problem Relation Age of Onset    Breast cancer Mother     Breast cancer Sister     Breast cancer Sister      Social History     Socioeconomic History    Marital status:    Tobacco Use    Smoking status: Never Smoker    Smokeless tobacco: Never Used   Substance and Sexual Activity    Alcohol use: Never    Drug use: Never    Sexual activity: Yes     Partners: Male     Birth control/protection: See Surgical Hx     Patient Active Problem List   Diagnosis    Essential hypertension    Type 2 diabetes mellitus without complication, with long-term current use of insulin    Systemic lupus erythematosus    Leukocytoclastic vasculitis    Nonintractable headache    Gastroesophageal reflux disease    Encounter for screening colonoscopy    Neck pain    Hearing loss    Thyroid nodule greater than or equal to 1 cm in diameter incidentally noted on imaging study    Drug induced insomnia    COVID-19 in immunocompromised patient    Immunocompromised state due to drug therapy     Situational anxiety    Avascular necrosis of bones of both hips    Diabetes mellitus due to underlying condition with complication, with long-term current use of insulin     Review of patient's allergies indicates:   Allergen Reactions    Azathioprine Nausea And Vomiting     Nausea, vomiting, diarrhea dose and early in the course of therapy    Olmesartan Shortness Of Breath    Oxycodone Itching       Medications:  Current Outpatient Medications on File Prior to Visit   Medication Sig Dispense Refill    atorvastatin (LIPITOR) 10 MG tablet Take 1 tablet (10 mg total) by mouth once daily. 90 tablet 3    betamethasone dipropionate 0.05 % cream APPLY A SMALL AMOUNT TO AFFECTED AREA TOPICALLY TWICE A DAY FOR 2 TO 3 WEEKS AT A TIME AS NEEDED FLARE UPS AVOID FACE & GROIN AREA      blood sugar diagnostic (TRUE METRIX GLUCOSE TEST STRIP) Strp Test 4 times daily. 300 strip 3    blood-glucose meter (TRUE METRIX GLUCOSE METER) Misc Use as directed 1 each 0    blood-glucose meter,continuous (DEXCOM G6 ) Misc 1 Units by Misc.(Non-Drug; Combo Route) route continuous. 1 each 0    blood-glucose sensor (DEXCOM G6 SENSOR) Myrtle 1 each by Misc.(Non-Drug; Combo Route) route every 10 days. 3 each 11    blood-glucose transmitter (DEXCOM G6 TRANSMITTER) Myrtle 1 each by Misc.(Non-Drug; Combo Route) route every 3 (three) months. 1 Device 3    dapsone 25 MG Tab Take 100 mg by mouth.      dexAMETHasone 0.5 mg/5 mL Soln 5 mL swish and spit four times daily 240 mL 0    gabapentin (NEURONTIN) 800 MG tablet Neurontin 800 mg tablet   Take 1 tablet 3 times a day by oral route.      hydroCHLOROthiazide (HYDRODIURIL) 25 MG tablet Take 1 tablet (25 mg total) by mouth once daily. 30 tablet 11    HYDROcodone-acetaminophen (NORCO)  mg per tablet Norco 10 mg-325 mg tablet   Take 1 tablet 3 times a day by oral route as needed.      hydrocortisone 2.5 % ointment APPLY TOPICALLY TO FACE TWICE A DAY AS NEEDED FOR 1 TO 2 WEEKS AT A  "TIME      hydroxychloroquine (PLAQUENIL) 200 mg tablet Take 200 mg by mouth 2 (two) times daily.       insulin aspart U-100 (NOVOLOG) 100 unit/mL injection Inject 4 Units into the skin 3 (three) times daily before meals. 10.8 mL 3    labetaloL (NORMODYNE) 200 MG tablet Take 1 tablet (200 mg total) by mouth 2 (two) times daily. 60 tablet 11    linaCLOtide (LINZESS) 145 mcg Cap capsule Take 1 capsule (145 mcg total) by mouth before breakfast. 90 capsule 3    mycophenolate (CELLCEPT) 250 mg Cap 3,000 mg once daily.      pen needle, diabetic 31 gauge x 5/16" Ndle 1 Units by Misc.(Non-Drug; Combo Route) route 4 (four) times daily. 300 each 3    predniSONE (DELTASONE) 10 MG tablet Take 7.5 mg by mouth once daily. Scale from 40 to 10 when having outbreak      pulse oximeter (PULSE OXIMETER) device Use twice daily at 8 AM and 3 PM and record the value in [facility] as directed. 1 each 0    tiZANidine (ZANAFLEX) 4 MG tablet TK 1 T PO TID PRN      triamcinolone acetonide 0.1% (KENALOG) 0.1 % paste Place onto teeth 2 (two) times daily. 5 g 11    TRUEPLUS LANCETS 33 gauge Misc Inject 1 lancet as directed 4 (four) times daily. 100 each 11    zolpidem (AMBIEN) 10 mg Tab Take 1 tablet (10 mg total) by mouth nightly as needed (insomnia). 30 tablet 5    [DISCONTINUED] cloNIDine (CATAPRES) 0.1 MG tablet Take 1 tablet (0.1 mg total) by mouth 3 (three) times daily. 90 tablet 11    [DISCONTINUED] esomeprazole (NEXIUM) 40 MG capsule Take 1 capsule (40 mg total) by mouth before breakfast. 90 capsule 3    [DISCONTINUED] insulin (LANTUS SOLOSTAR U-100 INSULIN) glargine 100 units/mL (3mL) SubQ pen Inject 40 Units into the skin once daily. 1 Box 11    AIMOVIG AUTOINJECTOR 70 mg/mL autoinjector INJECT 1 SRYINGE SUB-Q EVERY MONTH      baclofen (LIORESAL) 20 MG tablet TK 1 T PO Q 8 H PRN      hydrOXYzine HCL (ATARAX) 25 MG tablet TAKE ONE TABLET BY MOUTH EVERY 6 HOURS AS NEEDED FOR ITCHING FOR 7 DAYS      hydrOXYzine pamoate " (VISTARIL) 25 MG Cap Take 1 -2 tablets by mouth every 6 hours as needed for nausea/vomiting/insomnia/anxiety (Patient not taking: Reported on 3/29/2022) 25 capsule 0    pentoxifylline (TRENTAL) 400 mg TbSR Take 400 mg by mouth.      [DISCONTINUED] LORazepam (ATIVAN) 0.5 MG tablet Take 1 tablet (0.5 mg total) by mouth every 12 (twelve) hours as needed for Anxiety. 30 tablet 2     Current Facility-Administered Medications on File Prior to Visit   Medication Dose Route Frequency Provider Last Rate Last Admin    acetaminophen tablet 650 mg  650 mg Oral Once PRN Reji Dumas MD        acetaminophen tablet 650 mg  650 mg Oral Once PRN Reji Dumas MD        diphenhydrAMINE injection 25 mg  25 mg Intravenous Once PRN Reji Dumas MD        EPINEPHrine (EPIPEN) 0.3 mg/0.3 mL pen injection 0.3 mg  0.3 mg Intramuscular PRN Reji Dumas MD        methylPREDNISolone sodium succinate injection 40 mg  40 mg Intravenous Once PRN Reji Dumas MD        ondansetron disintegrating tablet 4 mg  4 mg Oral Once PRN Reji Dumas MD        sodium chloride 0.9% 500 mL flush bag   Intravenous PRN Reji Dumas MD        sodium chloride 0.9% flush 10 mL  10 mL Intravenous PRN Reji Dumas MD        [DISCONTINUED] albuterol inhaler 2 puff  2 puff Inhalation Q20 Min PRN Reji Dumas MD        [DISCONTINUED] albuterol inhaler 2 puff  2 puff Inhalation Q20 Min PRN Reji Dumas MD           Medications have been reviewed and reconciled with patient at visit today.    Barriers to medications present (no )    Adverse reactions to current medications (no)      Exam:  Vitals:    03/29/22 1053   Pulse: 93   Temp: 96.5 °F (35.8 °C)     Weight: 96.4 kg (212 lb 8.4 oz)   Body mass index is 32.31 kg/m².      BP Readings from Last 3 Encounters:   01/06/22 129/62   12/15/21 (!) 162/84   09/29/21 (!) 150/80     Wt Readings from Last 3 Encounters:   03/29/22 1053 96.4 kg (212 lb 8.4 oz)   12/15/21  1145 97.9 kg (215 lb 13.3 oz)   09/29/21 1043 100.7 kg (222 lb 0.1 oz)            Physical Exam  Constitutional:       Appearance: She is ill-appearing (chronically).   HENT:      Right Ear: Tympanic membrane normal.      Left Ear: Tympanic membrane normal.      Nose: Nose normal.      Mouth/Throat:      Mouth: Mucous membranes are moist.   Eyes:      General: No scleral icterus.  Cardiovascular:      Rate and Rhythm: Normal rate and regular rhythm.      Heart sounds: Normal heart sounds. No murmur heard.  Pulmonary:      Effort: No respiratory distress.      Breath sounds: Normal breath sounds.   Abdominal:      Palpations: Abdomen is soft.      Tenderness: There is no abdominal tenderness.   Musculoskeletal:         General: No swelling.   Skin:     General: Skin is warm.   Neurological:      Mental Status: She is alert. Mental status is at baseline.   Psychiatric:         Mood and Affect: Mood normal.         Thought Content: Thought content normal.         Laboratory Reviewed: (Yes)  Lab Results   Component Value Date    WBC 9.73 12/15/2021    HGB 9.5 (L) 12/15/2021    HCT 31.1 (L) 12/15/2021     12/15/2021    CHOL 137 08/26/2021    TRIG 42 08/26/2021    HDL 51 08/26/2021    ALT 8 (L) 08/26/2021    AST 14 08/26/2021     08/26/2021    K 3.7 08/26/2021     08/26/2021    CREATININE 0.6 08/26/2021    BUN 9 08/26/2021    CO2 27 08/26/2021    TSH 0.257 (L) 12/15/2020    INR 1.1 03/24/2020    HGBA1C 5.0 12/15/2021           Health Maintenance  Health Maintenance Topics with due status: Not Due       Topic Last Completion Date    Colorectal Cancer Screening 06/24/2020    Mammogram 08/12/2021    Lipid Panel 08/26/2021    Cervical Cancer Screening 09/29/2021    Hemoglobin A1c 12/15/2021    Low Dose Statin 03/29/2022     Health Maintenance Due   Topic Date Due    Pneumococcal Vaccines (Age 0-64) (1 of 4 - PCV13) Never done    Foot Exam  Never done    TETANUS VACCINE  Never done    Diabetes Urine  Screening  06/09/2021    Influenza Vaccine (1) Never done    Eye Exam  09/21/2021       Assessment:  Problem List Items Addressed This Visit        Cardiac/Vascular    Essential hypertension     Very difficult to control without adverse effects. Pt feels most confident in managing with clonidine.   Amlodipine - pt stopped due to pitting edema. Impeded  ambulation combined with SLE sx. Not willing to  resume.  Olmesartan - stopped due to throat tightness.   Labetalol - recommended to stop by cardiology and resume  amlodipine. Couldn't tolerate amlodipine. .  Doxazosin - no improvment.     Discussed mgmt options. Agrees to clonidine TD. Check BP daily. Continue diuretic and labetalol. If BP low then hold labetalol. Call me in two weeks with BP results.                Relevant Medications    cloNIDine (CATAPRES) 0.1 MG tablet    cloNIDine 0.1 mg/24 hr td ptwk (CATAPRES) 0.1 mg/24 hr       Endocrine    Diabetes mellitus due to underlying condition with complication, with long-term current use of insulin     Resume lantus 5u daily, continue ss insulin.   Notify me in 2 weeks of CBG readings.            Relevant Medications    insulin (LANTUS SOLOSTAR U-100 INSULIN) glargine 100 units/mL (3mL) SubQ pen    Type 2 diabetes mellitus without complication, with long-term current use of insulin - Primary    Relevant Medications    insulin (LANTUS SOLOSTAR U-100 INSULIN) glargine 100 units/mL (3mL) SubQ pen    Other Relevant Orders    Microalbumin/Creatinine Ratio, Urine    Hemoglobin A1C    Hemoglobin A1C    CBC Auto Differential    Comprehensive Metabolic Panel       GI    Gastroesophageal reflux disease    Relevant Medications    esomeprazole (NEXIUM) 40 MG capsule       Orthopedic    Avascular necrosis of bones of both hips     MRI 3/2022 at [facility].   Continue Ortho follow up.               Other Visit Diagnoses     Anxiety        Relevant Medications    LORazepam (ATIVAN) 0.5 MG tablet            Plan:  Type 2 diabetes  mellitus without complication, with long-term current use of insulin  -     Microalbumin/Creatinine Ratio, Urine; Future; Expected date: 03/29/2022  -     Hemoglobin A1C; Future; Expected date: 03/29/2022  -     insulin (LANTUS SOLOSTAR U-100 INSULIN) glargine 100 units/mL (3mL) SubQ pen; Inject 5 Units into the skin once daily.  Dispense: 1.5 mL; Refill: 11  -     Hemoglobin A1C; Future; Expected date: 03/29/2022  -     CBC Auto Differential; Future; Expected date: 03/29/2022  -     Comprehensive Metabolic Panel; Future; Expected date: 03/29/2022    Avascular necrosis of bones of both hips    Essential hypertension  -     cloNIDine (CATAPRES) 0.1 MG tablet; Take 1 tablet (0.1 mg total) by mouth every 6 (six) hours as needed (elevated BP).  Dispense: 90 tablet; Refill: 11  -     cloNIDine 0.1 mg/24 hr td ptwk (CATAPRES) 0.1 mg/24 hr; Place 1 patch onto the skin every 7 days.  Dispense: 4 patch; Refill: 11    Anxiety  -     LORazepam (ATIVAN) 0.5 MG tablet; Take 1 tablet (0.5 mg total) by mouth every 12 (twelve) hours as needed for Anxiety.  Dispense: 30 tablet; Refill: 2    Gastroesophageal reflux disease without esophagitis  -     esomeprazole (NEXIUM) 40 MG capsule; Take 1 capsule (40 mg total) by mouth before breakfast.  Dispense: 90 capsule; Refill: 3    Diabetes mellitus due to underlying condition with complication, with long-term current use of insulin      -Patient's lab results were reviewed and discussed with patient  -Treatment options and alternatives were discussed with the patient. Patient expressed understanding. Patient was given the opportunity to ask questions and be an active participant in their medical care. Patient had no further questions or concerns at this time.   -Documentation of patient's health and condition was obtained from family member who was present during visit.  -Patient is an overall moderate risk for health complications from their medical conditions.       Follow up: Follow up  in about 6 weeks (around 5/10/2022) for Diabetes management, HTN management, Medication Change.      After visit summary printed and given to patient upon discharge.  Patient goals and care plan are included in After visit summary.    Total medical decision making time was 92 min.  The following issues were discussed: The primary encounter diagnosis was Type 2 diabetes mellitus without complication, with long-term current use of insulin. Diagnoses of Avascular necrosis of bones of both hips, Essential hypertension, Anxiety, Gastroesophageal reflux disease without esophagitis, and Diabetes mellitus due to underlying condition with complication, with long-term current use of insulin were also pertinent to this visit.    Health maintenance needs, recent test results and goals of care discussed with pt and questions answered.

## 2022-03-29 NOTE — ASSESSMENT & PLAN NOTE
Very difficult to control without adverse effects. Pt feels most confident in managing with clonidine.   Amlodipine - pt stopped due to pitting edema. Impeded  ambulation combined with SLE sx. Not willing to  resume.  Olmesartan - stopped due to throat tightness.   Labetalol - recommended to stop by cardiology and resume  amlodipine. Couldn't tolerate amlodipine. .  Doxazosin - no improvment.     Discussed mgmt options. Agrees to clonidine TD. Check BP daily. Continue diuretic and labetalol. If BP low then hold labetalol. Call me in two weeks with BP results.

## 2022-03-31 ENCOUNTER — TELEPHONE (OUTPATIENT)
Dept: PRIMARY CARE CLINIC | Facility: CLINIC | Age: 47
End: 2022-03-31
Payer: MEDICARE

## 2022-03-31 NOTE — TELEPHONE ENCOUNTER
"----- Message from Diane Dominguez NP sent at 3/30/2022  3:13 PM CDT -----  Managing diabetes - insulin changes  ----- Message -----  From: Karis Lopez MA  Sent: 3/30/2022   8:31 AM CDT  To: Diane Dominguez NP     Patient was informed of her results & verbally understood the information. She was informed that:     Please let patient know that results are at baseline.   Continue plan of care as discussed.     She wants to know what does this mean. "Continue plan of care as discussed".       "

## 2022-04-26 ENCOUNTER — PATIENT MESSAGE (OUTPATIENT)
Dept: ADMINISTRATIVE | Facility: HOSPITAL | Age: 47
End: 2022-04-26
Payer: MEDICARE

## 2022-05-12 ENCOUNTER — OFFICE VISIT (OUTPATIENT)
Dept: PRIMARY CARE CLINIC | Facility: CLINIC | Age: 47
End: 2022-05-12
Payer: MEDICARE

## 2022-05-12 ENCOUNTER — TELEPHONE (OUTPATIENT)
Dept: PRIMARY CARE CLINIC | Facility: CLINIC | Age: 47
End: 2022-05-12

## 2022-05-12 DIAGNOSIS — G47.33 OSA (OBSTRUCTIVE SLEEP APNEA): Primary | ICD-10-CM

## 2022-05-12 DIAGNOSIS — E11.9 TYPE 2 DIABETES MELLITUS WITHOUT COMPLICATION, WITH LONG-TERM CURRENT USE OF INSULIN: ICD-10-CM

## 2022-05-12 DIAGNOSIS — M31.0 LEUKOCYTOCLASTIC VASCULITIS: ICD-10-CM

## 2022-05-12 DIAGNOSIS — T78.3XXD ANGIOEDEMA, SUBSEQUENT ENCOUNTER: ICD-10-CM

## 2022-05-12 DIAGNOSIS — Z79.4 TYPE 2 DIABETES MELLITUS WITHOUT COMPLICATION, WITH LONG-TERM CURRENT USE OF INSULIN: ICD-10-CM

## 2022-05-12 DIAGNOSIS — E08.8 DIABETES MELLITUS DUE TO UNDERLYING CONDITION WITH COMPLICATION, WITH LONG-TERM CURRENT USE OF INSULIN: ICD-10-CM

## 2022-05-12 DIAGNOSIS — Z79.4 DIABETES MELLITUS DUE TO UNDERLYING CONDITION WITH COMPLICATION, WITH LONG-TERM CURRENT USE OF INSULIN: ICD-10-CM

## 2022-05-12 DIAGNOSIS — I10 ESSENTIAL HYPERTENSION: ICD-10-CM

## 2022-05-12 DIAGNOSIS — F19.982 DRUG INDUCED INSOMNIA: ICD-10-CM

## 2022-05-12 PROBLEM — D84.9 COVID-19 IN IMMUNOCOMPROMISED PATIENT: Status: RESOLVED | Noted: 2021-02-05 | Resolved: 2022-05-12

## 2022-05-12 PROBLEM — U07.1 COVID-19 IN IMMUNOCOMPROMISED PATIENT: Status: RESOLVED | Noted: 2021-02-05 | Resolved: 2022-05-12

## 2022-05-12 PROCEDURE — 1160F PR REVIEW ALL MEDS BY PRESCRIBER/CLIN PHARMACIST DOCUMENTED: ICD-10-PCS | Mod: CPTII,95,, | Performed by: NURSE PRACTITIONER

## 2022-05-12 PROCEDURE — 3044F PR MOST RECENT HEMOGLOBIN A1C LEVEL <7.0%: ICD-10-PCS | Mod: CPTII,95,, | Performed by: NURSE PRACTITIONER

## 2022-05-12 PROCEDURE — 3066F PR DOCUMENTATION OF TREATMENT FOR NEPHROPATHY: ICD-10-PCS | Mod: CPTII,95,, | Performed by: NURSE PRACTITIONER

## 2022-05-12 PROCEDURE — 1159F MED LIST DOCD IN RCRD: CPT | Mod: CPTII,95,, | Performed by: NURSE PRACTITIONER

## 2022-05-12 PROCEDURE — 1159F PR MEDICATION LIST DOCUMENTED IN MEDICAL RECORD: ICD-10-PCS | Mod: CPTII,95,, | Performed by: NURSE PRACTITIONER

## 2022-05-12 PROCEDURE — 99417 PR PROLONGED SVC, OUTPT, W/WO DIRECT PT CONTACT,  EA ADDTL 15 MIN: ICD-10-PCS | Mod: 95,,, | Performed by: NURSE PRACTITIONER

## 2022-05-12 PROCEDURE — 99499 RISK ADDL DX/OHS AUDIT: ICD-10-PCS | Mod: 95,,, | Performed by: NURSE PRACTITIONER

## 2022-05-12 PROCEDURE — 99215 PR OFFICE/OUTPT VISIT, EST, LEVL V, 40-54 MIN: ICD-10-PCS | Mod: 95,,, | Performed by: NURSE PRACTITIONER

## 2022-05-12 PROCEDURE — 3060F PR POS MICROALBUMINURIA RESULT DOCUMENTED/REVIEW: ICD-10-PCS | Mod: CPTII,95,, | Performed by: NURSE PRACTITIONER

## 2022-05-12 PROCEDURE — 3044F HG A1C LEVEL LT 7.0%: CPT | Mod: CPTII,95,, | Performed by: NURSE PRACTITIONER

## 2022-05-12 PROCEDURE — 1160F RVW MEDS BY RX/DR IN RCRD: CPT | Mod: CPTII,95,, | Performed by: NURSE PRACTITIONER

## 2022-05-12 PROCEDURE — 3066F NEPHROPATHY DOC TX: CPT | Mod: CPTII,95,, | Performed by: NURSE PRACTITIONER

## 2022-05-12 PROCEDURE — 99417 PROLNG OP E/M EACH 15 MIN: CPT | Mod: 95,,, | Performed by: NURSE PRACTITIONER

## 2022-05-12 PROCEDURE — 99215 OFFICE O/P EST HI 40 MIN: CPT | Mod: 95,,, | Performed by: NURSE PRACTITIONER

## 2022-05-12 PROCEDURE — 99499 UNLISTED E&M SERVICE: CPT | Mod: 95,,, | Performed by: NURSE PRACTITIONER

## 2022-05-12 PROCEDURE — 3060F POS MICROALBUMINURIA REV: CPT | Mod: CPTII,95,, | Performed by: NURSE PRACTITIONER

## 2022-05-12 RX ORDER — PREDNISONE 2.5 MG/1
2.5 TABLET ORAL DAILY
COMMUNITY
End: 2022-11-29

## 2022-05-12 RX ORDER — SPIRONOLACTONE 25 MG/1
25 TABLET ORAL DAILY
Qty: 30 TABLET | Refills: 11 | Status: SHIPPED | OUTPATIENT
Start: 2022-05-12 | End: 2023-05-05

## 2022-05-12 RX ORDER — LABETALOL 300 MG/1
300 TABLET, FILM COATED ORAL 2 TIMES DAILY
Qty: 60 TABLET | Refills: 11 | Status: SHIPPED | OUTPATIENT
Start: 2022-05-12 | End: 2023-08-02 | Stop reason: SDUPTHER

## 2022-05-12 NOTE — ASSESSMENT & PLAN NOTE
Elevated home readings despite minimal prednisone now. Does not tolerate olmesartan d/t angioedema or amlodipine d/t swelling that interfered with ambulation. No response to doxazosin. Not responding to clonidine.     Discussed mgmt options.   Will increase labetalol dose to 300 BID, add aldactone. If no improvement then add hydralazine. Needs records from Dr Schmidt.    Needs new CPAP for DEVIKA mgmt. Will refer to pulmonology.     F/U in one week.

## 2022-05-12 NOTE — TELEPHONE ENCOUNTER
----- Message from Diane Dominguez NP sent at 5/12/2022  2:18 PM CDT -----  Marilou needs pulmonology appt for DEVIKA and f/u with me in one week. Virtual okay.

## 2022-05-12 NOTE — TELEPHONE ENCOUNTER
I tried reaching her but was unsuccessful. I did schedule her with Pulmonology but nothing was available until 6/24/22. She was added to the waiting list for a sooner appointment. I left a VM on her line to call me if that time & date doesn't work for her.

## 2022-05-12 NOTE — PROGRESS NOTES
Marilou Daniel  05/12/2022  [id number]    Diane Dominguez NP  Patient Care Team:  Diane Dominguez NP as PCP - General (Family Medicine)  Joe Yo RD as Dietitian (Endocrinology)  Liza Moon RD, CDE as Dietitian (Diabetes)      [facility] Primary Care Note      The patient location is:  Patient Home   The chief complaint leading to consultation is: HTN      Visit type: Virtual visit with synchronous audio and video  Each patient to whom he or she provides medical services by telemedicine is:  (1) informed of the relationship between the physician and patient and the respective role of any other health care provider with respect to management of the patient; and (2) notified that he or she may decline to receive medical services by telemedicine and may withdraw from such care at any time.    Chief Complaint:  No chief complaint on file.      History of Present Illness:  HPI      Clonidine patch added LOV.  Still elevated BP. 180/110, 175/98  No improvement with clonidine patch. Current meds:  labetolol BID, HCTZ qd, clonidine patch plus plus PO.   Does not tolerate olmesartan d/t angioedema or amlodipine d/t swelling that interfered with ambulation. No response to doxazosin.       Fatigued for the past month with joint pain.   Very weak, no energy. In bed, not wanting to move.  Now problems with shoulder, hips, knees.   Feels like she is becoming worse physically.   Very frustrated with her health overall.       Since weaning steroids poor appetite.   Sees Dr Schmidt. No recent ECHO.   Cardiologist wants to resume olmesartan. Considering throat swelling may have been r/t thyroid. Heart cath showed no blockage.     Has CPAP but does not use. It's been over 10 years.   [facility] previously.   Had difficulty wearing mask.       Taking Lantus 10 units in AM last visit.   Failed V-Go. Continues DexCom. 170-190 typically. Down to 130s after taking insulin.       MRI pelvis  3/7/22:  Impression    1.  Avascular necrosis left greater the right hip.    Narrative    EXAMINATION: MRI PELVIS WO CONTRAST     CLINICAL HISTORY:  Left hip pain     FINDINGS:   There is bilateral avascular necrosis involving the femoral heads. There is avascular necrosis extending to the left intratrochanteric location.   There is no evidence of fracture or fragmentation at this time. Joint spaces are well-maintained.         Soft tissues are normal.   There is a high body mass index.   There are nonspecific inguinal lymph nodes measuring maximum dimension 15 mm.    Procedure Note    King Lama MD - 03/07/2022     Appt Dr Wayne 5/10/22:  Other forms of systemic lupus erythematosus, unspecified organ involvement status (HCC) Active, severe, chronic   Ct Cellcept, Dapsone, Plaquenil   Info on Benlysta  Will attempt to lower Prednisone   If fails, add Benlysta at [place]  - predniSONE (DELTASONE) 2.5 mg tablet; Take 1 tablet by mouth once daily.    Leukocytoclastic vasculitis (HCC) as above  - predniSONE (DELTASONE) 2.5 mg tablet; Take 1 tablet by mouth once daily.    Long term systemic steroid user Stable, chronic and responding to current medical regimen. Continue to reassess for exacerbations which would warrant treatment change, including history, physical findings and interpretation of trending labs as documented above.     Chronic pain syndrome Stable, chronic and responding to current medical regimen. Continue to reassess for exacerbations which would warrant treatment change, including history, physical findings and interpretation of trending labs as documented above.     Avascular necrosis of bone of hip, unspecified laterality (HCC)  Stable, chronic and responding to current medical regimen. Continue to reassess for exacerbations which would warrant treatment change, including history, physical findings and interpretation of trending labs as documented above.     Medication monitoring encounter Requiring  intensive monitoring for hemolytic anemia on Dapsone w/ CBC  Requiring intensive monitoring for cytopenia, infections on MMF w/ CBC   Requiring intensive monitoring for infections, glaucoma, cataracts, low BMD, hypertension with BG, BP, DXA on PDN       Immunocompromised (HCC) Precautions for SARS-CoV-2 (COVID-19) discussed today vis-a-vis immunosuppression and/or comorbidity.   Discussed with patient social distancing, masking, hand washing, and self-quarantine if symptoms appear.   Possibility of reinfection with COVID-19 discussed and breakthrough infections despite vaccination discussed with pt. Recommended COVID-19 vaccine as soon as available to risk group unless has contraindications if not yet vaccinated.     - QuantiFERON-TB Gold Plus; Future  - QuantiFERON-TB Gold Plus    Long-term use of Plaquenil  Requiring intensive monitoring for myotoxicity, ophthalmic toxicity w/ CK, LFT, yearly ophthalmology exam    Vitamin D deficiency Stable, chronic and responding to current medical regimen. Continue to reassess for exacerbations which would warrant treatment change, including history, physical findings and interpretation of trending labs as documented above.       Return in about 4 weeks (around 6/7/2022).            Review of Systems   Constitutional: Positive for malaise/fatigue. Negative for fever.   Cardiovascular: Negative for chest pain and leg swelling.   Gastrointestinal: Negative for nausea and vomiting.   Genitourinary: Negative for dysuria.   Musculoskeletal: Positive for joint pain.   Neurological: Negative for dizziness.   Psychiatric/Behavioral: Negative for depression.         The following were reviewed: Active problem list, medication list, allergies, family history, social history, and Health Maintenance.     History:  Past Medical History:   Diagnosis Date    Anemia     Arthritis     Connective tissue disease 1999    COVID-19 in immunocompromised patient 2/5/2021    COVID-19 in  immunocompromised patient 2/5/2021    Diabetes mellitus     Gastroesophageal reflux disease 3/23/2020    Hypertension     Lupus 1999    Situational anxiety 8/24/2021    Suspected COVID-19 virus infection 2/5/2021    Thyroid nodule greater than or equal to 1 cm in diameter incidentally noted on imaging study 11/19/2020    Vasculitis      Past Surgical History:   Procedure Laterality Date    ABLATION      COLONOSCOPY N/A 6/24/2020    Procedure: COLONOSCOPY;  Surgeon: Nevin Penny MD;  Location: [facility] ENDO;  Service: Endoscopy;  Laterality: N/A;    ESOPHAGOGASTRODUODENOSCOPY N/A 6/24/2020    Procedure: ESOPHAGOGASTRODUODENOSCOPY (EGD);  Surgeon: Nevin Penny MD;  Location: [facility] ENDO;  Service: Endoscopy;  Laterality: N/A;    RIGHT HEART CATHETERIZATION      TUBAL LIGATION      WRIST SURGERY Bilateral      Family History   Problem Relation Age of Onset    Breast cancer Mother     Breast cancer Sister     Breast cancer Sister      Social History     Socioeconomic History    Marital status:    Tobacco Use    Smoking status: Never Smoker    Smokeless tobacco: Never Used   Substance and Sexual Activity    Alcohol use: Never    Drug use: Never    Sexual activity: Yes     Partners: Male     Birth control/protection: See Surgical Hx     Patient Active Problem List   Diagnosis    Essential hypertension    Type 2 diabetes mellitus without complication, with long-term current use of insulin    Systemic lupus erythematosus    Leukocytoclastic vasculitis    Nonintractable headache    Gastroesophageal reflux disease    Encounter for screening colonoscopy    Neck pain    Hearing loss    Thyroid nodule greater than or equal to 1 cm in diameter incidentally noted on imaging study    Drug induced insomnia    Immunocompromised state due to drug therapy    Situational anxiety    Avascular necrosis of bones of both hips    Diabetes mellitus due to underlying condition with  complication, with long-term current use of insulin     Review of patient's allergies indicates:   Allergen Reactions    Azathioprine Nausea And Vomiting     Nausea, vomiting, diarrhea dose and early in the course of therapy    Olmesartan Shortness Of Breath    Oxycodone Itching       Medications:  Current Outpatient Medications on File Prior to Visit   Medication Sig Dispense Refill    predniSONE (DELTASONE) 2.5 MG tablet Take 2.5 mg by mouth once daily.      betamethasone dipropionate 0.05 % cream APPLY A SMALL AMOUNT TO AFFECTED AREA TOPICALLY TWICE A DAY FOR 2 TO 3 WEEKS AT A TIME AS NEEDED FLARE UPS AVOID FACE & GROIN AREA      blood sugar diagnostic (TRUE METRIX GLUCOSE TEST STRIP) Strp Test 4 times daily. 300 strip 3    blood-glucose meter (TRUE METRIX GLUCOSE METER) Misc Use as directed 1 each 0    blood-glucose meter,continuous (DEXCOM G6 ) Misc 1 Units by Misc.(Non-Drug; Combo Route) route continuous. 1 each 0    blood-glucose sensor (DEXCOM G6 SENSOR) Myrtle 1 each by Misc.(Non-Drug; Combo Route) route every 10 days. 3 each 11    blood-glucose transmitter (DEXCOM G6 TRANSMITTER) Myrtle 1 each by Misc.(Non-Drug; Combo Route) route every 3 (three) months. 1 Device 3    cloNIDine (CATAPRES) 0.1 MG tablet Take 1 tablet (0.1 mg total) by mouth every 6 (six) hours as needed (elevated BP). 90 tablet 11    cloNIDine 0.1 mg/24 hr td ptwk (CATAPRES) 0.1 mg/24 hr Place 1 patch onto the skin every 7 days. 4 patch 11    esomeprazole (NEXIUM) 40 MG capsule Take 1 capsule (40 mg total) by mouth before breakfast. 90 capsule 3    gabapentin (NEURONTIN) 800 MG tablet Neurontin 800 mg tablet   Take 1 tablet 3 times a day by oral route.      hydroCHLOROthiazide (HYDRODIURIL) 25 MG tablet Take 1 tablet (25 mg total) by mouth once daily. 30 tablet 11    HYDROcodone-acetaminophen (NORCO)  mg per tablet Norco 10 mg-325 mg tablet   Take 1 tablet 3 times a day by oral route as needed.      hydrocortisone  "2.5 % ointment APPLY TOPICALLY TO FACE TWICE A DAY AS NEEDED FOR 1 TO 2 WEEKS AT A TIME      hydroxychloroquine (PLAQUENIL) 200 mg tablet Take 200 mg by mouth 2 (two) times daily.       hydrOXYzine HCL (ATARAX) 25 MG tablet TAKE ONE TABLET BY MOUTH EVERY 6 HOURS AS NEEDED FOR ITCHING FOR 7 DAYS      insulin (LANTUS SOLOSTAR U-100 INSULIN) glargine 100 units/mL (3mL) SubQ pen Inject 5 Units into the skin once daily. 1.5 mL 11    insulin aspart U-100 (NOVOLOG) 100 unit/mL injection Inject 4 Units into the skin 3 (three) times daily before meals. 10.8 mL 3    linaCLOtide (LINZESS) 145 mcg Cap capsule Take 1 capsule (145 mcg total) by mouth before breakfast. 90 capsule 3    LORazepam (ATIVAN) 0.5 MG tablet Take 1 tablet (0.5 mg total) by mouth every 12 (twelve) hours as needed for Anxiety. 30 tablet 2    mycophenolate (CELLCEPT) 250 mg Cap 3,000 mg once daily.      pen needle, diabetic 31 gauge x 5/16" Ndle 1 Units by Misc.(Non-Drug; Combo Route) route 4 (four) times daily. 300 each 3    tiZANidine (ZANAFLEX) 4 MG tablet TK 1 T PO TID PRN      TRUEPLUS LANCETS 33 gauge Misc Inject 1 lancet as directed 4 (four) times daily. 100 each 11    zolpidem (AMBIEN) 10 mg Tab Take 1 tablet (10 mg total) by mouth nightly as needed (insomnia). 30 tablet 5    [DISCONTINUED] AIMOVIG AUTOINJECTOR 70 mg/mL autoinjector INJECT 1 SRYINGE SUB-Q EVERY MONTH      [DISCONTINUED] atorvastatin (LIPITOR) 10 MG tablet Take 1 tablet (10 mg total) by mouth once daily. 90 tablet 3    [DISCONTINUED] baclofen (LIORESAL) 20 MG tablet TK 1 T PO Q 8 H PRN      [DISCONTINUED] dapsone 25 MG Tab Take 100 mg by mouth.      [DISCONTINUED] dexAMETHasone 0.5 mg/5 mL Soln 5 mL swish and spit four times daily 240 mL 0    [DISCONTINUED] hydrOXYzine pamoate (VISTARIL) 25 MG Cap Take 1 -2 tablets by mouth every 6 hours as needed for nausea/vomiting/insomnia/anxiety (Patient not taking: Reported on 3/29/2022) 25 capsule 0    [DISCONTINUED] labetaloL " (NORMODYNE) 200 MG tablet Take 1 tablet (200 mg total) by mouth 2 (two) times daily. 60 tablet 11    [DISCONTINUED] pentoxifylline (TRENTAL) 400 mg TbSR Take 400 mg by mouth.      [DISCONTINUED] predniSONE (DELTASONE) 10 MG tablet Take 7.5 mg by mouth once daily. Scale from 40 to 10 when having outbreak      [DISCONTINUED] pulse oximeter (PULSE OXIMETER) device Use twice daily at 8 AM and 3 PM and record the value [facility] as directed. 1 each 0     Current Facility-Administered Medications on File Prior to Visit   Medication Dose Route Frequency Provider Last Rate Last Admin    acetaminophen tablet 650 mg  650 mg Oral Once PRN Reji Dumas MD        acetaminophen tablet 650 mg  650 mg Oral Once PRN Reji Dumas MD        sodium chloride 0.9% 500 mL flush bag   Intravenous PRN Reji Dumas MD        sodium chloride 0.9% flush 10 mL  10 mL Intravenous PRN Reji Dumas MD        [DISCONTINUED] diphenhydrAMINE injection 25 mg  25 mg Intravenous Once PRN Reji Dumas MD        [DISCONTINUED] EPINEPHrine (EPIPEN) 0.3 mg/0.3 mL pen injection 0.3 mg  0.3 mg Intramuscular PRN Reji Dumas MD        [DISCONTINUED] methylPREDNISolone sodium succinate injection 40 mg  40 mg Intravenous Once PRN Reji Dumas MD        [DISCONTINUED] ondansetron disintegrating tablet 4 mg  4 mg Oral Once PRN Reji Dumas MD           Medications have been reviewed and reconciled with patient at visit today.    Barriers to medications present (no )    Adverse reactions to current medications (no)      Exam:  There were no vitals filed for this visit.      There is no height or weight on file to calculate BMI.      BP Readings from Last 3 Encounters:   01/06/22 129/62   12/15/21 (!) 162/84   09/29/21 (!) 150/80     Wt Readings from Last 3 Encounters:   03/29/22 1053 96.4 kg (212 lb 8.4 oz)   12/15/21 1145 97.9 kg (215 lb 13.3 oz)   09/29/21 1043 100.7 kg (222 lb 0.1 oz)            Physical  Exam  Constitutional:       General: She is not in acute distress.  Pulmonary:      Effort: Pulmonary effort is normal.   Neurological:      Mental Status: She is alert. Mental status is at baseline.   Psychiatric:         Mood and Affect: Mood normal.         Behavior: Behavior normal.         Thought Content: Thought content normal.         Judgment: Judgment normal.         Laboratory Reviewed: (Yes)  Lab Results   Component Value Date    WBC 10.55 03/29/2022    HGB 10.0 (L) 03/29/2022    HCT 31.1 (L) 03/29/2022     03/29/2022    CHOL 137 08/26/2021    TRIG 42 08/26/2021    HDL 51 08/26/2021    ALT 10 03/29/2022    AST 13 03/29/2022     03/29/2022    K 3.8 03/29/2022     03/29/2022    CREATININE 0.8 03/29/2022    BUN 11 03/29/2022    CO2 25 03/29/2022    TSH 0.257 (L) 12/15/2020    INR 1.1 03/24/2020    HGBA1C 5.9 (H) 03/29/2022           Health Maintenance  Health Maintenance Topics with due status: Not Due       Topic Last Completion Date    Colorectal Cancer Screening 06/24/2020    Mammogram 08/12/2021    Lipid Panel 08/26/2021    Cervical Cancer Screening 09/29/2021    Diabetes Urine Screening 03/29/2022    Hemoglobin A1c 03/29/2022    Influenza Vaccine Not Due     Health Maintenance Due   Topic Date Due    Pneumococcal Vaccines (Age 0-64) (1 - PCV) Never done    Foot Exam  Never done    TETANUS VACCINE  Never done    Low Dose Statin  Never done    Eye Exam  09/21/2021     Stopping atorvastatin due to myopathy.    Assessment:  Problem List Items Addressed This Visit        Psychiatric    Drug induced insomnia     Intolerant of lower dose of Ambien.               Cardiac/Vascular    Essential hypertension     Elevated home readings despite minimal prednisone now. Does not tolerate olmesartan d/t angioedema or amlodipine d/t swelling that interfered with ambulation. No response to doxazosin. Not responding to clonidine.     Discussed mgmt options.   Will increase labetalol dose to 300  BID, add aldactone. If no improvement then add hydralazine. Needs records from Dr Schmidt.    Needs new CPAP for DEVIKA mgmt. Will refer to pulmonology.     F/U in one week.              Relevant Medications    spironolactone (ALDACTONE) 25 MG tablet    labetaloL (NORMODYNE) 300 MG tablet    Other Relevant Orders    Ambulatory referral/consult to Pulmonology       Immunology/Multi System    Leukocytoclastic vasculitis     Followed by Dr Wayne.   Weaning prednisone.              Endocrine    Type 2 diabetes mellitus without complication, with long-term current use of insulin     Lab Results   Component Value Date    HGBA1C 5.9 (H) 03/29/2022                Diabetes mellitus due to underlying condition with complication, with long-term current use of insulin     A1C okay but CBGs not at goal despite weaning prednisone. Increase lantus to 12 u daily.              Other Visit Diagnoses     DEVIKA (obstructive sleep apnea)    -  Primary    Relevant Orders    Ambulatory referral/consult to Pulmonology    Angioedema, subsequent encounter                Plan:  DEVIKA (obstructive sleep apnea)  -     Ambulatory referral/consult to Pulmonology; Future; Expected date: 05/13/2022    Leukocytoclastic vasculitis    Type 2 diabetes mellitus without complication, with long-term current use of insulin    Drug induced insomnia    Essential hypertension  -     spironolactone (ALDACTONE) 25 MG tablet; Take 1 tablet (25 mg total) by mouth once daily.  Dispense: 30 tablet; Refill: 11  -     labetaloL (NORMODYNE) 300 MG tablet; Take 1 tablet (300 mg total) by mouth 2 (two) times daily.  Dispense: 60 tablet; Refill: 11  -     Ambulatory referral/consult to Pulmonology; Future; Expected date: 05/13/2022    Angioedema, subsequent encounter  -     Cancel: Ambulatory referral/consult to Allergy; Future; Expected date: 05/13/2022    Diabetes mellitus due to underlying condition with complication, with long-term current use of insulin      -Patient's lab  results were reviewed and discussed with patient  -Treatment options and alternatives were discussed with the patient. Patient expressed understanding. Patient was given the opportunity to ask questions and be an active participant in their medical care. Patient had no further questions or concerns at this time.   -Documentation of patient's health and condition was obtained from family member who was present during visit.  -Patient is an overall moderate risk for health complications from their medical conditions.       Follow up: No follow-ups on file.      Total medical decision making time was 98 min.  The following issues were discussed: The primary encounter diagnosis was DEVIKA (obstructive sleep apnea). Diagnoses of Leukocytoclastic vasculitis, Type 2 diabetes mellitus without complication, with long-term current use of insulin, Drug induced insomnia, Essential hypertension, Angioedema, subsequent encounter, and Diabetes mellitus due to underlying condition with complication, with long-term current use of insulin were also pertinent to this visit.    Health maintenance needs, recent test results and goals of care discussed with pt and questions answered.

## 2022-05-20 ENCOUNTER — PATIENT MESSAGE (OUTPATIENT)
Dept: PRIMARY CARE CLINIC | Facility: CLINIC | Age: 47
End: 2022-05-20
Payer: MEDICARE

## 2022-05-25 ENCOUNTER — PATIENT MESSAGE (OUTPATIENT)
Dept: RESEARCH | Facility: HOSPITAL | Age: 47
End: 2022-05-25
Payer: MEDICARE

## 2022-07-27 ENCOUNTER — TELEPHONE (OUTPATIENT)
Dept: PRIMARY CARE CLINIC | Facility: CLINIC | Age: 47
End: 2022-07-27
Payer: MEDICARE

## 2022-07-27 NOTE — TELEPHONE ENCOUNTER
Patient call was returned. She was informed that I tried reaching her earlier to schedule her f/u appointment with VIVIANE Dominguez ([facility]). Patient appointment was scheduled, she verbally understood the information.

## 2022-07-27 NOTE — TELEPHONE ENCOUNTER
----- Message from Trinidad Balderas sent at 7/27/2022  9:55 AM CDT -----  Contact: Marilou gibbons [phone]  Patient is returning a phone call.  Who left a message for the patient: Karis  Does patient know what this is regarding:    Would you like a call back, or a response through your [facility] portal?:call back  Comments:  Pt was returning the nurses call

## 2022-07-27 NOTE — TELEPHONE ENCOUNTER
----- Message from Trinidad Balderas sent at 7/27/2022  9:55 AM CDT -----  Contact: Marilou gibbons [phone]  Patient is returning a phone call.  Who left a message for the patient: Karis  Does patient know what this is regarding:    Would you like a call back, or a response through your [facility] portal?:call back  Comments:  Pt was returning the nurses call

## 2022-07-27 NOTE — TELEPHONE ENCOUNTER
Message was left for the patient to call the office back to schedule her BP (Nurse visit & her f/u appointment).

## 2022-08-03 ENCOUNTER — TELEPHONE (OUTPATIENT)
Dept: PRIMARY CARE CLINIC | Facility: CLINIC | Age: 47
End: 2022-08-03
Payer: MEDICARE

## 2022-08-03 ENCOUNTER — TELEPHONE (OUTPATIENT)
Dept: PRIMARY CARE CLINIC | Facility: CLINIC | Age: 47
End: 2022-08-03

## 2022-08-03 ENCOUNTER — OFFICE VISIT (OUTPATIENT)
Dept: PRIMARY CARE CLINIC | Facility: CLINIC | Age: 47
End: 2022-08-03
Payer: MEDICARE

## 2022-08-03 ENCOUNTER — OFFICE VISIT (OUTPATIENT)
Dept: PULMONOLOGY | Facility: CLINIC | Age: 47
End: 2022-08-03
Payer: MEDICARE

## 2022-08-03 VITALS
HEIGHT: 68 IN | SYSTOLIC BLOOD PRESSURE: 150 MMHG | HEART RATE: 72 BPM | RESPIRATION RATE: 14 BRPM | BODY MASS INDEX: 31.98 KG/M2 | OXYGEN SATURATION: 94 % | WEIGHT: 211 LBS | DIASTOLIC BLOOD PRESSURE: 90 MMHG

## 2022-08-03 DIAGNOSIS — I10 ESSENTIAL HYPERTENSION: ICD-10-CM

## 2022-08-03 DIAGNOSIS — F41.8 SITUATIONAL ANXIETY: ICD-10-CM

## 2022-08-03 DIAGNOSIS — E11.59 HYPERTENSION ASSOCIATED WITH DIABETES: ICD-10-CM

## 2022-08-03 DIAGNOSIS — L29.9 PRURITUS: Primary | ICD-10-CM

## 2022-08-03 DIAGNOSIS — M32.8 OTHER FORMS OF SYSTEMIC LUPUS ERYTHEMATOSUS, UNSPECIFIED ORGAN INVOLVEMENT STATUS: ICD-10-CM

## 2022-08-03 DIAGNOSIS — I15.2 HYPERTENSION ASSOCIATED WITH DIABETES: ICD-10-CM

## 2022-08-03 DIAGNOSIS — K21.9 GASTROESOPHAGEAL REFLUX DISEASE WITHOUT ESOPHAGITIS: ICD-10-CM

## 2022-08-03 DIAGNOSIS — G47.33 OSA (OBSTRUCTIVE SLEEP APNEA): Primary | ICD-10-CM

## 2022-08-03 DIAGNOSIS — Z12.31 SCREENING MAMMOGRAM FOR BREAST CANCER: ICD-10-CM

## 2022-08-03 DIAGNOSIS — F19.982 DRUG INDUCED INSOMNIA: ICD-10-CM

## 2022-08-03 DIAGNOSIS — E08.8 DIABETES MELLITUS DUE TO UNDERLYING CONDITION WITH COMPLICATION, WITH LONG-TERM CURRENT USE OF INSULIN: ICD-10-CM

## 2022-08-03 DIAGNOSIS — E04.1 THYROID NODULE GREATER THAN OR EQUAL TO 1 CM IN DIAMETER INCIDENTALLY NOTED ON IMAGING STUDY: ICD-10-CM

## 2022-08-03 DIAGNOSIS — Z79.4 DIABETES MELLITUS DUE TO UNDERLYING CONDITION WITH COMPLICATION, WITH LONG-TERM CURRENT USE OF INSULIN: ICD-10-CM

## 2022-08-03 PROCEDURE — 3060F POS MICROALBUMINURIA REV: CPT | Mod: CPTII,95,, | Performed by: NURSE PRACTITIONER

## 2022-08-03 PROCEDURE — 1160F RVW MEDS BY RX/DR IN RCRD: CPT | Mod: CPTII,S$GLB,, | Performed by: PHYSICIAN ASSISTANT

## 2022-08-03 PROCEDURE — 99215 PR OFFICE/OUTPT VISIT, EST, LEVL V, 40-54 MIN: ICD-10-PCS | Mod: 95,,, | Performed by: NURSE PRACTITIONER

## 2022-08-03 PROCEDURE — 3044F PR MOST RECENT HEMOGLOBIN A1C LEVEL <7.0%: ICD-10-PCS | Mod: CPTII,S$GLB,, | Performed by: PHYSICIAN ASSISTANT

## 2022-08-03 PROCEDURE — 3008F BODY MASS INDEX DOCD: CPT | Mod: CPTII,S$GLB,, | Performed by: PHYSICIAN ASSISTANT

## 2022-08-03 PROCEDURE — 3066F NEPHROPATHY DOC TX: CPT | Mod: CPTII,S$GLB,, | Performed by: PHYSICIAN ASSISTANT

## 2022-08-03 PROCEDURE — 3077F SYST BP >= 140 MM HG: CPT | Mod: CPTII,S$GLB,, | Performed by: PHYSICIAN ASSISTANT

## 2022-08-03 PROCEDURE — 1159F MED LIST DOCD IN RCRD: CPT | Mod: CPTII,S$GLB,, | Performed by: PHYSICIAN ASSISTANT

## 2022-08-03 PROCEDURE — 3060F PR POS MICROALBUMINURIA RESULT DOCUMENTED/REVIEW: ICD-10-PCS | Mod: CPTII,95,, | Performed by: NURSE PRACTITIONER

## 2022-08-03 PROCEDURE — 99204 PR OFFICE/OUTPT VISIT, NEW, LEVL IV, 45-59 MIN: ICD-10-PCS | Mod: S$GLB,,, | Performed by: PHYSICIAN ASSISTANT

## 2022-08-03 PROCEDURE — 3060F PR POS MICROALBUMINURIA RESULT DOCUMENTED/REVIEW: ICD-10-PCS | Mod: CPTII,S$GLB,, | Performed by: PHYSICIAN ASSISTANT

## 2022-08-03 PROCEDURE — 3044F HG A1C LEVEL LT 7.0%: CPT | Mod: CPTII,S$GLB,, | Performed by: PHYSICIAN ASSISTANT

## 2022-08-03 PROCEDURE — 3066F NEPHROPATHY DOC TX: CPT | Mod: CPTII,95,, | Performed by: NURSE PRACTITIONER

## 2022-08-03 PROCEDURE — 1159F PR MEDICATION LIST DOCUMENTED IN MEDICAL RECORD: ICD-10-PCS | Mod: CPTII,S$GLB,, | Performed by: PHYSICIAN ASSISTANT

## 2022-08-03 PROCEDURE — 99204 OFFICE O/P NEW MOD 45 MIN: CPT | Mod: S$GLB,,, | Performed by: PHYSICIAN ASSISTANT

## 2022-08-03 PROCEDURE — 3080F DIAST BP >= 90 MM HG: CPT | Mod: CPTII,S$GLB,, | Performed by: PHYSICIAN ASSISTANT

## 2022-08-03 PROCEDURE — 3066F PR DOCUMENTATION OF TREATMENT FOR NEPHROPATHY: ICD-10-PCS | Mod: CPTII,95,, | Performed by: NURSE PRACTITIONER

## 2022-08-03 PROCEDURE — 3044F PR MOST RECENT HEMOGLOBIN A1C LEVEL <7.0%: ICD-10-PCS | Mod: CPTII,95,, | Performed by: NURSE PRACTITIONER

## 2022-08-03 PROCEDURE — 99999 PR PBB SHADOW E&M-EST. PATIENT-LVL V: CPT | Mod: PBBFAC,,, | Performed by: PHYSICIAN ASSISTANT

## 2022-08-03 PROCEDURE — 3066F PR DOCUMENTATION OF TREATMENT FOR NEPHROPATHY: ICD-10-PCS | Mod: CPTII,S$GLB,, | Performed by: PHYSICIAN ASSISTANT

## 2022-08-03 PROCEDURE — 3044F HG A1C LEVEL LT 7.0%: CPT | Mod: CPTII,95,, | Performed by: NURSE PRACTITIONER

## 2022-08-03 PROCEDURE — 3077F PR MOST RECENT SYSTOLIC BLOOD PRESSURE >= 140 MM HG: ICD-10-PCS | Mod: CPTII,S$GLB,, | Performed by: PHYSICIAN ASSISTANT

## 2022-08-03 PROCEDURE — 3060F POS MICROALBUMINURIA REV: CPT | Mod: CPTII,S$GLB,, | Performed by: PHYSICIAN ASSISTANT

## 2022-08-03 PROCEDURE — 99999 PR PBB SHADOW E&M-EST. PATIENT-LVL V: ICD-10-PCS | Mod: PBBFAC,,, | Performed by: PHYSICIAN ASSISTANT

## 2022-08-03 PROCEDURE — 3080F PR MOST RECENT DIASTOLIC BLOOD PRESSURE >= 90 MM HG: ICD-10-PCS | Mod: CPTII,S$GLB,, | Performed by: PHYSICIAN ASSISTANT

## 2022-08-03 PROCEDURE — 1160F PR REVIEW ALL MEDS BY PRESCRIBER/CLIN PHARMACIST DOCUMENTED: ICD-10-PCS | Mod: CPTII,S$GLB,, | Performed by: PHYSICIAN ASSISTANT

## 2022-08-03 PROCEDURE — 3008F PR BODY MASS INDEX (BMI) DOCUMENTED: ICD-10-PCS | Mod: CPTII,S$GLB,, | Performed by: PHYSICIAN ASSISTANT

## 2022-08-03 PROCEDURE — 99215 OFFICE O/P EST HI 40 MIN: CPT | Mod: 95,,, | Performed by: NURSE PRACTITIONER

## 2022-08-03 RX ORDER — HYDROXYZINE HYDROCHLORIDE 25 MG/1
TABLET, FILM COATED ORAL
Qty: 30 TABLET | Refills: 5 | Status: SHIPPED | OUTPATIENT
Start: 2022-08-03 | End: 2023-12-22

## 2022-08-03 RX ORDER — ESOMEPRAZOLE MAGNESIUM 40 MG/1
40 CAPSULE, DELAYED RELEASE ORAL
Qty: 90 CAPSULE | Refills: 3 | Status: SHIPPED | OUTPATIENT
Start: 2022-08-03 | End: 2023-05-02 | Stop reason: SDUPTHER

## 2022-08-03 NOTE — TELEPHONE ENCOUNTER
----- Message from Rocky Arizmendi sent at 8/3/2022 11:24 AM CDT -----  Contact: P [phone]  The patient wants to know if you can change the in person appointment to a virtual face to face or audio only for today. She declined the virtual on tomorrow that if offeren.    Thank ;you

## 2022-08-03 NOTE — TELEPHONE ENCOUNTER
----- Message from Rocky Arizmendi sent at 8/3/2022 11:24 AM CDT -----  Contact: P [phone]  The patient wants to know if you can change the in person appointment to a virtual face to face or audio only for today. She declined the virtual on tomorrow that if offeren.    Thank ;you

## 2022-08-03 NOTE — ASSESSMENT & PLAN NOTE
R/T chronic steroid use. Still requiring insulin.  A1C next month.   Continues Dexcom - beneficial due to widely fluctuating CBGs.

## 2022-08-03 NOTE — ASSESSMENT & PLAN NOTE
Better BP control with increased labetalol and spironolactone.  Diabetes better controlled.  Lab Results   Component Value Date    HGBA1C 5.9 (H) 03/29/2022     Plans to resume statin when pain better controlled. Discussed benefits/risks.   Plan to start pravastatin.  Consider stopping Lantus, not ready to do so.

## 2022-08-03 NOTE — PROGRESS NOTES
Subjective:       Patient ID: Marilou Daniel is a 46 y.o. female.    Chief Complaint: Sleep Apnea      45yo female referred by Diane Dominguez NP for DEVIKA  History of DEVIKA on CPAP, 10 years ago  Did not like mask  Sleep study [facility]  Has lupus, chronic pain, trouble sleeping due to pain  Now uncontrolled HTN, PCP wants her to get back on CPAP  She is willing to get back on CPAP  Takes ambian 10, muscle relaxer, gabapentin, loratab, and hydroxyzine to go to sleep      BP Readings from Last 3 Encounters:   08/03/22 (!) 150/90   01/06/22 129/62   12/15/21 (!) 162/84     Snoring / Sleep:     [place] Questionnaire (validated DEVIKA screening questionnaire)    yes -- Snoring/apnea    yes -- Fatigue    Body mass index is 32.08 kg/m².  (>25 is overweight, >30 is obese)    Blood Pressure = Hypertension  (PreHTN 120-139/80-89, Stg1 140-159/90-99, Stg2 >160/>100)  [place] = 3 of three DEVIKA categories are positive (high risk is 2-3 positive categories)     [place] Sleepiness Scale TOTAL =   12  (validated sleepiness questionnaire with a higher score indicating greater sleepiness; range 0-24)    STOP-Bang Questionnaire (validated DEVIKA screening questionnaire)  Negative unless checked off.  [x] Snoring    [x]  Tired/Fatigued/Sleepy  [x] Obstruction (apneas/choking)  [x] Pressure (HTN)  [] BMI >35  [] Age >50  [x] Neck >40 cm  [] Gender male   STOP-Bang = 5 (low risk 0-2,high risk 3-8)    Immunization History   Administered Date(s) Administered    COVID-19, MRNA, LN-S, PF (Pfizer) (Purple Cap) 06/22/2021, 07/13/2021      Tobacco Use: Low Risk     Smoking Tobacco Use: Never Smoker    Smokeless Tobacco Use: Never Used      Past Medical History:   Diagnosis Date    Anemia     Arthritis     Connective tissue disease 1999    COVID-19 in immunocompromised patient 2/5/2021    COVID-19 in immunocompromised patient 2/5/2021    Diabetes mellitus     Gastroesophageal reflux disease 3/23/2020    Hypertension      Lupus 1999    Situational anxiety 8/24/2021    Suspected COVID-19 virus infection 2/5/2021    Thyroid nodule greater than or equal to 1 cm in diameter incidentally noted on imaging study 11/19/2020    Vasculitis       Current Outpatient Medications on File Prior to Visit   Medication Sig Dispense Refill    betamethasone dipropionate 0.05 % cream APPLY A SMALL AMOUNT TO AFFECTED AREA TOPICALLY TWICE A DAY FOR 2 TO 3 WEEKS AT A TIME AS NEEDED FLARE UPS AVOID FACE & GROIN AREA      blood sugar diagnostic (TRUE METRIX GLUCOSE TEST STRIP) Strp Test 4 times daily. 300 strip 3    blood-glucose meter (TRUE METRIX GLUCOSE METER) Misc Use as directed 1 each 0    cloNIDine (CATAPRES) 0.1 MG tablet Take 1 tablet (0.1 mg total) by mouth every 6 (six) hours as needed (elevated BP). 90 tablet 11    cloNIDine 0.1 mg/24 hr td ptwk (CATAPRES) 0.1 mg/24 hr Place 1 patch onto the skin every 7 days. 4 patch 11    dapsone 100 MG Tab Take 100 mg by mouth once daily at 6am.      gabapentin (NEURONTIN) 800 MG tablet Neurontin 800 mg tablet   Take 1 tablet 3 times a day by oral route.      hydroCHLOROthiazide (HYDRODIURIL) 25 MG tablet Take 1 tablet (25 mg total) by mouth once daily. 30 tablet 11    HYDROcodone-acetaminophen (NORCO)  mg per tablet Norco 10 mg-325 mg tablet   Take 1 tablet 3 times a day by oral route as needed.      hydrocortisone 2.5 % ointment APPLY TOPICALLY TO FACE TWICE A DAY AS NEEDED FOR 1 TO 2 WEEKS AT A TIME      hydroxychloroquine (PLAQUENIL) 200 mg tablet Take 200 mg by mouth 2 (two) times daily.       insulin (LANTUS SOLOSTAR U-100 INSULIN) glargine 100 units/mL (3mL) SubQ pen Inject 5 Units into the skin once daily. 1.5 mL 11    insulin aspart U-100 (NOVOLOG) 100 unit/mL injection Inject 4 Units into the skin 3 (three) times daily before meals. 10.8 mL 3    labetaloL (NORMODYNE) 300 MG tablet Take 1 tablet (300 mg total) by mouth 2 (two) times daily. 60 tablet 11    LORazepam (ATIVAN)  0.5 MG tablet Take 1 tablet (0.5 mg total) by mouth every 12 (twelve) hours as needed for Anxiety. 30 tablet 2    mycophenolate (CELLCEPT) 250 mg Cap 3,000 mg once daily.      predniSONE (DELTASONE) 2.5 MG tablet Take 2.5 mg by mouth once daily.      spironolactone (ALDACTONE) 25 MG tablet Take 1 tablet (25 mg total) by mouth once daily. 30 tablet 11    tiZANidine (ZANAFLEX) 4 MG tablet TK 1 T PO TID PRN      TRUEPLUS LANCETS 33 gauge Misc Inject 1 lancet as directed 4 (four) times daily. 100 each 11    zolpidem (AMBIEN) 10 mg Tab TAKE ONE TABLET BY MOUTH NIGHTLY AS NEEDED FOR INSOMNIA 30 tablet 5    blood-glucose meter,continuous (DEXCOM G6 ) Misc 1 Units by Misc.(Non-Drug; Combo Route) route continuous. 1 each 0    blood-glucose sensor (DEXCOM G6 SENSOR) [place] 1 each by Misc.(Non-Drug; Combo Route) route every 10 days. 3 each 11    blood-glucose transmitter (DEXCOM G6 TRANSMITTER) [place] 1 each by Misc.(Non-Drug; Combo Route) route every 3 (three) months. 1 Device 3    [DISCONTINUED] esomeprazole (NEXIUM) 40 MG capsule Take 1 capsule (40 mg total) by mouth before breakfast. 90 capsule 3    [DISCONTINUED] hydrOXYzine HCL (ATARAX) 25 MG tablet TAKE ONE TABLET BY MOUTH EVERY 6 HOURS AS NEEDED FOR ITCHING FOR 7 DAYS       Current Facility-Administered Medications on File Prior to Visit   Medication Dose Route Frequency Provider Last Rate Last Admin    acetaminophen tablet 650 mg  650 mg Oral Once PRN Reji Dumas MD        acetaminophen tablet 650 mg  650 mg Oral Once PRN Reji Dumas MD        sodium chloride 0.9% 500 mL flush bag   Intravenous PRN Reji Dumas MD        sodium chloride 0.9% flush 10 mL  10 mL Intravenous PRN Reji Dumas MD            Review of Systems   Constitutional: Negative for fever, weight loss, appetite change, fatigue and weakness.   HENT: Negative for postnasal drip, rhinorrhea, sinus pressure, trouble swallowing and congestion.    Respiratory:  "Positive for apnea, snoring and somnolence. Negative for cough, sputum production, choking, chest tightness, shortness of breath, wheezing and dyspnea on extertion.    Cardiovascular: Negative for chest pain and leg swelling.   Musculoskeletal: Positive for arthralgias and gait problem. Negative for joint swelling.   Gastrointestinal: Negative for nausea, vomiting and abdominal pain.   Neurological: Negative for dizziness, weakness and headaches.   Psychiatric/Behavioral: Positive for sleep disturbance.   All other systems reviewed and are negative.      Objective:       Vitals:    08/03/22 1511   BP: (!) 150/90   Pulse: 72   Resp: 14   SpO2: (!) 94%   Weight: 95.7 kg (210 lb 15.7 oz)   Height: 5' 8" (1.727 m)       Physical Exam   Constitutional: She is oriented to person, place, and time. She appears well-developed and well-nourished. No distress. She is obese.   HENT:   Head: Normocephalic.   Nose: Nose normal.   Mouth/Throat: Oropharynx is clear and moist.   Cardiovascular: Normal rate and regular rhythm.   Pulmonary/Chest: Effort normal. No respiratory distress. She has no wheezes. She has no rhonchi. She has no rales.   Musculoskeletal:         General: No edema.      Cervical back: Normal range of motion and neck supple.   Lymphadenopathy: No supraclavicular adenopathy is present.     She has no cervical adenopathy.   Neurological: She is alert and oriented to person, place, and time. Gait normal.   Skin: Skin is warm and dry.   Psychiatric: She has a normal mood and affect.   Vitals reviewed.    Personal Diagnostic Review    Mammo Digital Screening Bilat w/ Barrett  Narrative: Result:  Mammo Digital Screening Bilat w/ Barrett    History:  Patient is 45 y.o. and is seen for a screening mammogram.    Films Compared:  Compared to: 06/22/2020 Mammo Digital Screening Bilat w/ Barrett, 06/12/2017   Mammo Previous, and 04/18/2016 Mammo Previous     Findings:   This procedure was performed using tomosynthesis. "   Computer-aided detection was utilized in the interpretation of this   examination.    The breasts have scattered areas of fibroglandular density. There is no   evidence of suspicious masses, microcalcifications or architectural   distortion.  Impression:    No mammographic evidence of malignancy.    BI-RADS Category 1: Negative    Recommendation:  Routine screening mammogram in 1 year is recommended.    Your estimated lifetime risk of breast cancer (to age 85) based on   Tyrer-Cuzick risk assessment model is Tyrer-Cuzick: 21.25 %. According to   the [employer], patients with a lifetime breast cancer risk   of 20% or higher might benefit from supplemental screening tests.          No flowsheet data found.      Assessment/Plan:       Problem List Items Addressed This Visit        Psychiatric    Drug induced insomnia     Ambian, followed by pcp              Cardiac/Vascular    Hypertension associated with diabetes     F/u regularly with PCP                Immunology/Multi System    Systemic lupus erythematosus - Primary     Followed by Dr. Wayne  Prednisone daily              Other    DEVIKA (obstructive sleep apnea)     History of DEVIKA  STOPbang 5, [place] 3, Ep 12  Home sleep study  DEVIKA and implications on health discussed  CPAP and nasal mask will be ordered if indicated             Relevant Orders    Home Sleep Studies      Other Visit Diagnoses     Essential hypertension            Follow up pending sleep study completion. CPAP with nasal mask will be ordered if indicated. Anticipate next follow up will be for initial CPAP review.    Discussed diagnosis, its evaluation, treatment and usual course. All questions answered.    Patient verbalized understanding of plan and left in no acute distress    Thank you for the courtesy of participating in the care of this patient    Terra Padgett PA-C

## 2022-08-03 NOTE — PROGRESS NOTES
"Marilou Daniel  08/03/2022  [id number]    Diane Dominguez NP  Patient Care Team:  Diane Dominguez NP as PCP - General (Family Medicine)  Joe Yo RD as Dietitian (Endocrinology)  Liza Moon RD, CDE as Dietitian (Diabetes)      [facility] Primary Care Note      The patient location is:  Patient Home   The chief complaint leading to consultation is: follow up HTN, DMII      Visit type: Virtual visit with synchronous audio and video  Each patient to whom he or she provides medical services by telemedicine is:  (1) informed of the relationship between the physician and patient and the respective role of any other health care provider with respect to management of the patient; and (2) notified that he or she may decline to receive medical services by telemedicine and may withdraw from such care at any time.    Chief Complaint:  No chief complaint on file.      History of Present Illness:  HPI    BP is better with labetalol 300 BID and aldactone. Clonidine PRN, takes rarely.     On prednisone 2.5 QOD now. Started PT at [facility] for generalized joint pain. Water therapy helpful.     Stopped statin d/t myalgias/myopathy. Unsure if improvement due that or PT.     DMII good. Doesn't eat regular meals. Drops at times "low."   101-120 fasting. Takes mealtime insulin PRN, not as often.     Saw GYN, doesn't need PAP annually per GYN.     Needs mammo!     Dr Srini warren. Plans to schedule appt.   Need JEN when in person.     Due to see Dr Schmidt. Pt will call to schedule.     Still using Dexcom, very beneficial.    Previously saw counselor for psychological trauma and anxiety. Still some anxiety, would like to see another LCSW.       The 10-year ASCVD risk score (Pisek NELLY Jr., et al., 2013) is: 23.9%    Values used to calculate the score:      Age: 46 years      Sex: Female      Is Non- : Yes      Diabetic: Yes      Tobacco smoker: No      Systolic Blood Pressure: 183 " mmHg      Is BP treated: Yes      HDL Cholesterol: 51 mg/dL      Total Cholesterol: 137 mg/dL      Review of Systems   Constitutional: Negative for chills, fever and malaise/fatigue.   Respiratory: Negative for shortness of breath.    Cardiovascular: Negative for leg swelling.   Gastrointestinal: Negative for nausea and vomiting.   Musculoskeletal: Positive for joint pain. Negative for myalgias.   Psychiatric/Behavioral: Negative for depression. The patient is nervous/anxious and has insomnia.          The following were reviewed: Active problem list, medication list, allergies, family history, social history, and Health Maintenance.     History:  Past Medical History:   Diagnosis Date    Anemia     Arthritis     Connective tissue disease 1999    COVID-19 in immunocompromised patient 2/5/2021    COVID-19 in immunocompromised patient 2/5/2021    Diabetes mellitus     Gastroesophageal reflux disease 3/23/2020    Hypertension     Lupus 1999    Situational anxiety 8/24/2021    Suspected COVID-19 virus infection 2/5/2021    Thyroid nodule greater than or equal to 1 cm in diameter incidentally noted on imaging study 11/19/2020    Vasculitis      Past Surgical History:   Procedure Laterality Date    ABLATION      COLONOSCOPY N/A 6/24/2020    Procedure: COLONOSCOPY;  Surgeon: Nevin Penny MD;  Location: [facility];  Service: Endoscopy;  Laterality: N/A;    ESOPHAGOGASTRODUODENOSCOPY N/A 6/24/2020    Procedure: ESOPHAGOGASTRODUODENOSCOPY (EGD);  Surgeon: Nevin Penny MD;  Location: [facility];  Service: Endoscopy;  Laterality: N/A;    RIGHT HEART CATHETERIZATION      TUBAL LIGATION      WRIST SURGERY Bilateral      Family History   Problem Relation Age of Onset    Breast cancer Mother     Breast cancer Sister     Breast cancer Sister      Social History     Socioeconomic History    Marital status:    Tobacco Use    Smoking status: Never Smoker    Smokeless tobacco: Never Used    Substance and Sexual Activity    Alcohol use: Never    Drug use: Never    Sexual activity: Yes     Partners: Male     Birth control/protection: See Surgical Hx     Patient Active Problem List   Diagnosis    Hypertension associated with diabetes    Type 2 diabetes mellitus without complication, with long-term current use of insulin    Systemic lupus erythematosus    Leukocytoclastic vasculitis    Nonintractable headache    Gastroesophageal reflux disease    Encounter for screening colonoscopy    Neck pain    Hearing loss    Thyroid nodule greater than or equal to 1 cm in diameter incidentally noted on imaging study    Drug induced insomnia    Immunocompromised state due to drug therapy    Situational anxiety    Avascular necrosis of bones of both hips    Diabetes mellitus due to underlying condition with complication, with long-term current use of insulin     Review of patient's allergies indicates:   Allergen Reactions    Azathioprine Nausea And Vomiting     Nausea, vomiting, diarrhea dose and early in the course of therapy    Olmesartan Shortness Of Breath    Oxycodone Itching       Medications:  Current Outpatient Medications on File Prior to Visit   Medication Sig Dispense Refill    betamethasone dipropionate 0.05 % cream APPLY A SMALL AMOUNT TO AFFECTED AREA TOPICALLY TWICE A DAY FOR 2 TO 3 WEEKS AT A TIME AS NEEDED FLARE UPS AVOID FACE & GROIN AREA      blood sugar diagnostic (TRUE METRIX GLUCOSE TEST STRIP) Strp Test 4 times daily. 300 strip 3    blood-glucose meter (TRUE METRIX GLUCOSE METER) Misc Use as directed 1 each 0    blood-glucose meter,continuous (DEXCOM G6 ) Misc 1 Units by Misc.(Non-Drug; Combo Route) route continuous. 1 each 0    blood-glucose sensor (DEXCOM G6 SENSOR) Myrtle 1 each by Misc.(Non-Drug; Combo Route) route every 10 days. 3 each 11    blood-glucose transmitter (DEXCOM G6 TRANSMITTER) Myrtle 1 each by Misc.(Non-Drug; Combo Route) route every 3 (three)  months. 1 Device 3    cloNIDine (CATAPRES) 0.1 MG tablet Take 1 tablet (0.1 mg total) by mouth every 6 (six) hours as needed (elevated BP). 90 tablet 11    cloNIDine 0.1 mg/24 hr td ptwk (CATAPRES) 0.1 mg/24 hr Place 1 patch onto the skin every 7 days. 4 patch 11    dapsone 100 MG Tab Take 100 mg by mouth once daily at 6am.      gabapentin (NEURONTIN) 800 MG tablet Neurontin 800 mg tablet   Take 1 tablet 3 times a day by oral route.      hydroCHLOROthiazide (HYDRODIURIL) 25 MG tablet Take 1 tablet (25 mg total) by mouth once daily. 30 tablet 11    HYDROcodone-acetaminophen (NORCO)  mg per tablet Norco 10 mg-325 mg tablet   Take 1 tablet 3 times a day by oral route as needed.      hydrocortisone 2.5 % ointment APPLY TOPICALLY TO FACE TWICE A DAY AS NEEDED FOR 1 TO 2 WEEKS AT A TIME      hydroxychloroquine (PLAQUENIL) 200 mg tablet Take 200 mg by mouth 2 (two) times daily.       insulin (LANTUS SOLOSTAR U-100 INSULIN) glargine 100 units/mL (3mL) SubQ pen Inject 5 Units into the skin once daily. 1.5 mL 11    insulin aspart U-100 (NOVOLOG) 100 unit/mL injection Inject 4 Units into the skin 3 (three) times daily before meals. 10.8 mL 3    labetaloL (NORMODYNE) 300 MG tablet Take 1 tablet (300 mg total) by mouth 2 (two) times daily. 60 tablet 11    LORazepam (ATIVAN) 0.5 MG tablet Take 1 tablet (0.5 mg total) by mouth every 12 (twelve) hours as needed for Anxiety. 30 tablet 2    mycophenolate (CELLCEPT) 250 mg Cap 3,000 mg once daily.      predniSONE (DELTASONE) 2.5 MG tablet Take 2.5 mg by mouth once daily.      spironolactone (ALDACTONE) 25 MG tablet Take 1 tablet (25 mg total) by mouth once daily. 30 tablet 11    tiZANidine (ZANAFLEX) 4 MG tablet TK 1 T PO TID PRN      TRUEPLUS LANCETS 33 gauge Misc Inject 1 lancet as directed 4 (four) times daily. 100 each 11    zolpidem (AMBIEN) 10 mg Tab TAKE ONE TABLET BY MOUTH NIGHTLY AS NEEDED FOR INSOMNIA 30 tablet 5    [DISCONTINUED] esomeprazole  (NEXIUM) 40 MG capsule Take 1 capsule (40 mg total) by mouth before breakfast. 90 capsule 3    [DISCONTINUED] hydrOXYzine HCL (ATARAX) 25 MG tablet TAKE ONE TABLET BY MOUTH EVERY 6 HOURS AS NEEDED FOR ITCHING FOR 7 DAYS       Current Facility-Administered Medications on File Prior to Visit   Medication Dose Route Frequency Provider Last Rate Last Admin    acetaminophen tablet 650 mg  650 mg Oral Once PRN Reji Dumas MD        acetaminophen tablet 650 mg  650 mg Oral Once PRN Reji Dumas MD        sodium chloride 0.9% 500 mL flush bag   Intravenous PRN Reji Dumas MD        sodium chloride 0.9% flush 10 mL  10 mL Intravenous PRN Reji Dumas MD           Medications have been reviewed and reconciled with patient at visit today.    Barriers to medications present (no )    Adverse reactions to current medications (no)      Exam:  There were no vitals filed for this visit.      There is no height or weight on file to calculate BMI.      BP Readings from Last 3 Encounters:   01/06/22 129/62   12/15/21 (!) 162/84   09/29/21 (!) 150/80     Wt Readings from Last 3 Encounters:   03/29/22 1053 96.4 kg (212 lb 8.4 oz)   12/15/21 1145 97.9 kg (215 lb 13.3 oz)   09/29/21 1043 100.7 kg (222 lb 0.1 oz)            Physical Exam  Constitutional:       General: She is not in acute distress.  Eyes:      General: No scleral icterus.  Pulmonary:      Effort: No respiratory distress.   Neurological:      Mental Status: She is alert.   Psychiatric:         Mood and Affect: Mood normal.         Behavior: Behavior normal.         Thought Content: Thought content normal.         Laboratory Reviewed: (Yes)  Lab Results   Component Value Date    WBC 10.55 03/29/2022    HGB 10.0 (L) 03/29/2022    HCT 31.1 (L) 03/29/2022     03/29/2022    CHOL 137 08/26/2021    TRIG 42 08/26/2021    HDL 51 08/26/2021    ALT 10 03/29/2022    AST 13 03/29/2022     03/29/2022    K 3.8 03/29/2022     03/29/2022     CREATININE 0.8 03/29/2022    BUN 11 03/29/2022    CO2 25 03/29/2022    TSH 0.257 (L) 12/15/2020    INR 1.1 03/24/2020    HGBA1C 5.9 (H) 03/29/2022           Health Maintenance  Health Maintenance Topics with due status: Not Due       Topic Last Completion Date    Colorectal Cancer Screening 06/24/2020    Lipid Panel 08/26/2021    Cervical Cancer Screening 09/29/2021    Diabetes Urine Screening 03/29/2022    Hemoglobin A1c 03/29/2022    Influenza Vaccine Not Due     Health Maintenance Due   Topic Date Due    Pneumococcal Vaccines (Age 0-64) (1 - PCV) Never done    Foot Exam  Never done    TETANUS VACCINE  Never done    Low Dose Statin  Never done    Eye Exam  09/21/2021    Mammogram  08/12/2022       Assessment:  Problem List Items Addressed This Visit        Psychiatric    Situational anxiety    Relevant Orders    Ambulatory referral/consult to Psychiatry       Cardiac/Vascular    Hypertension associated with diabetes     Better BP control with increased labetalol and spironolactone.  Diabetes better controlled.  Lab Results   Component Value Date    HGBA1C 5.9 (H) 03/29/2022     Plans to resume statin when pain better controlled. Discussed benefits/risks.   Plan to start pravastatin.  Consider stopping Lantus, not ready to do so.                 Immunology/Multi System    Systemic lupus erythematosus     Followed closely by Dr Wayne.  Sx better improved. Tapering prednisone.              Endocrine    Thyroid nodule greater than or equal to 1 cm in diameter incidentally noted on imaging study     Followed by Dr Jarvis. Last thyroid U/S 2021.   Unable to tolerate biopsy.  Repeat thyroid U/S next month or f/u with Dr Jarvis.           Relevant Orders    TSH    Diabetes mellitus due to underlying condition with complication, with long-term current use of insulin     R/T chronic steroid use. Still requiring insulin.  A1C next month.   Continues Dexcom - beneficial due to widely fluctuating CBGs.           Relevant  Orders    Hemoglobin A1C       GI    Gastroesophageal reflux disease    Relevant Medications    esomeprazole (NEXIUM) 40 MG capsule      Other Visit Diagnoses     Pruritus    -  Primary    Relevant Medications    hydrOXYzine HCL (ATARAX) 25 MG tablet    Screening mammogram for breast cancer        Relevant Orders    Mammo Digital Screening Bilat w/ Barrett            Plan:  Pruritus  -     hydrOXYzine HCL (ATARAX) 25 MG tablet; TAKE ONE TABLET BY MOUTH EVERY 12 HOURS AS NEEDED FOR ITCHING  Dispense: 30 tablet; Refill: 5    Gastroesophageal reflux disease without esophagitis  -     esomeprazole (NEXIUM) 40 MG capsule; Take 1 capsule (40 mg total) by mouth before breakfast.  Dispense: 90 capsule; Refill: 3    Situational anxiety  -     Ambulatory referral/consult to Psychiatry; Future; Expected date: 08/04/2022    Hypertension associated with diabetes    Other forms of systemic lupus erythematosus, unspecified organ involvement status    Diabetes mellitus due to underlying condition with complication, with long-term current use of insulin  -     Hemoglobin A1C; Future; Expected date: 08/03/2022    Thyroid nodule greater than or equal to 1 cm in diameter incidentally noted on imaging study  -     TSH; Future; Expected date: 08/03/2022    Screening mammogram for breast cancer  -     Mammo Digital Screening Bilat w/ Barrett; Future; Expected date: 08/03/2022      -Patient's lab results were reviewed and discussed with patient  -Treatment options and alternatives were discussed with the patient. Patient expressed understanding. Patient was given the opportunity to ask questions and be an active participant in their medical care. Patient had no further questions or concerns at this time.   -Documentation of patient's health and condition was obtained from family member who was present during visit.  -Patient is an overall moderate risk for health complications from their medical conditions.       Follow up: Follow up in about 3  months (around 11/3/2022) for HTN management, Diabetes management.      Total medical decision making time was 42 min.  The following issues were discussed: The primary encounter diagnosis was Pruritus. Diagnoses of Gastroesophageal reflux disease without esophagitis, Situational anxiety, Hypertension associated with diabetes, Other forms of systemic lupus erythematosus, unspecified organ involvement status, Diabetes mellitus due to underlying condition with complication, with long-term current use of insulin, Thyroid nodule greater than or equal to 1 cm in diameter incidentally noted on imaging study, and Screening mammogram for breast cancer were also pertinent to this visit.    Health maintenance needs, recent test results and goals of care discussed with pt and questions answered.

## 2022-08-03 NOTE — ASSESSMENT & PLAN NOTE
History of DEVIKA  [employer]bang 5, [employer] 3, Ep 12  Home sleep study  DEVIKA and implications on health discussed  CPAP and nasal mask will be ordered if indicated

## 2022-08-03 NOTE — ASSESSMENT & PLAN NOTE
Followed by Dr Jarvis. Last thyroid U/S 2021.   Unable to tolerate biopsy.  Repeat thyroid U/S next month or f/u with Dr Jarvis.

## 2022-08-03 NOTE — TELEPHONE ENCOUNTER
Patient called in because of the weather and her appointment. She says that she wanted to change her visit to a virtual appointment. Patient appointment was changed, she verbally understood the information.

## 2022-08-04 ENCOUNTER — TELEPHONE (OUTPATIENT)
Dept: PULMONOLOGY | Facility: HOSPITAL | Age: 47
End: 2022-08-04
Payer: MEDICARE

## 2022-08-19 ENCOUNTER — TELEPHONE (OUTPATIENT)
Dept: PRIMARY CARE CLINIC | Facility: CLINIC | Age: 47
End: 2022-08-19

## 2022-08-19 ENCOUNTER — OFFICE VISIT (OUTPATIENT)
Dept: PRIMARY CARE CLINIC | Facility: CLINIC | Age: 47
End: 2022-08-19
Payer: MEDICARE

## 2022-08-19 VITALS
HEIGHT: 68 IN | DIASTOLIC BLOOD PRESSURE: 82 MMHG | HEART RATE: 82 BPM | BODY MASS INDEX: 31.24 KG/M2 | TEMPERATURE: 98 F | SYSTOLIC BLOOD PRESSURE: 142 MMHG | WEIGHT: 206.13 LBS | OXYGEN SATURATION: 95 %

## 2022-08-19 DIAGNOSIS — R51.9 NONINTRACTABLE HEADACHE, UNSPECIFIED CHRONICITY PATTERN, UNSPECIFIED HEADACHE TYPE: ICD-10-CM

## 2022-08-19 DIAGNOSIS — R22.1 NECK MASS: ICD-10-CM

## 2022-08-19 DIAGNOSIS — M62.838 MUSCLE SPASM: ICD-10-CM

## 2022-08-19 DIAGNOSIS — F43.10 PTSD (POST-TRAUMATIC STRESS DISORDER): Primary | ICD-10-CM

## 2022-08-19 DIAGNOSIS — F41.9 ANXIETY: ICD-10-CM

## 2022-08-19 PROCEDURE — 3044F HG A1C LEVEL LT 7.0%: CPT | Mod: CPTII,S$GLB,, | Performed by: NURSE PRACTITIONER

## 2022-08-19 PROCEDURE — 1159F PR MEDICATION LIST DOCUMENTED IN MEDICAL RECORD: ICD-10-PCS | Mod: CPTII,S$GLB,, | Performed by: NURSE PRACTITIONER

## 2022-08-19 PROCEDURE — 99215 OFFICE O/P EST HI 40 MIN: CPT | Mod: S$GLB,,, | Performed by: NURSE PRACTITIONER

## 2022-08-19 PROCEDURE — 3066F PR DOCUMENTATION OF TREATMENT FOR NEPHROPATHY: ICD-10-PCS | Mod: CPTII,S$GLB,, | Performed by: NURSE PRACTITIONER

## 2022-08-19 PROCEDURE — 99999 PR PBB SHADOW E&M-EST. PATIENT-LVL V: ICD-10-PCS | Mod: PBBFAC,,, | Performed by: NURSE PRACTITIONER

## 2022-08-19 PROCEDURE — 99417 PR PROLONGED SVC, OUTPT, W/WO DIRECT PT CONTACT,  EA ADDTL 15 MIN: ICD-10-PCS | Mod: S$GLB,,, | Performed by: NURSE PRACTITIONER

## 2022-08-19 PROCEDURE — 99417 PROLNG OP E/M EACH 15 MIN: CPT | Mod: S$GLB,,, | Performed by: NURSE PRACTITIONER

## 2022-08-19 PROCEDURE — 3060F PR POS MICROALBUMINURIA RESULT DOCUMENTED/REVIEW: ICD-10-PCS | Mod: CPTII,S$GLB,, | Performed by: NURSE PRACTITIONER

## 2022-08-19 PROCEDURE — 3079F DIAST BP 80-89 MM HG: CPT | Mod: CPTII,S$GLB,, | Performed by: NURSE PRACTITIONER

## 2022-08-19 PROCEDURE — 3079F PR MOST RECENT DIASTOLIC BLOOD PRESSURE 80-89 MM HG: ICD-10-PCS | Mod: CPTII,S$GLB,, | Performed by: NURSE PRACTITIONER

## 2022-08-19 PROCEDURE — 3060F POS MICROALBUMINURIA REV: CPT | Mod: CPTII,S$GLB,, | Performed by: NURSE PRACTITIONER

## 2022-08-19 PROCEDURE — 3066F NEPHROPATHY DOC TX: CPT | Mod: CPTII,S$GLB,, | Performed by: NURSE PRACTITIONER

## 2022-08-19 PROCEDURE — 3077F PR MOST RECENT SYSTOLIC BLOOD PRESSURE >= 140 MM HG: ICD-10-PCS | Mod: CPTII,S$GLB,, | Performed by: NURSE PRACTITIONER

## 2022-08-19 PROCEDURE — 3008F BODY MASS INDEX DOCD: CPT | Mod: CPTII,S$GLB,, | Performed by: NURSE PRACTITIONER

## 2022-08-19 PROCEDURE — 3077F SYST BP >= 140 MM HG: CPT | Mod: CPTII,S$GLB,, | Performed by: NURSE PRACTITIONER

## 2022-08-19 PROCEDURE — 3008F PR BODY MASS INDEX (BMI) DOCUMENTED: ICD-10-PCS | Mod: CPTII,S$GLB,, | Performed by: NURSE PRACTITIONER

## 2022-08-19 PROCEDURE — 99999 PR PBB SHADOW E&M-EST. PATIENT-LVL V: CPT | Mod: PBBFAC,,, | Performed by: NURSE PRACTITIONER

## 2022-08-19 PROCEDURE — 1159F MED LIST DOCD IN RCRD: CPT | Mod: CPTII,S$GLB,, | Performed by: NURSE PRACTITIONER

## 2022-08-19 PROCEDURE — 3044F PR MOST RECENT HEMOGLOBIN A1C LEVEL <7.0%: ICD-10-PCS | Mod: CPTII,S$GLB,, | Performed by: NURSE PRACTITIONER

## 2022-08-19 PROCEDURE — 99215 PR OFFICE/OUTPT VISIT, EST, LEVL V, 40-54 MIN: ICD-10-PCS | Mod: S$GLB,,, | Performed by: NURSE PRACTITIONER

## 2022-08-19 RX ORDER — SERTRALINE HYDROCHLORIDE 50 MG/1
50 TABLET, FILM COATED ORAL DAILY
Qty: 30 TABLET | Refills: 11 | Status: ON HOLD | OUTPATIENT
Start: 2022-08-19 | End: 2024-03-28 | Stop reason: HOSPADM

## 2022-08-19 RX ORDER — AMLODIPINE BESYLATE 5 MG/1
10 TABLET ORAL
COMMUNITY
Start: 2022-04-18 | End: 2023-07-14 | Stop reason: SDUPTHER

## 2022-08-19 NOTE — PROGRESS NOTES
"Marilou Daniel  08/24/2022  [id number]    Diane Dominguez NP  Patient Care Team:  Diane Dominguez NP as PCP - General (Family Medicine)  Joe Yo RD as Dietitian (Endocrinology)  Liza Moon RD, CDE as Dietitian (Diabetes)        [facility] Primary Care Note      Chief Complaint:  Chief Complaint   Patient presents with    uncontrollable twitching in face and eye       History of Present Illness:  HPI    Facial twitching, started 8/5 while at [employer], right side of face, then left eye also.     Took benzo that night, no sx the next day.   Then having more frequent episodes afterward.    BP elevated again, saw Dr Schmidt, resumed amlodipine 10 mg daily 10 days ago. BP yesterday 210/110. Severe headaches. Having swelling again but willing to tolerate. Still with elevated BP. Off of steroids now for one week!   Under a lot of stress.     Vision blurred, eye appt Monday. Dr Milligan.     CBGs are "better."     No LCSW contacted pt.     Headaches are severe, stabbing pain to neck, head.   Paresthesias to face.     Need records from [facility]    Message psychiatry about referral.       Growth to neck - need MRI results, also needs open MRI brain.   Overdue to f/u endocrine for thyroid nodules.    Seen by Dr Mccloud in 2018. To follow up now versus MRI first.    Elevated Blood pressure - residual from steroid tx?  Clonidine TD??    Echo - okay 6 months ago per pt.     CBG range past 3 days: 110-300, not eating well.     Anemia, chronic dz?? Thyroid studies???    Previously took sertraline, well-tolerated.         Review of Systems   Constitutional: Negative for chills, fever and weight loss.   Respiratory: Negative for cough and shortness of breath.    Musculoskeletal: Positive for joint pain and myalgias.   Psychiatric/Behavioral: Positive for depression. The patient is nervous/anxious.          The following were reviewed: Active problem list, medication list, allergies, family history, social " history, and Health Maintenance.     History:  Past Medical History:   Diagnosis Date    Anemia     Arthritis     Connective tissue disease 1999    COVID-19 in immunocompromised patient 2/5/2021    COVID-19 in immunocompromised patient 2/5/2021    Diabetes mellitus     Gastroesophageal reflux disease 3/23/2020    Hypertension     Lupus 1999    Situational anxiety 8/24/2021    Suspected COVID-19 virus infection 2/5/2021    Thyroid nodule greater than or equal to 1 cm in diameter incidentally noted on imaging study 11/19/2020    Vasculitis      Past Surgical History:   Procedure Laterality Date    ABLATION      COLONOSCOPY N/A 6/24/2020    Procedure: COLONOSCOPY;  Surgeon: Nevin Penny MD;  Location: [facility] ENDO;  Service: Endoscopy;  Laterality: N/A;    ESOPHAGOGASTRODUODENOSCOPY N/A 6/24/2020    Procedure: ESOPHAGOGASTRODUODENOSCOPY (EGD);  Surgeon: Nevin Penny MD;  Location: [facility] ENDO;  Service: Endoscopy;  Laterality: N/A;    RIGHT HEART CATHETERIZATION      TUBAL LIGATION      WRIST SURGERY Bilateral      Family History   Problem Relation Age of Onset    Breast cancer Mother     Breast cancer Sister     Breast cancer Sister      Social History     Socioeconomic History    Marital status:    Tobacco Use    Smoking status: Never Smoker    Smokeless tobacco: Never Used   Substance and Sexual Activity    Alcohol use: Never    Drug use: Never    Sexual activity: Yes     Partners: Male     Birth control/protection: See Surgical Hx     Patient Active Problem List   Diagnosis    Hypertension associated with diabetes    Type 2 diabetes mellitus without complication, with long-term current use of insulin    Systemic lupus erythematosus    Leukocytoclastic vasculitis    Nonintractable headache    Gastroesophageal reflux disease    Encounter for screening colonoscopy    Neck pain    Hearing loss    Thyroid nodule greater than or equal to 1 cm in diameter incidentally  noted on imaging study    Drug induced insomnia    Immunocompromised state due to drug therapy    Situational anxiety    Avascular necrosis of bones of both hips    Diabetes mellitus due to underlying condition with complication, with long-term current use of insulin    DEVIKA (obstructive sleep apnea)     Review of patient's allergies indicates:   Allergen Reactions    Azathioprine Nausea And Vomiting     Nausea, vomiting, diarrhea dose and early in the course of therapy    Olmesartan Shortness Of Breath    Oxycodone Itching       Medications:  Current Outpatient Medications on File Prior to Visit   Medication Sig Dispense Refill    amLODIPine (NORVASC) 5 MG tablet 10 mg.      betamethasone dipropionate 0.05 % cream APPLY A SMALL AMOUNT TO AFFECTED AREA TOPICALLY TWICE A DAY FOR 2 TO 3 WEEKS AT A TIME AS NEEDED FLARE UPS AVOID FACE & GROIN AREA      blood sugar diagnostic (TRUE METRIX GLUCOSE TEST STRIP) Strp Test 4 times daily. 300 strip 3    blood-glucose meter (TRUE METRIX GLUCOSE METER) Misc Use as directed 1 each 0    cloNIDine (CATAPRES) 0.1 MG tablet Take 1 tablet (0.1 mg total) by mouth every 6 (six) hours as needed (elevated BP). 90 tablet 11    cloNIDine 0.1 mg/24 hr td ptwk (CATAPRES) 0.1 mg/24 hr Place 1 patch onto the skin every 7 days. 4 patch 11    dapsone 100 MG Tab Take 100 mg by mouth once daily at 6am.      esomeprazole (NEXIUM) 40 MG capsule Take 1 capsule (40 mg total) by mouth before breakfast. 90 capsule 3    gabapentin (NEURONTIN) 800 MG tablet Neurontin 800 mg tablet   Take 1 tablet 3 times a day by oral route.      hydroCHLOROthiazide (HYDRODIURIL) 25 MG tablet Take 1 tablet (25 mg total) by mouth once daily. 30 tablet 11    HYDROcodone-acetaminophen (NORCO)  mg per tablet Norco 10 mg-325 mg tablet   Take 1 tablet 3 times a day by oral route as needed.      hydrocortisone 2.5 % ointment APPLY TOPICALLY TO FACE TWICE A DAY AS NEEDED FOR 1 TO 2 WEEKS AT A TIME       hydroxychloroquine (PLAQUENIL) 200 mg tablet Take 200 mg by mouth 2 (two) times daily.       hydrOXYzine HCL (ATARAX) 25 MG tablet TAKE ONE TABLET BY MOUTH EVERY 12 HOURS AS NEEDED FOR ITCHING 30 tablet 5    insulin (LANTUS SOLOSTAR U-100 INSULIN) glargine 100 units/mL (3mL) SubQ pen Inject 5 Units into the skin once daily. 1.5 mL 11    insulin aspart U-100 (NOVOLOG) 100 unit/mL injection Inject 4 Units into the skin 3 (three) times daily before meals. 10.8 mL 3    labetaloL (NORMODYNE) 300 MG tablet Take 1 tablet (300 mg total) by mouth 2 (two) times daily. 60 tablet 11    LORazepam (ATIVAN) 0.5 MG tablet Take 1 tablet (0.5 mg total) by mouth every 12 (twelve) hours as needed for Anxiety. 30 tablet 2    mycophenolate (CELLCEPT) 250 mg Cap 3,000 mg once daily.      spironolactone (ALDACTONE) 25 MG tablet Take 1 tablet (25 mg total) by mouth once daily. 30 tablet 11    tiZANidine (ZANAFLEX) 4 MG tablet TK 1 T PO TID PRN      TRUEPLUS LANCETS 33 gauge Misc Inject 1 lancet as directed 4 (four) times daily. 100 each 11    zolpidem (AMBIEN) 10 mg Tab TAKE ONE TABLET BY MOUTH NIGHTLY AS NEEDED FOR INSOMNIA 30 tablet 5    blood-glucose meter,continuous (DEXCOM G6 ) Misc 1 Units by Misc.(Non-Drug; Combo Route) route continuous. 1 each 0    blood-glucose sensor (DEXCOM G6 SENSOR) [place] 1 each by Misc.(Non-Drug; Combo Route) route every 10 days. 3 each 11    blood-glucose transmitter (DEXCOM G6 TRANSMITTER) [place] 1 each by Misc.(Non-Drug; Combo Route) route every 3 (three) months. 1 Device 3    predniSONE (DELTASONE) 2.5 MG tablet Take 2.5 mg by mouth once daily.       Current Facility-Administered Medications on File Prior to Visit   Medication Dose Route Frequency Provider Last Rate Last Admin    acetaminophen tablet 650 mg  650 mg Oral Once PRN Reji Dumas MD        acetaminophen tablet 650 mg  650 mg Oral Once PRN Reji Dumas MD        sodium chloride 0.9% 500 mL flush bag   Intravenous  PRN Reji Dumas MD        sodium chloride 0.9% flush 10 mL  10 mL Intravenous PRN Reji Dumas MD           Medications have been reviewed and reconciled with patient at visit today.    Barriers to medications present (no )    Adverse reactions to current medications (no)      Exam:  Vitals:    08/19/22 0847   BP: (!) 142/82   Pulse: 82   Temp: 97.7 °F (36.5 °C)     Weight: 93.5 kg (206 lb 2.1 oz)   Body mass index is 31.34 kg/m².      BP Readings from Last 3 Encounters:   08/19/22 (!) 142/82   08/03/22 (!) 150/90   01/06/22 129/62     Wt Readings from Last 3 Encounters:   08/19/22 0847 93.5 kg (206 lb 2.1 oz)   08/03/22 1511 95.7 kg (210 lb 15.7 oz)   03/29/22 1053 96.4 kg (212 lb 8.4 oz)            Physical Exam  Constitutional:       Appearance: She is ill-appearing (chronically).   HENT:      Head: Normocephalic.      Nose: Nose normal.      Mouth/Throat:      Mouth: Mucous membranes are moist.      Pharynx: No oropharyngeal exudate or posterior oropharyngeal erythema.   Eyes:      General: No scleral icterus.     Extraocular Movements: Extraocular movements intact.      Conjunctiva/sclera: Conjunctivae normal.      Pupils: Pupils are equal, round, and reactive to light.   Neck:      Vascular: No carotid bruit.     Cardiovascular:      Rate and Rhythm: Normal rate and regular rhythm.      Heart sounds: Normal heart sounds.   Pulmonary:      Effort: Pulmonary effort is normal.      Breath sounds: Normal breath sounds.   Abdominal:      Palpations: Abdomen is soft.      Tenderness: There is no abdominal tenderness.   Musculoskeletal:         General: No swelling.      Cervical back: Neck supple.   Lymphadenopathy:      Cervical: No cervical adenopathy.   Skin:     General: Skin is warm and dry.   Neurological:      General: No focal deficit present.      Mental Status: She is alert and oriented to person, place, and time. Mental status is at baseline.      Cranial Nerves: No cranial nerve deficit.       Motor: No weakness.      Gait: Gait normal.   Psychiatric:         Behavior: Behavior normal.         Thought Content: Thought content normal.         Laboratory Reviewed: (Yes)  Lab Results   Component Value Date    WBC 10.55 03/29/2022    HGB 10.0 (L) 03/29/2022    HCT 31.1 (L) 03/29/2022     03/29/2022    CHOL 137 08/26/2021    TRIG 42 08/26/2021    HDL 51 08/26/2021    ALT 10 03/29/2022    AST 13 03/29/2022     03/29/2022    K 3.8 03/29/2022     03/29/2022    CREATININE 0.8 03/29/2022    BUN 11 03/29/2022    CO2 25 03/29/2022    TSH 0.257 (L) 12/15/2020    INR 1.1 03/24/2020    HGBA1C 5.9 (H) 03/29/2022           Health Maintenance  Health Maintenance Topics with due status: Not Due       Topic Last Completion Date    Colorectal Cancer Screening 06/24/2020    Lipid Panel 08/26/2021    Cervical Cancer Screening 09/29/2021    Diabetes Urine Screening 03/29/2022    Hemoglobin A1c 03/29/2022    Influenza Vaccine Not Due     Health Maintenance Due   Topic Date Due    Pneumococcal Vaccines (Age 0-64) (1 - PCV) Never done    Foot Exam  Never done    TETANUS VACCINE  Never done    Low Dose Statin  Never done    Eye Exam  09/21/2021    Mammogram  08/12/2022       Assessment:  Problem List Items Addressed This Visit        Neuro    Nonintractable headache    Relevant Orders    MRI Brain W WO Contrast      Other Visit Diagnoses     PTSD (post-traumatic stress disorder)    -  Primary    Relevant Medications    sertraline (ZOLOFT) 50 MG tablet    Other Relevant Orders    Ambulatory referral/consult to Psychiatry    Muscle spasm        Relevant Orders    MRI Brain W WO Contrast    Anxiety        Relevant Orders    TSH    T4, FREE    Neck mass        Reports chronic, present when recent cervical MRI done. requesting results.         Repeat TSH, free T4. Monitor BP.  Need cervical MRI results, JEN complete.   MRI brain, needs open MRI.   Referral to [facility] for counseling.   Agrees to start sertraline for  anxiety/stress.         Plan:  PTSD (post-traumatic stress disorder)  -     Ambulatory referral/consult to Psychiatry; Future; Expected date: 08/19/2022  -     sertraline (ZOLOFT) 50 MG tablet; Take 1 tablet (50 mg total) by mouth once daily.  Dispense: 30 tablet; Refill: 11    Muscle spasm  -     MRI Brain W WO Contrast; Future; Expected date: 08/19/2022    Nonintractable headache, unspecified chronicity pattern, unspecified headache type  -     MRI Brain W WO Contrast; Future; Expected date: 08/19/2022    Anxiety  -     TSH; Future; Expected date: 08/24/2022  -     T4, FREE; Future; Expected date: 08/24/2022    Neck mass  Comments:  Reports chronic, present when recent cervical MRI done. requesting results.       -Patient's lab results were reviewed and discussed with patient  -Treatment options and alternatives were discussed with the patient. Patient expressed understanding. Patient was given the opportunity to ask questions and be an active participant in their medical care. Patient had no further questions or concerns at this time.   -Documentation of patient's health and condition was obtained from family member who was present during visit.  -Patient is an overall moderate risk for health complications from their medical conditions.       Follow up: Follow up in about 2 weeks (around 9/2/2022).    Addendum: 8/24/22 Spoke to pt. BP better controlled since starting amlodipine.     After visit summary printed and given to patient upon discharge.  Patient goals and care plan are included in After visit summary.    Total medical decision making time was 92 min.  The following issues were discussed: The primary encounter diagnosis was PTSD (post-traumatic stress disorder). Diagnoses of Muscle spasm, Nonintractable headache, unspecified chronicity pattern, unspecified headache type, Anxiety, and Neck mass were also pertinent to this visit.    Health maintenance needs, recent test results and goals of care discussed  with pt and questions answered.

## 2022-08-19 NOTE — PATIENT INSTRUCTIONS
Plan:     I will contact Dr Schmidt regarding blood pressure.     I may order additional labs for elevated blood pressure. Labs to be done next week.     I will order OPEN MRI for your brain.    I have referred you to psychiatry again. I will contact them directly.

## 2022-08-22 ENCOUNTER — TELEPHONE (OUTPATIENT)
Dept: PRIMARY CARE CLINIC | Facility: CLINIC | Age: 47
End: 2022-08-22
Payer: MEDICARE

## 2022-08-23 ENCOUNTER — PROCEDURE VISIT (OUTPATIENT)
Dept: SLEEP MEDICINE | Facility: CLINIC | Age: 47
End: 2022-08-23
Payer: MEDICARE

## 2022-08-23 DIAGNOSIS — G47.33 OSA (OBSTRUCTIVE SLEEP APNEA): ICD-10-CM

## 2022-08-23 PROCEDURE — 95800 PR SLEEP STUDY, UNATTENDED, RECORD HEART RATE/O2 SAT/RESP ANAL/SLEEP TIME: ICD-10-PCS | Mod: 26,,, | Performed by: INTERNAL MEDICINE

## 2022-08-23 PROCEDURE — 95800 SLP STDY UNATTENDED: CPT | Mod: 26,,, | Performed by: INTERNAL MEDICINE

## 2022-08-23 PROCEDURE — 95800 SLP STDY UNATTENDED: CPT

## 2022-08-23 NOTE — Clinical Note
PHYSICIAN INTERPRETATION AND COMMENTS: Clinically significant sleep disordered breathing is not identified; sleep disordered breathing does not explain the excessive daytime sleepiness. Additional testing may be necessary. Consider inlab Polysomnography. CLINICAL HISTORY: 46 year old female presented with: 15 inch neck, BMI of 42.8, an [place] sleepiness score of 14, history of hypertension, diabetes and symptoms of nocturnal waking up choking and witnessed apneas. Based on the clinical history, the patient has a high pre-test probability of having Moderate DEVIKA. SLEEP STUDY FINDINGS: Patient underwent a 1 night Home Sleep Test and by behavioral criteria, slept for approximately 5.08 hours, with a sleep latency of 20 minutes and a sleep efficiency of 72.5%. The patient slept supine 67.8% of the night based on valid recording time of 5.08 hours. Snoring occurs for 13.8% (30 dB) of the study, 3.5% is very loud. TREATMENT CONSIDERATIONS: The high pre-test probability is in

## 2022-08-24 ENCOUNTER — TELEPHONE (OUTPATIENT)
Dept: PRIMARY CARE CLINIC | Facility: CLINIC | Age: 47
End: 2022-08-24
Payer: MEDICARE

## 2022-08-24 NOTE — TELEPHONE ENCOUNTER
Good morning.     Diane wants to know if she can be seen sooner than the first available which is 10/4/2022?

## 2022-08-25 ENCOUNTER — LAB VISIT (OUTPATIENT)
Dept: LAB | Facility: HOSPITAL | Age: 47
End: 2022-08-25
Attending: NURSE PRACTITIONER
Payer: MEDICARE

## 2022-08-25 DIAGNOSIS — E04.1 THYROID NODULE GREATER THAN OR EQUAL TO 1 CM IN DIAMETER INCIDENTALLY NOTED ON IMAGING STUDY: ICD-10-CM

## 2022-08-25 DIAGNOSIS — Z79.4 DIABETES MELLITUS DUE TO UNDERLYING CONDITION WITH COMPLICATION, WITH LONG-TERM CURRENT USE OF INSULIN: ICD-10-CM

## 2022-08-25 DIAGNOSIS — E08.8 DIABETES MELLITUS DUE TO UNDERLYING CONDITION WITH COMPLICATION, WITH LONG-TERM CURRENT USE OF INSULIN: ICD-10-CM

## 2022-08-25 PROCEDURE — 36415 COLL VENOUS BLD VENIPUNCTURE: CPT | Performed by: NURSE PRACTITIONER

## 2022-08-25 PROCEDURE — 83036 HEMOGLOBIN GLYCOSYLATED A1C: CPT | Performed by: NURSE PRACTITIONER

## 2022-08-25 PROCEDURE — 84443 ASSAY THYROID STIM HORMONE: CPT | Performed by: NURSE PRACTITIONER

## 2022-08-26 LAB
ESTIMATED AVG GLUCOSE: 128 MG/DL (ref 68–131)
HBA1C MFR BLD: 6.1 % (ref 4–5.6)
TSH SERPL DL<=0.005 MIU/L-ACNC: 0.46 UIU/ML (ref 0.4–4)

## 2022-08-27 NOTE — PROGRESS NOTES
PHYSICIAN INTERPRETATION AND COMMENTS: Clinically significant sleep disordered breathing is not identified; sleep  disordered breathing does not explain the excessive daytime sleepiness. Additional testing may be necessary. Consider  inlab Polysomnography.  CLINICAL HISTORY: 46 year old female presented with: 15 inch neck, BMI of 42.8, an [place] sleepiness score of 14, history  of hypertension, diabetes and symptoms of nocturnal waking up choking and witnessed apneas. Based on the clinical  history, the patient has a high pre-test probability of having Moderate DEVIKA.  SLEEP STUDY FINDINGS: Patient underwent a 1 night Home Sleep Test and by behavioral criteria, slept for approximately  5.08 hours, with a sleep latency of 20 minutes and a sleep efficiency of 72.5%. The patient slept supine 67.8% of the night  based on valid recording time of 5.08 hours. Snoring occurs for 13.8% (30 dB) of the study, 3.5% is very loud.  TREATMENT CONSIDERATIONS: The high pre-test probability is inconsistent with the AHI severity. Consider further clinical  evaluation.  DISEASE MANAGEMENT CONSIDERATIONS: The patient's complaint of daytime somnolence is not explained by the study.

## 2022-08-29 ENCOUNTER — PATIENT MESSAGE (OUTPATIENT)
Dept: PRIMARY CARE CLINIC | Facility: CLINIC | Age: 47
End: 2022-08-29
Payer: MEDICARE

## 2022-08-29 NOTE — PROCEDURES
Home Sleep Studies    Date/Time: 8/23/2022 8:00 AM  Performed by: Andrew Lea MD  Authorized by: Terra Padgett PA-C     PHYSICIAN INTERPRETATION AND COMMENTS: Clinically significant sleep disordered breathing is not identified; sleep  disordered breathing does not explain the excessive daytime sleepiness. Additional testing may be necessary. Consider  inlab Polysomnography.  CLINICAL HISTORY: 46 year old female presented with: 15 inch neck, BMI of 42.8, an [place] sleepiness score of 14, history  of hypertension, diabetes and symptoms of nocturnal waking up choking and witnessed apneas. Based on the clinical  history, the patient has a high pre-test probability of having Moderate DEVIKA.  SLEEP STUDY FINDINGS: Patient underwent a 1 night Home Sleep Test and by behavioral criteria, slept for approximately  5.08 hours, with a sleep latency of 20 minutes and a sleep efficiency of 72.5%. The patient slept supine 67.8% of the night  based on valid recording time of 5.08 hours. Snoring occurs for 13.8% (30 dB) of the study, 3.5% is very loud.  TREATMENT CONSIDERATIONS: The high pre-test probability is inconsistent with the AHI severity. Consider further clinical  evaluation.  DISEASE MANAGEMENT CONSIDERATIONS: The patient's complaint of daytime somnolence is not explained by the study.

## 2022-08-31 ENCOUNTER — HOSPITAL ENCOUNTER (OUTPATIENT)
Dept: RADIOLOGY | Facility: HOSPITAL | Age: 47
Discharge: HOME | End: 2022-08-31
Attending: NURSE PRACTITIONER
Payer: MEDICARE

## 2022-08-31 VITALS — WEIGHT: 205 LBS | BODY MASS INDEX: 31.07 KG/M2 | HEIGHT: 68 IN

## 2022-08-31 DIAGNOSIS — Z12.31 SCREENING MAMMOGRAM FOR BREAST CANCER: ICD-10-CM

## 2022-08-31 PROCEDURE — 77063 MAMMO DIGITAL SCREENING BILAT WITH TOMO: ICD-10-PCS | Mod: 26,,, | Performed by: RADIOLOGY

## 2022-08-31 PROCEDURE — 77067 SCR MAMMO BI INCL CAD: CPT | Mod: TC

## 2022-08-31 PROCEDURE — 77067 MAMMO DIGITAL SCREENING BILAT WITH TOMO: ICD-10-PCS | Mod: 26,,, | Performed by: RADIOLOGY

## 2022-08-31 PROCEDURE — 77063 BREAST TOMOSYNTHESIS BI: CPT | Mod: TC

## 2022-08-31 PROCEDURE — 77063 BREAST TOMOSYNTHESIS BI: CPT | Mod: 26,,, | Performed by: RADIOLOGY

## 2022-08-31 PROCEDURE — 77067 SCR MAMMO BI INCL CAD: CPT | Mod: 26,,, | Performed by: RADIOLOGY

## 2022-09-01 ENCOUNTER — TELEPHONE (OUTPATIENT)
Dept: INTERNAL MEDICINE | Facility: CLINIC | Age: 47
End: 2022-09-01
Payer: MEDICARE

## 2022-09-01 ENCOUNTER — TELEPHONE (OUTPATIENT)
Dept: PULMONOLOGY | Facility: CLINIC | Age: 47
End: 2022-09-01
Payer: MEDICARE

## 2022-09-01 NOTE — TELEPHONE ENCOUNTER
Spoke with JOHN regarding PA for MRI. Let them know a PA has been started for our team and will call again to see where we are and to let them know. Called [employer] and they told me they will give a call regarding PA today. Waiting on call to call JOHN back to reschedule her.

## 2022-09-01 NOTE — TELEPHONE ENCOUNTER
Spoke with [employer] staff and they instructed someone on the case with contact me to discuss

## 2022-09-02 ENCOUNTER — TELEPHONE (OUTPATIENT)
Dept: PRIMARY CARE CLINIC | Facility: CLINIC | Age: 47
End: 2022-09-02
Payer: MEDICARE

## 2022-09-07 ENCOUNTER — TELEPHONE (OUTPATIENT)
Dept: PULMONOLOGY | Facility: CLINIC | Age: 47
End: 2022-09-07
Payer: MEDICARE

## 2022-09-07 NOTE — TELEPHONE ENCOUNTER
Called pt. No answer, left voicemail that her sleep study results were in. Can she please give us a call back mala schedule diomedes with a different provider or wait until November when hollie comes back.

## 2022-09-08 ENCOUNTER — TELEPHONE (OUTPATIENT)
Dept: INTERNAL MEDICINE | Facility: CLINIC | Age: 47
End: 2022-09-08
Payer: MEDICARE

## 2022-09-08 NOTE — TELEPHONE ENCOUNTER
----- Message from Viviana Russell sent at 9/8/2022  2:56 PM CDT -----  Contact: Self/[phone]  Pt is calling in regards to a missed call from MATILDE Abdi. Please give her a call back at [phone]. Thank you s/g

## 2022-09-16 ENCOUNTER — OFFICE VISIT (OUTPATIENT)
Dept: PULMONOLOGY | Facility: CLINIC | Age: 47
End: 2022-09-16
Payer: MEDICARE

## 2022-09-16 ENCOUNTER — TELEPHONE (OUTPATIENT)
Dept: PULMONOLOGY | Facility: CLINIC | Age: 47
End: 2022-09-16
Payer: MEDICARE

## 2022-09-16 VITALS — WEIGHT: 205 LBS | HEIGHT: 68 IN | BODY MASS INDEX: 31.07 KG/M2

## 2022-09-16 DIAGNOSIS — R06.83 PRIMARY SNORING: ICD-10-CM

## 2022-09-16 DIAGNOSIS — Z86.69 HISTORY OF OBSTRUCTIVE SLEEP APNEA: Primary | ICD-10-CM

## 2022-09-16 DIAGNOSIS — R53.83 FATIGUE, UNSPECIFIED TYPE: ICD-10-CM

## 2022-09-16 DIAGNOSIS — I15.2 HYPERTENSION ASSOCIATED WITH DIABETES: ICD-10-CM

## 2022-09-16 DIAGNOSIS — E11.9 TYPE 2 DIABETES MELLITUS WITHOUT COMPLICATION, WITH LONG-TERM CURRENT USE OF INSULIN: ICD-10-CM

## 2022-09-16 DIAGNOSIS — I10 UNCONTROLLED HYPERTENSION: ICD-10-CM

## 2022-09-16 DIAGNOSIS — E11.59 HYPERTENSION ASSOCIATED WITH DIABETES: ICD-10-CM

## 2022-09-16 DIAGNOSIS — Z79.4 DIABETES MELLITUS DUE TO UNDERLYING CONDITION WITH COMPLICATION, WITH LONG-TERM CURRENT USE OF INSULIN: ICD-10-CM

## 2022-09-16 DIAGNOSIS — E66.1 CLASS 1 DRUG-INDUCED OBESITY WITH SERIOUS COMORBIDITY AND BODY MASS INDEX (BMI) OF 31.0 TO 31.9 IN ADULT: ICD-10-CM

## 2022-09-16 DIAGNOSIS — Z79.4 TYPE 2 DIABETES MELLITUS WITHOUT COMPLICATION, WITH LONG-TERM CURRENT USE OF INSULIN: ICD-10-CM

## 2022-09-16 DIAGNOSIS — G47.30 SLEEP-DISORDERED BREATHING: ICD-10-CM

## 2022-09-16 DIAGNOSIS — F19.982 DRUG INDUCED INSOMNIA: ICD-10-CM

## 2022-09-16 DIAGNOSIS — K21.9 GASTROESOPHAGEAL REFLUX DISEASE WITHOUT ESOPHAGITIS: ICD-10-CM

## 2022-09-16 DIAGNOSIS — E08.8 DIABETES MELLITUS DUE TO UNDERLYING CONDITION WITH COMPLICATION, WITH LONG-TERM CURRENT USE OF INSULIN: ICD-10-CM

## 2022-09-16 PROBLEM — E66.811 CLASS 1 DRUG-INDUCED OBESITY WITH SERIOUS COMORBIDITY AND BODY MASS INDEX (BMI) OF 31.0 TO 31.9 IN ADULT: Status: ACTIVE | Noted: 2022-09-16

## 2022-09-16 PROCEDURE — 3060F PR POS MICROALBUMINURIA RESULT DOCUMENTED/REVIEW: ICD-10-PCS | Mod: CPTII,95,, | Performed by: NURSE PRACTITIONER

## 2022-09-16 PROCEDURE — 3060F POS MICROALBUMINURIA REV: CPT | Mod: CPTII,95,, | Performed by: NURSE PRACTITIONER

## 2022-09-16 PROCEDURE — 3008F PR BODY MASS INDEX (BMI) DOCUMENTED: ICD-10-PCS | Mod: CPTII,95,, | Performed by: NURSE PRACTITIONER

## 2022-09-16 PROCEDURE — 1159F PR MEDICATION LIST DOCUMENTED IN MEDICAL RECORD: ICD-10-PCS | Mod: CPTII,95,, | Performed by: NURSE PRACTITIONER

## 2022-09-16 PROCEDURE — 3066F PR DOCUMENTATION OF TREATMENT FOR NEPHROPATHY: ICD-10-PCS | Mod: CPTII,95,, | Performed by: NURSE PRACTITIONER

## 2022-09-16 PROCEDURE — 1160F PR REVIEW ALL MEDS BY PRESCRIBER/CLIN PHARMACIST DOCUMENTED: ICD-10-PCS | Mod: CPTII,95,, | Performed by: NURSE PRACTITIONER

## 2022-09-16 PROCEDURE — 3008F BODY MASS INDEX DOCD: CPT | Mod: CPTII,95,, | Performed by: NURSE PRACTITIONER

## 2022-09-16 PROCEDURE — 99214 PR OFFICE/OUTPT VISIT, EST, LEVL IV, 30-39 MIN: ICD-10-PCS | Mod: 95,,, | Performed by: NURSE PRACTITIONER

## 2022-09-16 PROCEDURE — 1160F RVW MEDS BY RX/DR IN RCRD: CPT | Mod: CPTII,95,, | Performed by: NURSE PRACTITIONER

## 2022-09-16 PROCEDURE — 3044F PR MOST RECENT HEMOGLOBIN A1C LEVEL <7.0%: ICD-10-PCS | Mod: CPTII,95,, | Performed by: NURSE PRACTITIONER

## 2022-09-16 PROCEDURE — 99499 UNLISTED E&M SERVICE: CPT | Mod: HCNC,95,, | Performed by: NURSE PRACTITIONER

## 2022-09-16 PROCEDURE — 1159F MED LIST DOCD IN RCRD: CPT | Mod: CPTII,95,, | Performed by: NURSE PRACTITIONER

## 2022-09-16 PROCEDURE — 3066F NEPHROPATHY DOC TX: CPT | Mod: CPTII,95,, | Performed by: NURSE PRACTITIONER

## 2022-09-16 PROCEDURE — 99214 OFFICE O/P EST MOD 30 MIN: CPT | Mod: 95,,, | Performed by: NURSE PRACTITIONER

## 2022-09-16 PROCEDURE — 3044F HG A1C LEVEL LT 7.0%: CPT | Mod: CPTII,95,, | Performed by: NURSE PRACTITIONER

## 2022-09-16 RX ORDER — ATORVASTATIN CALCIUM 10 MG/1
TABLET, FILM COATED ORAL
COMMUNITY
Start: 2022-06-28

## 2022-09-16 NOTE — ASSESSMENT & PLAN NOTE
Managed by primary care provider  Hemoglobin A1C   Date Value Ref Range Status   08/25/2022 6.1 (H) 4.0 - 5.6 % Final     Comment:     ADA Screening Guidelines:  5.7-6.4%  Consistent with prediabetes  >or=6.5%  Consistent with diabetes    High levels of fetal hemoglobin interfere with the HbA1C  assay. Heterozygous hemoglobin variants (HbS, HgC, etc)do  not significantly interfere with this assay.   However, presence of multiple variants may affect accuracy.     03/29/2022 5.9 (H) 4.0 - 5.6 % Final     Comment:     ADA Screening Guidelines:  5.7-6.4%  Consistent with prediabetes  >or=6.5%  Consistent with diabetes    High levels of fetal hemoglobin interfere with the HbA1C  assay. Heterozygous hemoglobin variants (HbS, HgC, etc)do  not significantly interfere with this assay.   However, presence of multiple variants may affect accuracy.     12/15/2021 5.0 4.0 - 5.6 % Final     Comment:     ADA Screening Guidelines:  5.7-6.4%  Consistent with prediabetes  >or=6.5%  Consistent with diabetes    High levels of fetal hemoglobin interfere with the HbA1C  assay. Heterozygous hemoglobin variants (HbS, HgC, etc)do  not significantly interfere with this assay.   However, presence of multiple variants may affect accuracy.

## 2022-09-16 NOTE — ASSESSMENT & PLAN NOTE
08/18/2022 no obstructive sleep apnea AHI 0.0. primary snoring detected.    History of DEVIKA on CPAP, 10 years ago  Did not like mask  Sleep study at [place] [facility]    At intermediate risk obstructive sleep apnea uncontrolled HTN, proceed with additional testing in lab [facility].

## 2022-09-16 NOTE — ASSESSMENT & PLAN NOTE
Steroid induced  Wt Readings from Last 9 Encounters:   09/16/22 93 kg (205 lb)   08/31/22 93 kg (205 lb 0.4 oz)   08/19/22 93.5 kg (206 lb 2.1 oz)   08/03/22 95.7 kg (210 lb 15.7 oz)   03/29/22 96.4 kg (212 lb 8.4 oz)   12/15/21 97.9 kg (215 lb 13.3 oz)   09/29/21 100.7 kg (222 lb 0.1 oz)   08/24/21 102.2 kg (225 lb 5 oz)   03/24/21 96 kg (211 lb 10.3 oz)   Body mass index is 31.17 kg/m².

## 2022-09-19 NOTE — TELEPHONE ENCOUNTER
----- Message from Arline Flores sent at 9/2/2022  8:29 AM CDT -----  Contact: Miles ([facility] [place])  Miles would  like a call back at [phone],in regards pt authorization. She states that it was approved for the wrong location. They need it to be resent including their facility. (fax# [phone])    
Contacted insurance company and corrected place of service for MRI. Authorization updated to [facility]. Authorization faxed to facility.   
Name band;

## 2022-09-23 ENCOUNTER — TELEPHONE (OUTPATIENT)
Dept: PRIMARY CARE CLINIC | Facility: CLINIC | Age: 47
End: 2022-09-23
Payer: MEDICARE

## 2022-09-28 DIAGNOSIS — E11.9 TYPE 2 DIABETES MELLITUS WITHOUT COMPLICATION: ICD-10-CM

## 2022-11-25 ENCOUNTER — NURSE TRIAGE (OUTPATIENT)
Dept: ADMINISTRATIVE | Facility: CLINIC | Age: 47
End: 2022-11-25
Payer: MEDICARE

## 2022-11-25 ENCOUNTER — HOSPITAL ENCOUNTER (EMERGENCY)
Facility: HOSPITAL | Age: 47
Discharge: HOME | End: 2022-11-26
Attending: EMERGENCY MEDICINE
Payer: MEDICARE

## 2022-11-25 DIAGNOSIS — I10 PRIMARY HYPERTENSION: ICD-10-CM

## 2022-11-25 DIAGNOSIS — K52.9 COLITIS: Primary | ICD-10-CM

## 2022-11-25 LAB
ALBUMIN SERPL BCP-MCNC: 4.1 G/DL (ref 3.5–5.2)
ALP SERPL-CCNC: 62 U/L (ref 55–135)
ALT SERPL W/O P-5'-P-CCNC: 8 U/L (ref 10–44)
ANION GAP SERPL CALC-SCNC: 12 MMOL/L (ref 8–16)
AST SERPL-CCNC: 13 U/L (ref 10–40)
BASOPHILS # BLD AUTO: 0.03 K/UL (ref 0–0.2)
BASOPHILS NFR BLD: 0.3 % (ref 0–1.9)
BILIRUB SERPL-MCNC: 0.7 MG/DL (ref 0.1–1)
BUN SERPL-MCNC: 7 MG/DL (ref 6–20)
CALCIUM SERPL-MCNC: 9.3 MG/DL (ref 8.7–10.5)
CHLORIDE SERPL-SCNC: 110 MMOL/L (ref 95–110)
CO2 SERPL-SCNC: 20 MMOL/L (ref 23–29)
CREAT SERPL-MCNC: 0.7 MG/DL (ref 0.5–1.4)
DIFFERENTIAL METHOD: ABNORMAL
EOSINOPHIL # BLD AUTO: 0.1 K/UL (ref 0–0.5)
EOSINOPHIL NFR BLD: 0.8 % (ref 0–8)
ERYTHROCYTE [DISTWIDTH] IN BLOOD BY AUTOMATED COUNT: 13.2 % (ref 11.5–14.5)
EST. GFR  (NO RACE VARIABLE): >60 ML/MIN/1.73 M^2
GLUCOSE SERPL-MCNC: 114 MG/DL (ref 70–110)
HCT VFR BLD AUTO: 30.6 % (ref 37–48.5)
HGB BLD-MCNC: 10.1 G/DL (ref 12–16)
IMM GRANULOCYTES # BLD AUTO: 0.03 K/UL (ref 0–0.04)
IMM GRANULOCYTES NFR BLD AUTO: 0.3 % (ref 0–0.5)
LYMPHOCYTES # BLD AUTO: 2.8 K/UL (ref 1–4.8)
LYMPHOCYTES NFR BLD: 23.5 % (ref 18–48)
MCH RBC QN AUTO: 27.2 PG (ref 27–31)
MCHC RBC AUTO-ENTMCNC: 33 G/DL (ref 32–36)
MCV RBC AUTO: 83 FL (ref 82–98)
MONOCYTES # BLD AUTO: 0.6 K/UL (ref 0.3–1)
MONOCYTES NFR BLD: 5.1 % (ref 4–15)
NEUTROPHILS # BLD AUTO: 8.2 K/UL (ref 1.8–7.7)
NEUTROPHILS NFR BLD: 70 % (ref 38–73)
NRBC BLD-RTO: 0 /100 WBC
PLATELET # BLD AUTO: 256 K/UL (ref 150–450)
PMV BLD AUTO: 11.1 FL (ref 9.2–12.9)
POTASSIUM SERPL-SCNC: 3.5 MMOL/L (ref 3.5–5.1)
PROT SERPL-MCNC: 7.5 G/DL (ref 6–8.4)
RBC # BLD AUTO: 3.71 M/UL (ref 4–5.4)
SODIUM SERPL-SCNC: 142 MMOL/L (ref 136–145)
WBC # BLD AUTO: 11.68 K/UL (ref 3.9–12.7)

## 2022-11-25 PROCEDURE — 99285 EMERGENCY DEPT VISIT HI MDM: CPT | Mod: 25

## 2022-11-25 PROCEDURE — 96375 TX/PRO/DX INJ NEW DRUG ADDON: CPT

## 2022-11-25 PROCEDURE — 96361 HYDRATE IV INFUSION ADD-ON: CPT

## 2022-11-25 PROCEDURE — 80053 COMPREHEN METABOLIC PANEL: CPT | Performed by: EMERGENCY MEDICINE

## 2022-11-25 PROCEDURE — 63600175 PHARM REV CODE 636 W HCPCS: Performed by: EMERGENCY MEDICINE

## 2022-11-25 PROCEDURE — 85025 COMPLETE CBC W/AUTO DIFF WBC: CPT | Performed by: EMERGENCY MEDICINE

## 2022-11-25 PROCEDURE — 25000003 PHARM REV CODE 250: Performed by: EMERGENCY MEDICINE

## 2022-11-25 PROCEDURE — 96374 THER/PROPH/DIAG INJ IV PUSH: CPT

## 2022-11-25 RX ORDER — DIPHENHYDRAMINE HYDROCHLORIDE 50 MG/ML
25 INJECTION INTRAMUSCULAR; INTRAVENOUS
Status: COMPLETED | OUTPATIENT
Start: 2022-11-25 | End: 2022-11-25

## 2022-11-25 RX ORDER — MORPHINE SULFATE 4 MG/ML
4 INJECTION, SOLUTION INTRAMUSCULAR; INTRAVENOUS
Status: COMPLETED | OUTPATIENT
Start: 2022-11-25 | End: 2022-11-25

## 2022-11-25 RX ORDER — ONDANSETRON 2 MG/ML
8 INJECTION INTRAMUSCULAR; INTRAVENOUS
Status: COMPLETED | OUTPATIENT
Start: 2022-11-25 | End: 2022-11-25

## 2022-11-25 RX ORDER — ERGOCALCIFEROL 1.25 MG/1
50000 CAPSULE ORAL
COMMUNITY
Start: 2022-07-06 | End: 2023-07-06

## 2022-11-25 RX ADMIN — MORPHINE SULFATE 4 MG: 4 INJECTION INTRAVENOUS at 11:11

## 2022-11-25 RX ADMIN — IOHEXOL 100 ML: 350 INJECTION, SOLUTION INTRAVENOUS at 11:11

## 2022-11-25 RX ADMIN — ONDANSETRON 8 MG: 2 INJECTION INTRAMUSCULAR; INTRAVENOUS at 10:11

## 2022-11-25 RX ADMIN — DIPHENHYDRAMINE HYDROCHLORIDE 25 MG: 50 INJECTION, SOLUTION INTRAMUSCULAR; INTRAVENOUS at 11:11

## 2022-11-25 RX ADMIN — SODIUM CHLORIDE 1000 ML: 0.9 INJECTION, SOLUTION INTRAVENOUS at 10:11

## 2022-11-26 VITALS
SYSTOLIC BLOOD PRESSURE: 157 MMHG | RESPIRATION RATE: 16 BRPM | BODY MASS INDEX: 31.07 KG/M2 | HEART RATE: 81 BPM | DIASTOLIC BLOOD PRESSURE: 88 MMHG | OXYGEN SATURATION: 99 % | TEMPERATURE: 98 F | HEIGHT: 68 IN | WEIGHT: 205 LBS

## 2022-11-26 LAB
BACTERIA #/AREA URNS HPF: ABNORMAL /HPF
BILIRUB UR QL STRIP: NEGATIVE
CLARITY UR: ABNORMAL
COLOR UR: YELLOW
GLUCOSE UR QL STRIP: NEGATIVE
HGB UR QL STRIP: NEGATIVE
KETONES UR QL STRIP: NEGATIVE
LEUKOCYTE ESTERASE UR QL STRIP: ABNORMAL
MICROSCOPIC COMMENT: ABNORMAL
NITRITE UR QL STRIP: NEGATIVE
PH UR STRIP: 7 [PH] (ref 5–8)
PROT UR QL STRIP: NEGATIVE
RBC #/AREA URNS HPF: 2 /HPF (ref 0–4)
SP GR UR STRIP: 1.01 (ref 1–1.03)
SQUAMOUS #/AREA URNS HPF: 10 /HPF
URN SPEC COLLECT METH UR: ABNORMAL
UROBILINOGEN UR STRIP-ACNC: NEGATIVE EU/DL
WBC #/AREA URNS HPF: 33 /HPF (ref 0–5)

## 2022-11-26 PROCEDURE — 87088 URINE BACTERIA CULTURE: CPT | Performed by: EMERGENCY MEDICINE

## 2022-11-26 PROCEDURE — 87077 CULTURE AEROBIC IDENTIFY: CPT | Performed by: EMERGENCY MEDICINE

## 2022-11-26 PROCEDURE — 87086 URINE CULTURE/COLONY COUNT: CPT | Performed by: EMERGENCY MEDICINE

## 2022-11-26 PROCEDURE — 87186 SC STD MICRODIL/AGAR DIL: CPT | Performed by: EMERGENCY MEDICINE

## 2022-11-26 PROCEDURE — 81000 URINALYSIS NONAUTO W/SCOPE: CPT | Performed by: EMERGENCY MEDICINE

## 2022-11-26 PROCEDURE — 25500020 PHARM REV CODE 255: Performed by: EMERGENCY MEDICINE

## 2022-11-26 RX ORDER — CIPROFLOXACIN 500 MG/1
500 TABLET ORAL 2 TIMES DAILY
Qty: 14 TABLET | Refills: 0 | Status: SHIPPED | OUTPATIENT
Start: 2022-11-26 | End: 2023-07-14

## 2022-11-26 RX ORDER — METRONIDAZOLE 500 MG/1
500 TABLET ORAL 3 TIMES DAILY
Qty: 21 TABLET | Refills: 0 | Status: SHIPPED | OUTPATIENT
Start: 2022-11-26 | End: 2022-12-03

## 2022-11-26 RX ORDER — ONDANSETRON 4 MG/1
4 TABLET, FILM COATED ORAL EVERY 8 HOURS PRN
Qty: 12 TABLET | Refills: 0 | Status: SHIPPED | OUTPATIENT
Start: 2022-11-26 | End: 2023-12-22 | Stop reason: SDUPTHER

## 2022-11-26 NOTE — ED PROVIDER NOTES
SCRIBE #1 NOTE: I, Nhan Lama, am scribing for, and in the presence of, Jimmy Mcgowan MD. I have scribed the entire note.       History     Chief Complaint   Patient presents with    Headache     Mult complaints- NVD, dizziness, reports blood in stool, pt was given 2.5 droperidol     Review of patient's allergies indicates:   Allergen Reactions    Azathioprine Nausea And Vomiting     Nausea, vomiting, diarrhea dose and early in the course of therapy    Olmesartan Shortness Of Breath    Oxycodone Itching         History of Present Illness     HPI    11/25/2022, 10:43 PM  History obtained from the patient      History of Present Illness: Marilou Daniel is a 47 y.o. female patient with a PMHx of DM, anemia, and HTN who presents to the Emergency Department for evaluation of HA which onset gradually since 2:00 pm today. Pt has had difficulty taking meds and has been producing mostly blood when trying to pass stool. The pt was administered 2.5 droperidol in EMS escort. Symptoms are constant and moderate in severity. No mitigating or exacerbating factors reported. Associated sxs include blood in stool, abd pain, dizziness, and NVD. Patient denies any weakness, CP, SOB, congestion, and all other sxs at this time. No blood thinners No further complaints or concerns at this time.       Arrival mode: Ambulance Services    PCP: Diane Dominguez NP        Past Medical History:  Past Medical History:   Diagnosis Date    Anemia     Arthritis     Connective tissue disease 1999    COVID-19 in immunocompromised patient 02/05/2021    Diabetes mellitus     Gastroesophageal reflux disease 03/23/2020    Hypertension     Lupus 1999    Situational anxiety 08/24/2021    Thyroid nodule greater than or equal to 1 cm in diameter incidentally noted on imaging study 11/19/2020    Vasculitis        Past Surgical History:  Past Surgical History:   Procedure Laterality Date    ABLATION      COLONOSCOPY N/A 6/24/2020    Procedure:  COLONOSCOPY;  Surgeon: Nevin Penny MD;  Location: [facility];  Service: Endoscopy;  Laterality: N/A;    ESOPHAGOGASTRODUODENOSCOPY N/A 6/24/2020    Procedure: ESOPHAGOGASTRODUODENOSCOPY (EGD);  Surgeon: Nevin Penny MD;  Location: [facility];  Service: Endoscopy;  Laterality: N/A;    RIGHT HEART CATHETERIZATION      TUBAL LIGATION      WRIST SURGERY Bilateral          Family History:  Family History   Problem Relation Age of Onset    Breast cancer Mother     Breast cancer Sister     Breast cancer Sister        Social History:  Social History     Tobacco Use    Smoking status: Never    Smokeless tobacco: Never   Substance and Sexual Activity    Alcohol use: Never    Drug use: Never    Sexual activity: Yes     Partners: Male     Birth control/protection: See Surgical Hx        Review of Systems     Review of Systems   Constitutional:  Negative for chills and fever.   HENT:  Negative for congestion and sore throat.    Respiratory:  Negative for shortness of breath.    Cardiovascular:  Negative for chest pain.   Gastrointestinal:  Positive for abdominal pain, blood in stool, diarrhea, nausea and vomiting.   Genitourinary:  Negative for dysuria.   Musculoskeletal:  Negative for back pain.   Skin:  Negative for rash.   Neurological:  Positive for dizziness and headaches. Negative for weakness.   Hematological:  Does not bruise/bleed easily.   All other systems reviewed and are negative.     Physical Exam     Initial Vitals [11/25/22 2103]   BP Pulse Resp Temp SpO2   (!) 148/78 82 (!) 22 98.5 °F (36.9 °C) 95 %      MAP       --          Physical Exam  Nursing Notes and Vital Signs Reviewed.  Constitutional: Patient is in no acute distress. Well-developed and well-nourished.  Head: Atraumatic. Normocephalic.  Eyes: PERRL. EOM intact. Conjunctivae are not pale. No scleral icterus.  ENT: Mucous membranes are moist. Oropharynx is clear and symmetric.    Neck: Supple. Full ROM. No lymphadenopathy.  Cardiovascular:  "Regular rate. Regular rhythm. No murmurs, rubs, or gallops. Distal pulses are 2+ and symmetric.  Pulmonary/Chest: No respiratory distress. Clear to auscultation bilaterally. No wheezing or rales.  Abdominal: Soft and non-distended.  LLQ tenderness.  No rebound, guarding, or rigidity. Good bowel sounds.  Genitourinary: No CVA tenderness  Musculoskeletal: Moves all extremities. No obvious deformities. No edema. No calf tenderness.  Skin: Warm and dry.  Neurological:  Alert, awake, and appropriate.  Normal speech.  No acute focal neurological deficits are appreciated.  Psychiatric: Normal affect. Good eye contact. Appropriate in content.     ED Course   Procedures  ED Vital Signs:  Vitals:    11/25/22 2103 11/25/22 2215 11/25/22 2315 11/25/22 2324   BP: (!) 148/78 (!) 165/87 (!) 155/81    Pulse: 82 78 82    Resp: (!) 22 (!) 25 (!) 32 18   Temp: 98.5 °F (36.9 °C)      TempSrc: Oral      SpO2: 95% 100% 100%    Weight: 93 kg (205 lb)      Height: 5' 8" (1.727 m)       11/26/22 0015 11/26/22 0045 11/26/22 0122   BP: (!) 150/70 (!) 157/81 (!) 157/88   Pulse: 72 70 81   Resp: 19 (!) 25 16   Temp:   97.8 °F (36.6 °C)   TempSrc:      SpO2: 99% 100% 99%   Weight:      Height:          Abnormal Lab Results:  Labs Reviewed   CBC W/ AUTO DIFFERENTIAL - Abnormal; Notable for the following components:       Result Value    RBC 3.71 (*)     Hemoglobin 10.1 (*)     Hematocrit 30.6 (*)     Gran # (ANC) 8.2 (*)     All other components within normal limits   COMPREHENSIVE METABOLIC PANEL - Abnormal; Notable for the following components:    CO2 20 (*)     Glucose 114 (*)     ALT 8 (*)     All other components within normal limits   URINALYSIS, REFLEX TO URINE CULTURE - Abnormal; Notable for the following components:    Appearance, UA Hazy (*)     Leukocytes, UA 3+ (*)     All other components within normal limits    Narrative:     Specimen Source->Urine   URINALYSIS MICROSCOPIC - Abnormal; Notable for the following components:    WBC, UA " 33 (*)     All other components within normal limits    Narrative:     Specimen Source->Urine   CULTURE, URINE        All Lab Results:  Results for orders placed or performed during the hospital encounter of 11/25/22   CBC auto differential   Result Value Ref Range    WBC 11.68 3.90 - 12.70 K/uL    RBC 3.71 (L) 4.00 - 5.40 M/uL    Hemoglobin 10.1 (L) 12.0 - 16.0 g/dL    Hematocrit 30.6 (L) 37.0 - 48.5 %    MCV 83 82 - 98 fL    MCH 27.2 27.0 - 31.0 pg    MCHC 33.0 32.0 - 36.0 g/dL    RDW 13.2 11.5 - 14.5 %    Platelets 256 150 - 450 K/uL    MPV 11.1 9.2 - 12.9 fL    Immature Granulocytes 0.3 0.0 - 0.5 %    Gran # (ANC) 8.2 (H) 1.8 - 7.7 K/uL    Immature Grans (Abs) 0.03 0.00 - 0.04 K/uL    Lymph # 2.8 1.0 - 4.8 K/uL    Mono # 0.6 0.3 - 1.0 K/uL    Eos # 0.1 0.0 - 0.5 K/uL    Baso # 0.03 0.00 - 0.20 K/uL    nRBC 0 0 /100 WBC    Gran % 70.0 38.0 - 73.0 %    Lymph % 23.5 18.0 - 48.0 %    Mono % 5.1 4.0 - 15.0 %    Eosinophil % 0.8 0.0 - 8.0 %    Basophil % 0.3 0.0 - 1.9 %    Differential Method Automated    Comprehensive metabolic panel   Result Value Ref Range    Sodium 142 136 - 145 mmol/L    Potassium 3.5 3.5 - 5.1 mmol/L    Chloride 110 95 - 110 mmol/L    CO2 20 (L) 23 - 29 mmol/L    Glucose 114 (H) 70 - 110 mg/dL    BUN 7 6 - 20 mg/dL    Creatinine 0.7 0.5 - 1.4 mg/dL    Calcium 9.3 8.7 - 10.5 mg/dL    Total Protein 7.5 6.0 - 8.4 g/dL    Albumin 4.1 3.5 - 5.2 g/dL    Total Bilirubin 0.7 0.1 - 1.0 mg/dL    Alkaline Phosphatase 62 55 - 135 U/L    AST 13 10 - 40 U/L    ALT 8 (L) 10 - 44 U/L    Anion Gap 12 8 - 16 mmol/L    eGFR >60 >60 mL/min/1.73 m^2   Urinalysis, Reflex to Urine Culture Urine, Clean Catch    Specimen: Urine   Result Value Ref Range    Specimen UA Urine, Clean Catch     Color, UA Yellow Yellow, Straw, Shala    Appearance, UA Hazy (A) Clear    pH, UA 7.0 5.0 - 8.0    Specific Gravity, UA 1.010 1.005 - 1.030    Protein, UA Negative Negative    Glucose, UA Negative Negative    Ketones, UA Negative  Negative    Bilirubin (UA) Negative Negative    Occult Blood UA Negative Negative    Nitrite, UA Negative Negative    Urobilinogen, UA Negative <2.0 EU/dL    Leukocytes, UA 3+ (A) Negative   Urinalysis Microscopic   Result Value Ref Range    RBC, UA 2 0 - 4 /hpf    WBC, UA 33 (H) 0 - 5 /hpf    Bacteria Occasional None-Occ /hpf    Squam Epithel, UA 10 /hpf    Microscopic Comment SEE COMMENT         Imaging Results:  Imaging Results              CT Abdomen Pelvis With Contrast (Final result)  Result time 11/25/22 23:51:59      Final result by Wanda Braxton MD (11/25/22 23:51:59)                   Impression:      Mucosal thickening of the left colon with pericolonic increased vascularity consistent with segmental colitis.    Fatty infiltration of liver    Sigmoid diverticulosis without evidence for diverticulitis    All CT scans at this facility use dose modulation, iterative reconstruction, and/or weight based dosing when appropriate to reduce radiation dose to as low as reasonably achievable.      Electronically signed by: Fox Muñoz  Date:    11/25/2022  Time:    23:51               Narrative:    EXAMINATION:  CT ABDOMEN PELVIS WITH CONTRAST    CLINICAL HISTORY:  LLQ abdominal pain;    TECHNIQUE:  Low dose axial images, sagittal and coronal reformations were obtained from the lung bases to the pubic symphysis following the IV administration of 100 mL of Omnipaque 350.    COMPARISON:  None    FINDINGS:  Heart: Normal size as far as seen. No effusion as far as seen.    Lung Bases: Linear scarring.    Liver: Fatty infiltration of the liver.  No focal lesions.    Gallbladder: No calcified gallstones.    Bile Ducts: No dilatation.    Pancreas: No mass. No peripancreatic fat stranding.    Spleen: Normal.    Adrenals: Normal.    Kidneys/Ureters: Normal enhancement.  No mass or  hydroureteronephrosis.    Bladder: No wall thickening.    Reproductive organs: Normal.    GI Tract/Mesentery: No bowel obstruction.  Mucosal  thickening of the left colon consistent with colitis.  Mild pericolonic increased vascularity.  No evidence of acute appendicitis.  Normal appendix.  Colonic diverticulosis    Peritoneal Space: No ascites or free air.    Retroperitoneum: No significant adenopathy.    Abdominal wall: Normal.    Vasculature: No aneurysm.    Bones: No acute fracture. No suspicious lytic or sclerotic lesions.                                                  The Emergency Provider reviewed the vital signs and test results, which are outlined above.     ED Discussion       12:47 AM: Reassessed pt at this time.   Discussed with pt all pertinent ED information and results. Discussed pt dx and plan of tx. Gave pt all f/u and return to the ED instructions. All questions and concerns were addressed at this time. Pt expresses understanding of information and instructions, and is comfortable with plan to discharge. Pt is stable for discharge.    I discussed with patient and/or family/caretaker that evaluation in the ED does not suggest any emergent or life threatening medical conditions requiring immediate intervention beyond what was provided in the ED, and I believe patient is safe for discharge.  Regardless, an unremarkable evaluation in the ED does not preclude the development or presence of a serious of life threatening condition. As such, patient was instructed to return immediately for any worsening or change in current symptoms.        Medical Decision Making:   Clinical Tests:   Lab Tests: Ordered and Reviewed  Radiological Study: Ordered and Reviewed         ED Medication(s):  Medications   sodium chloride 0.9% bolus 1,000 mL (0 mLs Intravenous Stopped 11/26/22 0047)   ondansetron injection 8 mg (8 mg Intravenous Given 11/25/22 2240)   morphine injection 4 mg (4 mg Intravenous Given 11/25/22 2324)   iohexoL (OMNIPAQUE 350) injection 100 mL (100 mLs Intravenous Given 11/25/22 2343)   diphenhydrAMINE injection 25 mg (25 mg Intravenous  Given 11/25/22 9810)       Discharge Medication List as of 11/26/2022 12:46 AM        START taking these medications    Details   ciprofloxacin HCl (CIPRO) 500 MG tablet Take 1 tablet (500 mg total) by mouth 2 (two) times daily., Starting Sat 11/26/2022, Print      metroNIDAZOLE (FLAGYL) 500 MG tablet Take 1 tablet (500 mg total) by mouth 3 (three) times daily. for 7 days, Starting Sat 11/26/2022, Until Sat 12/3/2022, Print      ondansetron (ZOFRAN) 4 MG tablet Take 1 tablet (4 mg total) by mouth every 8 (eight) hours as needed for Nausea., Starting Sat 11/26/2022, Print              Follow-up Information       Diane Dominguez NP In 2 days.    Specialty: Family Medicine  Contact information:  [address]  [facility] [address] [phone]               [facility] - Emergency Dept..    Specialty: Emergency Medicine  Why: As needed, If symptoms worsen  Contact information:  [address] [facility] [address] [phone]                               Scribe Attestation:   Scribe #1: I performed the above scribed service and the documentation accurately describes the services I performed. I attest to the accuracy of the note.     Attending:   Physician Attestation Statement for Scribe #1: I, Jimmy Mcgowan MD, personally performed the services described in this documentation, as scribed by Nhan Lama, in my presence, and it is both accurate and complete.           Clinical Impression       ICD-10-CM ICD-9-CM   1. Colitis  K52.9 558.9   2. Primary hypertension  I10 401.9       Disposition:   Disposition: Discharged  Condition: Stable      Jimmy Mcgowan MD  11/26/22 8132

## 2022-11-26 NOTE — TELEPHONE ENCOUNTER
Pt states already spoke to another triager and was advised to call EMS. No further questions at this time.   Reason for Disposition   Caller has already spoken with another triager and has no further questions.    Protocols used: No Contact or Duplicate Contact Call-A-AH

## 2022-11-26 NOTE — TELEPHONE ENCOUNTER
Reason for Disposition   Shock suspected (e.g., cold/pale/clammy skin, too weak to stand, low BP, rapid pulse)    Protocols used: Rectal Bleeding-A-AH  LM at 743pm at [phone]  Pt states she started with vomiting this am, then diarrhea. Cant hold down any meds. /98. three hours ago BM bloody. hx lupus. Pt admits she is too weak to stand on her own. Rec EMS. Pt agrees.

## 2022-11-28 LAB — BACTERIA UR CULT: ABNORMAL

## 2022-11-28 NOTE — PROGRESS NOTES
Marilou Daniel  11/29/2022  [id number]    Diane Dominguez NP  Patient Care Team:  Diane Dominguez NP as PCP - General (Family Medicine)  Joe Yo RD as Dietitian (Endocrinology)  Liza Moon RD, CDE as Dietitian (Diabetes)      [facility] Primary Care Note      Chief Complaint:  Chief Complaint   Patient presents with    Hospital f/u        History of Present Illness:  HPI    ER follow up. Seen in ER 11/25 for colitis. Evident on CT. Tx with morphine, PO Cipro and metronidazole.     Took Linzess Thursday PM, diarrhea next AM. To ER. N/V/D, liquid, hematochezia. HTN, not able to take meds. Started abx Sunday on empty stomach, then vomiting. Last night eating ice, grits this AM. Did not start abx. LBM last soft, formed, no blood. Still some abdominal pain. No nausea today. Zofran last night. No fever.     Steroid free several months.     Rheum 10/28: Labs, continue POC, f/u in 3 mos.   Pulm 9/16: polysomnogram ordered.   Neuro? 9/2: Migraine. Record n/a.  LOV 8/19: Facial twitching, stress. Referred to psychiatry/LCSW. Rx sertraline for anxiety. Open MRI for growth to neck. Dr Horan?     BP not controlled. Still using clonidine BID.     Sleeps fairly well with Ambien, muscle relaxer. Still sees pain mgmt for AVN pain.     Ortho - [facility]. Considering hip replacement, aquatic therapy at [facility].     Describes ankle swelling when taking amlodipine    Pointing to lower back pain and describing lower back pain - cloudy appearing urine    Blood glucose noted in 100s in the AM      Review of Systems   Constitutional:  Negative for chills, fever and weight loss.   Respiratory:  Negative for cough and shortness of breath.    Gastrointestinal:  Positive for abdominal pain, blood in stool, constipation, diarrhea, nausea and vomiting.   Musculoskeletal:  Positive for joint pain and myalgias.   Psychiatric/Behavioral:  Positive for depression. The patient is nervous/anxious.        The following were  reviewed: Active problem list, medication list, allergies, family history, social history, and Health Maintenance.     History:  Past Medical History:   Diagnosis Date    Anemia     Arthritis     Connective tissue disease 1999    COVID-19 in immunocompromised patient 02/05/2021    Diabetes mellitus     Gastroesophageal reflux disease 03/23/2020    Hypertension     Lupus 1999    Situational anxiety 08/24/2021    Thyroid nodule greater than or equal to 1 cm in diameter incidentally noted on imaging study 11/19/2020    Vasculitis      Past Surgical History:   Procedure Laterality Date    ABLATION      COLONOSCOPY N/A 6/24/2020    Procedure: COLONOSCOPY;  Surgeon: Nevin Penny MD;  Location: [facility] ENDO;  Service: Endoscopy;  Laterality: N/A;    ESOPHAGOGASTRODUODENOSCOPY N/A 6/24/2020    Procedure: ESOPHAGOGASTRODUODENOSCOPY (EGD);  Surgeon: Nevin Penny MD;  Location: [facility];  Service: Endoscopy;  Laterality: N/A;    RIGHT HEART CATHETERIZATION      TUBAL LIGATION      WRIST SURGERY Bilateral      Family History   Problem Relation Age of Onset    Breast cancer Mother     Breast cancer Sister     Breast cancer Sister      Social History     Socioeconomic History    Marital status:    Tobacco Use    Smoking status: Never    Smokeless tobacco: Never   Substance and Sexual Activity    Alcohol use: Never    Drug use: Never    Sexual activity: Yes     Partners: Male     Birth control/protection: See Surgical Hx     Patient Active Problem List   Diagnosis    Hypertension associated with diabetes    Type 2 diabetes mellitus without complication, with long-term current use of insulin    Systemic lupus erythematosus    Leukocytoclastic vasculitis    Nonintractable headache    Gastroesophageal reflux disease    Encounter for screening colonoscopy    Neck pain    Hearing loss    Thyroid nodule greater than or equal to 1 cm in diameter incidentally noted on imaging study    Drug induced insomnia     Immunocompromised state due to drug therapy    Situational anxiety    Avascular necrosis of bones of both hips    Diabetes mellitus due to underlying condition with complication, with long-term current use of insulin    Primary snoring    Class 1 drug-induced obesity with serious comorbidity and body mass index (BMI) of 31.0 to 31.9 in adult     Review of patient's allergies indicates:   Allergen Reactions    Azathioprine Nausea And Vomiting     Nausea, vomiting, diarrhea dose and early in the course of therapy    Olmesartan Shortness Of Breath    Oxycodone Itching       Medications:  Current Outpatient Medications on File Prior to Visit   Medication Sig Dispense Refill    amLODIPine (NORVASC) 5 MG tablet 10 mg.      atorvastatin (LIPITOR) 10 MG tablet       betamethasone dipropionate 0.05 % cream APPLY A SMALL AMOUNT TO AFFECTED AREA TOPICALLY TWICE A DAY FOR 2 TO 3 WEEKS AT A TIME AS NEEDED FLARE UPS AVOID FACE & GROIN AREA      blood sugar diagnostic (TRUE METRIX GLUCOSE TEST STRIP) Strp Test 4 times daily. 300 strip 3    blood-glucose meter (TRUE METRIX GLUCOSE METER) Misc Use as directed 1 each 0    ciprofloxacin HCl (CIPRO) 500 MG tablet Take 1 tablet (500 mg total) by mouth 2 (two) times daily. 14 tablet 0    cloNIDine (CATAPRES) 0.1 MG tablet Take 1 tablet (0.1 mg total) by mouth every 6 (six) hours as needed (elevated BP). 90 tablet 11    cloNIDine 0.1 mg/24 hr td ptwk (CATAPRES) 0.1 mg/24 hr Place 1 patch onto the skin every 7 days. 4 patch 11    dapsone 100 MG Tab Take 100 mg by mouth once daily at 6am.      ergocalciferol (ERGOCALCIFEROL) 50,000 unit Cap Take 50,000 Units by mouth.      esomeprazole (NEXIUM) 40 MG capsule Take 1 capsule (40 mg total) by mouth before breakfast. 90 capsule 3    gabapentin (NEURONTIN) 800 MG tablet Neurontin 800 mg tablet   Take 1 tablet 3 times a day by oral route.      HYDROcodone-acetaminophen (NORCO)  mg per tablet Norco 10 mg-325 mg tablet   Take 1 tablet 3 times  a day by oral route as needed.      hydrocortisone 2.5 % ointment APPLY TOPICALLY TO FACE TWICE A DAY AS NEEDED FOR 1 TO 2 WEEKS AT A TIME      hydroxychloroquine (PLAQUENIL) 200 mg tablet Take 200 mg by mouth 2 (two) times daily.       hydrOXYzine HCL (ATARAX) 25 MG tablet TAKE ONE TABLET BY MOUTH EVERY 12 HOURS AS NEEDED FOR ITCHING 30 tablet 5    insulin (LANTUS SOLOSTAR U-100 INSULIN) glargine 100 units/mL (3mL) SubQ pen Inject 5 Units into the skin once daily. 1.5 mL 11    labetaloL (NORMODYNE) 300 MG tablet Take 1 tablet (300 mg total) by mouth 2 (two) times daily. 60 tablet 11    metroNIDAZOLE (FLAGYL) 500 MG tablet Take 1 tablet (500 mg total) by mouth 3 (three) times daily. for 7 days 21 tablet 0    mycophenolate (CELLCEPT) 250 mg Cap 3,000 mg once daily.      ondansetron (ZOFRAN) 4 MG tablet Take 1 tablet (4 mg total) by mouth every 8 (eight) hours as needed for Nausea. 12 tablet 0    sertraline (ZOLOFT) 50 MG tablet Take 1 tablet (50 mg total) by mouth once daily. 30 tablet 11    spironolactone (ALDACTONE) 25 MG tablet Take 1 tablet (25 mg total) by mouth once daily. 30 tablet 11    tiZANidine (ZANAFLEX) 4 MG tablet TK 1 T PO TID PRN      TRUEPLUS LANCETS 33 gauge Misc Inject 1 lancet as directed 4 (four) times daily. 100 each 11    zolpidem (AMBIEN) 10 mg Tab TAKE ONE TABLET BY MOUTH NIGHTLY AS NEEDED FOR INSOMNIA 30 tablet 5    [DISCONTINUED] predniSONE (DELTASONE) 2.5 MG tablet Take 2.5 mg by mouth once daily.      blood-glucose meter,continuous (DEXCOM G6 ) Misc 1 Units by Misc.(Non-Drug; Combo Route) route continuous. 1 each 0    blood-glucose sensor (DEXCOM G6 SENSOR) Myrtle 1 each by Misc.(Non-Drug; Combo Route) route every 10 days. 3 each 11    blood-glucose transmitter (DEXCOM G6 TRANSMITTER) Myrtle 1 each by Misc.(Non-Drug; Combo Route) route every 3 (three) months. 1 Device 3    insulin aspart U-100 (NOVOLOG) 100 unit/mL injection Inject 4 Units into the skin 3 (three) times daily before  meals. 10.8 mL 3    LORazepam (ATIVAN) 0.5 MG tablet Take 1 tablet (0.5 mg total) by mouth every 12 (twelve) hours as needed for Anxiety. 30 tablet 2    [DISCONTINUED] hydroCHLOROthiazide (HYDRODIURIL) 25 MG tablet Take 1 tablet (25 mg total) by mouth once daily. 30 tablet 11     Current Facility-Administered Medications on File Prior to Visit   Medication Dose Route Frequency Provider Last Rate Last Admin    acetaminophen tablet 650 mg  650 mg Oral Once PRN Reji Dumas MD        acetaminophen tablet 650 mg  650 mg Oral Once PRN Reji Dumas MD        sodium chloride 0.9% 500 mL flush bag   Intravenous PRN Reji Dumas MD        sodium chloride 0.9% flush 10 mL  10 mL Intravenous PRN Reji Dumas MD           Medications have been reviewed and reconciled with patient at visit today.    Barriers to medications present (no )    Fall since last office visit (no )      Exam:  Vitals:    11/29/22 0954   BP: 134/74   Pulse: 77   Temp: 98.1 °F (36.7 °C)     Weight: 88.7 kg (195 lb 8 oz)   Body mass index is 29.73 kg/m².      BP Readings from Last 3 Encounters:   11/29/22 134/74   11/26/22 (!) 157/88   08/19/22 (!) 142/82     Wt Readings from Last 3 Encounters:   11/29/22 0954 88.7 kg (195 lb 8 oz)   11/25/22 2103 93 kg (205 lb)   09/16/22 1511 93 kg (205 lb)            Physical Exam  Vitals reviewed.   Constitutional:       General: She is not in acute distress.     Appearance: Normal appearance.   HENT:      Head: Normocephalic.      Nose: Nose normal.      Mouth/Throat:      Mouth: Mucous membranes are moist.      Pharynx: No oropharyngeal exudate or posterior oropharyngeal erythema.   Eyes:      General: No scleral icterus.     Extraocular Movements: Extraocular movements intact.      Conjunctiva/sclera: Conjunctivae normal.      Pupils: Pupils are equal, round, and reactive to light.   Neck:      Vascular: No carotid bruit.     Cardiovascular:      Rate and Rhythm: Normal rate and regular rhythm.       Heart sounds: Normal heart sounds. No murmur heard.  Pulmonary:      Effort: Pulmonary effort is normal.      Breath sounds: Normal breath sounds.   Abdominal:      General: Bowel sounds are decreased.      Palpations: Abdomen is soft.      Tenderness: There is generalized abdominal tenderness. There is no guarding or rebound.   Musculoskeletal:         General: No swelling.      Cervical back: Neck supple.   Lymphadenopathy:      Cervical: No cervical adenopathy.   Skin:     General: Skin is warm and dry.   Neurological:      General: No focal deficit present.      Mental Status: She is alert and oriented to person, place, and time. Mental status is at baseline.      Cranial Nerves: No cranial nerve deficit.      Motor: No weakness.      Gait: Gait normal.   Psychiatric:         Behavior: Behavior normal.         Thought Content: Thought content normal.       Laboratory Reviewed: (Yes)  Lab Results   Component Value Date    WBC 11.68 11/25/2022    HGB 10.1 (L) 11/25/2022    HCT 30.6 (L) 11/25/2022     11/25/2022    CHOL 137 08/26/2021    TRIG 42 08/26/2021    HDL 51 08/26/2021    ALT 8 (L) 11/25/2022    AST 13 11/25/2022     11/25/2022    K 3.5 11/25/2022     11/25/2022    CREATININE 0.7 11/25/2022    BUN 7 11/25/2022    CO2 20 (L) 11/25/2022    TSH 0.464 08/25/2022    INR 1.1 03/24/2020    HGBA1C 6.1 (H) 08/25/2022           Health Maintenance  Health Maintenance Topics with due status: Not Due       Topic Last Completion Date    Colorectal Cancer Screening 06/24/2020    Cervical Cancer Screening 09/29/2021    Diabetes Urine Screening 03/29/2022    Hemoglobin A1c 08/25/2022    Mammogram 08/31/2022    Low Dose Statin 11/29/2022     Health Maintenance Due   Topic Date Due    Pneumococcal Vaccines (Age 0-64) (1 - PCV) Never done    Foot Exam  Never done    TETANUS VACCINE  Never done    Eye Exam  09/21/2021    COVID-19 Vaccine (4 - Booster for Pfizer series) 02/02/2022    Lipid Panel   08/26/2022    Influenza Vaccine (1) Never done       Assessment:  Problem List Items Addressed This Visit          Cardiac/Vascular    Hypertension associated with diabetes    Relevant Medications    chlorthalidone (HYGROTEN) 25 MG Tab       Immunology/Multi System    Leukocytoclastic vasculitis     Stable.   Followed closely by rheumatology.             Endocrine    Type 2 diabetes mellitus without complication, with long-term current use of insulin     Lab Results   Component Value Date    HGBA1C 6.1 (H) 08/25/2022   Controlled.   Continue POC.   Allergy to ARB - throat tightness.  Consider addition of Jardiance.             Other Visit Diagnoses       Left lower quadrant abdominal pain    -  Primary    Relevant Orders    CBC Auto Differential    RENAL FUNCTION PANEL    Hepatic Function Panel    Sedimentation rate    C-REACTIVE PROTEIN    Colitis        Sx improving. Take abx as prescribed. If sx not significantly improved this week then CTA abd and consider GI referral.     Relevant Orders    CBC Auto Differential    RENAL FUNCTION PANEL    Hepatic Function Panel    Sedimentation rate    C-REACTIVE PROTEIN              Plan:  Left lower quadrant abdominal pain  -     CBC Auto Differential; Future; Expected date: 11/29/2022  -     RENAL FUNCTION PANEL; Future; Expected date: 11/29/2022  -     Hepatic Function Panel; Future; Expected date: 11/29/2022  -     Sedimentation rate; Future; Expected date: 11/29/2022  -     C-REACTIVE PROTEIN; Future; Expected date: 11/29/2022    Colitis  Comments:  Sx improving. Take abx as prescribed. If sx not significantly improved this week then CTA abd and consider GI referral.   Orders:  -     CBC Auto Differential; Future; Expected date: 11/29/2022  -     RENAL FUNCTION PANEL; Future; Expected date: 11/29/2022  -     Hepatic Function Panel; Future; Expected date: 11/29/2022  -     Sedimentation rate; Future; Expected date: 11/29/2022  -     C-REACTIVE PROTEIN; Future; Expected  date: 11/29/2022    Hypertension associated with diabetes  -     chlorthalidone (HYGROTEN) 25 MG Tab; Take 1 tablet (25 mg total) by mouth once daily.  Dispense: 30 tablet; Refill: 2    Leukocytoclastic vasculitis    Type 2 diabetes mellitus without complication, with long-term current use of insulin    -Patient's lab results were reviewed and discussed with patient  -Treatment options and alternatives were discussed with the patient. Patient expressed understanding. Patient was given the opportunity to ask questions and be an active participant in their medical care. Patient had no further questions or concerns at this time.   -Documentation of patient's health and condition was obtained from family member who was present during visit.  -Patient is an overall moderate risk for health complications from their medical conditions.       Follow up: Follow up in 3 days (on 12/2/2022) for Virtual Visit to follow up for colitis treatment.      After visit summary printed and given to patient upon discharge.  Patient goals and care plan are included in After visit summary.    Total medical decision making time was 42 min.  The following issues were discussed: The primary encounter diagnosis was Left lower quadrant abdominal pain. Diagnoses of Colitis, Hypertension associated with diabetes, Leukocytoclastic vasculitis, and Type 2 diabetes mellitus without complication, with long-term current use of insulin were also pertinent to this visit.    Health maintenance needs, recent test results and goals of care discussed with pt and questions answered.

## 2022-11-29 ENCOUNTER — OFFICE VISIT (OUTPATIENT)
Dept: PRIMARY CARE CLINIC | Facility: CLINIC | Age: 47
End: 2022-11-29
Payer: MEDICARE

## 2022-11-29 VITALS
OXYGEN SATURATION: 99 % | TEMPERATURE: 98 F | HEIGHT: 68 IN | WEIGHT: 195.5 LBS | BODY MASS INDEX: 29.63 KG/M2 | SYSTOLIC BLOOD PRESSURE: 134 MMHG | HEART RATE: 77 BPM | DIASTOLIC BLOOD PRESSURE: 74 MMHG

## 2022-11-29 DIAGNOSIS — K52.9 COLITIS: ICD-10-CM

## 2022-11-29 DIAGNOSIS — I15.2 HYPERTENSION ASSOCIATED WITH DIABETES: ICD-10-CM

## 2022-11-29 DIAGNOSIS — E11.9 TYPE 2 DIABETES MELLITUS WITHOUT COMPLICATION, WITH LONG-TERM CURRENT USE OF INSULIN: ICD-10-CM

## 2022-11-29 DIAGNOSIS — E11.59 HYPERTENSION ASSOCIATED WITH DIABETES: ICD-10-CM

## 2022-11-29 DIAGNOSIS — Z79.4 TYPE 2 DIABETES MELLITUS WITHOUT COMPLICATION, WITH LONG-TERM CURRENT USE OF INSULIN: ICD-10-CM

## 2022-11-29 DIAGNOSIS — M31.0 LEUKOCYTOCLASTIC VASCULITIS: ICD-10-CM

## 2022-11-29 DIAGNOSIS — R10.32 LEFT LOWER QUADRANT ABDOMINAL PAIN: Primary | ICD-10-CM

## 2022-11-29 PROCEDURE — 3075F PR MOST RECENT SYSTOLIC BLOOD PRESS GE 130-139MM HG: ICD-10-PCS | Mod: CPTII,S$GLB,, | Performed by: NURSE PRACTITIONER

## 2022-11-29 PROCEDURE — 3078F PR MOST RECENT DIASTOLIC BLOOD PRESSURE < 80 MM HG: ICD-10-PCS | Mod: CPTII,S$GLB,, | Performed by: NURSE PRACTITIONER

## 2022-11-29 PROCEDURE — 3078F DIAST BP <80 MM HG: CPT | Mod: CPTII,S$GLB,, | Performed by: NURSE PRACTITIONER

## 2022-11-29 PROCEDURE — 99215 PR OFFICE/OUTPT VISIT, EST, LEVL V, 40-54 MIN: ICD-10-PCS | Mod: S$GLB,,, | Performed by: NURSE PRACTITIONER

## 2022-11-29 PROCEDURE — 85025 COMPLETE CBC W/AUTO DIFF WBC: CPT | Performed by: NURSE PRACTITIONER

## 2022-11-29 PROCEDURE — 99215 OFFICE O/P EST HI 40 MIN: CPT | Mod: S$GLB,,, | Performed by: NURSE PRACTITIONER

## 2022-11-29 PROCEDURE — 84075 ASSAY ALKALINE PHOSPHATASE: CPT | Performed by: NURSE PRACTITIONER

## 2022-11-29 PROCEDURE — 86140 C-REACTIVE PROTEIN: CPT | Performed by: NURSE PRACTITIONER

## 2022-11-29 PROCEDURE — 3008F BODY MASS INDEX DOCD: CPT | Mod: CPTII,S$GLB,, | Performed by: NURSE PRACTITIONER

## 2022-11-29 PROCEDURE — 3008F PR BODY MASS INDEX (BMI) DOCUMENTED: ICD-10-PCS | Mod: CPTII,S$GLB,, | Performed by: NURSE PRACTITIONER

## 2022-11-29 PROCEDURE — 3066F PR DOCUMENTATION OF TREATMENT FOR NEPHROPATHY: ICD-10-PCS | Mod: CPTII,S$GLB,, | Performed by: NURSE PRACTITIONER

## 2022-11-29 PROCEDURE — 3044F PR MOST RECENT HEMOGLOBIN A1C LEVEL <7.0%: ICD-10-PCS | Mod: CPTII,S$GLB,, | Performed by: NURSE PRACTITIONER

## 2022-11-29 PROCEDURE — 99499 UNLISTED E&M SERVICE: CPT | Mod: HCNC,S$GLB,, | Performed by: NURSE PRACTITIONER

## 2022-11-29 PROCEDURE — 1159F PR MEDICATION LIST DOCUMENTED IN MEDICAL RECORD: ICD-10-PCS | Mod: CPTII,S$GLB,, | Performed by: NURSE PRACTITIONER

## 2022-11-29 PROCEDURE — 85652 RBC SED RATE AUTOMATED: CPT | Performed by: NURSE PRACTITIONER

## 2022-11-29 PROCEDURE — 3075F SYST BP GE 130 - 139MM HG: CPT | Mod: CPTII,S$GLB,, | Performed by: NURSE PRACTITIONER

## 2022-11-29 PROCEDURE — 80069 RENAL FUNCTION PANEL: CPT | Performed by: NURSE PRACTITIONER

## 2022-11-29 PROCEDURE — 3066F NEPHROPATHY DOC TX: CPT | Mod: CPTII,S$GLB,, | Performed by: NURSE PRACTITIONER

## 2022-11-29 PROCEDURE — 99999 PR PBB SHADOW E&M-EST. PATIENT-LVL V: ICD-10-PCS | Mod: PBBFAC,,, | Performed by: NURSE PRACTITIONER

## 2022-11-29 PROCEDURE — 99999 PR PBB SHADOW E&M-EST. PATIENT-LVL V: CPT | Mod: PBBFAC,,, | Performed by: NURSE PRACTITIONER

## 2022-11-29 PROCEDURE — 3060F PR POS MICROALBUMINURIA RESULT DOCUMENTED/REVIEW: ICD-10-PCS | Mod: CPTII,S$GLB,, | Performed by: NURSE PRACTITIONER

## 2022-11-29 PROCEDURE — 1159F MED LIST DOCD IN RCRD: CPT | Mod: CPTII,S$GLB,, | Performed by: NURSE PRACTITIONER

## 2022-11-29 PROCEDURE — 3060F POS MICROALBUMINURIA REV: CPT | Mod: CPTII,S$GLB,, | Performed by: NURSE PRACTITIONER

## 2022-11-29 PROCEDURE — 3044F HG A1C LEVEL LT 7.0%: CPT | Mod: CPTII,S$GLB,, | Performed by: NURSE PRACTITIONER

## 2022-11-29 RX ORDER — CHLORTHALIDONE 25 MG/1
25 TABLET ORAL DAILY
Qty: 30 TABLET | Refills: 2 | Status: SHIPPED | OUTPATIENT
Start: 2022-11-29 | End: 2023-02-09

## 2022-11-29 NOTE — ASSESSMENT & PLAN NOTE
Lab Results   Component Value Date    HGBA1C 6.1 (H) 08/25/2022   Controlled.   Continue POC.   Allergy to ARB - throat tightness.  Consider addition of Jardiance.

## 2022-11-30 LAB
ALBUMIN SERPL BCP-MCNC: 4.1 G/DL (ref 3.5–5.2)
ALBUMIN SERPL BCP-MCNC: 4.1 G/DL (ref 3.5–5.2)
ALP SERPL-CCNC: 66 U/L (ref 55–135)
ALT SERPL W/O P-5'-P-CCNC: 13 U/L (ref 10–44)
ANION GAP SERPL CALC-SCNC: 10 MMOL/L (ref 8–16)
AST SERPL-CCNC: 19 U/L (ref 10–40)
BASOPHILS # BLD AUTO: 0.02 K/UL (ref 0–0.2)
BASOPHILS NFR BLD: 0.3 % (ref 0–1.9)
BILIRUB DIRECT SERPL-MCNC: 0.2 MG/DL (ref 0.1–0.3)
BILIRUB SERPL-MCNC: 0.3 MG/DL (ref 0.1–1)
BUN SERPL-MCNC: 8 MG/DL (ref 6–20)
CALCIUM SERPL-MCNC: 10 MG/DL (ref 8.7–10.5)
CHLORIDE SERPL-SCNC: 108 MMOL/L (ref 95–110)
CO2 SERPL-SCNC: 25 MMOL/L (ref 23–29)
CREAT SERPL-MCNC: 0.8 MG/DL (ref 0.5–1.4)
CRP SERPL-MCNC: 4.7 MG/L (ref 0–8.2)
DIFFERENTIAL METHOD: ABNORMAL
EOSINOPHIL # BLD AUTO: 0.3 K/UL (ref 0–0.5)
EOSINOPHIL NFR BLD: 4.9 % (ref 0–8)
ERYTHROCYTE [DISTWIDTH] IN BLOOD BY AUTOMATED COUNT: 13.6 % (ref 11.5–14.5)
ERYTHROCYTE [SEDIMENTATION RATE] IN BLOOD BY PHOTOMETRIC METHOD: 5 MM/HR (ref 0–36)
EST. GFR  (NO RACE VARIABLE): >60 ML/MIN/1.73 M^2
GLUCOSE SERPL-MCNC: 147 MG/DL (ref 70–110)
HCT VFR BLD AUTO: 32 % (ref 37–48.5)
HGB BLD-MCNC: 10.1 G/DL (ref 12–16)
IMM GRANULOCYTES # BLD AUTO: 0.01 K/UL (ref 0–0.04)
IMM GRANULOCYTES NFR BLD AUTO: 0.2 % (ref 0–0.5)
LYMPHOCYTES # BLD AUTO: 1.6 K/UL (ref 1–4.8)
LYMPHOCYTES NFR BLD: 27.5 % (ref 18–48)
MCH RBC QN AUTO: 27.4 PG (ref 27–31)
MCHC RBC AUTO-ENTMCNC: 31.6 G/DL (ref 32–36)
MCV RBC AUTO: 87 FL (ref 82–98)
MONOCYTES # BLD AUTO: 0.5 K/UL (ref 0.3–1)
MONOCYTES NFR BLD: 8.2 % (ref 4–15)
NEUTROPHILS # BLD AUTO: 3.5 K/UL (ref 1.8–7.7)
NEUTROPHILS NFR BLD: 58.9 % (ref 38–73)
NRBC BLD-RTO: 0 /100 WBC
PHOSPHATE SERPL-MCNC: 2.8 MG/DL (ref 2.7–4.5)
PLATELET # BLD AUTO: 300 K/UL (ref 150–450)
PMV BLD AUTO: 12.1 FL (ref 9.2–12.9)
POTASSIUM SERPL-SCNC: 4 MMOL/L (ref 3.5–5.1)
PROT SERPL-MCNC: 7.3 G/DL (ref 6–8.4)
RBC # BLD AUTO: 3.69 M/UL (ref 4–5.4)
SODIUM SERPL-SCNC: 143 MMOL/L (ref 136–145)
WBC # BLD AUTO: 5.96 K/UL (ref 3.9–12.7)

## 2022-12-01 ENCOUNTER — TELEPHONE (OUTPATIENT)
Dept: PRIMARY CARE CLINIC | Facility: CLINIC | Age: 47
End: 2022-12-01
Payer: MEDICARE

## 2022-12-01 DIAGNOSIS — N76.0 ACUTE VAGINITIS: Primary | ICD-10-CM

## 2022-12-01 NOTE — TELEPHONE ENCOUNTER
----- Message from Lizeth Restrepo RN sent at 12/1/2022  4:06 PM CST -----  Regarding: FW: medication request  Contact: [phone]  Requesting Diflucan      ----- Message -----  From: Iván Mcgowan  Sent: 12/1/2022   3:59 PM CST  To: [facility] Staff  Subject: medication request                               Requesting diflucan   Currently on two antibiotics, but the antibiotics are causing.

## 2022-12-01 NOTE — TELEPHONE ENCOUNTER
----- Message from Diane Dominguez NP sent at 12/1/2022  4:44 PM CST -----  Regarding: RE: medication request  Contact: [phone]  She should use OTC Monistat 7. The oral diflucan doesn't mix with her oral antibiotics. Can cause heart arrhythmia.   ----- Message -----  From: Lizeth Restrepo RN  Sent: 12/1/2022   4:07 PM CST  To: Diane Dominguez NP  Subject: FW: medication request                           Requesting Diflucan      ----- Message -----  From: Iván Mcgowan  Sent: 12/1/2022   3:59 PM CST  To: [facility] Staff  Subject: medication request                               Requesting diflucan   Currently on two antibiotics, but the antibiotics are causing.

## 2022-12-02 ENCOUNTER — OFFICE VISIT (OUTPATIENT)
Dept: PRIMARY CARE CLINIC | Facility: CLINIC | Age: 47
End: 2022-12-02
Payer: MEDICARE

## 2022-12-02 ENCOUNTER — TELEPHONE (OUTPATIENT)
Dept: PRIMARY CARE CLINIC | Facility: CLINIC | Age: 47
End: 2022-12-02

## 2022-12-02 DIAGNOSIS — M32.8 OTHER FORMS OF SYSTEMIC LUPUS ERYTHEMATOSUS, UNSPECIFIED ORGAN INVOLVEMENT STATUS: ICD-10-CM

## 2022-12-02 DIAGNOSIS — K52.9 COLITIS: ICD-10-CM

## 2022-12-02 DIAGNOSIS — D50.9 IRON DEFICIENCY ANEMIA, UNSPECIFIED IRON DEFICIENCY ANEMIA TYPE: Primary | ICD-10-CM

## 2022-12-02 PROCEDURE — 99214 PR OFFICE/OUTPT VISIT, EST, LEVL IV, 30-39 MIN: ICD-10-PCS | Mod: 95,,, | Performed by: NURSE PRACTITIONER

## 2022-12-02 PROCEDURE — 99499 RISK ADDL DX/OHS AUDIT: ICD-10-PCS | Mod: HCNC,95,, | Performed by: NURSE PRACTITIONER

## 2022-12-02 PROCEDURE — 3066F PR DOCUMENTATION OF TREATMENT FOR NEPHROPATHY: ICD-10-PCS | Mod: CPTII,95,, | Performed by: NURSE PRACTITIONER

## 2022-12-02 PROCEDURE — 3044F HG A1C LEVEL LT 7.0%: CPT | Mod: CPTII,95,, | Performed by: NURSE PRACTITIONER

## 2022-12-02 PROCEDURE — 3060F POS MICROALBUMINURIA REV: CPT | Mod: CPTII,95,, | Performed by: NURSE PRACTITIONER

## 2022-12-02 PROCEDURE — 3066F NEPHROPATHY DOC TX: CPT | Mod: CPTII,95,, | Performed by: NURSE PRACTITIONER

## 2022-12-02 PROCEDURE — 99214 OFFICE O/P EST MOD 30 MIN: CPT | Mod: 95,,, | Performed by: NURSE PRACTITIONER

## 2022-12-02 PROCEDURE — 3060F PR POS MICROALBUMINURIA RESULT DOCUMENTED/REVIEW: ICD-10-PCS | Mod: CPTII,95,, | Performed by: NURSE PRACTITIONER

## 2022-12-02 PROCEDURE — 3044F PR MOST RECENT HEMOGLOBIN A1C LEVEL <7.0%: ICD-10-PCS | Mod: CPTII,95,, | Performed by: NURSE PRACTITIONER

## 2022-12-02 PROCEDURE — 99499 UNLISTED E&M SERVICE: CPT | Mod: HCNC,95,, | Performed by: NURSE PRACTITIONER

## 2022-12-02 NOTE — TELEPHONE ENCOUNTER
CTA and Labs for 12/9/22 at [facility]. Patient notified verbalized understanding.

## 2022-12-02 NOTE — TELEPHONE ENCOUNTER
----- Message from Diane Dominguez NP sent at 12/2/2022 11:31 AM CST -----  Please schedule CTA abd and labs next week. Thank you!

## 2022-12-02 NOTE — PROGRESS NOTES
Marilou Daniel  12/02/2022  [id number]    Diane Dominguez NP  Patient Care Team:  Diane Dominguez NP as PCP - General (Family Medicine)  Joe Yo RD as Dietitian (Endocrinology)  Liza Moon RD, CDE as Dietitian (Diabetes)      [facility] Care Note      Chief Complaint:  No chief complaint on file.      History of Present Illness:  HPI    F/U diverticulitis.     Seen in ER 11/25 for colitis. Evident on CT. Tx with morphine, PO Cipro and metronidazole.    Took Linzess Last Thursday PM, diarrhea next AM. To ER. N/V/D, liquid, hematochezia. HTN, not able to take meds. Started abx Sunday on empty stomach, then vomiting.Sx improving earlier this week but still had some abdominal pain. Had not yet started Cipro + metronidazole.     Feels better today. Some vomiting Wednesday. No vomiting since. No further abdominal pain or diarrhea. Still some nausea, attributing to abx.     Schedule CTA next week. Previously had iron infusions.      Review of Systems   Constitutional:  Negative for chills, fever and malaise/fatigue.   Respiratory:  Negative for cough and shortness of breath.    Gastrointestinal:  Positive for nausea. Negative for abdominal pain, blood in stool, constipation, diarrhea, melena and vomiting.   Genitourinary:  Negative for dysuria.   Musculoskeletal:  Negative for falls and myalgias.       The following were reviewed: Active problem list, medication list, allergies, family history, social history, and Health Maintenance.     History:  Past Medical History:   Diagnosis Date    Anemia     Arthritis     Connective tissue disease 1999    COVID-19 in immunocompromised patient 02/05/2021    Diabetes mellitus     Gastroesophageal reflux disease 03/23/2020    Hypertension     Lupus 1999    Situational anxiety 08/24/2021    Thyroid nodule greater than or equal to 1 cm in diameter incidentally noted on imaging study 11/19/2020    Vasculitis      Past Surgical History:   Procedure  Laterality Date    ABLATION      COLONOSCOPY N/A 6/24/2020    Procedure: COLONOSCOPY;  Surgeon: Nevin Penny MD;  Location: [facility] ENDO;  Service: Endoscopy;  Laterality: N/A;    ESOPHAGOGASTRODUODENOSCOPY N/A 6/24/2020    Procedure: ESOPHAGOGASTRODUODENOSCOPY (EGD);  Surgeon: Nevin Penny MD;  Location: [facility] ENDO;  Service: Endoscopy;  Laterality: N/A;    RIGHT HEART CATHETERIZATION      TUBAL LIGATION      WRIST SURGERY Bilateral      Family History   Problem Relation Age of Onset    Breast cancer Mother     Breast cancer Sister     Breast cancer Sister      Social History     Socioeconomic History    Marital status:    Tobacco Use    Smoking status: Never    Smokeless tobacco: Never   Substance and Sexual Activity    Alcohol use: Never    Drug use: Never    Sexual activity: Yes     Partners: Male     Birth control/protection: See Surgical Hx     Patient Active Problem List   Diagnosis    Hypertension associated with diabetes    Type 2 diabetes mellitus without complication, with long-term current use of insulin    Systemic lupus erythematosus    Leukocytoclastic vasculitis    Nonintractable headache    Gastroesophageal reflux disease    Encounter for screening colonoscopy    Neck pain    Hearing loss    Thyroid nodule greater than or equal to 1 cm in diameter incidentally noted on imaging study    Drug induced insomnia    Immunocompromised state due to drug therapy    Situational anxiety    Avascular necrosis of bones of both hips    Diabetes mellitus due to underlying condition with complication, with long-term current use of insulin    Primary snoring    Class 1 drug-induced obesity with serious comorbidity and body mass index (BMI) of 31.0 to 31.9 in adult    Colitis    Iron deficiency anemia     Review of patient's allergies indicates:   Allergen Reactions    Azathioprine Nausea And Vomiting     Nausea, vomiting, diarrhea dose and early in the course of therapy    Olmesartan Shortness Of  Breath    Oxycodone Itching       Medications:  Current Outpatient Medications on File Prior to Visit   Medication Sig Dispense Refill    amLODIPine (NORVASC) 5 MG tablet 10 mg.      atorvastatin (LIPITOR) 10 MG tablet       betamethasone dipropionate 0.05 % cream APPLY A SMALL AMOUNT TO AFFECTED AREA TOPICALLY TWICE A DAY FOR 2 TO 3 WEEKS AT A TIME AS NEEDED FLARE UPS AVOID FACE & GROIN AREA      blood sugar diagnostic (TRUE METRIX GLUCOSE TEST STRIP) Strp Test 4 times daily. 300 strip 3    blood-glucose meter (TRUE METRIX GLUCOSE METER) Misc Use as directed 1 each 0    blood-glucose meter,continuous (DEXCOM G6 ) Misc 1 Units by Misc.(Non-Drug; Combo Route) route continuous. 1 each 0    blood-glucose sensor (DEXCOM G6 SENSOR) Myrtle 1 each by Misc.(Non-Drug; Combo Route) route every 10 days. 3 each 11    blood-glucose transmitter (DEXCOM G6 TRANSMITTER) Myrtle 1 each by Misc.(Non-Drug; Combo Route) route every 3 (three) months. 1 Device 3    chlorthalidone (HYGROTEN) 25 MG Tab Take 1 tablet (25 mg total) by mouth once daily. 30 tablet 2    ciprofloxacin HCl (CIPRO) 500 MG tablet Take 1 tablet (500 mg total) by mouth 2 (two) times daily. 14 tablet 0    cloNIDine (CATAPRES) 0.1 MG tablet Take 1 tablet (0.1 mg total) by mouth every 6 (six) hours as needed (elevated BP). 90 tablet 11    cloNIDine 0.1 mg/24 hr td ptwk (CATAPRES) 0.1 mg/24 hr Place 1 patch onto the skin every 7 days. 4 patch 11    dapsone 100 MG Tab Take 100 mg by mouth once daily at 6am.      ergocalciferol (ERGOCALCIFEROL) 50,000 unit Cap Take 50,000 Units by mouth.      esomeprazole (NEXIUM) 40 MG capsule Take 1 capsule (40 mg total) by mouth before breakfast. 90 capsule 3    gabapentin (NEURONTIN) 800 MG tablet Neurontin 800 mg tablet   Take 1 tablet 3 times a day by oral route.      HYDROcodone-acetaminophen (NORCO)  mg per tablet Norco 10 mg-325 mg tablet   Take 1 tablet 3 times a day by oral route as needed.      hydrocortisone 2.5 %  ointment APPLY TOPICALLY TO FACE TWICE A DAY AS NEEDED FOR 1 TO 2 WEEKS AT A TIME      hydroxychloroquine (PLAQUENIL) 200 mg tablet Take 200 mg by mouth 2 (two) times daily.       hydrOXYzine HCL (ATARAX) 25 MG tablet TAKE ONE TABLET BY MOUTH EVERY 12 HOURS AS NEEDED FOR ITCHING 30 tablet 5    insulin (LANTUS SOLOSTAR U-100 INSULIN) glargine 100 units/mL (3mL) SubQ pen Inject 5 Units into the skin once daily. 1.5 mL 11    insulin aspart U-100 (NOVOLOG) 100 unit/mL injection Inject 4 Units into the skin 3 (three) times daily before meals. 10.8 mL 3    labetaloL (NORMODYNE) 300 MG tablet Take 1 tablet (300 mg total) by mouth 2 (two) times daily. 60 tablet 11    LORazepam (ATIVAN) 0.5 MG tablet Take 1 tablet (0.5 mg total) by mouth every 12 (twelve) hours as needed for Anxiety. 30 tablet 2    metroNIDAZOLE (FLAGYL) 500 MG tablet Take 1 tablet (500 mg total) by mouth 3 (three) times daily. for 7 days 21 tablet 0    mycophenolate (CELLCEPT) 250 mg Cap 3,000 mg once daily.      ondansetron (ZOFRAN) 4 MG tablet Take 1 tablet (4 mg total) by mouth every 8 (eight) hours as needed for Nausea. 12 tablet 0    sertraline (ZOLOFT) 50 MG tablet Take 1 tablet (50 mg total) by mouth once daily. 30 tablet 11    spironolactone (ALDACTONE) 25 MG tablet Take 1 tablet (25 mg total) by mouth once daily. 30 tablet 11    tiZANidine (ZANAFLEX) 4 MG tablet TK 1 T PO TID PRN      TRUEPLUS LANCETS 33 gauge Misc Inject 1 lancet as directed 4 (four) times daily. 100 each 11    zolpidem (AMBIEN) 10 mg Tab TAKE ONE TABLET BY MOUTH NIGHTLY AS NEEDED FOR INSOMNIA 30 tablet 5     Current Facility-Administered Medications on File Prior to Visit   Medication Dose Route Frequency Provider Last Rate Last Admin    acetaminophen tablet 650 mg  650 mg Oral Once PRN Reji Dumas MD        acetaminophen tablet 650 mg  650 mg Oral Once PRN Reji Dumas MD        sodium chloride 0.9% 500 mL flush bag   Intravenous PRN Reji Dumas MD        sodium  chloride 0.9% flush 10 mL  10 mL Intravenous PRN Reji Dumas MD           Medications have been reviewed and reconciled with patient at visit today.    Barriers to medications present (no )    Fall since last office visit (no )      Exam:  There were no vitals filed for this visit.      There is no height or weight on file to calculate BMI.      BP Readings from Last 3 Encounters:   11/29/22 134/74   11/26/22 (!) 157/88   08/19/22 (!) 142/82     Wt Readings from Last 3 Encounters:   11/29/22 0954 88.7 kg (195 lb 8 oz)   11/25/22 2103 93 kg (205 lb)   09/16/22 1511 93 kg (205 lb)            Physical Exam  Constitutional:       General: She is not in acute distress.  Neurological:      Mental Status: She is alert.   Psychiatric:         Mood and Affect: Mood normal.         Behavior: Behavior normal.       Laboratory Reviewed: (Yes)  Lab Results   Component Value Date    WBC 5.96 11/29/2022    HGB 10.1 (L) 11/29/2022    HCT 32.0 (L) 11/29/2022     11/29/2022    CHOL 137 08/26/2021    TRIG 42 08/26/2021    HDL 51 08/26/2021    ALT 13 11/29/2022    AST 19 11/29/2022     11/29/2022    K 4.0 11/29/2022     11/29/2022    CREATININE 0.8 11/29/2022    BUN 8 11/29/2022    CO2 25 11/29/2022    TSH 0.464 08/25/2022    INR 1.1 03/24/2020    HGBA1C 6.1 (H) 08/25/2022           Health Maintenance  Health Maintenance Topics with due status: Not Due       Topic Last Completion Date    Colorectal Cancer Screening 06/24/2020    Cervical Cancer Screening 09/29/2021    Diabetes Urine Screening 03/29/2022    Hemoglobin A1c 08/25/2022    Mammogram 08/31/2022    Low Dose Statin 11/29/2022     Health Maintenance Due   Topic Date Due    Pneumococcal Vaccines (Age 0-64) (1 - PCV) Never done    Foot Exam  Never done    TETANUS VACCINE  Never done    Eye Exam  09/21/2021    COVID-19 Vaccine (4 - Booster for Pfizer series) 02/02/2022    Lipid Panel  08/26/2022    Influenza Vaccine (1) Never done        Assessment:  Problem List Items Addressed This Visit          Immunology/Multi System    Systemic lupus erythematosus    Relevant Orders    CTA Abdomen       Oncology    Iron deficiency anemia - Primary     Repeat iron studies. May need infusion.          Relevant Orders    CBC Auto Differential    Ferritin    Iron and TIBC    Reticulocytes       GI    Colitis     Sx slow to resolve with concurrent vasculitis.   Consider mesenteric vasculitis, schedule CTA abdomen.  Repeat CBC. To ER if sx worsen, you develop fever, vomiting, diarrhea/hematochezia/melena.          Relevant Orders    CTA Abdomen         Plan:  Iron deficiency anemia, unspecified iron deficiency anemia type  -     CBC Auto Differential; Future; Expected date: 12/02/2022  -     Ferritin; Future; Expected date: 12/02/2022  -     Iron and TIBC; Future; Expected date: 12/02/2022  -     Reticulocytes; Future; Expected date: 12/02/2022    Colitis  -     CTA Abdomen; Future; Expected date: 12/03/2022    Other forms of systemic lupus erythematosus, unspecified organ involvement status  -     CTA Abdomen; Future; Expected date: 12/03/2022    -Patient's lab results were reviewed and discussed with patient  -Treatment options and alternatives were discussed with the patient. Patient expressed understanding. Patient was given the opportunity to ask questions and be an active participant in their medical care. Patient had no further questions or concerns at this time.   -Documentation of patient's health and condition was obtained from family member who was present during visit.  -Patient is an overall moderate risk for health complications from their medical conditions.       Follow up: Follow up in about 2 weeks (around 12/16/2022).      After visit summary printed and given to patient upon discharge.  Patient goals and care plan are included in After visit summary.    Total medical decision making time was 32 min.  The following issues were discussed: The  primary encounter diagnosis was Iron deficiency anemia, unspecified iron deficiency anemia type. Diagnoses of Colitis and Other forms of systemic lupus erythematosus, unspecified organ involvement status were also pertinent to this visit.    Health maintenance needs, recent test results and goals of care discussed with pt and questions answered.

## 2022-12-02 NOTE — ASSESSMENT & PLAN NOTE
Sx slow to resolve with concurrent vasculitis.   Consider mesenteric vasculitis, schedule CTA abdomen.  Repeat CBC. To ER if sx worsen, you develop fever, vomiting, diarrhea/hematochezia/melena.

## 2022-12-09 ENCOUNTER — HOSPITAL ENCOUNTER (OUTPATIENT)
Dept: RADIOLOGY | Facility: HOSPITAL | Age: 47
Discharge: HOME | End: 2022-12-09
Attending: NURSE PRACTITIONER
Payer: MEDICARE

## 2022-12-09 DIAGNOSIS — K52.9 COLITIS: ICD-10-CM

## 2022-12-09 DIAGNOSIS — M32.8 OTHER FORMS OF SYSTEMIC LUPUS ERYTHEMATOSUS, UNSPECIFIED ORGAN INVOLVEMENT STATUS: ICD-10-CM

## 2022-12-09 PROCEDURE — 74175 CTA ABDOMEN W/CONTRAST: CPT | Mod: TC

## 2022-12-09 PROCEDURE — 25500020 PHARM REV CODE 255: Performed by: NURSE PRACTITIONER

## 2022-12-09 RX ADMIN — IOHEXOL 100 ML: 350 INJECTION, SOLUTION INTRAVENOUS at 10:12

## 2022-12-12 DIAGNOSIS — D50.9 IRON DEFICIENCY ANEMIA, UNSPECIFIED IRON DEFICIENCY ANEMIA TYPE: Primary | ICD-10-CM

## 2022-12-13 ENCOUNTER — TELEPHONE (OUTPATIENT)
Dept: PRIMARY CARE CLINIC | Facility: CLINIC | Age: 47
End: 2022-12-13
Payer: MEDICARE

## 2022-12-13 NOTE — TELEPHONE ENCOUNTER
----- Message from Diane Dominguez NP sent at 12/12/2022  8:20 AM CST -----  Hemoglobin down to 9.2. Will likely need iron, further blood tests by hematology.   Please help her schedule. She's seen hematology previously but not sure who.

## 2022-12-14 ENCOUNTER — TELEPHONE (OUTPATIENT)
Dept: HEMATOLOGY/ONCOLOGY | Facility: CLINIC | Age: 47
End: 2022-12-14
Payer: MEDICARE

## 2022-12-14 ENCOUNTER — LAB VISIT (OUTPATIENT)
Dept: LAB | Facility: HOSPITAL | Age: 47
End: 2022-12-14
Attending: INTERNAL MEDICINE
Payer: MEDICARE

## 2022-12-14 ENCOUNTER — OFFICE VISIT (OUTPATIENT)
Dept: HEMATOLOGY/ONCOLOGY | Facility: CLINIC | Age: 47
End: 2022-12-14
Payer: MEDICARE

## 2022-12-14 VITALS
DIASTOLIC BLOOD PRESSURE: 72 MMHG | BODY MASS INDEX: 29.77 KG/M2 | HEART RATE: 76 BPM | OXYGEN SATURATION: 98 % | HEIGHT: 68 IN | SYSTOLIC BLOOD PRESSURE: 106 MMHG | WEIGHT: 196.44 LBS | TEMPERATURE: 98 F

## 2022-12-14 DIAGNOSIS — D50.9 IRON DEFICIENCY ANEMIA, UNSPECIFIED IRON DEFICIENCY ANEMIA TYPE: ICD-10-CM

## 2022-12-14 DIAGNOSIS — B99.9 ANEMIA OF INFECTION AND CHRONIC DISEASE: Primary | ICD-10-CM

## 2022-12-14 DIAGNOSIS — Z80.3 FAMILY HISTORY OF BREAST CANCER: ICD-10-CM

## 2022-12-14 DIAGNOSIS — D63.8 ANEMIA OF INFECTION AND CHRONIC DISEASE: Primary | ICD-10-CM

## 2022-12-14 DIAGNOSIS — B99.9 ANEMIA OF INFECTION AND CHRONIC DISEASE: ICD-10-CM

## 2022-12-14 DIAGNOSIS — D63.8 ANEMIA OF INFECTION AND CHRONIC DISEASE: ICD-10-CM

## 2022-12-14 LAB
IGA SERPL-MCNC: 557 MG/DL (ref 40–350)
IGG SERPL-MCNC: 1088 MG/DL (ref 650–1600)
IGM SERPL-MCNC: 41 MG/DL (ref 50–300)

## 2022-12-14 PROCEDURE — 99999 PR PBB SHADOW E&M-EST. PATIENT-LVL V: CPT | Mod: PBBFAC,,, | Performed by: INTERNAL MEDICINE

## 2022-12-14 PROCEDURE — 3060F POS MICROALBUMINURIA REV: CPT | Mod: CPTII,S$GLB,, | Performed by: INTERNAL MEDICINE

## 2022-12-14 PROCEDURE — 84165 PATHOLOGIST INTERPRETATION SPE: ICD-10-PCS | Mod: 26,,, | Performed by: PATHOLOGY

## 2022-12-14 PROCEDURE — 3066F PR DOCUMENTATION OF TREATMENT FOR NEPHROPATHY: ICD-10-PCS | Mod: CPTII,S$GLB,, | Performed by: INTERNAL MEDICINE

## 2022-12-14 PROCEDURE — 3008F BODY MASS INDEX DOCD: CPT | Mod: CPTII,S$GLB,, | Performed by: INTERNAL MEDICINE

## 2022-12-14 PROCEDURE — 3078F DIAST BP <80 MM HG: CPT | Mod: CPTII,S$GLB,, | Performed by: INTERNAL MEDICINE

## 2022-12-14 PROCEDURE — 1160F RVW MEDS BY RX/DR IN RCRD: CPT | Mod: CPTII,S$GLB,, | Performed by: INTERNAL MEDICINE

## 2022-12-14 PROCEDURE — 82784 ASSAY IGA/IGD/IGG/IGM EACH: CPT | Performed by: INTERNAL MEDICINE

## 2022-12-14 PROCEDURE — 83521 IG LIGHT CHAINS FREE EACH: CPT | Mod: 59 | Performed by: INTERNAL MEDICINE

## 2022-12-14 PROCEDURE — 3066F NEPHROPATHY DOC TX: CPT | Mod: CPTII,S$GLB,, | Performed by: INTERNAL MEDICINE

## 2022-12-14 PROCEDURE — 3044F PR MOST RECENT HEMOGLOBIN A1C LEVEL <7.0%: ICD-10-PCS | Mod: CPTII,S$GLB,, | Performed by: INTERNAL MEDICINE

## 2022-12-14 PROCEDURE — 84165 PROTEIN E-PHORESIS SERUM: CPT | Mod: 26,,, | Performed by: PATHOLOGY

## 2022-12-14 PROCEDURE — 99204 OFFICE O/P NEW MOD 45 MIN: CPT | Mod: S$GLB,,, | Performed by: INTERNAL MEDICINE

## 2022-12-14 PROCEDURE — 1159F PR MEDICATION LIST DOCUMENTED IN MEDICAL RECORD: ICD-10-PCS | Mod: CPTII,S$GLB,, | Performed by: INTERNAL MEDICINE

## 2022-12-14 PROCEDURE — 3060F PR POS MICROALBUMINURIA RESULT DOCUMENTED/REVIEW: ICD-10-PCS | Mod: CPTII,S$GLB,, | Performed by: INTERNAL MEDICINE

## 2022-12-14 PROCEDURE — 36415 COLL VENOUS BLD VENIPUNCTURE: CPT | Performed by: INTERNAL MEDICINE

## 2022-12-14 PROCEDURE — 86334 IMMUNOFIX E-PHORESIS SERUM: CPT | Performed by: INTERNAL MEDICINE

## 2022-12-14 PROCEDURE — 86334 PATHOLOGIST INTERPRETATION IFE: ICD-10-PCS | Mod: 26,,, | Performed by: PATHOLOGY

## 2022-12-14 PROCEDURE — 99999 PR PBB SHADOW E&M-EST. PATIENT-LVL V: ICD-10-PCS | Mod: PBBFAC,,, | Performed by: INTERNAL MEDICINE

## 2022-12-14 PROCEDURE — 1160F PR REVIEW ALL MEDS BY PRESCRIBER/CLIN PHARMACIST DOCUMENTED: ICD-10-PCS | Mod: CPTII,S$GLB,, | Performed by: INTERNAL MEDICINE

## 2022-12-14 PROCEDURE — 3078F PR MOST RECENT DIASTOLIC BLOOD PRESSURE < 80 MM HG: ICD-10-PCS | Mod: CPTII,S$GLB,, | Performed by: INTERNAL MEDICINE

## 2022-12-14 PROCEDURE — 99204 PR OFFICE/OUTPT VISIT, NEW, LEVL IV, 45-59 MIN: ICD-10-PCS | Mod: S$GLB,,, | Performed by: INTERNAL MEDICINE

## 2022-12-14 PROCEDURE — 3008F PR BODY MASS INDEX (BMI) DOCUMENTED: ICD-10-PCS | Mod: CPTII,S$GLB,, | Performed by: INTERNAL MEDICINE

## 2022-12-14 PROCEDURE — 3074F SYST BP LT 130 MM HG: CPT | Mod: CPTII,S$GLB,, | Performed by: INTERNAL MEDICINE

## 2022-12-14 PROCEDURE — 3074F PR MOST RECENT SYSTOLIC BLOOD PRESSURE < 130 MM HG: ICD-10-PCS | Mod: CPTII,S$GLB,, | Performed by: INTERNAL MEDICINE

## 2022-12-14 PROCEDURE — 86334 IMMUNOFIX E-PHORESIS SERUM: CPT | Mod: 26,,, | Performed by: PATHOLOGY

## 2022-12-14 PROCEDURE — 3044F HG A1C LEVEL LT 7.0%: CPT | Mod: CPTII,S$GLB,, | Performed by: INTERNAL MEDICINE

## 2022-12-14 PROCEDURE — 1159F MED LIST DOCD IN RCRD: CPT | Mod: CPTII,S$GLB,, | Performed by: INTERNAL MEDICINE

## 2022-12-14 PROCEDURE — 84165 PROTEIN E-PHORESIS SERUM: CPT | Performed by: INTERNAL MEDICINE

## 2022-12-14 NOTE — PROGRESS NOTES
Subjective:      DATE OF VISIT: 12/14/22     ?  Patient ID:?Marilou Daniel is a 47 y.o. female.?? MR#: [id number]   ?   REFERRING PROVIDER: Diane Dominguez NP  [id number] [id number]     ? Primary Care Providers:  Diane Dominguez NP, NP (General)     CHIEF COMPLAINT: ?anemia, history of MCTD and SLE??   ?   HPI    I would the pleasure meeting for the 1st time Ms. Daniel a very pleasant 47-year-old woman with SLE and mixed connective tissue disease recently establishing care with [facility] providers.  She notes prior hematologist has prescribed IV iron therapy last couple years ago and she was continued on oral iron therapy but has not had improvement in chronic anemia hemoglobin 8-10 per patient report.  She denies any evidence of bleeding.  She notes being up-to-date on age-appropriate cancer screening.  Family history is notable for 2 sisters with breast cancer about 50-60syo.  She notes wean from steroids and some weight loss associated with this.  No unintentional weight loss fevers chills.  She is not had bone marrow biopsy in the past.    Review of Systems    ?   A comprehensive 14-point review of systems was reviewed with patient and was negative other than as specified above.   ?   PAST MEDICAL HISTORY:   Past Medical History:   Diagnosis Date    Anemia     Arthritis     Connective tissue disease 1999    COVID-19 in immunocompromised patient 02/05/2021    Diabetes mellitus     Gastroesophageal reflux disease 03/23/2020    Hypertension     Lupus 1999    Situational anxiety 08/24/2021    Thyroid nodule greater than or equal to 1 cm in diameter incidentally noted on imaging study 11/19/2020    Vasculitis     ?     PAST SURGICAL HISTORY:   Past Surgical History:   Procedure Laterality Date    ABLATION      COLONOSCOPY N/A 6/24/2020    Procedure: COLONOSCOPY;  Surgeon: Nevin Penny MD;  Location: [facility];  Service: Endoscopy;  Laterality: N/A;     ESOPHAGOGASTRODUODENOSCOPY N/A 6/24/2020    Procedure: ESOPHAGOGASTRODUODENOSCOPY (EGD);  Surgeon: Nevin Penny MD;  Location: [facility];  Service: Endoscopy;  Laterality: N/A;    RIGHT HEART CATHETERIZATION      TUBAL LIGATION      WRIST SURGERY Bilateral       ?   ALLERGIES:   Allergies as of 12/14/2022 - Reviewed 12/14/2022   Allergen Reaction Noted    Azathioprine Nausea And Vomiting 03/16/2021    Olmesartan Shortness Of Breath 03/11/2021    Oxycodone Itching 03/13/2019      ?   MEDICATIONS:?   Outpatient Medications Marked as Taking for the 12/14/22 encounter (Office Visit) with Maritza Fields MD   Medication Sig Dispense Refill    amLODIPine (NORVASC) 5 MG tablet 10 mg.      atorvastatin (LIPITOR) 10 MG tablet       betamethasone dipropionate 0.05 % cream APPLY A SMALL AMOUNT TO AFFECTED AREA TOPICALLY TWICE A DAY FOR 2 TO 3 WEEKS AT A TIME AS NEEDED FLARE UPS AVOID FACE & GROIN AREA      blood sugar diagnostic (TRUE METRIX GLUCOSE TEST STRIP) Strp Test 4 times daily. 300 strip 3    blood-glucose meter (TRUE METRIX GLUCOSE METER) Misc Use as directed 1 each 0    chlorthalidone (HYGROTEN) 25 MG Tab Take 1 tablet (25 mg total) by mouth once daily. 30 tablet 2    ciprofloxacin HCl (CIPRO) 500 MG tablet Take 1 tablet (500 mg total) by mouth 2 (two) times daily. 14 tablet 0    cloNIDine (CATAPRES) 0.1 MG tablet Take 1 tablet (0.1 mg total) by mouth every 6 (six) hours as needed (elevated BP). 90 tablet 11    cloNIDine 0.1 mg/24 hr td ptwk (CATAPRES) 0.1 mg/24 hr Place 1 patch onto the skin every 7 days. 4 patch 11    dapsone 100 MG Tab Take 100 mg by mouth once daily at 6am.      ergocalciferol (ERGOCALCIFEROL) 50,000 unit Cap Take 50,000 Units by mouth.      esomeprazole (NEXIUM) 40 MG capsule Take 1 capsule (40 mg total) by mouth before breakfast. 90 capsule 3    gabapentin (NEURONTIN) 800 MG tablet Neurontin 800 mg tablet   Take 1 tablet 3 times a day by oral route.       HYDROcodone-acetaminophen (NORCO)  mg per tablet Norco 10 mg-325 mg tablet   Take 1 tablet 3 times a day by oral route as needed.      hydrocortisone 2.5 % ointment APPLY TOPICALLY TO FACE TWICE A DAY AS NEEDED FOR 1 TO 2 WEEKS AT A TIME      hydroxychloroquine (PLAQUENIL) 200 mg tablet Take 200 mg by mouth 2 (two) times daily.       hydrOXYzine HCL (ATARAX) 25 MG tablet TAKE ONE TABLET BY MOUTH EVERY 12 HOURS AS NEEDED FOR ITCHING 30 tablet 5    insulin (LANTUS SOLOSTAR U-100 INSULIN) glargine 100 units/mL (3mL) SubQ pen Inject 5 Units into the skin once daily. 1.5 mL 11    labetaloL (NORMODYNE) 300 MG tablet Take 1 tablet (300 mg total) by mouth 2 (two) times daily. 60 tablet 11    mycophenolate (CELLCEPT) 250 mg Cap 3,000 mg once daily.      ondansetron (ZOFRAN) 4 MG tablet Take 1 tablet (4 mg total) by mouth every 8 (eight) hours as needed for Nausea. 12 tablet 0    sertraline (ZOLOFT) 50 MG tablet Take 1 tablet (50 mg total) by mouth once daily. 30 tablet 11    spironolactone (ALDACTONE) 25 MG tablet Take 1 tablet (25 mg total) by mouth once daily. 30 tablet 11    tiZANidine (ZANAFLEX) 4 MG tablet TK 1 T PO TID PRN      TRUEPLUS LANCETS 33 gauge Misc Inject 1 lancet as directed 4 (four) times daily. 100 each 11    zolpidem (AMBIEN) 10 mg Tab TAKE ONE TABLET BY MOUTH NIGHTLY AS NEEDED FOR INSOMNIA 30 tablet 5     Current Facility-Administered Medications for the 12/14/22 encounter (Office Visit) with Maritza Fields MD   Medication Dose Route Frequency Provider Last Rate Last Admin    acetaminophen tablet 650 mg  650 mg Oral Once PRN Reji Dumas MD        acetaminophen tablet 650 mg  650 mg Oral Once PRN Reji Dumas MD        sodium chloride 0.9% 500 mL flush bag   Intravenous PRN Reji Dumas MD        sodium chloride 0.9% flush 10 mL  10 mL Intravenous PRN Reji Dumas MD          ?   SOCIAL HISTORY:?   Social History     Tobacco Use    Smoking status: Never     Smokeless tobacco: Never   Substance Use Topics    Alcohol use: Never      ?      ?   FAMILY HISTORY:   family history includes Breast cancer in her mother, sister, and sister.   ?        Objective:      Physical Exam      ?   Vitals:    12/14/22 1402   BP: 106/72   Pulse: 76   Temp: 97.5 °F (36.4 °C)      ?   ECOG:?0  General appearance: Generally well appearing, in no acute distress.   Head, eyes, ears, nose, and throat: moist mucous membranes.   Respiratory:  Normal work of breathing  Abdomen: nontender, nondistended.   Extremities: Warm, without edema.   Neurologic: Alert and oriented.   Skin: No rashes, ecchymoses or petechial lesion.   Psychiatric:  Normal mood and affect.    ?   Laboratory:    Lab Results   Component Value Date    WBC 8.12 12/09/2022    RBC 3.31 (L) 12/09/2022    HGB 9.2 (L) 12/09/2022    HCT 28.5 (L) 12/09/2022    MCV 86 12/09/2022    MCH 27.8 12/09/2022    MCHC 32.3 12/09/2022    RDW 13.9 12/09/2022     12/09/2022    MPV 10.7 12/09/2022    GRAN 4.3 12/09/2022    GRAN 52.9 12/09/2022    LYMPH 2.9 12/09/2022    LYMPH 35.2 12/09/2022    MONO 0.5 12/09/2022    MONO 6.7 12/09/2022    EOS 0.3 12/09/2022    BASO 0.03 12/09/2022    EOSINOPHIL 4.2 12/09/2022    BASOPHIL 0.4 12/09/2022       Sodium   Date Value Ref Range Status   11/29/2022 143 136 - 145 mmol/L Final     Potassium   Date Value Ref Range Status   11/29/2022 4.0 3.5 - 5.1 mmol/L Final     Chloride   Date Value Ref Range Status   11/29/2022 108 95 - 110 mmol/L Final     CO2   Date Value Ref Range Status   11/29/2022 25 23 - 29 mmol/L Final     Glucose   Date Value Ref Range Status   11/29/2022 147 (H) 70 - 110 mg/dL Final     BUN   Date Value Ref Range Status   11/29/2022 8 6 - 20 mg/dL Final     Creatinine   Date Value Ref Range Status   11/29/2022 0.8 0.5 - 1.4 mg/dL Final     Calcium   Date Value Ref Range Status   11/29/2022 10.0 8.7 - 10.5 mg/dL Final     Total Protein   Date Value Ref Range Status   11/29/2022 7.3 6.0 -  8.4 g/dL Final     Albumin   Date Value Ref Range Status   11/29/2022 4.1 3.5 - 5.2 g/dL Final   11/29/2022 4.1 3.5 - 5.2 g/dL Final     Total Bilirubin   Date Value Ref Range Status   11/29/2022 0.3 0.1 - 1.0 mg/dL Final     Comment:     For infants and newborns, interpretation of results should be based  on gestational age, weight and in agreement with clinical  observations.    Premature Infant recommended reference ranges:  Up to 24 hours.............<8.0 mg/dL  Up to 48 hours............<12.0 mg/dL  3-5 days..................<15.0 mg/dL  6-29 days.................<15.0 mg/dL       Alkaline Phosphatase   Date Value Ref Range Status   11/29/2022 66 55 - 135 U/L Final     AST   Date Value Ref Range Status   11/29/2022 19 10 - 40 U/L Final     ALT   Date Value Ref Range Status   11/29/2022 13 10 - 44 U/L Final     Anion Gap   Date Value Ref Range Status   11/29/2022 10 8 - 16 mmol/L Final     eGFR if    Date Value Ref Range Status   03/29/2022 >60.0 >60 mL/min/1.73 m^2 Final     eGFR if non    Date Value Ref Range Status   03/29/2022 >60.0 >60 mL/min/1.73 m^2 Final     Comment:     Calculation used to obtain the estimated glomerular filtration  rate (eGFR) is the CKD-EPI equation.          Iron   Date Value Ref Range Status   12/09/2022 37 30 - 160 ug/dL Final     TIBC   Date Value Ref Range Status   12/09/2022 266 250 - 450 ug/dL Final     Saturated Iron   Date Value Ref Range Status   12/09/2022 14 (L) 20 - 50 % Final     Ferritin   Date Value Ref Range Status   12/09/2022 194 20.0 - 300.0 ng/mL Final     TSH   Date Value Ref Range Status   08/25/2022 0.464 0.400 - 4.000 uIU/mL Final           ?   Assessment/Plan:   Anemia of infection and chronic disease    Iron deficiency anemia, unspecified iron deficiency anemia type  -     Ambulatory referral/consult to Hematology / Oncology       1. Anemia of infection and chronic disease    2. Iron deficiency anemia, unspecified iron  deficiency anemia type            Plan:     Problem List Items Addressed This Visit          Oncology    Iron deficiency anemia     Other Visit Diagnoses       Anemia of infection and chronic disease    -  Primary          Anemia: stable moderate anemia, normocytic hgb 9-10, no other cytopenia or consitutional symptoms.  Iron indices pattern most consistent with anemia of chronic inflammation which may be an association with her chronic comorbidities MCTD and SLE.  We discussed potential concomitant iron deficiency however given history of iron supplementation further IV supplementation trial may not improve chronic anemia.  She is interested in trial of IV iron therapy last several years ago.  We can screen for plasma cell dyscrasia although no other concerning cytopenia or constitutional symptoms.  Renal function intact.    Family history of breast cancer: two sisters with breast cancer diagnosed ages 50-60, referral to genetics desired by pt with discuss consideration of genetic testing. She has continued to be utd on yearly mammogram.     Follow-Up:   Myeloma lab screen today  Iv iron  Rv in 3 mo with labs couple days prior  Genetics referral    Route Chart for Scheduling    Med Onc Chart Routing      Follow up with physician 3 months.   Follow up with [facility]    Infusion scheduling note iv iron feraheme x 2 doses 1 week apart when approved   Injection scheduling note    Labs Ferritin, iron and TIBC and CBC   Lab interval:  labs today, cbc only in 3 mo   Imaging    Pharmacy appointment    Other referrals            Supportive Plan Information  OP FERUMOXYTOL   Maritza Fields MD   Upcoming Treatment Dates - OP FERUMOXYTOL    12/14/2022       Medications       ferumoxytoL (FERAHEME) 510 mg in dextrose 5 % 100 mL IVPB  12/21/2022       Medications       ferumoxytoL (FERAHEME) 510 mg in dextrose 5 % 100 mL IVPB

## 2022-12-14 NOTE — H&P (VIEW-ONLY)
Subjective:      DATE OF VISIT: 12/14/22     ?  Patient ID:?Marilou Daniel is a 47 y.o. female.?? MR#: [id number]   ?   REFERRING PROVIDER: Diane Dominguez NP  [id number] [id number]     ? Primary Care Providers:  Diane Dominguez NP, NP (General)     CHIEF COMPLAINT: ?anemia, history of MCTD and SLE??   ?   HPI    I would the pleasure meeting for the 1st time Ms. Daniel a very pleasant 47-year-old woman with SLE and mixed connective tissue disease recently establishing care with [facility] providers.  She notes prior hematologist has prescribed IV iron therapy last couple years ago and she was continued on oral iron therapy but has not had improvement in chronic anemia hemoglobin 8-10 per patient report.  She denies any evidence of bleeding.  She notes being up-to-date on age-appropriate cancer screening.  Family history is notable for 2 sisters with breast cancer about 50-60syo.  She notes wean from steroids and some weight loss associated with this.  No unintentional weight loss fevers chills.  She is not had bone marrow biopsy in the past.    Review of Systems    ?   A comprehensive 14-point review of systems was reviewed with patient and was negative other than as specified above.   ?   PAST MEDICAL HISTORY:   Past Medical History:   Diagnosis Date    Anemia     Arthritis     Connective tissue disease 1999    COVID-19 in immunocompromised patient 02/05/2021    Diabetes mellitus     Gastroesophageal reflux disease 03/23/2020    Hypertension     Lupus 1999    Situational anxiety 08/24/2021    Thyroid nodule greater than or equal to 1 cm in diameter incidentally noted on imaging study 11/19/2020    Vasculitis     ?     PAST SURGICAL HISTORY:   Past Surgical History:   Procedure Laterality Date    ABLATION      COLONOSCOPY N/A 6/24/2020    Procedure: COLONOSCOPY;  Surgeon: Nevin Penny MD;  Location: [facility];  Service: Endoscopy;  Laterality: N/A;     ESOPHAGOGASTRODUODENOSCOPY N/A 6/24/2020    Procedure: ESOPHAGOGASTRODUODENOSCOPY (EGD);  Surgeon: Nevin Penny MD;  Location: [facility];  Service: Endoscopy;  Laterality: N/A;    RIGHT HEART CATHETERIZATION      TUBAL LIGATION      WRIST SURGERY Bilateral       ?   ALLERGIES:   Allergies as of 12/14/2022 - Reviewed 12/14/2022   Allergen Reaction Noted    Azathioprine Nausea And Vomiting 03/16/2021    Olmesartan Shortness Of Breath 03/11/2021    Oxycodone Itching 03/13/2019      ?   MEDICATIONS:?   Outpatient Medications Marked as Taking for the 12/14/22 encounter (Office Visit) with Maritza Fields MD   Medication Sig Dispense Refill    amLODIPine (NORVASC) 5 MG tablet 10 mg.      atorvastatin (LIPITOR) 10 MG tablet       betamethasone dipropionate 0.05 % cream APPLY A SMALL AMOUNT TO AFFECTED AREA TOPICALLY TWICE A DAY FOR 2 TO 3 WEEKS AT A TIME AS NEEDED FLARE UPS AVOID FACE & GROIN AREA      blood sugar diagnostic (TRUE METRIX GLUCOSE TEST STRIP) Strp Test 4 times daily. 300 strip 3    blood-glucose meter (TRUE METRIX GLUCOSE METER) Misc Use as directed 1 each 0    chlorthalidone (HYGROTEN) 25 MG Tab Take 1 tablet (25 mg total) by mouth once daily. 30 tablet 2    ciprofloxacin HCl (CIPRO) 500 MG tablet Take 1 tablet (500 mg total) by mouth 2 (two) times daily. 14 tablet 0    cloNIDine (CATAPRES) 0.1 MG tablet Take 1 tablet (0.1 mg total) by mouth every 6 (six) hours as needed (elevated BP). 90 tablet 11    cloNIDine 0.1 mg/24 hr td ptwk (CATAPRES) 0.1 mg/24 hr Place 1 patch onto the skin every 7 days. 4 patch 11    dapsone 100 MG Tab Take 100 mg by mouth once daily at 6am.      ergocalciferol (ERGOCALCIFEROL) 50,000 unit Cap Take 50,000 Units by mouth.      esomeprazole (NEXIUM) 40 MG capsule Take 1 capsule (40 mg total) by mouth before breakfast. 90 capsule 3    gabapentin (NEURONTIN) 800 MG tablet Neurontin 800 mg tablet   Take 1 tablet 3 times a day by oral route.       HYDROcodone-acetaminophen (NORCO)  mg per tablet Norco 10 mg-325 mg tablet   Take 1 tablet 3 times a day by oral route as needed.      hydrocortisone 2.5 % ointment APPLY TOPICALLY TO FACE TWICE A DAY AS NEEDED FOR 1 TO 2 WEEKS AT A TIME      hydroxychloroquine (PLAQUENIL) 200 mg tablet Take 200 mg by mouth 2 (two) times daily.       hydrOXYzine HCL (ATARAX) 25 MG tablet TAKE ONE TABLET BY MOUTH EVERY 12 HOURS AS NEEDED FOR ITCHING 30 tablet 5    insulin (LANTUS SOLOSTAR U-100 INSULIN) glargine 100 units/mL (3mL) SubQ pen Inject 5 Units into the skin once daily. 1.5 mL 11    labetaloL (NORMODYNE) 300 MG tablet Take 1 tablet (300 mg total) by mouth 2 (two) times daily. 60 tablet 11    mycophenolate (CELLCEPT) 250 mg Cap 3,000 mg once daily.      ondansetron (ZOFRAN) 4 MG tablet Take 1 tablet (4 mg total) by mouth every 8 (eight) hours as needed for Nausea. 12 tablet 0    sertraline (ZOLOFT) 50 MG tablet Take 1 tablet (50 mg total) by mouth once daily. 30 tablet 11    spironolactone (ALDACTONE) 25 MG tablet Take 1 tablet (25 mg total) by mouth once daily. 30 tablet 11    tiZANidine (ZANAFLEX) 4 MG tablet TK 1 T PO TID PRN      TRUEPLUS LANCETS 33 gauge Misc Inject 1 lancet as directed 4 (four) times daily. 100 each 11    zolpidem (AMBIEN) 10 mg Tab TAKE ONE TABLET BY MOUTH NIGHTLY AS NEEDED FOR INSOMNIA 30 tablet 5     Current Facility-Administered Medications for the 12/14/22 encounter (Office Visit) with Maritza Fields MD   Medication Dose Route Frequency Provider Last Rate Last Admin    acetaminophen tablet 650 mg  650 mg Oral Once PRN Reji Dumas MD        acetaminophen tablet 650 mg  650 mg Oral Once PRN Reji Dumas MD        sodium chloride 0.9% 500 mL flush bag   Intravenous PRN Reji Dumas MD        sodium chloride 0.9% flush 10 mL  10 mL Intravenous PRN Reji Dumas MD          ?   SOCIAL HISTORY:?   Social History     Tobacco Use    Smoking status: Never     Smokeless tobacco: Never   Substance Use Topics    Alcohol use: Never      ?      ?   FAMILY HISTORY:   family history includes Breast cancer in her mother, sister, and sister.   ?        Objective:      Physical Exam      ?   Vitals:    12/14/22 1402   BP: 106/72   Pulse: 76   Temp: 97.5 °F (36.4 °C)      ?   ECOG:?0  General appearance: Generally well appearing, in no acute distress.   Head, eyes, ears, nose, and throat: moist mucous membranes.   Respiratory:  Normal work of breathing  Abdomen: nontender, nondistended.   Extremities: Warm, without edema.   Neurologic: Alert and oriented.   Skin: No rashes, ecchymoses or petechial lesion.   Psychiatric:  Normal mood and affect.    ?   Laboratory:    Lab Results   Component Value Date    WBC 8.12 12/09/2022    RBC 3.31 (L) 12/09/2022    HGB 9.2 (L) 12/09/2022    HCT 28.5 (L) 12/09/2022    MCV 86 12/09/2022    MCH 27.8 12/09/2022    MCHC 32.3 12/09/2022    RDW 13.9 12/09/2022     12/09/2022    MPV 10.7 12/09/2022    GRAN 4.3 12/09/2022    GRAN 52.9 12/09/2022    LYMPH 2.9 12/09/2022    LYMPH 35.2 12/09/2022    MONO 0.5 12/09/2022    MONO 6.7 12/09/2022    EOS 0.3 12/09/2022    BASO 0.03 12/09/2022    EOSINOPHIL 4.2 12/09/2022    BASOPHIL 0.4 12/09/2022       Sodium   Date Value Ref Range Status   11/29/2022 143 136 - 145 mmol/L Final     Potassium   Date Value Ref Range Status   11/29/2022 4.0 3.5 - 5.1 mmol/L Final     Chloride   Date Value Ref Range Status   11/29/2022 108 95 - 110 mmol/L Final     CO2   Date Value Ref Range Status   11/29/2022 25 23 - 29 mmol/L Final     Glucose   Date Value Ref Range Status   11/29/2022 147 (H) 70 - 110 mg/dL Final     BUN   Date Value Ref Range Status   11/29/2022 8 6 - 20 mg/dL Final     Creatinine   Date Value Ref Range Status   11/29/2022 0.8 0.5 - 1.4 mg/dL Final     Calcium   Date Value Ref Range Status   11/29/2022 10.0 8.7 - 10.5 mg/dL Final     Total Protein   Date Value Ref Range Status   11/29/2022 7.3 6.0 -  8.4 g/dL Final     Albumin   Date Value Ref Range Status   11/29/2022 4.1 3.5 - 5.2 g/dL Final   11/29/2022 4.1 3.5 - 5.2 g/dL Final     Total Bilirubin   Date Value Ref Range Status   11/29/2022 0.3 0.1 - 1.0 mg/dL Final     Comment:     For infants and newborns, interpretation of results should be based  on gestational age, weight and in agreement with clinical  observations.    Premature Infant recommended reference ranges:  Up to 24 hours.............<8.0 mg/dL  Up to 48 hours............<12.0 mg/dL  3-5 days..................<15.0 mg/dL  6-29 days.................<15.0 mg/dL       Alkaline Phosphatase   Date Value Ref Range Status   11/29/2022 66 55 - 135 U/L Final     AST   Date Value Ref Range Status   11/29/2022 19 10 - 40 U/L Final     ALT   Date Value Ref Range Status   11/29/2022 13 10 - 44 U/L Final     Anion Gap   Date Value Ref Range Status   11/29/2022 10 8 - 16 mmol/L Final     eGFR if    Date Value Ref Range Status   03/29/2022 >60.0 >60 mL/min/1.73 m^2 Final     eGFR if non    Date Value Ref Range Status   03/29/2022 >60.0 >60 mL/min/1.73 m^2 Final     Comment:     Calculation used to obtain the estimated glomerular filtration  rate (eGFR) is the CKD-EPI equation.          Iron   Date Value Ref Range Status   12/09/2022 37 30 - 160 ug/dL Final     TIBC   Date Value Ref Range Status   12/09/2022 266 250 - 450 ug/dL Final     Saturated Iron   Date Value Ref Range Status   12/09/2022 14 (L) 20 - 50 % Final     Ferritin   Date Value Ref Range Status   12/09/2022 194 20.0 - 300.0 ng/mL Final     TSH   Date Value Ref Range Status   08/25/2022 0.464 0.400 - 4.000 uIU/mL Final           ?   Assessment/Plan:   Anemia of infection and chronic disease    Iron deficiency anemia, unspecified iron deficiency anemia type  -     Ambulatory referral/consult to Hematology / Oncology       1. Anemia of infection and chronic disease    2. Iron deficiency anemia, unspecified iron  deficiency anemia type            Plan:     Problem List Items Addressed This Visit          Oncology    Iron deficiency anemia     Other Visit Diagnoses       Anemia of infection and chronic disease    -  Primary          Anemia: stable moderate anemia, normocytic hgb 9-10, no other cytopenia or consitutional symptoms.  Iron indices pattern most consistent with anemia of chronic inflammation which may be an association with her chronic comorbidities MCTD and SLE.  We discussed potential concomitant iron deficiency however given history of iron supplementation further IV supplementation trial may not improve chronic anemia.  She is interested in trial of IV iron therapy last several years ago.  We can screen for plasma cell dyscrasia although no other concerning cytopenia or constitutional symptoms.  Renal function intact.    Family history of breast cancer: two sisters with breast cancer diagnosed ages 50-60, referral to genetics desired by pt with discuss consideration of genetic testing. She has continued to be utd on yearly mammogram.     Follow-Up:   Myeloma lab screen today  Iv iron  Rv in 3 mo with labs couple days prior  Genetics referral    Route Chart for Scheduling    Med Onc Chart Routing      Follow up with physician 3 months.   Follow up with [facility]    Infusion scheduling note iv iron feraheme x 2 doses 1 week apart when approved   Injection scheduling note    Labs Ferritin, iron and TIBC and CBC   Lab interval:  labs today, cbc only in 3 mo   Imaging    Pharmacy appointment    Other referrals            Supportive Plan Information  OP FERUMOXYTOL   Maritza Fields MD   Upcoming Treatment Dates - OP FERUMOXYTOL    12/14/2022       Medications       ferumoxytoL (FERAHEME) 510 mg in dextrose 5 % 100 mL IVPB  12/21/2022       Medications       ferumoxytoL (FERAHEME) 510 mg in dextrose 5 % 100 mL IVPB

## 2022-12-14 NOTE — TELEPHONE ENCOUNTER
----- Message from Marisa De La Torre sent at 12/14/2022 12:34 PM CST -----  Patient is calling to speak with a nurse in regards to questions and concerns. Reports received a call from nurse and was giving a callback. Please give patient a callback at [phone]

## 2022-12-15 ENCOUNTER — TELEPHONE (OUTPATIENT)
Dept: GENETICS | Facility: CLINIC | Age: 47
End: 2022-12-15
Payer: MEDICARE

## 2022-12-15 LAB
ALBUMIN SERPL ELPH-MCNC: 4.27 G/DL (ref 3.35–5.55)
ALPHA1 GLOB SERPL ELPH-MCNC: 0.29 G/DL (ref 0.17–0.41)
ALPHA2 GLOB SERPL ELPH-MCNC: 0.58 G/DL (ref 0.43–0.99)
B-GLOBULIN SERPL ELPH-MCNC: 0.96 G/DL (ref 0.5–1.1)
GAMMA GLOB SERPL ELPH-MCNC: 0.99 G/DL (ref 0.67–1.58)
INTERPRETATION SERPL IFE-IMP: NORMAL
KAPPA LC SER QL IA: 4.25 MG/DL (ref 0.33–1.94)
KAPPA LC/LAMBDA SER IA: 1.47 (ref 0.26–1.65)
LAMBDA LC SER QL IA: 2.9 MG/DL (ref 0.57–2.63)
PATHOLOGIST INTERPRETATION IFE: NORMAL
PATHOLOGIST INTERPRETATION SPE: NORMAL
PROT SERPL-MCNC: 7.1 G/DL (ref 6–8.4)

## 2022-12-16 ENCOUNTER — PATIENT MESSAGE (OUTPATIENT)
Dept: HEMATOLOGY/ONCOLOGY | Facility: CLINIC | Age: 47
End: 2022-12-16
Payer: MEDICARE

## 2022-12-19 ENCOUNTER — OFFICE VISIT (OUTPATIENT)
Dept: HEMATOLOGY/ONCOLOGY | Facility: CLINIC | Age: 47
End: 2022-12-19
Payer: MEDICARE

## 2022-12-19 DIAGNOSIS — Z13.79 ENCOUNTER FOR OTHER SCREENING FOR GENETIC AND CHROMOSOMAL ANOMALIES: ICD-10-CM

## 2022-12-19 DIAGNOSIS — D47.2 MGUS (MONOCLONAL GAMMOPATHY OF UNKNOWN SIGNIFICANCE): Primary | ICD-10-CM

## 2022-12-19 PROCEDURE — 3060F PR POS MICROALBUMINURIA RESULT DOCUMENTED/REVIEW: ICD-10-PCS | Mod: CPTII,95,, | Performed by: INTERNAL MEDICINE

## 2022-12-19 PROCEDURE — 99214 PR OFFICE/OUTPT VISIT, EST, LEVL IV, 30-39 MIN: ICD-10-PCS | Mod: 95,,, | Performed by: INTERNAL MEDICINE

## 2022-12-19 PROCEDURE — 1159F MED LIST DOCD IN RCRD: CPT | Mod: CPTII,95,, | Performed by: INTERNAL MEDICINE

## 2022-12-19 PROCEDURE — 3044F HG A1C LEVEL LT 7.0%: CPT | Mod: CPTII,95,, | Performed by: INTERNAL MEDICINE

## 2022-12-19 PROCEDURE — 1160F RVW MEDS BY RX/DR IN RCRD: CPT | Mod: CPTII,95,, | Performed by: INTERNAL MEDICINE

## 2022-12-19 PROCEDURE — 1160F PR REVIEW ALL MEDS BY PRESCRIBER/CLIN PHARMACIST DOCUMENTED: ICD-10-PCS | Mod: CPTII,95,, | Performed by: INTERNAL MEDICINE

## 2022-12-19 PROCEDURE — 3044F PR MOST RECENT HEMOGLOBIN A1C LEVEL <7.0%: ICD-10-PCS | Mod: CPTII,95,, | Performed by: INTERNAL MEDICINE

## 2022-12-19 PROCEDURE — 3060F POS MICROALBUMINURIA REV: CPT | Mod: CPTII,95,, | Performed by: INTERNAL MEDICINE

## 2022-12-19 PROCEDURE — 3066F PR DOCUMENTATION OF TREATMENT FOR NEPHROPATHY: ICD-10-PCS | Mod: CPTII,95,, | Performed by: INTERNAL MEDICINE

## 2022-12-19 PROCEDURE — 1159F PR MEDICATION LIST DOCUMENTED IN MEDICAL RECORD: ICD-10-PCS | Mod: CPTII,95,, | Performed by: INTERNAL MEDICINE

## 2022-12-19 PROCEDURE — 3066F NEPHROPATHY DOC TX: CPT | Mod: CPTII,95,, | Performed by: INTERNAL MEDICINE

## 2022-12-19 PROCEDURE — 99214 OFFICE O/P EST MOD 30 MIN: CPT | Mod: 95,,, | Performed by: INTERNAL MEDICINE

## 2022-12-19 NOTE — H&P (VIEW-ONLY)
The patient location is:  Home  The chief complaint leading to consultation is:  Follow-up anemia and MGUS    Visit type: audiovisual    Face to Face time with patient:  10 minutes  Thirty minutes of total time spent on the encounter, which includes face to face time and non-face to face time preparing to see the patient (eg, review of tests), Obtaining and/or reviewing separately obtained history, Documenting clinical information in the electronic or other health record, Independently interpreting results (not separately reported) and communicating results to the patient/family/caregiver, or Care coordination (not separately reported).         Each patient to whom he or she provides medical services by telemedicine is:  (1) informed of the relationship between the physician and patient and the respective role of any other health care provider with respect to management of the patient; and (2) notified that he or she may decline to receive medical services by telemedicine and may withdraw from such care at any time.    Notes:     Subjective:      DATE OF VISIT: 12/19/22    ?  Patient ID:?Marilou Daniel is a 47 y.o. female.?? MR#: [id number]   PRIMARY ONCOLOGIST: Dr. Fields      ? Primary Care Providers:  Diane Dominguez, NP, NP (General)     CHIEF COMPLAINT: ?anemia, history of MCTD and SLE, IgA lambda MGUS  ?   HPI    She follows up in virtual visit after workup for anemia.  She is to be plan for IV iron therapy.  Workup did reveal IgA lambda MGUS which we discussed today.  No unintentional weight loss.    Review of Systems    ?   A comprehensive 14-point review of systems was reviewed with patient and was negative other than as specified above.   ?   PAST MEDICAL HISTORY:   Past Medical History:   Diagnosis Date    Anemia     Arthritis     Connective tissue disease 1999    COVID-19 in immunocompromised patient 02/05/2021    Diabetes mellitus     Gastroesophageal reflux disease 03/23/2020     Hypertension     Lupus 1999    Situational anxiety 08/24/2021    Thyroid nodule greater than or equal to 1 cm in diameter incidentally noted on imaging study 11/19/2020    Vasculitis     ?     PAST SURGICAL HISTORY:   Past Surgical History:   Procedure Laterality Date    ABLATION      COLONOSCOPY N/A 6/24/2020    Procedure: COLONOSCOPY;  Surgeon: Nevin Penny MD;  Location: [facility];  Service: Endoscopy;  Laterality: N/A;    ESOPHAGOGASTRODUODENOSCOPY N/A 6/24/2020    Procedure: ESOPHAGOGASTRODUODENOSCOPY (EGD);  Surgeon: Nevin Penny MD;  Location: [facility];  Service: Endoscopy;  Laterality: N/A;    RIGHT HEART CATHETERIZATION      TUBAL LIGATION      WRIST SURGERY Bilateral       ?   ALLERGIES:   Allergies as of 12/19/2022 - Reviewed 12/14/2022   Allergen Reaction Noted    Azathioprine Nausea And Vomiting 03/16/2021    Olmesartan Shortness Of Breath 03/11/2021    Oxycodone Itching 03/13/2019      ?   MEDICATIONS:?   No outpatient medications have been marked as taking for the 12/19/22 encounter (Appointment) with Maritza Fields MD.     Current Facility-Administered Medications for the 12/19/22 encounter (Appointment) with Maritza Fields MD   Medication Dose Route Frequency Provider Last Rate Last Admin    acetaminophen tablet 650 mg  650 mg Oral Once PRN Reji Dumas MD        acetaminophen tablet 650 mg  650 mg Oral Once PRN Reji Dumas MD        sodium chloride 0.9% 500 mL flush bag   Intravenous PRN Reji Dumas MD        sodium chloride 0.9% flush 10 mL  10 mL Intravenous PRN Reji Dumas MD          ?   SOCIAL HISTORY:?   Social History     Tobacco Use    Smoking status: Never    Smokeless tobacco: Never   Substance Use Topics    Alcohol use: Never      ?      ?   FAMILY HISTORY:   family history includes Breast cancer in her mother, sister, and sister.   ?        Objective:      Physical Exam      ? Limited due to virtual visit  There were no vitals filed for this  visit.     ?   ECOG:?0  General appearance: Generally well appearing, in no acute distress.   Head, eyes, ears, nose, and throat: moist mucous membranes.   Respiratory:  Normal work of breathing  Neurologic: Alert and oriented.   Psychiatric:  Normal mood and affect.    ?   Laboratory:    Lab Results   Component Value Date    WBC 8.12 12/09/2022    RBC 3.31 (L) 12/09/2022    HGB 9.2 (L) 12/09/2022    HCT 28.5 (L) 12/09/2022    MCV 86 12/09/2022    MCH 27.8 12/09/2022    MCHC 32.3 12/09/2022    RDW 13.9 12/09/2022     12/09/2022    MPV 10.7 12/09/2022    GRAN 4.3 12/09/2022    GRAN 52.9 12/09/2022    LYMPH 2.9 12/09/2022    LYMPH 35.2 12/09/2022    MONO 0.5 12/09/2022    MONO 6.7 12/09/2022    EOS 0.3 12/09/2022    BASO 0.03 12/09/2022    EOSINOPHIL 4.2 12/09/2022    BASOPHIL 0.4 12/09/2022       Sodium   Date Value Ref Range Status   11/29/2022 143 136 - 145 mmol/L Final     Potassium   Date Value Ref Range Status   11/29/2022 4.0 3.5 - 5.1 mmol/L Final     Chloride   Date Value Ref Range Status   11/29/2022 108 95 - 110 mmol/L Final     CO2   Date Value Ref Range Status   11/29/2022 25 23 - 29 mmol/L Final     Glucose   Date Value Ref Range Status   11/29/2022 147 (H) 70 - 110 mg/dL Final     BUN   Date Value Ref Range Status   11/29/2022 8 6 - 20 mg/dL Final     Creatinine   Date Value Ref Range Status   11/29/2022 0.8 0.5 - 1.4 mg/dL Final     Calcium   Date Value Ref Range Status   11/29/2022 10.0 8.7 - 10.5 mg/dL Final     Total Protein   Date Value Ref Range Status   11/29/2022 7.3 6.0 - 8.4 g/dL Final     Albumin   Date Value Ref Range Status   11/29/2022 4.1 3.5 - 5.2 g/dL Final   11/29/2022 4.1 3.5 - 5.2 g/dL Final     Total Bilirubin   Date Value Ref Range Status   11/29/2022 0.3 0.1 - 1.0 mg/dL Final     Comment:     For infants and newborns, interpretation of results should be based  on gestational age, weight and in agreement with clinical  observations.    Premature Infant recommended reference  ranges:  Up to 24 hours.............<8.0 mg/dL  Up to 48 hours............<12.0 mg/dL  3-5 days..................<15.0 mg/dL  6-29 days.................<15.0 mg/dL       Alkaline Phosphatase   Date Value Ref Range Status   11/29/2022 66 55 - 135 U/L Final     AST   Date Value Ref Range Status   11/29/2022 19 10 - 40 U/L Final     ALT   Date Value Ref Range Status   11/29/2022 13 10 - 44 U/L Final     Anion Gap   Date Value Ref Range Status   11/29/2022 10 8 - 16 mmol/L Final     eGFR if    Date Value Ref Range Status   03/29/2022 >60.0 >60 mL/min/1.73 m^2 Final     eGFR if non    Date Value Ref Range Status   03/29/2022 >60.0 >60 mL/min/1.73 m^2 Final     Comment:     Calculation used to obtain the estimated glomerular filtration  rate (eGFR) is the CKD-EPI equation.          Iron   Date Value Ref Range Status   12/09/2022 37 30 - 160 ug/dL Final     TIBC   Date Value Ref Range Status   12/09/2022 266 250 - 450 ug/dL Final     Saturated Iron   Date Value Ref Range Status   12/09/2022 14 (L) 20 - 50 % Final     Ferritin   Date Value Ref Range Status   12/09/2022 194 20.0 - 300.0 ng/mL Final     TSH   Date Value Ref Range Status   08/25/2022 0.464 0.400 - 4.000 uIU/mL Final           ?   Assessment/Plan:   There are no diagnoses linked to this encounter.     No diagnosis found.          Plan:     Problem List Items Addressed This Visit    None      Anemia: stable moderate anemia, normocytic hgb 9-10, no other cytopenia or consitutional symptoms.  Iron indices pattern most consistent with anemia of chronic inflammation which may be an association with her chronic comorbidities MCTD and SLE.  We discussed potential concomitant iron deficiency however given history of iron supplementation further IV supplementation trial may not improve chronic anemia.  She is interested in trial of IV iron therapy last several years ago.  Screen for plasma cell dyscrasia did reveal IgA lambda MGUS 0.3  grams/deciliter.  Discussed natural history of MGUS and associated conditions of plasma cell dyscrasia.  Given IgA MGUS recommended further evaluation with bone marrow biopsy and PET scan screening.    Family history of breast cancer: two sisters with breast cancer diagnosed ages 50-60, referral to genetics desired by pt with discuss consideration of genetic testing. She has continued to be utd on yearly mammogram.     Follow-Up:   Patient Instructions   Please schedule iv iron therapy  Bmbx with IR with cbc same day prior  PET  RV 2 wks after bmbx and PET to review results, virtual ok      Route Chart for Scheduling  Med Onc Route Chart for Scheduling        Supportive Plan Information  OP FERUMOXYTOL   Maritza Fields MD   Upcoming Treatment Dates - OP FERUMOXYTOL    12/14/2022       Medications       ferumoxytoL (FERAHEME) 510 mg in dextrose 5 % 100 mL IVPB  12/21/2022       Medications       ferumoxytoL (FERAHEME) 510 mg in dextrose 5 % 100 mL IVPB

## 2022-12-19 NOTE — PATIENT INSTRUCTIONS
Please schedule iv iron therapy  Bmbx with IR with cbc same day prior  PET  RV 2 wks after bmbx and PET to review results, virtual ok

## 2022-12-19 NOTE — PROGRESS NOTES
The patient location is:  Home  The chief complaint leading to consultation is:  Follow-up anemia and MGUS    Visit type: audiovisual    Face to Face time with patient:  10 minutes  Thirty minutes of total time spent on the encounter, which includes face to face time and non-face to face time preparing to see the patient (eg, review of tests), Obtaining and/or reviewing separately obtained history, Documenting clinical information in the electronic or other health record, Independently interpreting results (not separately reported) and communicating results to the patient/family/caregiver, or Care coordination (not separately reported).         Each patient to whom he or she provides medical services by telemedicine is:  (1) informed of the relationship between the physician and patient and the respective role of any other health care provider with respect to management of the patient; and (2) notified that he or she may decline to receive medical services by telemedicine and may withdraw from such care at any time.    Notes:     Subjective:      DATE OF VISIT: 12/19/22    ?  Patient ID:?Marilou Daniel is a 47 y.o. female.?? MR#: [id number]   PRIMARY ONCOLOGIST: Dr. Fields      ? Primary Care Providers:  Diane Dominguez, NP, NP (General)     CHIEF COMPLAINT: ?anemia, history of MCTD and SLE, IgA lambda MGUS  ?   HPI    She follows up in virtual visit after workup for anemia.  She is to be plan for IV iron therapy.  Workup did reveal IgA lambda MGUS which we discussed today.  No unintentional weight loss.    Review of Systems    ?   A comprehensive 14-point review of systems was reviewed with patient and was negative other than as specified above.   ?   PAST MEDICAL HISTORY:   Past Medical History:   Diagnosis Date    Anemia     Arthritis     Connective tissue disease 1999    COVID-19 in immunocompromised patient 02/05/2021    Diabetes mellitus     Gastroesophageal reflux disease 03/23/2020     Hypertension     Lupus 1999    Situational anxiety 08/24/2021    Thyroid nodule greater than or equal to 1 cm in diameter incidentally noted on imaging study 11/19/2020    Vasculitis     ?     PAST SURGICAL HISTORY:   Past Surgical History:   Procedure Laterality Date    ABLATION      COLONOSCOPY N/A 6/24/2020    Procedure: COLONOSCOPY;  Surgeon: Nevin Penny MD;  Location: [facility];  Service: Endoscopy;  Laterality: N/A;    ESOPHAGOGASTRODUODENOSCOPY N/A 6/24/2020    Procedure: ESOPHAGOGASTRODUODENOSCOPY (EGD);  Surgeon: Nevin Penny MD;  Location: [facility];  Service: Endoscopy;  Laterality: N/A;    RIGHT HEART CATHETERIZATION      TUBAL LIGATION      WRIST SURGERY Bilateral       ?   ALLERGIES:   Allergies as of 12/19/2022 - Reviewed 12/14/2022   Allergen Reaction Noted    Azathioprine Nausea And Vomiting 03/16/2021    Olmesartan Shortness Of Breath 03/11/2021    Oxycodone Itching 03/13/2019      ?   MEDICATIONS:?   No outpatient medications have been marked as taking for the 12/19/22 encounter (Appointment) with Maritza Fields MD.     Current Facility-Administered Medications for the 12/19/22 encounter (Appointment) with Maritza Fields MD   Medication Dose Route Frequency Provider Last Rate Last Admin    acetaminophen tablet 650 mg  650 mg Oral Once PRN Reji Dumas MD        acetaminophen tablet 650 mg  650 mg Oral Once PRN Reji Dumas MD        sodium chloride 0.9% 500 mL flush bag   Intravenous PRN Reji Dumas MD        sodium chloride 0.9% flush 10 mL  10 mL Intravenous PRN Reji Dumas MD          ?   SOCIAL HISTORY:?   Social History     Tobacco Use    Smoking status: Never    Smokeless tobacco: Never   Substance Use Topics    Alcohol use: Never      ?      ?   FAMILY HISTORY:   family history includes Breast cancer in her mother, sister, and sister.   ?        Objective:      Physical Exam      ? Limited due to virtual visit  There were no vitals filed for this  visit.     ?   ECOG:?0  General appearance: Generally well appearing, in no acute distress.   Head, eyes, ears, nose, and throat: moist mucous membranes.   Respiratory:  Normal work of breathing  Neurologic: Alert and oriented.   Psychiatric:  Normal mood and affect.    ?   Laboratory:    Lab Results   Component Value Date    WBC 8.12 12/09/2022    RBC 3.31 (L) 12/09/2022    HGB 9.2 (L) 12/09/2022    HCT 28.5 (L) 12/09/2022    MCV 86 12/09/2022    MCH 27.8 12/09/2022    MCHC 32.3 12/09/2022    RDW 13.9 12/09/2022     12/09/2022    MPV 10.7 12/09/2022    GRAN 4.3 12/09/2022    GRAN 52.9 12/09/2022    LYMPH 2.9 12/09/2022    LYMPH 35.2 12/09/2022    MONO 0.5 12/09/2022    MONO 6.7 12/09/2022    EOS 0.3 12/09/2022    BASO 0.03 12/09/2022    EOSINOPHIL 4.2 12/09/2022    BASOPHIL 0.4 12/09/2022       Sodium   Date Value Ref Range Status   11/29/2022 143 136 - 145 mmol/L Final     Potassium   Date Value Ref Range Status   11/29/2022 4.0 3.5 - 5.1 mmol/L Final     Chloride   Date Value Ref Range Status   11/29/2022 108 95 - 110 mmol/L Final     CO2   Date Value Ref Range Status   11/29/2022 25 23 - 29 mmol/L Final     Glucose   Date Value Ref Range Status   11/29/2022 147 (H) 70 - 110 mg/dL Final     BUN   Date Value Ref Range Status   11/29/2022 8 6 - 20 mg/dL Final     Creatinine   Date Value Ref Range Status   11/29/2022 0.8 0.5 - 1.4 mg/dL Final     Calcium   Date Value Ref Range Status   11/29/2022 10.0 8.7 - 10.5 mg/dL Final     Total Protein   Date Value Ref Range Status   11/29/2022 7.3 6.0 - 8.4 g/dL Final     Albumin   Date Value Ref Range Status   11/29/2022 4.1 3.5 - 5.2 g/dL Final   11/29/2022 4.1 3.5 - 5.2 g/dL Final     Total Bilirubin   Date Value Ref Range Status   11/29/2022 0.3 0.1 - 1.0 mg/dL Final     Comment:     For infants and newborns, interpretation of results should be based  on gestational age, weight and in agreement with clinical  observations.    Premature Infant recommended reference  ranges:  Up to 24 hours.............<8.0 mg/dL  Up to 48 hours............<12.0 mg/dL  3-5 days..................<15.0 mg/dL  6-29 days.................<15.0 mg/dL       Alkaline Phosphatase   Date Value Ref Range Status   11/29/2022 66 55 - 135 U/L Final     AST   Date Value Ref Range Status   11/29/2022 19 10 - 40 U/L Final     ALT   Date Value Ref Range Status   11/29/2022 13 10 - 44 U/L Final     Anion Gap   Date Value Ref Range Status   11/29/2022 10 8 - 16 mmol/L Final     eGFR if    Date Value Ref Range Status   03/29/2022 >60.0 >60 mL/min/1.73 m^2 Final     eGFR if non    Date Value Ref Range Status   03/29/2022 >60.0 >60 mL/min/1.73 m^2 Final     Comment:     Calculation used to obtain the estimated glomerular filtration  rate (eGFR) is the CKD-EPI equation.          Iron   Date Value Ref Range Status   12/09/2022 37 30 - 160 ug/dL Final     TIBC   Date Value Ref Range Status   12/09/2022 266 250 - 450 ug/dL Final     Saturated Iron   Date Value Ref Range Status   12/09/2022 14 (L) 20 - 50 % Final     Ferritin   Date Value Ref Range Status   12/09/2022 194 20.0 - 300.0 ng/mL Final     TSH   Date Value Ref Range Status   08/25/2022 0.464 0.400 - 4.000 uIU/mL Final           ?   Assessment/Plan:   There are no diagnoses linked to this encounter.     No diagnosis found.          Plan:     Problem List Items Addressed This Visit    None      Anemia: stable moderate anemia, normocytic hgb 9-10, no other cytopenia or consitutional symptoms.  Iron indices pattern most consistent with anemia of chronic inflammation which may be an association with her chronic comorbidities MCTD and SLE.  We discussed potential concomitant iron deficiency however given history of iron supplementation further IV supplementation trial may not improve chronic anemia.  She is interested in trial of IV iron therapy last several years ago.  Screen for plasma cell dyscrasia did reveal IgA lambda MGUS 0.3  grams/deciliter.  Discussed natural history of MGUS and associated conditions of plasma cell dyscrasia.  Given IgA MGUS recommended further evaluation with bone marrow biopsy and PET scan screening.    Family history of breast cancer: two sisters with breast cancer diagnosed ages 50-60, referral to genetics desired by pt with discuss consideration of genetic testing. She has continued to be utd on yearly mammogram.     Follow-Up:   Patient Instructions   Please schedule iv iron therapy  Bmbx with IR with cbc same day prior  PET  RV 2 wks after bmbx and PET to review results, virtual ok      Route Chart for Scheduling  Med Onc Route Chart for Scheduling        Supportive Plan Information  OP FERUMOXYTOL   Maritza Fields MD   Upcoming Treatment Dates - OP FERUMOXYTOL    12/14/2022       Medications       ferumoxytoL (FERAHEME) 510 mg in dextrose 5 % 100 mL IVPB  12/21/2022       Medications       ferumoxytoL (FERAHEME) 510 mg in dextrose 5 % 100 mL IVPB

## 2022-12-20 ENCOUNTER — TELEPHONE (OUTPATIENT)
Dept: HEMATOLOGY/ONCOLOGY | Facility: CLINIC | Age: 47
End: 2022-12-20
Payer: MEDICARE

## 2022-12-20 NOTE — TELEPHONE ENCOUNTER
Spoke with patient and relayed appts for PET, BMBX and iron infusions as well as lab and f/u with Dr. Fields.  She acknowledged acceptance, and call ended well.

## 2022-12-23 ENCOUNTER — PATIENT MESSAGE (OUTPATIENT)
Dept: HEMATOLOGY/ONCOLOGY | Facility: CLINIC | Age: 47
End: 2022-12-23
Payer: MEDICARE

## 2022-12-28 ENCOUNTER — HOSPITAL ENCOUNTER (OUTPATIENT)
Dept: RADIOLOGY | Facility: HOSPITAL | Age: 47
Discharge: HOME | End: 2022-12-28
Attending: INTERNAL MEDICINE
Payer: MEDICARE

## 2022-12-28 DIAGNOSIS — D47.2 MGUS (MONOCLONAL GAMMOPATHY OF UNKNOWN SIGNIFICANCE): ICD-10-CM

## 2022-12-28 PROCEDURE — 78816 NM PET CT WHOLE BODY: ICD-10-PCS | Mod: 26,HCNC,PS, | Performed by: RADIOLOGY

## 2022-12-28 PROCEDURE — 78816 PET IMAGE W/CT FULL BODY: CPT | Mod: 26,HCNC,PS, | Performed by: RADIOLOGY

## 2022-12-28 PROCEDURE — 78816 PET IMAGE W/CT FULL BODY: CPT | Mod: TC,HCNC

## 2022-12-30 ENCOUNTER — TELEPHONE (OUTPATIENT)
Dept: RADIOLOGY | Facility: HOSPITAL | Age: 47
End: 2022-12-30

## 2023-01-03 ENCOUNTER — HOSPITAL ENCOUNTER (OUTPATIENT)
Dept: RADIOLOGY | Facility: HOSPITAL | Age: 48
Discharge: HOME | End: 2023-01-03
Attending: INTERNAL MEDICINE
Payer: MEDICARE

## 2023-01-03 VITALS
OXYGEN SATURATION: 96 % | HEIGHT: 67 IN | BODY MASS INDEX: 30.76 KG/M2 | WEIGHT: 196 LBS | RESPIRATION RATE: 14 BRPM | SYSTOLIC BLOOD PRESSURE: 142 MMHG | DIASTOLIC BLOOD PRESSURE: 67 MMHG | HEART RATE: 59 BPM

## 2023-01-03 DIAGNOSIS — I15.2 HYPERTENSION ASSOCIATED WITH DIABETES: ICD-10-CM

## 2023-01-03 DIAGNOSIS — D68.9 COAGULATION DEFECT, UNSPECIFIED: Primary | ICD-10-CM

## 2023-01-03 DIAGNOSIS — M31.0 LEUKOCYTOCLASTIC VASCULITIS: ICD-10-CM

## 2023-01-03 DIAGNOSIS — E11.59 HYPERTENSION ASSOCIATED WITH DIABETES: ICD-10-CM

## 2023-01-03 DIAGNOSIS — D47.2 MGUS (MONOCLONAL GAMMOPATHY OF UNKNOWN SIGNIFICANCE): ICD-10-CM

## 2023-01-03 DIAGNOSIS — F19.982 DRUG-INDUCED INSOMNIA: ICD-10-CM

## 2023-01-03 PROCEDURE — 88271 CYTOGENETICS DNA PROBE: CPT | Mod: HCNC | Performed by: INTERNAL MEDICINE

## 2023-01-03 PROCEDURE — 38221 CT BIOPSY BONE MARROW (XPD): ICD-10-PCS | Mod: HCNC,RT,, | Performed by: RADIOLOGY

## 2023-01-03 PROCEDURE — 88274 CYTOGENETICS 25-99: CPT | Mod: HCNC | Performed by: INTERNAL MEDICINE

## 2023-01-03 PROCEDURE — 88274 CYTOGENETICS 25-99: CPT | Mod: 59,HCNC | Performed by: INTERNAL MEDICINE

## 2023-01-03 PROCEDURE — 88184 FLOWCYTOMETRY/ TC 1 MARKER: CPT | Mod: HCNC | Performed by: INTERNAL MEDICINE

## 2023-01-03 PROCEDURE — 88365 PR  TISSUE HYBRIDIZATION: ICD-10-PCS | Mod: 26,HCNC,, | Performed by: PATHOLOGY

## 2023-01-03 PROCEDURE — 88364 INSITU HYBRIDIZATION (FISH): CPT | Mod: 26,HCNC,, | Performed by: PATHOLOGY

## 2023-01-03 PROCEDURE — 88341 IMHCHEM/IMCYTCHM EA ADD ANTB: CPT | Mod: HCNC | Performed by: PATHOLOGY

## 2023-01-03 PROCEDURE — 88341 PR IHC OR ICC EACH ADD'L SINGLE ANTIBODY  STAINPR: ICD-10-PCS | Mod: 26,HCNC,, | Performed by: PATHOLOGY

## 2023-01-03 PROCEDURE — 88185 FLOWCYTOMETRY/TC ADD-ON: CPT | Mod: 59,HCNC | Performed by: PATHOLOGY

## 2023-01-03 PROCEDURE — 88305 TISSUE EXAM BY PATHOLOGIST: CPT | Mod: HCNC | Performed by: PATHOLOGY

## 2023-01-03 PROCEDURE — 36415 COLL VENOUS BLD VENIPUNCTURE: CPT | Mod: HCNC | Performed by: INTERNAL MEDICINE

## 2023-01-03 PROCEDURE — 77012 CT BIOPSY BONE MARROW (XPD): ICD-10-PCS | Mod: 26,HCNC,RT, | Performed by: RADIOLOGY

## 2023-01-03 PROCEDURE — 88311 DECALCIFY TISSUE: CPT | Mod: HCNC | Performed by: PATHOLOGY

## 2023-01-03 PROCEDURE — 88365 INSITU HYBRIDIZATION (FISH): CPT | Mod: HCNC | Performed by: PATHOLOGY

## 2023-01-03 PROCEDURE — 88342 IMHCHEM/IMCYTCHM 1ST ANTB: CPT | Mod: HCNC,59 | Performed by: PATHOLOGY

## 2023-01-03 PROCEDURE — 88342 CHG IMMUNOCYTOCHEMISTRY: ICD-10-PCS | Mod: 26,59,HCNC, | Performed by: PATHOLOGY

## 2023-01-03 PROCEDURE — 88313 PR  SPECIAL STAINS,GROUP II: ICD-10-PCS | Mod: 26,HCNC,, | Performed by: PATHOLOGY

## 2023-01-03 PROCEDURE — 88311 PR  DECALCIFY TISSUE: ICD-10-PCS | Mod: 26,HCNC,, | Performed by: PATHOLOGY

## 2023-01-03 PROCEDURE — 88271 CYTOGENETICS DNA PROBE: CPT | Mod: 59,HCNC | Performed by: INTERNAL MEDICINE

## 2023-01-03 PROCEDURE — 88313 SPECIAL STAINS GROUP 2: CPT | Mod: HCNC | Performed by: PATHOLOGY

## 2023-01-03 PROCEDURE — 38221 DX BONE MARROW BIOPSIES: CPT | Mod: HCNC,RT,, | Performed by: RADIOLOGY

## 2023-01-03 PROCEDURE — 88313 SPECIAL STAINS GROUP 2: CPT | Mod: 26,HCNC,, | Performed by: PATHOLOGY

## 2023-01-03 PROCEDURE — 77012 CT SCAN FOR NEEDLE BIOPSY: CPT | Mod: 26,HCNC,RT, | Performed by: RADIOLOGY

## 2023-01-03 PROCEDURE — 88364 CHG INSITU HYBRIDIZATION (FISH: ICD-10-PCS | Mod: 26,HCNC,, | Performed by: PATHOLOGY

## 2023-01-03 PROCEDURE — 85097 BONE MARROW INTERPRETATION: CPT | Mod: HCNC,,, | Performed by: PATHOLOGY

## 2023-01-03 PROCEDURE — 88184 FLOWCYTOMETRY/ TC 1 MARKER: CPT | Mod: 59,HCNC | Performed by: PATHOLOGY

## 2023-01-03 PROCEDURE — 88311 DECALCIFY TISSUE: CPT | Mod: 26,HCNC,, | Performed by: PATHOLOGY

## 2023-01-03 PROCEDURE — 88189 PR  FLOWCYTOMETRY/READ, 16 & > MARKERS: ICD-10-PCS | Mod: HCNC,,, | Performed by: PATHOLOGY

## 2023-01-03 PROCEDURE — 77012 CT SCAN FOR NEEDLE BIOPSY: CPT | Mod: TC,HCNC

## 2023-01-03 PROCEDURE — 88342 IMHCHEM/IMCYTCHM 1ST ANTB: CPT | Mod: 26,59,HCNC, | Performed by: PATHOLOGY

## 2023-01-03 PROCEDURE — 85097 PR  BONE MARROW,SMEAR INTERPRETATION: ICD-10-PCS | Mod: HCNC,,, | Performed by: PATHOLOGY

## 2023-01-03 PROCEDURE — 88264 CHROMOSOME ANALYSIS 20-25: CPT | Mod: HCNC | Performed by: INTERNAL MEDICINE

## 2023-01-03 PROCEDURE — 88365 INSITU HYBRIDIZATION (FISH): CPT | Mod: 26,HCNC,, | Performed by: PATHOLOGY

## 2023-01-03 PROCEDURE — 88341 IMHCHEM/IMCYTCHM EA ADD ANTB: CPT | Mod: 26,HCNC,, | Performed by: PATHOLOGY

## 2023-01-03 PROCEDURE — 88305 TISSUE EXAM BY PATHOLOGIST: ICD-10-PCS | Mod: 26,HCNC,, | Performed by: PATHOLOGY

## 2023-01-03 PROCEDURE — 88237 TISSUE CULTURE BONE MARROW: CPT | Mod: HCNC | Performed by: INTERNAL MEDICINE

## 2023-01-03 PROCEDURE — 88189 FLOWCYTOMETRY/READ 16 & >: CPT | Mod: HCNC,,, | Performed by: PATHOLOGY

## 2023-01-03 PROCEDURE — 88364 INSITU HYBRIDIZATION (FISH): CPT | Mod: 59,HCNC | Performed by: PATHOLOGY

## 2023-01-03 PROCEDURE — 88305 TISSUE EXAM BY PATHOLOGIST: CPT | Mod: 26,HCNC,, | Performed by: PATHOLOGY

## 2023-01-03 PROCEDURE — 63600175 PHARM REV CODE 636 W HCPCS: Mod: HCNC | Performed by: RADIOLOGY

## 2023-01-03 RX ORDER — MIDAZOLAM HYDROCHLORIDE 1 MG/ML
INJECTION INTRAMUSCULAR; INTRAVENOUS CODE/TRAUMA/SEDATION MEDICATION
Status: COMPLETED | OUTPATIENT
Start: 2023-01-03 | End: 2023-01-03

## 2023-01-03 RX ORDER — FENTANYL CITRATE 50 UG/ML
INJECTION, SOLUTION INTRAMUSCULAR; INTRAVENOUS CODE/TRAUMA/SEDATION MEDICATION
Status: COMPLETED | OUTPATIENT
Start: 2023-01-03 | End: 2023-01-03

## 2023-01-03 RX ORDER — ZOLPIDEM TARTRATE 10 MG/1
TABLET ORAL
Qty: 30 TABLET | Refills: 5 | Status: SHIPPED | OUTPATIENT
Start: 2023-01-03 | End: 2023-06-10

## 2023-01-03 RX ADMIN — MIDAZOLAM HYDROCHLORIDE 1 MG: 1 INJECTION INTRAMUSCULAR; INTRAVENOUS at 10:01

## 2023-01-03 RX ADMIN — FENTANYL CITRATE 50 MCG: 50 INJECTION, SOLUTION INTRAMUSCULAR; INTRAVENOUS at 11:01

## 2023-01-03 RX ADMIN — MIDAZOLAM HYDROCHLORIDE 1 MG: 1 INJECTION INTRAMUSCULAR; INTRAVENOUS at 11:01

## 2023-01-03 NOTE — DISCHARGE INSTRUCTIONS
Please return to ER if any of these symptoms occur:  Fever over 101 degrees,  Bleeding from the puncture site not controlled,  Pain not controlled with Aleve or Tylenol,    No driving for 24 hours after procedure due to sedation given during procedure.     Do not submerge in standing water for 2 days after biopsy but you may shower.    Resume home medications and diet    Biopsy results will be with Dr. Fields in 5-7 days, please follow up with her for results and any other questions or concerns that you may have.

## 2023-01-03 NOTE — TELEPHONE ENCOUNTER
----- Message from Iván Mcgowan sent at 1/3/2023  8:35 AM CST -----  Contact: [phone]  Would like to see if it is possible to have ambien prescription filled.

## 2023-01-03 NOTE — TELEPHONE ENCOUNTER
Patient call was returned. She stated that she was out of her Ambien. Prescription request was sent to the provider, patient was made aware & verbally understood the information given.

## 2023-01-03 NOTE — DISCHARGE SUMMARY
[facility] (H[facility])  Discharge Note  Short Stay    CT Biopsy Bone Marrow (xpd)      OUTCOME: Patient tolerated treatment/procedure well without complication and is now ready for discharge.    DISPOSITION: Home or Self Care    FINAL DIAGNOSIS:  <principal problem not specified>    FOLLOWUP: In clinic    DISCHARGE INSTRUCTIONS:  No discharge procedures on file.      Clinical Reference Documents Added to Patient Instructions         Document    BONE MARROW ASPIRATION OR BIOPSY (ENGLISH)    PROCEDURAL SEDATION, ADULT ED (ENGLISH)            TIME SPENT ON DISCHARGE: 15 minutes    Pre Op Diagnosis: Monoclonal gammopathy     Post Op Diagnosis: same     Procedure:  Bone marrow biopsy     Procedure performed by: Phil RIOS, Grayson CANO     Written Informed Consent Obtained: Yes     Specimen Removed:  yes     Estimated Blood Loss:  minimal     Findings: Local anesthesia and moderate sedation were used.     The patient tolerated the procedure well and there were no complications.      Disposition:  F/U in clinic    Discharge instructions:  Light activity for 24 hours.  Remove band aid in 24 hours.  No baths (showers are appropriate).    F/U with ordering physician    Sterile technique was performed in the right iliac, lidocaine was used as a local anesthetic.  Multiple samples taken percutaneously from the right iliac bone.  Pt tolerated the procedure well without immediate complications.  Please see radiologist report for details. F/u with PCP and/or ordering physician.

## 2023-01-03 NOTE — PLAN OF CARE
Verbal and written discharge instructions given to patient including when to seek medical attention and site care. Pt verbalized understanding. PIV safely discontinued. Dressing to procedure site remains CDI. Pt denies pain and NADN. VSS. Pt transported to main entrance via w/c with all belongings, where met by spouse in personal vehicle. Pt was stable on discharge.

## 2023-01-04 LAB
BODY SITE - BONE MARROW: NORMAL
CHROM BANDING METHOD: NORMAL
CHROMOSOME ANALYSIS BM ADDITIONAL INFORMATION: NORMAL
CHROMOSOME ANALYSIS BM RELEASED BY: NORMAL
CHROMOSOME ANALYSIS BM RESULT SUMMARY: NORMAL
CLINICAL CYTOGENETICIST REVIEW: NORMAL
CLINICAL DIAGNOSIS - BONE MARROW: NORMAL
FLOW CYTOMETRY ANTIBODIES ANALYZED - BONE MARROW: NORMAL
FLOW CYTOMETRY COMMENT - BONE MARROW: NORMAL
FLOW CYTOMETRY INTERPRETATION - BONE MARROW: NORMAL
KARYOTYP MAR: NORMAL
PCPDS FINAL DIAGNOSIS: NORMAL
PCPDS PRE-ANALYSIS PRE-SORT: NORMAL
REASON FOR REFERRAL (NARRATIVE): NORMAL
REF LAB TEST METHOD: NORMAL
SPECIMEN SOURCE: NORMAL
SPECIMEN: NORMAL

## 2023-01-06 ENCOUNTER — TELEPHONE (OUTPATIENT)
Dept: PRIMARY CARE CLINIC | Facility: CLINIC | Age: 48
End: 2023-01-06
Payer: MEDICARE

## 2023-01-06 RX ORDER — SODIUM CHLORIDE 0.9 % (FLUSH) 0.9 %
10 SYRINGE (ML) INJECTION
Status: CANCELLED | OUTPATIENT
Start: 2023-01-06

## 2023-01-06 RX ORDER — HEPARIN 100 UNIT/ML
500 SYRINGE INTRAVENOUS
Status: CANCELLED | OUTPATIENT
Start: 2023-01-06

## 2023-01-06 NOTE — TELEPHONE ENCOUNTER
----- Message from Jovanna Gibbs RN sent at 1/6/2023  8:40 AM CST -----  Regarding: iron infusion  Please sign iron infusion treatment

## 2023-01-09 ENCOUNTER — OFFICE VISIT (OUTPATIENT)
Dept: HEMATOLOGY/ONCOLOGY | Facility: CLINIC | Age: 48
End: 2023-01-09
Payer: MEDICARE

## 2023-01-09 DIAGNOSIS — D47.2 MGUS (MONOCLONAL GAMMOPATHY OF UNKNOWN SIGNIFICANCE): Primary | ICD-10-CM

## 2023-01-09 DIAGNOSIS — D50.9 IRON DEFICIENCY ANEMIA, UNSPECIFIED IRON DEFICIENCY ANEMIA TYPE: ICD-10-CM

## 2023-01-09 PROCEDURE — 99214 OFFICE O/P EST MOD 30 MIN: CPT | Mod: HCNC,95,, | Performed by: INTERNAL MEDICINE

## 2023-01-09 PROCEDURE — 99214 PR OFFICE/OUTPT VISIT, EST, LEVL IV, 30-39 MIN: ICD-10-PCS | Mod: HCNC,95,, | Performed by: INTERNAL MEDICINE

## 2023-01-09 NOTE — Clinical Note
Patient is already scheduled for follow-up with me but would like to push back to April with labs 1 week prior including iron studies but also myeloma labs I have just placed today please include.  Virtual visit okay in follow-up.

## 2023-01-09 NOTE — PROGRESS NOTES
The patient location is:  Home  The chief complaint leading to consultation is:  Follow-up anemia and MGUS    Visit type: audiovisual    Face to Face time with patient:  10 minutes  35 minutes of total time spent on the encounter, which includes face to face time and non-face to face time preparing to see the patient (eg, review of tests), Obtaining and/or reviewing separately obtained history, Documenting clinical information in the electronic or other health record, Independently interpreting results (not separately reported) and communicating results to the patient/family/caregiver, or Care coordination (not separately reported).         Each patient to whom he or she provides medical services by telemedicine is:  (1) informed of the relationship between the physician and patient and the respective role of any other health care provider with respect to management of the patient; and (2) notified that he or she may decline to receive medical services by telemedicine and may withdraw from such care at any time.    Notes:     Subjective:      DATE OF VISIT: 1/9/23    ?  Patient ID:?Marilou Daniel is a 47 y.o. female.?? MR#: [id number]   PRIMARY ONCOLOGIST: Dr. Fields      ? Primary Care Providers:  Diane Dominguez, NP, NP (General)     CHIEF COMPLAINT: ?anemia, history of MCTD and SLE, IgA lambda MGUS  ?   HPI    She follows up in virtual visit after bone marrow biopsy.  Plan for IV iron therapy in the coming weeks.  She continues to feel well.    Review of Systems    ?   A comprehensive 14-point review of systems was reviewed with patient and was negative other than as specified above.   ?   PAST MEDICAL HISTORY:   Past Medical History:   Diagnosis Date    Anemia     Arthritis     Connective tissue disease 1999    COVID-19 in immunocompromised patient 02/05/2021    Diabetes mellitus     Gastroesophageal reflux disease 03/23/2020    Hypertension     Lupus 1999    Situational anxiety 08/24/2021     Thyroid nodule greater than or equal to 1 cm in diameter incidentally noted on imaging study 11/19/2020    Vasculitis     ?     PAST SURGICAL HISTORY:   Past Surgical History:   Procedure Laterality Date    ABLATION      COLONOSCOPY N/A 6/24/2020    Procedure: COLONOSCOPY;  Surgeon: Nevin Penny MD;  Location: [facility];  Service: Endoscopy;  Laterality: N/A;    ESOPHAGOGASTRODUODENOSCOPY N/A 6/24/2020    Procedure: ESOPHAGOGASTRODUODENOSCOPY (EGD);  Surgeon: Nevin Penny MD;  Location: [facility];  Service: Endoscopy;  Laterality: N/A;    RIGHT HEART CATHETERIZATION      TUBAL LIGATION      WRIST SURGERY Bilateral       ?   ALLERGIES:   Allergies as of 01/09/2023 - Reviewed 01/09/2023   Allergen Reaction Noted    Azathioprine Nausea And Vomiting 03/16/2021    Olmesartan Shortness Of Breath 03/11/2021    Oxycodone Itching 03/13/2019      ?   MEDICATIONS:?   Outpatient Medications Marked as Taking for the 1/9/23 encounter (Office Visit) with Maritza Fields MD   Medication Sig Dispense Refill    amLODIPine (NORVASC) 5 MG tablet 10 mg.      atorvastatin (LIPITOR) 10 MG tablet       betamethasone dipropionate 0.05 % cream APPLY A SMALL AMOUNT TO AFFECTED AREA TOPICALLY TWICE A DAY FOR 2 TO 3 WEEKS AT A TIME AS NEEDED FLARE UPS AVOID FACE & GROIN AREA      blood sugar diagnostic (TRUE METRIX GLUCOSE TEST STRIP) Strp Test 4 times daily. 300 strip 3    blood-glucose meter (TRUE METRIX GLUCOSE METER) Misc Use as directed 1 each 0    chlorthalidone (HYGROTEN) 25 MG Tab Take 1 tablet (25 mg total) by mouth once daily. 30 tablet 2    ciprofloxacin HCl (CIPRO) 500 MG tablet Take 1 tablet (500 mg total) by mouth 2 (two) times daily. 14 tablet 0    cloNIDine (CATAPRES) 0.1 MG tablet Take 1 tablet (0.1 mg total) by mouth every 6 (six) hours as needed (elevated BP). 90 tablet 11    cloNIDine 0.1 mg/24 hr td ptwk (CATAPRES) 0.1 mg/24 hr Place 1 patch onto the skin every 7 days. 4 patch 11    dapsone 100 MG Tab Take  100 mg by mouth once daily at 6am.      ergocalciferol (ERGOCALCIFEROL) 50,000 unit Cap Take 50,000 Units by mouth.      esomeprazole (NEXIUM) 40 MG capsule Take 1 capsule (40 mg total) by mouth before breakfast. 90 capsule 3    gabapentin (NEURONTIN) 800 MG tablet Neurontin 800 mg tablet   Take 1 tablet 3 times a day by oral route.      HYDROcodone-acetaminophen (NORCO)  mg per tablet Norco 10 mg-325 mg tablet   Take 1 tablet 3 times a day by oral route as needed.      hydrocortisone 2.5 % ointment APPLY TOPICALLY TO FACE TWICE A DAY AS NEEDED FOR 1 TO 2 WEEKS AT A TIME      hydroxychloroquine (PLAQUENIL) 200 mg tablet Take 200 mg by mouth 2 (two) times daily.       hydrOXYzine HCL (ATARAX) 25 MG tablet TAKE ONE TABLET BY MOUTH EVERY 12 HOURS AS NEEDED FOR ITCHING 30 tablet 5    insulin (LANTUS SOLOSTAR U-100 INSULIN) glargine 100 units/mL (3mL) SubQ pen Inject 5 Units into the skin once daily. 1.5 mL 11    labetaloL (NORMODYNE) 300 MG tablet Take 1 tablet (300 mg total) by mouth 2 (two) times daily. 60 tablet 11    mycophenolate (CELLCEPT) 250 mg Cap 3,000 mg once daily.      ondansetron (ZOFRAN) 4 MG tablet Take 1 tablet (4 mg total) by mouth every 8 (eight) hours as needed for Nausea. 12 tablet 0    sertraline (ZOLOFT) 50 MG tablet Take 1 tablet (50 mg total) by mouth once daily. 30 tablet 11    spironolactone (ALDACTONE) 25 MG tablet Take 1 tablet (25 mg total) by mouth once daily. 30 tablet 11    tiZANidine (ZANAFLEX) 4 MG tablet TK 1 T PO TID PRN      TRUEPLUS LANCETS 33 gauge Misc Inject 1 lancet as directed 4 (four) times daily. 100 each 11    zolpidem (AMBIEN) 10 mg Tab TAKE ONE TABLET BY MOUTH NIGHTLY AS NEEDED FOR INSOMNIA Strength: 10 mg 30 tablet 5     Current Facility-Administered Medications for the 1/9/23 encounter (Office Visit) with Maritza Fields MD   Medication Dose Route Frequency Provider Last Rate Last Admin    acetaminophen tablet 650 mg  650 mg Oral Once PRN Reji Dumas MD         acetaminophen tablet 650 mg  650 mg Oral Once PRN Reji Dumas MD        sodium chloride 0.9% 500 mL flush bag   Intravenous PRN Reji Dumas MD        sodium chloride 0.9% flush 10 mL  10 mL Intravenous PRN Reji Dumas MD          ?   SOCIAL HISTORY:?   Social History     Tobacco Use    Smoking status: Never    Smokeless tobacco: Never   Substance Use Topics    Alcohol use: Never      ?      ?   FAMILY HISTORY:   family history includes Breast cancer in her mother, sister, and sister.   ?        Objective:      Physical Exam      ? Limited due to virtual visit  There were no vitals filed for this visit.     ?   ECOG:?0  General appearance: Generally well appearing, in no acute distress.   Head, eyes, ears, nose, and throat: moist mucous membranes.   Respiratory:  Normal work of breathing  Neurologic: Alert and oriented.   Psychiatric:  Normal mood and affect.    ?   Laboratory:    Lab Results   Component Value Date    WBC 6.24 01/03/2023    RBC 2.97 (L) 01/03/2023    HGB 8.2 (L) 01/03/2023    HCT 25.4 (L) 01/03/2023    MCV 86 01/03/2023    MCH 27.6 01/03/2023    MCHC 32.3 01/03/2023    RDW 13.3 01/03/2023     01/03/2023    MPV 10.7 01/03/2023    GRAN 3.2 01/03/2023    GRAN 50.4 01/03/2023    LYMPH 2.4 01/03/2023    LYMPH 38.8 01/03/2023    MONO 0.5 01/03/2023    MONO 7.5 01/03/2023    EOS 0.2 01/03/2023    BASO 0.03 01/03/2023    EOSINOPHIL 2.6 01/03/2023    BASOPHIL 0.5 01/03/2023       Sodium   Date Value Ref Range Status   01/03/2023 143 136 - 145 mmol/L Final     Potassium   Date Value Ref Range Status   01/03/2023 3.3 (L) 3.5 - 5.1 mmol/L Final     Chloride   Date Value Ref Range Status   01/03/2023 107 95 - 110 mmol/L Final     CO2   Date Value Ref Range Status   01/03/2023 25 23 - 29 mmol/L Final     Glucose   Date Value Ref Range Status   01/03/2023 132 (H) 70 - 110 mg/dL Final     BUN   Date Value Ref Range Status   01/03/2023 7 6 - 20 mg/dL Final     Creatinine   Date Value  Ref Range Status   01/03/2023 0.8 0.5 - 1.4 mg/dL Final     Calcium   Date Value Ref Range Status   01/03/2023 9.4 8.7 - 10.5 mg/dL Final     Total Protein   Date Value Ref Range Status   01/03/2023 6.8 6.0 - 8.4 g/dL Final     Albumin   Date Value Ref Range Status   01/03/2023 3.9 3.5 - 5.2 g/dL Final     Total Bilirubin   Date Value Ref Range Status   01/03/2023 0.6 0.1 - 1.0 mg/dL Final     Comment:     For infants and newborns, interpretation of results should be based  on gestational age, weight and in agreement with clinical  observations.    Premature Infant recommended reference ranges:  Up to 24 hours.............<8.0 mg/dL  Up to 48 hours............<12.0 mg/dL  3-5 days..................<15.0 mg/dL  6-29 days.................<15.0 mg/dL       Alkaline Phosphatase   Date Value Ref Range Status   01/03/2023 68 55 - 135 U/L Final     AST   Date Value Ref Range Status   01/03/2023 12 10 - 40 U/L Final     ALT   Date Value Ref Range Status   01/03/2023 9 (L) 10 - 44 U/L Final     Anion Gap   Date Value Ref Range Status   01/03/2023 11 8 - 16 mmol/L Final     eGFR if    Date Value Ref Range Status   03/29/2022 >60.0 >60 mL/min/1.73 m^2 Final     eGFR if non    Date Value Ref Range Status   03/29/2022 >60.0 >60 mL/min/1.73 m^2 Final     Comment:     Calculation used to obtain the estimated glomerular filtration  rate (eGFR) is the CKD-EPI equation.          Iron   Date Value Ref Range Status   12/09/2022 37 30 - 160 ug/dL Final     TIBC   Date Value Ref Range Status   12/09/2022 266 250 - 450 ug/dL Final     Saturated Iron   Date Value Ref Range Status   12/09/2022 14 (L) 20 - 50 % Final     Ferritin   Date Value Ref Range Status   12/09/2022 194 20.0 - 300.0 ng/mL Final     TSH   Date Value Ref Range Status   08/25/2022 0.464 0.400 - 4.000 uIU/mL Final       Results for orders placed or performed during the hospital encounter of 12/28/22 (from the past 2160 hour(s))   NM PET CT  "Whole Body    Impression    1. No abnormal hypermetabolic activity identified in the appendicular axial skeleton.  2. Joint effusion with Baker's cyst with low-level hypermetabolic activity.  3. No overt evidence for malignancy.      Electronically signed by: Carlo Velasco MD  Date:    12/28/2022  Time:    15:59           ?   Assessment/Plan:   There are no diagnoses linked to this encounter.     No diagnosis found.          Plan:     Problem List Items Addressed This Visit    None      Anemia: stable moderate anemia, normocytic hgb 9-10, no other cytopenia or consitutional symptoms.  Iron indices pattern most consistent with anemia of chronic inflammation which may be an association with her chronic comorbidities MCTD and SLE.  We discussed potential concomitant iron deficiency however given history of iron supplementation further IV supplementation trial may not improve chronic anemia.  She is interested in trial of IV iron therapy last several years ago.  IV iron treatments pending in coming weeks.  Screen for plasma cell dyscrasia did reveal IgA lambda MGUS 0.3 grams/deciliter.  PET scan without hypermetabolic activity or concern for metastatic disease.  Bone marrow biopsy January 2023 with slight lambda subcu plasma cell possible neoplasm 5-10%. Noted limited study "suboptimal kappa and lambda ADRIANE staining on   the decalcified core biopsy (3A). Controls are adequate.    stains increased numbers of scattered and perivascular and rare   non-perivascular small clusters of plasma cells (5-10% of total cellularity)   with lambda light chain predominance / skew by kappa and lambda ADRIANE."   We discussed potential implications of possible plasma cell neoplasm. Given lack of definitive findings on bmbx recommend close monitoring with repeat labs in 3 months and consideration of repeat bmbx if concerning clinical or lab change.     Family history of breast cancer: two sisters with breast cancer diagnosed ages 50-60, " referral to genetics desired by pt with discuss consideration of genetic testing. She has continued to be utd on yearly mammogram. 2/3/23 genetics referral planned.    Follow-Up:       Route Chart for Scheduling  Med Onc Route Chart for Scheduling      Supportive Plan Information  OP FERUMOXYTOL   Maritza Fields MD   Upcoming Treatment Dates - OP FERUMOXYTOL    12/14/2022       Medications       ferumoxytoL (FERAHEME) 510 mg in dextrose 5 % 100 mL IVPB  12/21/2022       Medications       ferumoxytoL (FERAHEME) 510 mg in dextrose 5 % 100 mL IVPB

## 2023-01-10 ENCOUNTER — OFFICE VISIT (OUTPATIENT)
Dept: PRIMARY CARE CLINIC | Facility: CLINIC | Age: 48
End: 2023-01-10
Payer: MEDICARE

## 2023-01-10 ENCOUNTER — TELEPHONE (OUTPATIENT)
Dept: PRIMARY CARE CLINIC | Facility: CLINIC | Age: 48
End: 2023-01-10
Payer: MEDICARE

## 2023-01-10 DIAGNOSIS — M32.8 OTHER FORMS OF SYSTEMIC LUPUS ERYTHEMATOSUS, UNSPECIFIED ORGAN INVOLVEMENT STATUS: ICD-10-CM

## 2023-01-10 DIAGNOSIS — K52.9 GASTROENTERITIS: Primary | ICD-10-CM

## 2023-01-10 DIAGNOSIS — Z79.4 TYPE 2 DIABETES MELLITUS WITHOUT COMPLICATION, WITH LONG-TERM CURRENT USE OF INSULIN: ICD-10-CM

## 2023-01-10 DIAGNOSIS — E11.9 TYPE 2 DIABETES MELLITUS WITHOUT COMPLICATION, WITH LONG-TERM CURRENT USE OF INSULIN: ICD-10-CM

## 2023-01-10 LAB
CHROM BANDING METHOD: NORMAL
CHROMOSOME ANALYSIS BM ADDITIONAL INFORMATION: NORMAL
CHROMOSOME ANALYSIS BM RELEASED BY: NORMAL
CHROMOSOME ANALYSIS BM RESULT SUMMARY: NORMAL
CLINICAL CYTOGENETICIST REVIEW: NORMAL
KARYOTYP MAR: NORMAL
REASON FOR REFERRAL (NARRATIVE): NORMAL
REF LAB TEST METHOD: NORMAL
SPECIMEN SOURCE: NORMAL
SPECIMEN: NORMAL

## 2023-01-10 PROCEDURE — 99214 OFFICE O/P EST MOD 30 MIN: CPT | Mod: HCNC,95,, | Performed by: NURSE PRACTITIONER

## 2023-01-10 PROCEDURE — 99214 PR OFFICE/OUTPT VISIT, EST, LEVL IV, 30-39 MIN: ICD-10-PCS | Mod: HCNC,95,, | Performed by: NURSE PRACTITIONER

## 2023-01-10 RX ORDER — ONDANSETRON 4 MG/1
4 TABLET, ORALLY DISINTEGRATING ORAL EVERY 8 HOURS PRN
Qty: 30 TABLET | Refills: 1 | Status: SHIPPED | OUTPATIENT
Start: 2023-01-10 | End: 2023-12-22

## 2023-01-10 NOTE — TELEPHONE ENCOUNTER
----- Message from Lizeth Restrepo RN sent at 1/10/2023  8:53 AM CST -----  Contact: [phone]    ----- Message -----  From: Iván Mcgowan  Sent: 1/10/2023   8:48 AM CST  To: [facility] Staff    Started with nausea and vomiting and diarrhea yesterday; continuing today and took zofran yesterday with little relief; relieved nausea for a short while.

## 2023-01-10 NOTE — TELEPHONE ENCOUNTER
----- Message from Diane Dominguez NP sent at 1/10/2023 10:18 AM CST -----  Contact: [phone]  Any fever, abdominal pain, blood in stool? If yes then should be seen, preferably in person but can do virtual.     How is blood sugar and BP? Anyone in home sick? Are sx relieved by Zofran?    ----- Message -----  From: Lizeth Restrepo RN  Sent: 1/10/2023   8:53 AM CST  To: Diane Dominguez NP      ----- Message -----  From: Iván Mcgowan  Sent: 1/10/2023   8:48 AM CST  To: [facility] Staff    Started with nausea and vomiting and diarrhea yesterday; continuing today and took zofran yesterday with little relief; relieved nausea for a short while.

## 2023-01-10 NOTE — PROGRESS NOTES
Marilou Daniel  01/12/2023  [id number]    Diane Dominguez NP  Patient Care Team:  Diane Dominguez NP as PCP - General (Family Medicine)  Joe Yo RD as Dietitian (Endocrinology)  Liza Moon RD, CDE as Dietitian (Diabetes)      [facility] Primary Care Note      The patient location is:  Patient Home   The chief complaint leading to consultation is: n/v/d      Visit type: Virtual visit with synchronous audio and video  Each patient to whom he or she provides medical services by telemedicine is:  (1) informed of the relationship between the physician and patient and the respective role of any other health care provider with respect to management of the patient; and (2) notified that he or she may decline to receive medical services by telemedicine and may withdraw from such care at any time.    Chief Complaint:  No chief complaint on file.      History of Present Illness:  HPI    N/V temporarily relieved with ondansetron. Onset yesterday AM. No blood in stool.     Out of ondansetron. Vomiting this AM. Missing medication, not keeping food/liquids down. Some abdominal pain prior to BM.      Had bone marrow bx yesterday. -215.     Tx for colitis 11/25/22 with metronidazole and cipro. Previously started after taking Linzess.    Refill Zofran    Starting IV iron for BALJEET. Recently dx with MGUS. PET CT okay, to have bone marrow bx.           The 10-year ASCVD risk score (Gray DK, et al., 2019) is: 9.6%    Values used to calculate the score:      Age: 47 years      Sex: Female      Is Non- : Yes      Diabetic: Yes      Tobacco smoker: No      Systolic Blood Pressure: 144 mmHg      Is BP treated: Yes      HDL Cholesterol: 51 mg/dL      Total Cholesterol: 137 mg/dL      Review of Systems   Constitutional:  Positive for malaise/fatigue. Negative for chills and fever.   Respiratory:  Negative for cough and shortness of breath.    Cardiovascular:  Negative for chest pain  and palpitations.   Gastrointestinal:  Positive for diarrhea, nausea and vomiting. Negative for abdominal pain, blood in stool, constipation, heartburn and melena.   Genitourinary:  Negative for dysuria.   Musculoskeletal:  Negative for falls, joint pain and myalgias.   Skin:  Negative for rash.   Neurological:  Negative for dizziness.       The following were reviewed: Active problem list, medication list, allergies, family history, social history, and Health Maintenance.     History:  Past Medical History:   Diagnosis Date    Anemia     Arthritis     Connective tissue disease 1999    COVID-19 in immunocompromised patient 02/05/2021    Diabetes mellitus     Gastroesophageal reflux disease 03/23/2020    Hypertension     Lupus 1999    Situational anxiety 08/24/2021    Thyroid nodule greater than or equal to 1 cm in diameter incidentally noted on imaging study 11/19/2020    Vasculitis      Past Surgical History:   Procedure Laterality Date    ABLATION      COLONOSCOPY N/A 6/24/2020    Procedure: COLONOSCOPY;  Surgeon: Nevin Penny MD;  Location: [facility];  Service: Endoscopy;  Laterality: N/A;    ESOPHAGOGASTRODUODENOSCOPY N/A 6/24/2020    Procedure: ESOPHAGOGASTRODUODENOSCOPY (EGD);  Surgeon: Nevin Penny MD;  Location: [facility];  Service: Endoscopy;  Laterality: N/A;    RIGHT HEART CATHETERIZATION      TUBAL LIGATION      WRIST SURGERY Bilateral      Family History   Problem Relation Age of Onset    Breast cancer Mother     Breast cancer Sister     Breast cancer Sister      Social History     Socioeconomic History    Marital status:    Tobacco Use    Smoking status: Never    Smokeless tobacco: Never   Substance and Sexual Activity    Alcohol use: Never    Drug use: Never    Sexual activity: Yes     Partners: Male     Birth control/protection: See Surgical Hx     Patient Active Problem List   Diagnosis    Hypertension associated with diabetes    Type 2 diabetes mellitus without complication, with  long-term current use of insulin    Systemic lupus erythematosus    Leukocytoclastic vasculitis    Nonintractable headache    Gastroesophageal reflux disease    Encounter for screening colonoscopy    Neck pain    Hearing loss    Thyroid nodule greater than or equal to 1 cm in diameter incidentally noted on imaging study    Drug induced insomnia    Immunocompromised state due to drug therapy    Situational anxiety    Avascular necrosis of bones of both hips    Diabetes mellitus due to underlying condition with complication, with long-term current use of insulin    Primary snoring    Class 1 drug-induced obesity with serious comorbidity and body mass index (BMI) of 31.0 to 31.9 in adult    Colitis    Iron deficiency anemia    Anemia of infection and chronic disease    Iron deficiency anemia secondary to inadequate dietary iron intake    Gastroenteritis     Review of patient's allergies indicates:   Allergen Reactions    Azathioprine Nausea And Vomiting     Nausea, vomiting, diarrhea dose and early in the course of therapy    Olmesartan Shortness Of Breath    Feraheme [ferumoxytol] Itching    Oxycodone Itching       Medications:  Current Outpatient Medications on File Prior to Visit   Medication Sig Dispense Refill    amLODIPine (NORVASC) 5 MG tablet 10 mg.      atorvastatin (LIPITOR) 10 MG tablet       betamethasone dipropionate 0.05 % cream APPLY A SMALL AMOUNT TO AFFECTED AREA TOPICALLY TWICE A DAY FOR 2 TO 3 WEEKS AT A TIME AS NEEDED FLARE UPS AVOID FACE & GROIN AREA      blood sugar diagnostic (TRUE METRIX GLUCOSE TEST STRIP) Strp Test 4 times daily. 300 strip 3    blood-glucose meter (TRUE METRIX GLUCOSE METER) Misc Use as directed 1 each 0    blood-glucose meter,continuous (DEXCOM G6 ) Misc 1 Units by Misc.(Non-Drug; Combo Route) route continuous. 1 each 0    blood-glucose sensor (DEXCOM G6 SENSOR) Myrtle 1 each by Misc.(Non-Drug; Combo Route) route every 10 days. 3 each 11    blood-glucose transmitter  (DEXCOM G6 TRANSMITTER) Myrtle 1 each by Misc.(Non-Drug; Combo Route) route every 3 (three) months. 1 Device 3    chlorthalidone (HYGROTEN) 25 MG Tab Take 1 tablet (25 mg total) by mouth once daily. 30 tablet 2    ciprofloxacin HCl (CIPRO) 500 MG tablet Take 1 tablet (500 mg total) by mouth 2 (two) times daily. (Patient not taking: Reported on 1/11/2023) 14 tablet 0    cloNIDine (CATAPRES) 0.1 MG tablet Take 1 tablet (0.1 mg total) by mouth every 6 (six) hours as needed (elevated BP). 90 tablet 11    cloNIDine 0.1 mg/24 hr td ptwk (CATAPRES) 0.1 mg/24 hr Place 1 patch onto the skin every 7 days. 4 patch 11    dapsone 100 MG Tab Take 100 mg by mouth once daily at 6am.      ergocalciferol (ERGOCALCIFEROL) 50,000 unit Cap Take 50,000 Units by mouth.      esomeprazole (NEXIUM) 40 MG capsule Take 1 capsule (40 mg total) by mouth before breakfast. 90 capsule 3    gabapentin (NEURONTIN) 800 MG tablet Neurontin 800 mg tablet   Take 1 tablet 3 times a day by oral route.      HYDROcodone-acetaminophen (NORCO)  mg per tablet Norco 10 mg-325 mg tablet   Take 1 tablet 3 times a day by oral route as needed.      hydrocortisone 2.5 % ointment APPLY TOPICALLY TO FACE TWICE A DAY AS NEEDED FOR 1 TO 2 WEEKS AT A TIME      hydroxychloroquine (PLAQUENIL) 200 mg tablet Take 200 mg by mouth 2 (two) times daily.       hydrOXYzine HCL (ATARAX) 25 MG tablet TAKE ONE TABLET BY MOUTH EVERY 12 HOURS AS NEEDED FOR ITCHING 30 tablet 5    insulin (LANTUS SOLOSTAR U-100 INSULIN) glargine 100 units/mL (3mL) SubQ pen Inject 5 Units into the skin once daily. 1.5 mL 11    insulin aspart U-100 (NOVOLOG) 100 unit/mL injection Inject 4 Units into the skin 3 (three) times daily before meals. 10.8 mL 3    labetaloL (NORMODYNE) 300 MG tablet Take 1 tablet (300 mg total) by mouth 2 (two) times daily. 60 tablet 11    LORazepam (ATIVAN) 0.5 MG tablet Take 1 tablet (0.5 mg total) by mouth every 12 (twelve) hours as needed for Anxiety. 30 tablet 2     mycophenolate (CELLCEPT) 250 mg Cap 3,000 mg once daily.      ondansetron (ZOFRAN) 4 MG tablet Take 1 tablet (4 mg total) by mouth every 8 (eight) hours as needed for Nausea. 12 tablet 0    sertraline (ZOLOFT) 50 MG tablet Take 1 tablet (50 mg total) by mouth once daily. 30 tablet 11    spironolactone (ALDACTONE) 25 MG tablet Take 1 tablet (25 mg total) by mouth once daily. 30 tablet 11    tiZANidine (ZANAFLEX) 4 MG tablet TK 1 T PO TID PRN      TRUEPLUS LANCETS 33 gauge Misc Inject 1 lancet as directed 4 (four) times daily. 100 each 11    zolpidem (AMBIEN) 10 mg Tab TAKE ONE TABLET BY MOUTH NIGHTLY AS NEEDED FOR INSOMNIA Strength: 10 mg 30 tablet 5     Current Facility-Administered Medications on File Prior to Visit   Medication Dose Route Frequency Provider Last Rate Last Admin    acetaminophen tablet 650 mg  650 mg Oral Once PRN Reji Dumas MD        acetaminophen tablet 650 mg  650 mg Oral Once PRN Reji Dumas MD        sodium chloride 0.9% 500 mL flush bag   Intravenous PRN Reji Dumas MD        sodium chloride 0.9% flush 10 mL  10 mL Intravenous PRN Reji Dumas MD           Medications have been reviewed and reconciled with patient at visit today.    Barriers to medications present (no )    Adverse reactions to current medications (no)      Exam:  There were no vitals filed for this visit.      There is no height or weight on file to calculate BMI.      BP Readings from Last 3 Encounters:   01/11/23 (!) 144/87   01/03/23 (!) 142/67   12/14/22 106/72     Wt Readings from Last 3 Encounters:   01/03/23 1017 88.9 kg (196 lb)   12/14/22 1402 89.1 kg (196 lb 6.9 oz)   11/29/22 0954 88.7 kg (195 lb 8 oz)            Physical Exam  Constitutional:       General: She is not in acute distress.     Appearance: She is ill-appearing (mildly). She is not toxic-appearing.   Pulmonary:      Effort: Pulmonary effort is normal.   Neurological:      Mental Status: She is alert.   Psychiatric:         Mood  and Affect: Mood normal.         Behavior: Behavior normal.       Laboratory Reviewed: (Yes)  Lab Results   Component Value Date    WBC 6.24 01/03/2023    HGB 8.2 (L) 01/03/2023    HCT 25.4 (L) 01/03/2023     01/03/2023    CHOL 137 08/26/2021    TRIG 42 08/26/2021    HDL 51 08/26/2021    ALT 9 (L) 01/03/2023    AST 12 01/03/2023     01/03/2023    K 3.3 (L) 01/03/2023     01/03/2023    CREATININE 0.8 01/03/2023    BUN 7 01/03/2023    CO2 25 01/03/2023    TSH 0.464 08/25/2022    INR 1.0 01/03/2023    HGBA1C 6.1 (H) 08/25/2022           Health Maintenance  Health Maintenance Topics with due status: Not Due       Topic Last Completion Date    Colorectal Cancer Screening 06/24/2020    Cervical Cancer Screening 09/29/2021    Diabetes Urine Screening 03/29/2022    Hemoglobin A1c 08/25/2022    Mammogram 08/31/2022    Low Dose Statin 01/03/2023     Health Maintenance Due   Topic Date Due    Pneumococcal Vaccines (Age 0-64) (1 - PCV) Never done    Foot Exam  Never done    TETANUS VACCINE  Never done    Eye Exam  09/21/2021    COVID-19 Vaccine (4 - Booster for Pfizer series) 02/02/2022    Lipid Panel  08/26/2022    Influenza Vaccine (1) Never done       Assessment:  Problem List Items Addressed This Visit          Immunology/Multi System    Systemic lupus erythematosus     Followed closely by Dr Wayne.  Nor recent change in plan.   Sx well managed per pt.            Endocrine    Type 2 diabetes mellitus without complication, with long-term current use of insulin     Lab Results   Component Value Date    HGBA1C 6.1 (H) 08/25/2022   Continue to monitor.  Discussed possible need for insulin dose reduction while ill.              GI    Gastroenteritis - Primary     Rx ondansetron. Hydrate. No fever or abd pain.   If sx worsen/persist or fever/abd pain develops then CT abd and labs.          Relevant Medications    ondansetron (ZOFRAN-ODT) 4 MG TbDL    Other Relevant Orders    CBC Auto Differential    Comprehensive  Metabolic Panel    CT Abdomen Pelvis With Contrast         Plan:  Gastroenteritis  -     CBC Auto Differential; Future; Expected date: 01/11/2023  -     Comprehensive Metabolic Panel; Future; Expected date: 01/10/2023  -     CT Abdomen Pelvis With Contrast; Future; Expected date: 01/11/2023  -     ondansetron (ZOFRAN-ODT) 4 MG TbDL; Take 1 tablet (4 mg total) by mouth every 8 (eight) hours as needed.  Dispense: 30 tablet; Refill: 1    Type 2 diabetes mellitus without complication, with long-term current use of insulin    Other forms of systemic lupus erythematosus, unspecified organ involvement status      -Patient's lab results were reviewed and discussed with patient  -Treatment options and alternatives were discussed with the patient. Patient expressed understanding. Patient was given the opportunity to ask questions and be an active participant in their medical care. Patient had no further questions or concerns at this time.   -Documentation of patient's health and condition was obtained from family member who was present during visit.  -Patient is an overall moderate risk for health complications from their medical conditions.       Follow up: Follow up in about 3 months (around 4/10/2023), or or sooner if symptoms persist/worsen..      Total medical decision making time was 35 min.  The following issues were discussed: The primary encounter diagnosis was Gastroenteritis. Diagnoses of Type 2 diabetes mellitus without complication, with long-term current use of insulin and Other forms of systemic lupus erythematosus, unspecified organ involvement status were also pertinent to this visit.    Health maintenance needs, recent test results and goals of care discussed with pt and questions answered.

## 2023-01-10 NOTE — TELEPHONE ENCOUNTER
Denies fever, blood in stool.  Some abdominal pain. BP has been elevated. No one at home sick. Ran out of Zofran this am and started vomiting again.

## 2023-01-11 ENCOUNTER — INFUSION (OUTPATIENT)
Dept: INFUSION THERAPY | Facility: HOSPITAL | Age: 48
End: 2023-01-11
Attending: INTERNAL MEDICINE
Payer: MEDICARE

## 2023-01-11 VITALS
HEART RATE: 75 BPM | DIASTOLIC BLOOD PRESSURE: 87 MMHG | SYSTOLIC BLOOD PRESSURE: 144 MMHG | RESPIRATION RATE: 16 BRPM | OXYGEN SATURATION: 99 % | TEMPERATURE: 98 F

## 2023-01-11 DIAGNOSIS — D63.8 ANEMIA OF INFECTION AND CHRONIC DISEASE: Primary | ICD-10-CM

## 2023-01-11 DIAGNOSIS — D50.8 IRON DEFICIENCY ANEMIA SECONDARY TO INADEQUATE DIETARY IRON INTAKE: ICD-10-CM

## 2023-01-11 DIAGNOSIS — B99.9 ANEMIA OF INFECTION AND CHRONIC DISEASE: Primary | ICD-10-CM

## 2023-01-11 PROCEDURE — 96365 THER/PROPH/DIAG IV INF INIT: CPT | Mod: HCNC

## 2023-01-11 PROCEDURE — 25000003 PHARM REV CODE 250: Mod: HCNC | Performed by: NURSE PRACTITIONER

## 2023-01-11 PROCEDURE — 63600175 PHARM REV CODE 636 W HCPCS: Mod: JG,HCNC | Performed by: NURSE PRACTITIONER

## 2023-01-11 PROCEDURE — 96375 TX/PRO/DX INJ NEW DRUG ADDON: CPT | Mod: HCNC

## 2023-01-11 PROCEDURE — 25000003 PHARM REV CODE 250: Mod: HCNC | Performed by: INTERNAL MEDICINE

## 2023-01-11 PROCEDURE — 63600175 PHARM REV CODE 636 W HCPCS: Mod: HCNC | Performed by: INTERNAL MEDICINE

## 2023-01-11 RX ORDER — SODIUM CHLORIDE 0.9 % (FLUSH) 0.9 %
10 SYRINGE (ML) INJECTION
Status: CANCELLED | OUTPATIENT
Start: 2023-01-12

## 2023-01-11 RX ORDER — SODIUM CHLORIDE 0.9 % (FLUSH) 0.9 %
10 SYRINGE (ML) INJECTION
OUTPATIENT
Start: 2023-01-18

## 2023-01-11 RX ORDER — DIPHENHYDRAMINE HCL 25 MG
25 CAPSULE ORAL
Status: COMPLETED | OUTPATIENT
Start: 2023-01-11 | End: 2023-01-11

## 2023-01-11 RX ORDER — HEPARIN 100 UNIT/ML
500 SYRINGE INTRAVENOUS
Status: CANCELLED | OUTPATIENT
Start: 2023-01-12

## 2023-01-11 RX ORDER — HEPARIN 100 UNIT/ML
5 SYRINGE INTRAVENOUS
OUTPATIENT
Start: 2023-01-18

## 2023-01-11 RX ORDER — EPINEPHRINE 0.3 MG/.3ML
0.3 INJECTION SUBCUTANEOUS ONCE AS NEEDED
OUTPATIENT
Start: 2023-01-18

## 2023-01-11 RX ORDER — METHYLPREDNISOLONE SOD SUCC 125 MG
125 VIAL (EA) INJECTION
Status: CANCELLED
Start: 2023-01-12

## 2023-01-11 RX ORDER — SODIUM CHLORIDE 0.9 % (FLUSH) 0.9 %
10 SYRINGE (ML) INJECTION
Status: DISCONTINUED | OUTPATIENT
Start: 2023-01-11 | End: 2023-01-11

## 2023-01-11 RX ORDER — SODIUM CHLORIDE 9 MG/ML
INJECTION, SOLUTION INTRAVENOUS CONTINUOUS
OUTPATIENT
Start: 2023-01-18

## 2023-01-11 RX ORDER — METHYLPREDNISOLONE SOD SUCC 125 MG
125 VIAL (EA) INJECTION ONCE AS NEEDED
OUTPATIENT
Start: 2023-01-18

## 2023-01-11 RX ORDER — METHYLPREDNISOLONE SOD SUCC 125 MG
125 VIAL (EA) INJECTION
Status: COMPLETED | OUTPATIENT
Start: 2023-01-11 | End: 2023-01-11

## 2023-01-11 RX ORDER — DIPHENHYDRAMINE HYDROCHLORIDE 50 MG/ML
50 INJECTION INTRAMUSCULAR; INTRAVENOUS ONCE AS NEEDED
OUTPATIENT
Start: 2023-01-18

## 2023-01-11 RX ORDER — DIPHENHYDRAMINE HCL 25 MG
25 CAPSULE ORAL
Status: CANCELLED
Start: 2023-01-12

## 2023-01-11 RX ORDER — METHYLPREDNISOLONE SOD SUCC 125 MG
VIAL (EA) INJECTION
Status: DISCONTINUED
Start: 2023-01-11 | End: 2023-01-11 | Stop reason: HOSPADM

## 2023-01-11 RX ADMIN — FERUMOXYTOL 510 MG: 510 INJECTION INTRAVENOUS at 02:01

## 2023-01-11 RX ADMIN — METHYLPREDNISOLONE SODIUM SUCCINATE 125 MG: 125 INJECTION, POWDER, FOR SOLUTION INTRAMUSCULAR; INTRAVENOUS at 03:01

## 2023-01-11 RX ADMIN — DIPHENHYDRAMINE HYDROCHLORIDE 25 MG: 25 CAPSULE ORAL at 03:01

## 2023-01-11 NOTE — PLAN OF CARE
Problem: Adult Inpatient Plan of Care  Goal: Plan of Care Review  Outcome: Ongoing, Progressing  Flowsheets (Taken 1/11/2023 1449)  Plan of Care Reviewed With: patient  Goal: Patient-Specific Goal (Individualized)  Outcome: Ongoing, Progressing  Flowsheets (Taken 1/11/2023 1449)  Anxieties, Fears or Concerns: denies  Individualized Care Needs: feet elevated pillow provided  Goal: Optimal Comfort and Wellbeing  Outcome: Ongoing, Progressing  Intervention: Provide Person-Centered Care  Flowsheets (Taken 1/11/2023 1449)  Trust Relationship/Rapport:   care explained   questions answered   choices provided   questions encouraged   emotional support provided   reassurance provided   empathic listening provided   thoughts/feelings acknowledged     Problem: Anemia  Goal: Anemia Symptom Improvement  Outcome: Ongoing, Progressing  Intervention: Monitor and Manage Anemia  Flowsheets (Taken 1/11/2023 1449)  Safety Promotion/Fall Prevention:   assistive device/personal item within reach   in recliner, wheels locked   Fall Risk reviewed with patient/family   instructed to call staff for mobility   medications reviewed  Fatigue Management: frequent rest breaks encouraged

## 2023-01-12 PROBLEM — K52.9 GASTROENTERITIS: Status: ACTIVE | Noted: 2023-01-12

## 2023-01-12 NOTE — ASSESSMENT & PLAN NOTE
Rx ondansetron. Hydrate. No fever or abd pain.   If sx worsen/persist or fever/abd pain develops then CT abd and labs.

## 2023-01-13 LAB
GENETICIST REVIEW: NORMAL
PLASMA CELL PROLIF RELEASED BY: NORMAL
PLASMA CELL PROLIF RESULT SUMMARY: NORMAL
PLASMA CELL PROLIF RESULT TABLE: NORMAL
REASON FOR REFERRAL, PLASMA CELL PROLIF (PCPD), FISH: NORMAL
REF LAB TEST METHOD: NORMAL
RESULTS, PLASMA CELL PROLIF (PCPD), FISH: NORMAL
SERVICE CMNT-IMP: NORMAL
SERVICE CMNT-IMP: NORMAL
SPECIMEN SOURCE: NORMAL
SPECIMEN, PLASMA CELL PROLIF (PCPD), FISH: NORMAL

## 2023-01-15 LAB
COMMENT: NORMAL
FINAL PATHOLOGIC DIAGNOSIS: NORMAL
GROSS: NORMAL
Lab: NORMAL
MICROSCOPIC EXAM: NORMAL
SUPPLEMENTAL DIAGNOSIS: NORMAL

## 2023-01-31 ENCOUNTER — DOCUMENTATION ONLY (OUTPATIENT)
Dept: GENETICS | Facility: CLINIC | Age: 48
End: 2023-01-31
Payer: MEDICARE

## 2023-01-31 ENCOUNTER — DOCUMENTATION ONLY (OUTPATIENT)
Dept: PALLIATIVE MEDICINE | Facility: CLINIC | Age: 48
End: 2023-01-31

## 2023-01-31 NOTE — PROGRESS NOTES
Nurse reached out to pt to confirmed her apt with [facility] family history of BC,  per Dr Fields whose f/u is at the end of Lj. Voice message left for pt to call office with questions or concerns.

## 2023-02-07 DIAGNOSIS — Z00.00 ENCOUNTER FOR MEDICARE ANNUAL WELLNESS EXAM: ICD-10-CM

## 2023-02-08 ENCOUNTER — PES CALL (OUTPATIENT)
Dept: ADMINISTRATIVE | Facility: OTHER | Age: 48
End: 2023-02-08
Payer: MEDICARE

## 2023-02-09 DIAGNOSIS — Z00.00 ENCOUNTER FOR MEDICARE ANNUAL WELLNESS EXAM: ICD-10-CM

## 2023-03-03 ENCOUNTER — TELEPHONE (OUTPATIENT)
Dept: PRIMARY CARE CLINIC | Facility: CLINIC | Age: 48
End: 2023-03-03
Payer: MEDICARE

## 2023-03-03 DIAGNOSIS — F41.8 SITUATIONAL ANXIETY: Primary | ICD-10-CM

## 2023-03-03 DIAGNOSIS — K12.1 STOMATITIS: ICD-10-CM

## 2023-03-03 RX ORDER — DEXAMETHASONE 0.5 MG/5ML
SOLUTION ORAL
Qty: 240 ML | Refills: 2 | Status: SHIPPED | OUTPATIENT
Start: 2023-03-03 | End: 2023-07-14 | Stop reason: ALTCHOICE

## 2023-03-03 NOTE — TELEPHONE ENCOUNTER
"Patient called in requesting a refill on the "Swish and swallow" for her mouth sores. She says she previously has some but had to d/c the remainder of it because it was only good for so long. She is also requesting to have the referral placed for counseling. She says it was discussed at her last OV. Patient uses [facility].   "

## 2023-03-03 NOTE — TELEPHONE ENCOUNTER
Patient was contacted and made aware of her prescription being sent to the pharmacy and of her referral being placed for the . Patient verbally understood the information given.

## 2023-03-03 NOTE — TELEPHONE ENCOUNTER
----- Message from Iván Mcgowan sent at 3/3/2023 11:26 AM CST -----  Regarding: medication request  Contact: [phone]  Requesting for medication for sore in mouth.   Sores started on Monday.

## 2023-03-06 ENCOUNTER — PATIENT OUTREACH (OUTPATIENT)
Dept: ADMINISTRATIVE | Facility: HOSPITAL | Age: 48
End: 2023-03-06
Payer: MEDICARE

## 2023-03-06 DIAGNOSIS — Z79.4 TYPE 2 DIABETES MELLITUS WITHOUT COMPLICATION, WITH LONG-TERM CURRENT USE OF INSULIN: Primary | ICD-10-CM

## 2023-03-06 DIAGNOSIS — E11.9 TYPE 2 DIABETES MELLITUS WITHOUT COMPLICATION, WITH LONG-TERM CURRENT USE OF INSULIN: Primary | ICD-10-CM

## 2023-03-10 ENCOUNTER — CLINICAL SUPPORT (OUTPATIENT)
Dept: PRIMARY CARE CLINIC | Facility: CLINIC | Age: 48
End: 2023-03-10
Payer: MEDICARE

## 2023-03-10 ENCOUNTER — DOCUMENTATION ONLY (OUTPATIENT)
Dept: PRIMARY CARE CLINIC | Facility: CLINIC | Age: 48
End: 2023-03-10
Payer: MEDICARE

## 2023-03-10 DIAGNOSIS — F41.8 SITUATIONAL ANXIETY: ICD-10-CM

## 2023-03-10 PROCEDURE — 99499 UNLISTED E&M SERVICE: CPT | Mod: HCNC,S$GLB,,

## 2023-03-10 PROCEDURE — 99999 PR PBB SHADOW E&M-EST. PATIENT-LVL I: CPT | Mod: PBBFAC,HCNC,,

## 2023-03-10 PROCEDURE — 99499 NO LOS: ICD-10-PCS | Mod: HCNC,S$GLB,,

## 2023-03-10 PROCEDURE — 99999 PR PBB SHADOW E&M-EST. PATIENT-LVL I: ICD-10-PCS | Mod: PBBFAC,HCNC,,
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
## 2023-04-04 ENCOUNTER — PATIENT MESSAGE (OUTPATIENT)
Dept: PRIMARY CARE CLINIC | Facility: CLINIC | Age: 48
End: 2023-04-04
Payer: MEDICARE

## 2023-04-12 ENCOUNTER — DOCUMENTATION ONLY (OUTPATIENT)
Dept: PRIMARY CARE CLINIC | Facility: CLINIC | Age: 48
End: 2023-04-12
Payer: MEDICARE

## 2023-04-14 ENCOUNTER — CLINICAL SUPPORT (OUTPATIENT)
Dept: PRIMARY CARE CLINIC | Facility: CLINIC | Age: 48
End: 2023-04-14
Payer: MEDICARE

## 2023-04-14 DIAGNOSIS — F41.9 ANXIETY: Primary | ICD-10-CM

## 2023-04-14 PROCEDURE — 99499 NO LOS: ICD-10-PCS | Mod: HCNC,95,,

## 2023-04-14 PROCEDURE — 99499 UNLISTED E&M SERVICE: CPT | Mod: HCNC,95,,

## 2023-04-14 NOTE — PROGRESS NOTES
[facility]  Marilou Daniel,  4/14/2023  DATE:  4/14/2023  TYPE OF VISIT:  In person  LENGTH OF SESSION: 45    Therapeutic Intervention: Met with patient for individual psychotherapy.    Chief complaint/reason for encounter: depression and anxiety       Session Content/Presenting Problem:    Patient is currently dealing with stressful family situations, as well as managing her lupus. She has returned her 15 yo granddtr, Pauline, back to her father.  Patient worked on her goals worksheet and her self care assessment.  She realized she had not been doing anything to care for herself. She went and got a massage and a manicure.  Discussed her interactions with her siblings and extended family; patient says her siblings talk about each other to her and she is not always able to maintain her boundaries.  Discussed boundaries; discussed becoming more mindful and focusing on automatic thoughts that keep her from caring for herself and giving too much to others.  Patient will continue to work on identifying ANT's.      Current symptoms:  Depression: anhedonia, fatigue, and difficulty concentrating.  Anxiety: excessive worrying.  Insomnia: non-restful sleep.  Tiffanie:  denies.  Psychosis: denies .      Risk parameters:  Patient reports no suicidal ideation  Patient reports no homicidal ideation  Patient reports no self-injurious behavior  Patient reports no violent behavior    MENTAL HEALTH STATUS EXAM  General Appearance:  unremarkable, age appropriate   Speech: normal tone, normal rate, normal pitch, normal volume      Level of Cooperation: cooperative      Thought Processes: normal and logical   Mood: steady      Thought Content: normal, no suicidality, no homicidality, delusions, or paranoia   Affect: congruent and appropriate   Orientation: Oriented x3   Attention Span & Concentration: intact   Fund of General Knowledge: intact and appropriate to age and level of  education   Judgment & Insight: good     Language  intact       STRENGTHS AND LIABILITIES: Strength: Patient accepts guidance/feedback, Strength: Patient is expressive/articulate., Strength: Patient is motivated for change.    IMPRESSION:   My diagnostic impression is Anxiety disorders; generalized anxiety disorder [F41.1], as evidenced by difficulty relaxing, difficulty concentrating on tasks, increased worry.       Plan: Pt plans to continue individual psychotherapy CBT will be utilized in future individual therapy sessions to increase interaction, insight, support, and behavior modification.     Return to clinic: 1 week April 21

## 2023-04-17 ENCOUNTER — TELEPHONE (OUTPATIENT)
Dept: GENETICS | Facility: CLINIC | Age: 48
End: 2023-04-17
Payer: MEDICARE

## 2023-04-17 ENCOUNTER — PATIENT OUTREACH (OUTPATIENT)
Dept: ADMINISTRATIVE | Facility: HOSPITAL | Age: 48
End: 2023-04-17
Payer: MEDICARE

## 2023-04-17 NOTE — TELEPHONE ENCOUNTER
Spoke with Ms. Daniel who sated that she is needing to know if her appointment can be scheduled as virtual or does it need to be done in person? This is how she will determine if she will schedule.     Spoke with the Hem/Onc Lead Maksim WILSON LPN who states that patient can be scheduled Virtually for [place]. Appt scheduled for 4.24.23.     She has asked that her blood work be scheduled on 5.2.23 as she already has to get labs drawn for Dr. Fields that day.

## 2023-04-18 ENCOUNTER — DOCUMENTATION ONLY (OUTPATIENT)
Dept: PRIMARY CARE CLINIC | Facility: CLINIC | Age: 48
End: 2023-04-18
Payer: MEDICARE

## 2023-04-18 NOTE — PROGRESS NOTES
Spoke w/ pt today to reschedule her appt.  Will see patient on Friday, April 28 at 11:00 a.m.  It will be a virtual appointment.

## 2023-04-24 ENCOUNTER — OFFICE VISIT (OUTPATIENT)
Dept: GENETICS | Facility: CLINIC | Age: 48
End: 2023-04-24
Payer: MEDICARE

## 2023-04-24 DIAGNOSIS — Z80.3 FAMILY HISTORY OF BREAST CANCER: ICD-10-CM

## 2023-04-24 PROCEDURE — 99499 NO LOS: ICD-10-PCS | Mod: HCNC,95,, | Performed by: INTERNAL MEDICINE

## 2023-04-24 PROCEDURE — 99499 UNLISTED E&M SERVICE: CPT | Mod: HCNC,95,, | Performed by: INTERNAL MEDICINE

## 2023-04-24 NOTE — PROGRESS NOTES
"  Cancer Genetics  Hereditary/High Risk Clinic  Department of Hematology and Oncology  [facility]        Date of Service:  2023  Provider:  Stefany Hyde MS, [facility] [place] [facility]    Patient Information  Name:  Marilou Daniel  :  1975  MRN:  [id number]        Referring Provider: Maritza Fields MD    Televisit Information  The patient location is: [place], [place].  The chief complaint leading to consultation is: as below.  Visit type: audiovisual.  Face-to-face time with patient: approximately 30 minutes.  Approximately 40 minutes in total were spent on this encounter, which includes face-to-face time and non-face-to-face time preparing to see the patient (e.g., review of tests), obtaining and/or reviewing separately obtained history, documenting clinical information in the electronic or other health record, independently interpreting results (not separately reported) and communicating results to the patient/family/caregiver, or care coordination (not separately reported).  Each patient to whom he or she provides medical services by telemedicine is:  (1) informed of the relationship between the physician and patient and the respective role of any other health care provider with respect to management of the patient; and (2) notified that he or she may decline to receive medical services by telemedicine and may withdraw from such care at any time.      SUBJECTIVE:      Chief Complaint: Genetic Counseling    History of Present Illness (HPI):  Marilou Daniel ("Marilou"), 47 y.o., assigned female sex at birth is established with the [facility] Department of Hematology and Oncology but new to me.  She was referred by Medical Oncology for genetic cancer risk assessment given female family history of breast cancer. She has no personal history of cancer.    Focused Medical History:   Germline cancer-genetic testing:  No  Cancer:  No  Colon polyp:  Yes - 3 polyps   Most recent colonoscopy: " 06/2020, told to repeat 3 years  Most recent mammo: 08/2022  Other benign tumor:  Yes   Thyroid nodule (11/2020)  Ovarian cyst (teen years)  Pancreatitis:  No  Pancreatic cyst:  No  Hx lupus     Focused Surgical History  Reproductive organs intact  Uterine ablation (~10 years ago)    Ancestry:  Ashkenazi [employer] ancestry:  No  Paternal:   Maternal:     Focused Family History:  Consanguinity in ancestors:  No  Germline cancer-genetic testing in blood relatives:  No  Cancer in family:  Yes; there are no known blood relatives affected with cancer other than those mentioned in the pedigree below    Family Cancer Pedigree:             Review of Systems:  See HPI.      SUBJECTIVE:   Records Reviewed  Medical History  Problem List  Any pertinent, available Procedures and Pathology reports in both [facility] and [facility]    COUNSELING   Causes of cancer  Germline cancer genetic testing is the testing of genes associated with cancer, known as cancer susceptibility genes.  Just as these genes are inherited from parents, mutations in these genes can be inherited, as well.  A mutation in a cancer susceptibility gene adversely affects the gene's ability to prevent cancer; therefore, carriers of cancer susceptibility gene mutations may be at increased risk for certain cancers.     Only 5-10% cancers are caused by a cancer susceptibility gene mutation, meaning the cancer is genetic/hereditary; rather, most cancers are sporadic.  Causes of sporadic cancers may include environmental risk factors, lifestyle risk factors, and non-modifiable risk factors.  It is important to note that members of a family often share not only their genetics but also risk factors including environmental and lifestyle risk factors, so cancers can be familial.    Mutations in BRCA1 and BRCA2 are associated with an increased risk for breast and ovarian cancer, in addition to male breast cancer, pancreatic,  melanoma, and prostate cancer. Discussed that mutations in other genes may increase the risk of breast cancer, in addition to other cancers.     Potential results of genetic testing, and their implications  Potential results of genetic testing include positive, negative, and variant of unknown significance (VUS).    A positive result indicates the presence of at least one clinically significant mutation, and the patient's associated cancer risks vary depending upon the cancer susceptibility gene(s) in which there is/are a mutation(s).  With a positive result, in some cases, depending upon the specific result and the patient's clinical history, modified risk management may be recommended, including measures for risk reduction and/or surveillance; however, modified management is not always an option.    A negative result indicates that no clinically significant mutations were identified in the gene(s) tested.    A VUS indicates that there is not presently enough data for the laboratory to make a determination as to whether the variant is clinically significant.  VUSs are not typically acted upon clinically.       The ability to interpret the meaning of a negative genetic testing result in genes associated with cancer with which the patient has not personally been affected, when done prior to testing the appropriate affected relative(s), is significantly limited.  A negative result in the patient does not indicate that she cannot develop cancer, and, in fact, the patient may even be at increased risk for cancer based on shared risk factors with affected relatives.  The most informative candidates for initial genetic testing in a family are those who have been affected with cancer.    Tyer Cuzik Score  Modified management may also be recommended, even with a result of no or unknown significance, based upon risk assessment that incorporates the family history.      Breast Cancer Risk Stratification  Current estimated  lifetime risk of breast cancer, as calculated utilizing the Tyrer-Cuzick risk-assessment model v8.0b:  17.07%.  This places the patient in the intermediate-risk range according to current clinical guidelines.    As such, the NCCN guidelines recommend that you:  See a healthcare provider to review personal and family history, have your breast cancer risk assessed (your risk is not static and can change), discuss breast cancer risk reduction, and undergo a clinical breast exam.  This visit would typically be with a gynecologist or a breast healthcare specialist, should start at age 25, and should occur annually.  Practice breast awareness, which means that you are familiar with your breasts and perform periodic (I recommend at least monthly), consistent breast self-exams and promptly report to a healthcare provider (typically, a gynecologist or a breast healthcare specialist) any changes or concerns.  Breast awareness should begin by age 25.  Undergo a screening mammogram (preferably, one that is 3-D) annually.  This can be ordered by your primary care provider, gynecologist, or breast healthcare specialist and should begin at age 40.     Genetic mutation inheritance  If Marilou tests positive for a mutation, her first-degree relatives would each have a 50% chance of having the same mutation, and other, more distantly related blood-relatives would also be at risk of having the same mutation.       Genetic discrimination  The Genetic Information Nondiscrimination Act (DIANA) is U[place]S. federal legislation that provides some protections against use of an individual's genetic information by their health insurer and by their employer.  Title I of DIANA prohibits most health insurers from utilizing an individual's genetic information to make decisions regarding insurance eligibility or premium charges.  Title II of DIANA prohibits covered entities, including employers, from requesting the genetic information of employees and  applicants.  DIANA does not protect individuals from genetic discrimination toward health insurance obtained through a job with the  or through the Federal Employees Health Benefits Plan; from genetic discrimination by employers with fewer than 15 employees or if employed by the ; or from genetic discrimination by any other type of policies/entities, including but not limited to life insurance, disability insurance, long-term care insurance,  benefits, and  Health Services benefits.     Genetic testing logistics  An outside laboratory would perform the testing after a blood sample is collected here at [facility] or a saliva sample is collected by the patient at home.  With genetic testing, there is a potential for the patient to incur out-of-pocket costs.  Results can take several weeks.  Post-test genetic counseling can be conducted once the genetic testing results are available.     Assessment/Plan  Based on the information provided by Marilou, she does not meets current criteria for genetic testing according to current clinical guidelines. Marilou's clinical history, including personal history and/or family history, is not strongly suggestive of a hereditary cancer syndrome in the family given the family history of cancer. The estimated cost of the test is $250.      Offered germline cancer-genetic testing at this time versus deferring testing at this time or declining testing altogether.  Various test panel options were discussed. Marilou decided to defer testing given she does not meet criteria for genetic testing and the cost of testing.  She expressed that she will reach out to genetics department  if/when she decides to proceed with genetic testing.     Questions were encouraged and answered to the patient's satisfaction, and she verbalized understanding of information and agreement with the plan.         Signed,    Stefany Hyde MS, [facility]  Certified Genetic Counselor,  Hereditary and High-Risk Clinic  Hematology/Oncology, [facility]    04/24/2023

## 2023-04-28 ENCOUNTER — CLINICAL SUPPORT (OUTPATIENT)
Dept: PRIMARY CARE CLINIC | Facility: CLINIC | Age: 48
End: 2023-04-28
Payer: MEDICARE

## 2023-04-28 DIAGNOSIS — F41.8 DEPRESSION WITH ANXIETY: Primary | ICD-10-CM

## 2023-04-28 PROCEDURE — 99499 UNLISTED E&M SERVICE: CPT | Mod: HCNC,95,,

## 2023-04-28 PROCEDURE — 99499 NO LOS: ICD-10-PCS | Mod: HCNC,95,,

## 2023-04-28 NOTE — PROGRESS NOTES
Individual Psychotherapy ([facility])  Marilou Daniel,  4/28/2023  DATE:  4/28/2023  TYPE OF VISIT:  Video Session  LENGTH OF SESSION: 45    Therapeutic Intervention: Met with patient for individual psychotherapy.    Chief complaint/reason for encounter: depression and anxiety       Session Content/Presenting Problem:    Patient is currently dealing with stressful family situations, as well as managing her lupus.  She did speak with her granddaughter, Pauline, briefly this week. Patient is trying to be mindful of having boundaries with family and friends. Discussed her marriage; she is considering  and . She and her  have been  15 years; there have been serious issues in the relationship.  Processed her thoughts and feelings related to her marital relationship.   Patient is very mindful that she needs to care for herself and minimize stress in order to avoid a lupus flare or any other adverse health issues.  Discussed enjoyable activities, including massage, pedicures, epsom salt baths, meditation.  Suggested gentle yoga stretches for her neck as she tends to hold stress in her neck and shoulders.       Current symptoms:  Depression: anhedonia, fatigue, and difficulty concentrating.  Anxiety: excessive worrying.  Insomnia: non-restful sleep.  Tiffanie:  denies.  Psychosis: denies .      Risk parameters:  Patient reports no suicidal ideation  Patient reports no homicidal ideation  Patient reports no self-injurious behavior  Patient reports no violent behavior    MENTAL HEALTH STATUS EXAM  General Appearance:  unremarkable, age appropriate   Speech: normal tone, normal rate, normal pitch, normal volume      Level of Cooperation: cooperative      Thought Processes: normal and logical   Mood: steady      Thought Content: normal, no suicidality, no homicidality, delusions, or paranoia   Affect: congruent and appropriate   Orientation: Oriented x3   Attention Span  & Concentration: intact   Fund of General Knowledge: intact and appropriate to age and level of education   Judgment & Insight: good     Language  intact       STRENGTHS AND LIABILITIES: Strength: Patient accepts guidance/feedback, Strength: Patient is expressive/articulate., Strength: Patient is motivated for change.    IMPRESSION:   My diagnostic impression is Anxiety disorders; generalized anxiety disorder [F41.1], as evidenced by difficulty relaxing, difficulty concentrating on tasks, increased worry.       Plan: Pt plans to continue individual psychotherapy CBT will be utilized in future individual therapy sessions to increase interaction, insight, support, and behavior modification.     Return to clinic: 1 week May 5th Virtual

## 2023-05-01 ENCOUNTER — PATIENT OUTREACH (OUTPATIENT)
Dept: ADMINISTRATIVE | Facility: HOSPITAL | Age: 48
End: 2023-05-01
Payer: MEDICARE

## 2023-05-01 NOTE — LETTER
May 1, 2023         Travis Milligan OD                [facility]  [address]  [place] [place] [address]  Phone: [phone]   Patient: Marilou Daniel   MR Number: [id number]   YOB: 1975       To whom it may concern     We are seeing Marilou Daniel, GLORIA.O.B is 1975, at [facility]. Alissa Bautista MD is their primary care physician. To help with our health maintenance records could you please send the following:     Most recent copy of Diabetic Eye Exam that states Positive or Negative Retinopathy.    Please fax to [phone].    Thank-you in advance for your assistance. If you have any questions or concerns, please don't hesitate to contact me        Sincerely,  Aleshia BENAVIDEZ LPN Care Coordination   [facility]  Phone [phone],  Fax [phone]

## 2023-05-01 NOTE — PROGRESS NOTES
Working Diabetes Lab Report.    Pt has lab appt scheduled, 5/02/23.  All needed labs scheduled.    Per  pt had eye exam completed, 8/22/22.  Faxed request for copy of report.

## 2023-05-02 ENCOUNTER — LAB VISIT (OUTPATIENT)
Dept: LAB | Facility: HOSPITAL | Age: 48
End: 2023-05-02
Attending: INTERNAL MEDICINE
Payer: MEDICARE

## 2023-05-02 DIAGNOSIS — D47.2 MGUS (MONOCLONAL GAMMOPATHY OF UNKNOWN SIGNIFICANCE): ICD-10-CM

## 2023-05-02 DIAGNOSIS — E11.9 TYPE 2 DIABETES MELLITUS WITHOUT COMPLICATION, WITH LONG-TERM CURRENT USE OF INSULIN: ICD-10-CM

## 2023-05-02 DIAGNOSIS — Z79.4 TYPE 2 DIABETES MELLITUS WITHOUT COMPLICATION, WITH LONG-TERM CURRENT USE OF INSULIN: ICD-10-CM

## 2023-05-02 DIAGNOSIS — K21.9 GASTROESOPHAGEAL REFLUX DISEASE WITHOUT ESOPHAGITIS: ICD-10-CM

## 2023-05-02 DIAGNOSIS — E11.9 TYPE 2 DIABETES MELLITUS WITHOUT COMPLICATION: ICD-10-CM

## 2023-05-02 DIAGNOSIS — D63.8 ANEMIA OF INFECTION AND CHRONIC DISEASE: ICD-10-CM

## 2023-05-02 DIAGNOSIS — B99.9 ANEMIA OF INFECTION AND CHRONIC DISEASE: ICD-10-CM

## 2023-05-02 LAB
ALBUMIN SERPL BCP-MCNC: 4.3 G/DL (ref 3.5–5.2)
ALBUMIN/CREAT UR: 13.2 UG/MG (ref 0–30)
ALP SERPL-CCNC: 95 U/L (ref 55–135)
ALT SERPL W/O P-5'-P-CCNC: 12 U/L (ref 10–44)
ANION GAP SERPL CALC-SCNC: 13 MMOL/L (ref 8–16)
AST SERPL-CCNC: 15 U/L (ref 10–40)
BASOPHILS # BLD AUTO: 0.03 K/UL (ref 0–0.2)
BASOPHILS NFR BLD: 0.4 % (ref 0–1.9)
BILIRUB SERPL-MCNC: 0.4 MG/DL (ref 0.1–1)
BUN SERPL-MCNC: 13 MG/DL (ref 6–20)
CALCIUM SERPL-MCNC: 9.9 MG/DL (ref 8.7–10.5)
CHLORIDE SERPL-SCNC: 104 MMOL/L (ref 95–110)
CHOLEST SERPL-MCNC: 162 MG/DL (ref 120–199)
CHOLEST/HDLC SERPL: 4.2 {RATIO} (ref 2–5)
CO2 SERPL-SCNC: 24 MMOL/L (ref 23–29)
CREAT SERPL-MCNC: 1 MG/DL (ref 0.5–1.4)
CREAT UR-MCNC: 265 MG/DL (ref 15–325)
DIFFERENTIAL METHOD: ABNORMAL
EOSINOPHIL # BLD AUTO: 0.2 K/UL (ref 0–0.5)
EOSINOPHIL NFR BLD: 2.2 % (ref 0–8)
ERYTHROCYTE [DISTWIDTH] IN BLOOD BY AUTOMATED COUNT: 12.4 % (ref 11.5–14.5)
EST. GFR  (NO RACE VARIABLE): >60 ML/MIN/1.73 M^2
ESTIMATED AVG GLUCOSE: 123 MG/DL (ref 68–131)
FERRITIN SERPL-MCNC: 262 NG/ML (ref 20–300)
GLUCOSE SERPL-MCNC: 162 MG/DL (ref 70–110)
HBA1C MFR BLD: 5.9 % (ref 4–5.6)
HCT VFR BLD AUTO: 32.7 % (ref 37–48.5)
HDLC SERPL-MCNC: 39 MG/DL (ref 40–75)
HDLC SERPL: 24.1 % (ref 20–50)
HGB BLD-MCNC: 10.7 G/DL (ref 12–16)
IGA SERPL-MCNC: 549 MG/DL (ref 40–350)
IGG SERPL-MCNC: 1082 MG/DL (ref 650–1600)
IGM SERPL-MCNC: 43 MG/DL (ref 50–300)
IMM GRANULOCYTES # BLD AUTO: 0.02 K/UL (ref 0–0.04)
IMM GRANULOCYTES NFR BLD AUTO: 0.2 % (ref 0–0.5)
IRON SERPL-MCNC: 77 UG/DL (ref 30–160)
LDLC SERPL CALC-MCNC: 97.8 MG/DL (ref 63–159)
LYMPHOCYTES # BLD AUTO: 2.2 K/UL (ref 1–4.8)
LYMPHOCYTES NFR BLD: 27 % (ref 18–48)
MCH RBC QN AUTO: 27.7 PG (ref 27–31)
MCHC RBC AUTO-ENTMCNC: 32.7 G/DL (ref 32–36)
MCV RBC AUTO: 85 FL (ref 82–98)
MICROALBUMIN UR DL<=1MG/L-MCNC: 35 UG/ML
MONOCYTES # BLD AUTO: 0.5 K/UL (ref 0.3–1)
MONOCYTES NFR BLD: 5.7 % (ref 4–15)
NEUTROPHILS # BLD AUTO: 5.3 K/UL (ref 1.8–7.7)
NEUTROPHILS NFR BLD: 64.5 % (ref 38–73)
NONHDLC SERPL-MCNC: 123 MG/DL
NRBC BLD-RTO: 0 /100 WBC
PLATELET # BLD AUTO: 297 K/UL (ref 150–450)
PMV BLD AUTO: 10.5 FL (ref 9.2–12.9)
POTASSIUM SERPL-SCNC: 4 MMOL/L (ref 3.5–5.1)
PROT SERPL-MCNC: 7.7 G/DL (ref 6–8.4)
RBC # BLD AUTO: 3.86 M/UL (ref 4–5.4)
SATURATED IRON: 24 % (ref 20–50)
SODIUM SERPL-SCNC: 141 MMOL/L (ref 136–145)
TOTAL IRON BINDING CAPACITY: 318 UG/DL (ref 250–450)
TRANSFERRIN SERPL-MCNC: 215 MG/DL (ref 200–375)
TRIGL SERPL-MCNC: 126 MG/DL (ref 30–150)
WBC # BLD AUTO: 8.19 K/UL (ref 3.9–12.7)

## 2023-05-02 PROCEDURE — 84165 PROTEIN E-PHORESIS SERUM: CPT | Mod: HCNC | Performed by: INTERNAL MEDICINE

## 2023-05-02 PROCEDURE — 83036 HEMOGLOBIN GLYCOSYLATED A1C: CPT | Mod: HCNC | Performed by: NURSE PRACTITIONER

## 2023-05-02 PROCEDURE — 86334 PATHOLOGIST INTERPRETATION IFE: ICD-10-PCS | Mod: 26,,, | Performed by: PATHOLOGY

## 2023-05-02 PROCEDURE — 85025 COMPLETE CBC W/AUTO DIFF WBC: CPT | Mod: HCNC | Performed by: INTERNAL MEDICINE

## 2023-05-02 PROCEDURE — 83521 IG LIGHT CHAINS FREE EACH: CPT | Mod: 59,HCNC | Performed by: INTERNAL MEDICINE

## 2023-05-02 PROCEDURE — 86334 IMMUNOFIX E-PHORESIS SERUM: CPT | Mod: 26,,, | Performed by: PATHOLOGY

## 2023-05-02 PROCEDURE — 80061 LIPID PANEL: CPT | Mod: HCNC | Performed by: NURSE PRACTITIONER

## 2023-05-02 PROCEDURE — 84165 PROTEIN E-PHORESIS SERUM: CPT | Mod: 26,,, | Performed by: PATHOLOGY

## 2023-05-02 PROCEDURE — 82784 ASSAY IGA/IGD/IGG/IGM EACH: CPT | Mod: 59,HCNC | Performed by: INTERNAL MEDICINE

## 2023-05-02 PROCEDURE — 82728 ASSAY OF FERRITIN: CPT | Mod: HCNC | Performed by: INTERNAL MEDICINE

## 2023-05-02 PROCEDURE — 86334 IMMUNOFIX E-PHORESIS SERUM: CPT | Mod: HCNC | Performed by: INTERNAL MEDICINE

## 2023-05-02 PROCEDURE — 82570 ASSAY OF URINE CREATININE: CPT | Mod: HCNC | Performed by: INTERNAL MEDICINE

## 2023-05-02 PROCEDURE — 84165 PATHOLOGIST INTERPRETATION SPE: ICD-10-PCS | Mod: 26,,, | Performed by: PATHOLOGY

## 2023-05-02 PROCEDURE — 80053 COMPREHEN METABOLIC PANEL: CPT | Mod: HCNC | Performed by: INTERNAL MEDICINE

## 2023-05-02 PROCEDURE — 36415 COLL VENOUS BLD VENIPUNCTURE: CPT | Mod: HCNC | Performed by: INTERNAL MEDICINE

## 2023-05-02 PROCEDURE — 84466 ASSAY OF TRANSFERRIN: CPT | Mod: HCNC | Performed by: INTERNAL MEDICINE

## 2023-05-03 LAB
ALBUMIN SERPL ELPH-MCNC: 4.46 G/DL (ref 3.35–5.55)
ALPHA1 GLOB SERPL ELPH-MCNC: 0.28 G/DL (ref 0.17–0.41)
ALPHA2 GLOB SERPL ELPH-MCNC: 0.56 G/DL (ref 0.43–0.99)
B-GLOBULIN SERPL ELPH-MCNC: 1 G/DL (ref 0.5–1.1)
GAMMA GLOB SERPL ELPH-MCNC: 0.99 G/DL (ref 0.67–1.58)
INTERPRETATION SERPL IFE-IMP: NORMAL
KAPPA LC SER QL IA: 4.15 MG/DL (ref 0.33–1.94)
KAPPA LC/LAMBDA SER IA: 1.57 (ref 0.26–1.65)
LAMBDA LC SER QL IA: 2.65 MG/DL (ref 0.57–2.63)
PROT SERPL-MCNC: 7.3 G/DL (ref 6–8.4)

## 2023-05-03 RX ORDER — ESOMEPRAZOLE MAGNESIUM 40 MG/1
40 CAPSULE, DELAYED RELEASE ORAL
Qty: 90 CAPSULE | Refills: 3 | Status: SHIPPED | OUTPATIENT
Start: 2023-05-03 | End: 2023-11-30

## 2023-05-04 ENCOUNTER — OFFICE VISIT (OUTPATIENT)
Dept: HEMATOLOGY/ONCOLOGY | Facility: CLINIC | Age: 48
End: 2023-05-04
Payer: MEDICARE

## 2023-05-04 DIAGNOSIS — D50.8 IRON DEFICIENCY ANEMIA SECONDARY TO INADEQUATE DIETARY IRON INTAKE: ICD-10-CM

## 2023-05-04 DIAGNOSIS — D47.2 MGUS (MONOCLONAL GAMMOPATHY OF UNKNOWN SIGNIFICANCE): Primary | ICD-10-CM

## 2023-05-04 PROCEDURE — 3044F PR MOST RECENT HEMOGLOBIN A1C LEVEL <7.0%: ICD-10-PCS | Mod: CPTII,95,, | Performed by: INTERNAL MEDICINE

## 2023-05-04 PROCEDURE — 3066F PR DOCUMENTATION OF TREATMENT FOR NEPHROPATHY: ICD-10-PCS | Mod: CPTII,95,, | Performed by: INTERNAL MEDICINE

## 2023-05-04 PROCEDURE — 3061F NEG MICROALBUMINURIA REV: CPT | Mod: CPTII,95,, | Performed by: INTERNAL MEDICINE

## 2023-05-04 PROCEDURE — 99214 PR OFFICE/OUTPT VISIT, EST, LEVL IV, 30-39 MIN: ICD-10-PCS | Mod: 95,,, | Performed by: INTERNAL MEDICINE

## 2023-05-04 PROCEDURE — 3061F PR NEG MICROALBUMINURIA RESULT DOCUMENTED/REVIEW: ICD-10-PCS | Mod: CPTII,95,, | Performed by: INTERNAL MEDICINE

## 2023-05-04 PROCEDURE — 3044F HG A1C LEVEL LT 7.0%: CPT | Mod: CPTII,95,, | Performed by: INTERNAL MEDICINE

## 2023-05-04 PROCEDURE — 3066F NEPHROPATHY DOC TX: CPT | Mod: CPTII,95,, | Performed by: INTERNAL MEDICINE

## 2023-05-04 PROCEDURE — 99214 OFFICE O/P EST MOD 30 MIN: CPT | Mod: 95,,, | Performed by: INTERNAL MEDICINE

## 2023-05-04 NOTE — PROGRESS NOTES
The patient location is:  Home  The chief complaint leading to consultation is:  Follow-up anemia and MGUS    Visit type: audiovisual    Face to Face time with patient:  5 minutes  25 minutes of total time spent on the encounter, which includes face to face time and non-face to face time preparing to see the patient (eg, review of tests), Obtaining and/or reviewing separately obtained history, Documenting clinical information in the electronic or other health record, Independently interpreting results (not separately reported) and communicating results to the patient/family/caregiver, or Care coordination (not separately reported).         Each patient to whom he or she provides medical services by telemedicine is:  (1) informed of the relationship between the physician and patient and the respective role of any other health care provider with respect to management of the patient; and (2) notified that he or she may decline to receive medical services by telemedicine and may withdraw from such care at any time.    Notes:     Subjective:      DATE OF VISIT: 5/4/23    ?  Patient ID:?Marilou Daniel is a 47 y.o. female.?? MR#: [id number]   PRIMARY ONCOLOGIST: Dr. Fields      ? Primary Care Providers:  Alissa Bautista MD, MD (General)     CHIEF COMPLAINT: ?anemia, history of MCTD and SLE, IgA lambda MGUS  ?   HPI    She follows up in virtual visit after repeat labs in surveillance.  In January she would 1 dose of IV iron noted intolerance with rash shortness of breath requiring Benadryl declined further iron treatments.  Otherwise she notes feeling well.  Review of Systems    ?   A comprehensive 14-point review of systems was reviewed with patient and was negative other than as specified above.   ?   PAST MEDICAL HISTORY:   Past Medical History:   Diagnosis Date    Anemia     Arthritis     Connective tissue disease 1999    COVID-19 in immunocompromised patient 02/05/2021    Diabetes mellitus      Gastroesophageal reflux disease 03/23/2020    Hypertension     Lupus 1999    Situational anxiety 08/24/2021    Thyroid nodule greater than or equal to 1 cm in diameter incidentally noted on imaging study 11/19/2020    Vasculitis     ?     PAST SURGICAL HISTORY:   Past Surgical History:   Procedure Laterality Date    ABLATION      COLONOSCOPY N/A 6/24/2020    Procedure: COLONOSCOPY;  Surgeon: Nevin Penny MD;  Location: [facility];  Service: Endoscopy;  Laterality: N/A;    ESOPHAGOGASTRODUODENOSCOPY N/A 6/24/2020    Procedure: ESOPHAGOGASTRODUODENOSCOPY (EGD);  Surgeon: Nevin Penny MD;  Location: [facility];  Service: Endoscopy;  Laterality: N/A;    RIGHT HEART CATHETERIZATION      TUBAL LIGATION      WRIST SURGERY Bilateral       ?   ALLERGIES:   Allergies as of 05/04/2023 - Reviewed 03/14/2023   Allergen Reaction Noted    Azathioprine Nausea And Vomiting 03/16/2021    Olmesartan Shortness Of Breath 03/11/2021    Feraheme [ferumoxytol] Itching 01/11/2023    Oxycodone Itching 03/13/2019      ?   MEDICATIONS:?   No outpatient medications have been marked as taking for the 5/4/23 encounter (Appointment) with Maritza Fields MD.     Current Facility-Administered Medications for the 5/4/23 encounter (Appointment) with Maritza Fields MD   Medication Dose Route Frequency Provider Last Rate Last Admin    acetaminophen tablet 650 mg  650 mg Oral Once PRN Reji Dumas MD        acetaminophen tablet 650 mg  650 mg Oral Once PRN Reji Dumas MD        sodium chloride 0.9% 500 mL flush bag   Intravenous PRN Reji Dumas MD        sodium chloride 0.9% flush 10 mL  10 mL Intravenous PRN Reji Dumas MD          ?   SOCIAL HISTORY:?   Social History     Tobacco Use    Smoking status: Never    Smokeless tobacco: Never   Substance Use Topics    Alcohol use: Never      ?      ?   FAMILY HISTORY:   family history includes Asthma in an other family member; Autism in an other  family member; Breast cancer (age of onset: 58) in an other family member; Breast cancer (age of onset: 63) in an other family member; Cancer in her father and another family member; Liver cancer (age of onset: 60) in an other family member; Lung cancer (age of onset: 59) in her mother; Obesity in an other family member.   ?        Objective:      Physical Exam      ? Limited due to virtual visit  There were no vitals filed for this visit.     ?   ECOG:?0  General appearance: Generally well appearing, in no acute distress.   Head, eyes, ears, nose, and throat: moist mucous membranes.   Respiratory:  Normal work of breathing  Neurologic: Alert and oriented.   Psychiatric:  Normal mood and affect.    ?   Laboratory:    Lab Results   Component Value Date    WBC 8.19 05/02/2023    RBC 3.86 (L) 05/02/2023    HGB 10.7 (L) 05/02/2023    HCT 32.7 (L) 05/02/2023    MCV 85 05/02/2023    MCH 27.7 05/02/2023    MCHC 32.7 05/02/2023    RDW 12.4 05/02/2023     05/02/2023    MPV 10.5 05/02/2023    GRAN 5.3 05/02/2023    GRAN 64.5 05/02/2023    LYMPH 2.2 05/02/2023    LYMPH 27.0 05/02/2023    MONO 0.5 05/02/2023    MONO 5.7 05/02/2023    EOS 0.2 05/02/2023    BASO 0.03 05/02/2023    EOSINOPHIL 2.2 05/02/2023    BASOPHIL 0.4 05/02/2023       Sodium   Date Value Ref Range Status   05/02/2023 141 136 - 145 mmol/L Final     Potassium   Date Value Ref Range Status   05/02/2023 4.0 3.5 - 5.1 mmol/L Final     Chloride   Date Value Ref Range Status   05/02/2023 104 95 - 110 mmol/L Final     CO2   Date Value Ref Range Status   05/02/2023 24 23 - 29 mmol/L Final     Glucose   Date Value Ref Range Status   05/02/2023 162 (H) 70 - 110 mg/dL Final     BUN   Date Value Ref Range Status   05/02/2023 13 6 - 20 mg/dL Final     Creatinine   Date Value Ref Range Status   05/02/2023 1.0 0.5 - 1.4 mg/dL Final     Calcium   Date Value Ref Range Status   05/02/2023 9.9 8.7 - 10.5 mg/dL Final     Total Protein   Date Value Ref Range Status    05/02/2023 7.7 6.0 - 8.4 g/dL Final     Albumin   Date Value Ref Range Status   05/02/2023 4.3 3.5 - 5.2 g/dL Final     Total Bilirubin   Date Value Ref Range Status   05/02/2023 0.4 0.1 - 1.0 mg/dL Final     Comment:     For infants and newborns, interpretation of results should be based  on gestational age, weight and in agreement with clinical  observations.    Premature Infant recommended reference ranges:  Up to 24 hours.............<8.0 mg/dL  Up to 48 hours............<12.0 mg/dL  3-5 days..................<15.0 mg/dL  6-29 days.................<15.0 mg/dL       Alkaline Phosphatase   Date Value Ref Range Status   05/02/2023 95 55 - 135 U/L Final     AST   Date Value Ref Range Status   05/02/2023 15 10 - 40 U/L Final     ALT   Date Value Ref Range Status   05/02/2023 12 10 - 44 U/L Final     Anion Gap   Date Value Ref Range Status   05/02/2023 13 8 - 16 mmol/L Final     eGFR if    Date Value Ref Range Status   03/29/2022 >60.0 >60 mL/min/1.73 m^2 Final     eGFR if non    Date Value Ref Range Status   03/29/2022 >60.0 >60 mL/min/1.73 m^2 Final     Comment:     Calculation used to obtain the estimated glomerular filtration  rate (eGFR) is the CKD-EPI equation.          Iron   Date Value Ref Range Status   05/02/2023 77 30 - 160 ug/dL Final     TIBC   Date Value Ref Range Status   05/02/2023 318 250 - 450 ug/dL Final     Saturated Iron   Date Value Ref Range Status   05/02/2023 24 20 - 50 % Final     Ferritin   Date Value Ref Range Status   05/02/2023 262 20.0 - 300.0 ng/mL Final     TSH   Date Value Ref Range Status   08/25/2022 0.464 0.400 - 4.000 uIU/mL Final       No results found for this or any previous visit (from the past 2160 hour(s)).          ?   Assessment/Plan:   There are no diagnoses linked to this encounter.     No diagnosis found.          Plan:     Problem List Items Addressed This Visit    None      Anemia: stable moderate anemia, normocytic hgb 9-10, improved  "after IV iron January 2023.  We discussed possible multifactorial nature of her anemia with history of mixed connective tissue disease and SLE.  Workup also revealed IgA lambda MGUS 0.3 grams/deciliter.  PET scan without hypermetabolic activity or concern for metastatic disease.  Bone marrow biopsy January 2023 with slight lambda plasma cell possible neoplasm 5-10%. Noted limited study "suboptimal kappa and lambda ADRIANE staining on   the decalcified core biopsy (3A). Controls are adequate.    stains increased numbers of scattered and perivascular and rare   non-perivascular small clusters of plasma cells (5-10% of total cellularity)   with lambda light chain predominance / skew by kappa and lambda ADRIANE."   We discussed potential implications of possible plasma cell neoplasm.   Recommend close monitoring continued oral iron supplement due to allergy to IV iron declining further IV formulation continue on 1 tablet daily oral iron.  Close monitoring of plasma cell with peripheral blood and repeat bone marrow biopsy if worsening anemia in absence of iron deficiency.  If recurrent notable iron deficiency would perform repeat EGD and colonoscopy last 2020.         Family history of breast cancer: two sisters with breast cancer diagnosed ages 50-60, referral to genetics desired by pt with discuss consideration of genetic testing. She has continued to be utd on yearly mammogram. 2/3/23 genetics referral pursued but declined testing as it would not be covered per patient report.    Follow-Up:       Route Chart for Scheduling    Med Onc Chart Routing      Follow up with physician 3 months. Virtual okay   Follow up with [facility]    Infusion scheduling note    Injection scheduling note    Labs CBC, ferritin, iron and TIBC, SPEP, free light chains and CMP   Scheduling:  Preferred lab:  Lab interval:  Couple days prior to visit in 3 months   Imaging    Pharmacy appointment    Other referrals           Therapy Plan " Information  Medications  ferric carboxymaltose (INJECTAFER) 750 mg in sodium chloride 0.9% 265 mL infusion  750 mg, Intravenous, 1 time a week  IV Fluids  0.9%  NaCl infusion  Intravenous, 1 time a week  Flushes  sodium chloride 0.9% flush 10 mL  10 mL, Intravenous, 1 time a week  heparin, porcine (PF) 100 unit/mL injection flush 500 Units  500 Units, Intravenous, 1 time a week  sodium chloride 0.9% 100 mL flush bag  Intravenous, 1 time a week  PRN Medications  EPINEPHrine (EPIPEN) 0.3 mg/0.3 mL pen injection 0.3 mg  0.3 mg, Intramuscular, PRN  diphenhydrAMINE injection 50 mg  50 mg, Intravenous, PRN  methylPREDNISolone sodium succinate injection 125 mg  125 mg, Intravenous, PRN  sodium chloride 0.9% bolus 1,000 mL 1,000 mL  1,000 mL, Intravenous, PRN

## 2023-05-05 ENCOUNTER — CLINICAL SUPPORT (OUTPATIENT)
Dept: PRIMARY CARE CLINIC | Facility: CLINIC | Age: 48
End: 2023-05-05
Payer: MEDICARE

## 2023-05-05 ENCOUNTER — OFFICE VISIT (OUTPATIENT)
Dept: PRIMARY CARE CLINIC | Facility: CLINIC | Age: 48
End: 2023-05-05
Payer: MEDICARE

## 2023-05-05 VITALS — SYSTOLIC BLOOD PRESSURE: 165 MMHG | DIASTOLIC BLOOD PRESSURE: 83 MMHG

## 2023-05-05 DIAGNOSIS — I10 ESSENTIAL HYPERTENSION: ICD-10-CM

## 2023-05-05 DIAGNOSIS — E11.59 HYPERTENSION ASSOCIATED WITH DIABETES: ICD-10-CM

## 2023-05-05 DIAGNOSIS — Z79.899 IMMUNOCOMPROMISED STATE DUE TO DRUG THERAPY: ICD-10-CM

## 2023-05-05 DIAGNOSIS — E11.9 TYPE 2 DIABETES MELLITUS WITHOUT COMPLICATION, WITH LONG-TERM CURRENT USE OF INSULIN: ICD-10-CM

## 2023-05-05 DIAGNOSIS — D84.821 IMMUNOCOMPROMISED STATE DUE TO DRUG THERAPY: ICD-10-CM

## 2023-05-05 DIAGNOSIS — F41.9 ANXIETY: Primary | ICD-10-CM

## 2023-05-05 DIAGNOSIS — R51.9 NONINTRACTABLE HEADACHE, UNSPECIFIED CHRONICITY PATTERN, UNSPECIFIED HEADACHE TYPE: Primary | ICD-10-CM

## 2023-05-05 DIAGNOSIS — Z79.4 TYPE 2 DIABETES MELLITUS WITHOUT COMPLICATION, WITH LONG-TERM CURRENT USE OF INSULIN: ICD-10-CM

## 2023-05-05 DIAGNOSIS — I15.2 HYPERTENSION ASSOCIATED WITH DIABETES: ICD-10-CM

## 2023-05-05 LAB
PATHOLOGIST INTERPRETATION IFE: NORMAL
PATHOLOGIST INTERPRETATION SPE: NORMAL

## 2023-05-05 PROCEDURE — 3077F PR MOST RECENT SYSTOLIC BLOOD PRESSURE >= 140 MM HG: ICD-10-PCS | Mod: CPTII,95,, | Performed by: NURSE PRACTITIONER

## 2023-05-05 PROCEDURE — 3044F PR MOST RECENT HEMOGLOBIN A1C LEVEL <7.0%: ICD-10-PCS | Mod: CPTII,95,, | Performed by: NURSE PRACTITIONER

## 2023-05-05 PROCEDURE — 3061F PR NEG MICROALBUMINURIA RESULT DOCUMENTED/REVIEW: ICD-10-PCS | Mod: CPTII,95,, | Performed by: NURSE PRACTITIONER

## 2023-05-05 PROCEDURE — 3077F SYST BP >= 140 MM HG: CPT | Mod: CPTII,95,, | Performed by: NURSE PRACTITIONER

## 2023-05-05 PROCEDURE — 3044F HG A1C LEVEL LT 7.0%: CPT | Mod: CPTII,95,, | Performed by: NURSE PRACTITIONER

## 2023-05-05 PROCEDURE — 99215 PR OFFICE/OUTPT VISIT, EST, LEVL V, 40-54 MIN: ICD-10-PCS | Mod: 95,,, | Performed by: NURSE PRACTITIONER

## 2023-05-05 PROCEDURE — 1159F MED LIST DOCD IN RCRD: CPT | Mod: CPTII,95,, | Performed by: NURSE PRACTITIONER

## 2023-05-05 PROCEDURE — 99499 NO LOS: ICD-10-PCS | Mod: 95,,,

## 2023-05-05 PROCEDURE — 3079F DIAST BP 80-89 MM HG: CPT | Mod: CPTII,95,, | Performed by: NURSE PRACTITIONER

## 2023-05-05 PROCEDURE — 1160F RVW MEDS BY RX/DR IN RCRD: CPT | Mod: CPTII,95,, | Performed by: NURSE PRACTITIONER

## 2023-05-05 PROCEDURE — 3066F PR DOCUMENTATION OF TREATMENT FOR NEPHROPATHY: ICD-10-PCS | Mod: CPTII,95,, | Performed by: NURSE PRACTITIONER

## 2023-05-05 PROCEDURE — 3061F NEG MICROALBUMINURIA REV: CPT | Mod: CPTII,95,, | Performed by: NURSE PRACTITIONER

## 2023-05-05 PROCEDURE — 99499 UNLISTED E&M SERVICE: CPT | Mod: 95,,,

## 2023-05-05 PROCEDURE — 99215 OFFICE O/P EST HI 40 MIN: CPT | Mod: 95,,, | Performed by: NURSE PRACTITIONER

## 2023-05-05 PROCEDURE — 3079F PR MOST RECENT DIASTOLIC BLOOD PRESSURE 80-89 MM HG: ICD-10-PCS | Mod: CPTII,95,, | Performed by: NURSE PRACTITIONER

## 2023-05-05 PROCEDURE — 3066F NEPHROPATHY DOC TX: CPT | Mod: CPTII,95,, | Performed by: NURSE PRACTITIONER

## 2023-05-05 PROCEDURE — 1159F PR MEDICATION LIST DOCUMENTED IN MEDICAL RECORD: ICD-10-PCS | Mod: CPTII,95,, | Performed by: NURSE PRACTITIONER

## 2023-05-05 PROCEDURE — 1160F PR REVIEW ALL MEDS BY PRESCRIBER/CLIN PHARMACIST DOCUMENTED: ICD-10-PCS | Mod: CPTII,95,, | Performed by: NURSE PRACTITIONER

## 2023-05-05 RX ORDER — SPIRONOLACTONE 50 MG/1
50 TABLET, FILM COATED ORAL DAILY
Qty: 30 TABLET | Refills: 11 | Status: SHIPPED | OUTPATIENT
Start: 2023-05-05 | End: 2024-05-04

## 2023-05-05 RX ORDER — INSULIN GLARGINE 100 [IU]/ML
10 INJECTION, SOLUTION SUBCUTANEOUS DAILY
Qty: 3 ML | Refills: 11 | Status: SHIPPED | OUTPATIENT
Start: 2023-05-05 | End: 2023-12-22 | Stop reason: SDUPTHER

## 2023-05-05 NOTE — PROGRESS NOTES
Individual Psychotherapy ([facility])  Marilou Daniel,  5/5/2023  DATE:  5/5/2023  TYPE OF VISIT:  Video Session  LENGTH OF SESSION: 45    Therapeutic Intervention: Met with patient for individual psychotherapy.    Chief complaint/reason for encounter: depression and anxiety       Session Content/Presenting Problem:    Met with patient in virtual session. She was seen by NP earlier today for blood pressure issues. Patient says she has been having trouble managing her blood pressure issues for the past week. Says she often checks it every few hours.  Discussed the mind-body connection; discussed the difficulty of managing her blood pressure with medicine alone if she is unable to manage her mental and emotional stress. Patient describes pushing herself to do a lot, all day every day; then, her body just shuts down and she does not have the energy to do anything until she recovers. Discussed her tendency to take on a lot for others even though she says she wants to take better care of herself. Patient says she has cut down a lot of responsibilities in the past year, but she knows she still feels she has too much on her plate. Discussed the thoughts that cause her to take on more than she can manage and the feelings connected to those thoughts. Patient is able to acknowledge what she is doing, it is more difficult for her to pinpoint the thought process that causing her to do this.  Current symptoms:  Depression: anhedonia, fatigue, and difficulty concentrating.  Anxiety: excessive worrying.  Insomnia: non-restful sleep.  Tiffanie:  denies.  Psychosis: denies .      Risk parameters:  Patient reports no suicidal ideation  Patient reports no homicidal ideation  Patient reports no self-injurious behavior  Patient reports no violent behavior    MENTAL HEALTH STATUS EXAM  General Appearance:  unremarkable, age appropriate   Speech: normal tone, normal rate, normal pitch, normal volume      Level  of Cooperation: cooperative      Thought Processes: normal and logical   Mood: steady      Thought Content: normal, no suicidality, no homicidality, delusions, or paranoia   Affect: congruent and appropriate   Orientation: Oriented x3   Attention Span & Concentration: intact   Fund of General Knowledge: intact and appropriate to age and level of education   Judgment & Insight: good     Language  intact       STRENGTHS AND LIABILITIES: Strength: Patient accepts guidance/feedback, Strength: Patient is expressive/articulate., Strength: Patient is motivated for change.    IMPRESSION:   My diagnostic impression is Anxiety disorders; generalized anxiety disorder [F41.1], as evidenced by difficulty relaxing, difficulty concentrating on tasks, increased worry.       Plan: Pt plans to continue individual psychotherapy CBT will be utilized in future individual therapy sessions to increase interaction, insight, support, and behavior modification. Patient will write out what she does for herself versus what she does for others. She will continue to use relaxation techniques daily.     Return to clinic: 1 week May 11th Virtual

## 2023-05-05 NOTE — PROGRESS NOTES
Marilou Daniel  05/05/2023  [id number]    Alissa Bautista MD  Patient Care Team:  Alissa Bautista MD as PCP - General (Internal Medicine)  Joe Yo RD as Dietitian (Endocrinology)  Liza Moon RD, CDE as Dietitian (Diabetes)  Travis Milligan, OD (Ophthalmology)      Primary Care Note      The patient location is:  Patient Home   The chief complaint leading to consultation is: blood pressure problems      Visit type: Virtual visit with synchronous audio and video  Each patient to whom he or she provides medical services by telemedicine is:  (1) informed of the relationship between the physician and patient and the respective role of any other health care provider with respect to management of the patient; and (2) notified that he or she may decline to receive medical services by telemedicine and may withdraw from such care at any time.    Chief Complaint:  Chief Complaint   Patient presents with    Hypertension       History of Present Illness:  HPI    Seen virtually today to follow up HTN.     Saw Dr Fields yesterday for anemia and MGUS:   Workup also revealed IgA lambda MGUS 0.3 grams/deciliter.  PET scan without hypermetabolic activity or concern for metastatic disease.  Bone marrow biopsy January 2023 with slight lambda plasma cell possible neoplasm 5-10%. Recommend close monitoring continued oral iron supplement due to allergy to IV iron declining further IV formulation continue on 1 tablet daily oral iron.  Close monitoring of plasma cell with peripheral blood and repeat bone marrow biopsy if worsening anemia in absence of iron deficiency.  If recurrent notable iron deficiency would perform repeat EGD and colonoscopy last 2020.    Continues pain mgmt with Dr Foote.   Rheum Dr Wayne - moncho to schedule appointment.   Seeing Dr Clark annually now.     /83 today. Has been high the past two weeks.Still taking clonidine. H/A. SBP up to 200.     CMP at the [facility] in 2-3 weeks.  5/19/23.    Taking Lantus 10 units daily. CBGs better overall. Doesn't follow diabetic diet.    Lab Results   Component Value Date    HGBA1C 5.9 (H) 05/02/2023     Still has mouth sores, joint pain otherwise SLE sx better.   Follow[facility], considering hip replacement.     Constant headache x 2 months.           The 10-year ASCVD risk score (Gray LOCKETT, et al., 2019) is: 27%    Values used to calculate the score:      Age: 47 years      Sex: Female      Is Non- : Yes      Diabetic: Yes      Tobacco smoker: No      Systolic Blood Pressure: 165 mmHg      Is BP treated: Yes      HDL Cholesterol: 39 mg/dL      Total Cholesterol: 162 mg/dL      ROS      The following were reviewed: Active problem list, medication list, allergies, family history, social history, and Health Maintenance.     History:  Past Medical History:   Diagnosis Date    Anemia     Arthritis     Connective tissue disease 1999    COVID-19 in immunocompromised patient 02/05/2021    Diabetes mellitus     Gastroesophageal reflux disease 03/23/2020    Hypertension     Lupus 1999    Situational anxiety 08/24/2021    Thyroid nodule greater than or equal to 1 cm in diameter incidentally noted on imaging study 11/19/2020    Vasculitis      Past Surgical History:   Procedure Laterality Date    ABLATION      COLONOSCOPY N/A 6/24/2020    Procedure: COLONOSCOPY;  Surgeon: Nevin Penny MD;  [facility];  Service: Endoscopy;  Laterality: N/A;    ESOPHAGOGASTRODUODENOSCOPY N/A 6/24/2020    Procedure: ESOPHAGOGASTRODUODENOSCOPY (EGD);  Surgeon: Nevin Penny MD;  Location[facility];  Service: Endoscopy;  Laterality: N/A;    RIGHT HEART CATHETERIZATION      TUBAL LIGATION      WRIST SURGERY Bilateral      Family History   Problem Relation Age of Onset    Lung cancer Mother 59        +hx of smoking    Cancer Father         type uk? stomach?    Breast cancer Other 58        UL mastect, mets to back & lungs    Breast  cancer Other 61        chemo, XRT, UL mastect?    Autism Other     Asthma Other     Obesity Other     Liver cancer Other 58        mets to lungs, pancreas, stomach    Cancer Other         type uk?     Social History     Socioeconomic History    Marital status:    Tobacco Use    Smoking status: Never    Smokeless tobacco: Never   Substance and Sexual Activity    Alcohol use: Never    Drug use: Never    Sexual activity: Yes     Partners: Male     Birth control/protection: See Surgical Hx     Patient Active Problem List   Diagnosis    Hypertension associated with diabetes    Type 2 diabetes mellitus without complication, with long-term current use of insulin    Systemic lupus erythematosus    Leukocytoclastic vasculitis    Nonintractable headache    Gastroesophageal reflux disease    Encounter for screening colonoscopy    Neck pain    Hearing loss    Thyroid nodule greater than or equal to 1 cm in diameter incidentally noted on imaging study    Drug induced insomnia    Immunocompromised state due to drug therapy    Situational anxiety    Avascular necrosis of bones of both hips    Diabetes mellitus due to underlying condition with complication, with long-term current use of insulin    Primary snoring    Class 1 drug-induced obesity with serious comorbidity and body mass index (BMI) of 31.0 to 31.9 in adult    Colitis    Iron deficiency anemia    Anemia of infection and chronic disease    Iron deficiency anemia secondary to inadequate dietary iron intake    Gastroenteritis     Review of patient's allergies indicates:   Allergen Reactions    Azathioprine Nausea And Vomiting     Nausea, vomiting, diarrhea dose and early in the course of therapy    Olmesartan Shortness Of Breath    Feraheme [ferumoxytol] Itching     Shortness of breath, rash    Oxycodone Itching     Other reaction(s): Unknown       Medications:  Current Outpatient Medications on File Prior to Visit   Medication Sig Dispense Refill    amLODIPine  (NORVASC) 5 MG tablet 10 mg.      chlorthalidone (HYGROTEN) 25 MG Tab TAKE 1 TABLET BY MOUTH ONCE DAILY 30 tablet 2    cloNIDine 0.1 mg/24 hr td ptwk (CATAPRES) 0.1 mg/24 hr Place 1 patch onto the skin every 7 days. 4 patch 11    labetaloL (NORMODYNE) 300 MG tablet Take 1 tablet (300 mg total) by mouth 2 (two) times daily. 60 tablet 11    [DISCONTINUED] spironolactone (ALDACTONE) 25 MG tablet Take 1 tablet (25 mg total) by mouth once daily. 30 tablet 11    atorvastatin (LIPITOR) 10 MG tablet       betamethasone dipropionate 0.05 % cream APPLY A SMALL AMOUNT TO AFFECTED AREA TOPICALLY TWICE A DAY FOR 2 TO 3 WEEKS AT A TIME AS NEEDED FLARE UPS AVOID FACE & GROIN AREA      blood sugar diagnostic (TRUE METRIX GLUCOSE TEST STRIP) Strp Test 4 times daily. 300 strip 3    blood-glucose meter (TRUE METRIX GLUCOSE METER) Misc Use as directed 1 each 0    blood-glucose meter,continuous (DEXCOM G6 ) Misc 1 Units by Misc.(Non-Drug; Combo Route) route continuous. 1 each 0    blood-glucose sensor (DEXCOM G6 SENSOR) Myrtle 1 each by Misc.(Non-Drug; Combo Route) route every 10 days. 3 each 11    blood-glucose transmitter (DEXCOM G6 TRANSMITTER) Myrtle 1 each by Misc.(Non-Drug; Combo Route) route every 3 (three) months. 1 Device 3    ciprofloxacin HCl (CIPRO) 500 MG tablet Take 1 tablet (500 mg total) by mouth 2 (two) times daily. (Patient not taking: Reported on 1/11/2023) 14 tablet 0    dapsone 100 MG Tab Take 100 mg by mouth once daily at 6am.      dexAMETHasone 0.5 mg/5 mL Soln solution 5 mL swish and spit four times daily 240 mL 2    ergocalciferol (ERGOCALCIFEROL) 50,000 unit Cap Take 50,000 Units by mouth.      esomeprazole (NEXIUM) 40 MG capsule Take 1 capsule (40 mg total) by mouth before breakfast. 90 capsule 3    gabapentin (NEURONTIN) 800 MG tablet Neurontin 800 mg tablet   Take 1 tablet 3 times a day by oral route.      HYDROcodone-acetaminophen (NORCO)  mg per tablet Norco 10 mg-325 mg tablet   Take 1 tablet 3  times a day by oral route as needed.      hydrocortisone 2.5 % ointment APPLY TOPICALLY TO FACE TWICE A DAY AS NEEDED FOR 1 TO 2 WEEKS AT A TIME      hydroxychloroquine (PLAQUENIL) 200 mg tablet Take 200 mg by mouth 2 (two) times daily.       hydrOXYzine HCL (ATARAX) 25 MG tablet TAKE ONE TABLET BY MOUTH EVERY 12 HOURS AS NEEDED FOR ITCHING 30 tablet 5    insulin aspart U-100 (NOVOLOG) 100 unit/mL injection Inject 4 Units into the skin 3 (three) times daily before meals. 10.8 mL 3    LORazepam (ATIVAN) 0.5 MG tablet Take 1 tablet (0.5 mg total) by mouth every 12 (twelve) hours as needed for Anxiety. 30 tablet 2    mycophenolate (CELLCEPT) 250 mg Cap 3,000 mg once daily.      ondansetron (ZOFRAN) 4 MG tablet Take 1 tablet (4 mg total) by mouth every 8 (eight) hours as needed for Nausea. 12 tablet 0    sertraline (ZOLOFT) 50 MG tablet Take 1 tablet (50 mg total) by mouth once daily. 30 tablet 11    tiZANidine (ZANAFLEX) 4 MG tablet TK 1 T PO TID PRN      TRUEPLUS LANCETS 33 gauge Misc Inject 1 lancet as directed 4 (four) times daily. 100 each 11    zolpidem (AMBIEN) 10 mg Tab TAKE ONE TABLET BY MOUTH NIGHTLY AS NEEDED FOR INSOMNIA Strength: 10 mg 30 tablet 5    [DISCONTINUED] cloNIDine (CATAPRES) 0.1 MG tablet Take 1 tablet (0.1 mg total) by mouth every 6 (six) hours as needed (elevated BP). 90 tablet 11    [DISCONTINUED] insulin (LANTUS SOLOSTAR U-100 INSULIN) glargine 100 units/mL (3mL) SubQ pen Inject 5 Units into the skin once daily. 1.5 mL 11     Current Facility-Administered Medications on File Prior to Visit   Medication Dose Route Frequency Provider Last Rate Last Admin    acetaminophen tablet 650 mg  650 mg Oral Once PRN Reji Dumas MD        acetaminophen tablet 650 mg  650 mg Oral Once PRN Reji Dumas MD        sodium chloride 0.9% 500 mL flush bag   Intravenous PRN Reji Dumas MD        sodium chloride 0.9% flush 10 mL  10 mL Intravenous PRN Reji Dumas MD           Medications have  been reviewed and reconciled with patient at visit today.    Barriers to medications present (no )    Adverse reactions to current medications (no)      Exam:  Vitals:    05/05/23 1042   BP: (!) 165/83         There is no height or weight on file to calculate BMI.      BP Readings from Last 3 Encounters:   05/05/23 (!) 165/83   01/11/23 (!) 144/87   01/03/23 (!) 142/67     Wt Readings from Last 3 Encounters:   01/03/23 1017 88.9 kg (196 lb)   12/14/22 1402 89.1 kg (196 lb 6.9 oz)   11/29/22 0954 88.7 kg (195 lb 8 oz)      Lab Results   Component Value Date    HGBA1C 5.9 (H) 05/02/2023           Physical Exam    Laboratory Reviewed: (Yes)  Lab Results   Component Value Date    WBC 8.19 05/02/2023    HGB 10.7 (L) 05/02/2023    HCT 32.7 (L) 05/02/2023     05/02/2023    CHOL 162 05/02/2023    TRIG 126 05/02/2023    HDL 39 (L) 05/02/2023    ALT 12 05/02/2023    AST 15 05/02/2023     05/02/2023    K 4.0 05/02/2023     05/02/2023    CREATININE 1.0 05/02/2023    BUN 13 05/02/2023    CO2 24 05/02/2023    TSH 0.464 08/25/2022    INR 1.0 01/03/2023    HGBA1C 5.9 (H) 05/02/2023           Health Maintenance  Health Maintenance Topics with due status: Not Due       Topic Last Completion Date    Cervical Cancer Screening 09/29/2021    Eye Exam 08/22/2022    Mammogram 08/31/2022    Low Dose Statin 01/03/2023    Diabetes Urine Screening 05/02/2023    Lipid Panel 05/02/2023    Hemoglobin A1c 05/02/2023    Influenza Vaccine Not Due     Health Maintenance Due   Topic Date Due    Pneumococcal Vaccines (Age 0-64) (1 - PCV) Never done    Foot Exam  Never done    TETANUS VACCINE  Never done    COVID-19 Vaccine (4 - Booster for Pfizer series) 02/02/2022    Colorectal Cancer Screening  06/24/2023       Assessment:  Problem List Items Addressed This Visit          Neuro    Nonintractable headache - Primary    Relevant Orders    Ambulatory referral/consult to Neurology       Cardiac/Vascular    Hypertension associated with  diabetes    Relevant Medications    spironolactone (ALDACTONE) 50 MG tablet    insulin (LANTUS SOLOSTAR U-100 INSULIN) glargine 100 units/mL SubQ pen       Immunology/Multi System    Immunocompromised state due to drug therapy       Endocrine    Type 2 diabetes mellitus without complication, with long-term current use of insulin    Relevant Medications    insulin (LANTUS SOLOSTAR U-100 INSULIN) glargine 100 units/mL SubQ pen     Other Visit Diagnoses       Essential hypertension        Relevant Medications    spironolactone (ALDACTONE) 50 MG tablet    Other Relevant Orders    Hypertension Digital Medicine ([facility]) Enrollment Order (Completed)    Hypertension Digital Medicine ([facility]): Assign Onboarding Questionnaires (Completed)              Plan:  Nonintractable headache, unspecified chronicity pattern, unspecified headache type  -     Ambulatory referral/consult to Neurology; Future; Expected date: 05/06/2023    Essential hypertension  -     spironolactone (ALDACTONE) 50 MG tablet; Take 1 tablet (50 mg total) by mouth once daily.  Dispense: 30 tablet; Refill: 11  -     Hypertension Digital Medicine ([facility]) Enrollment Order  -     Hypertension Digital Medicine ([facility]): Assign Onboarding Questionnaires    Type 2 diabetes mellitus without complication, with long-term current use of insulin  -     insulin (LANTUS SOLOSTAR U-100 INSULIN) glargine 100 units/mL SubQ pen; Inject 10 Units into the skin once daily.  Dispense: 3 mL; Refill: 11    Hypertension associated with diabetes    Immunocompromised state due to drug therapy      -Patient's lab results were reviewed and discussed with patient  -Treatment options and alternatives were discussed with the patient. Patient expressed understanding. Patient was given the opportunity to ask questions and be an active participant in their medical care. Patient had no further questions or concerns at this time.   -Documentation of patient's health and condition was obtained from  family member who was present during visit.  -Patient is an overall moderate risk for health complications from their medical conditions.       Follow up: Follow up in about 4 weeks (around 6/2/2023) for HTN management, Medication Change.      Total medical decision making time was 46 min.  The following issues were discussed: The primary encounter diagnosis was Nonintractable headache, unspecified chronicity pattern, unspecified headache type. Diagnoses of Essential hypertension, Type 2 diabetes mellitus without complication, with long-term current use of insulin, Hypertension associated with diabetes, and Immunocompromised state due to drug therapy were also pertinent to this visit.    Health maintenance needs, recent test results and goals of care discussed with pt and questions answered.

## 2023-05-05 NOTE — Clinical Note
I signed her up for digital HTN. You should get an order to sign. Please let me know if you do not. Thx! [address], [place] [address]

## 2023-05-11 DIAGNOSIS — E11.59 HYPERTENSION ASSOCIATED WITH DIABETES: ICD-10-CM

## 2023-05-11 DIAGNOSIS — I15.2 HYPERTENSION ASSOCIATED WITH DIABETES: ICD-10-CM

## 2023-05-12 RX ORDER — CHLORTHALIDONE 25 MG/1
TABLET ORAL
Qty: 30 TABLET | Refills: 2 | Status: SHIPPED | OUTPATIENT
Start: 2023-05-12 | End: 2023-09-06 | Stop reason: ALTCHOICE

## 2023-06-07 ENCOUNTER — PATIENT MESSAGE (OUTPATIENT)
Dept: ADMINISTRATIVE | Facility: OTHER | Age: 48
End: 2023-06-07
Payer: MEDICARE

## 2023-06-07 ENCOUNTER — TELEPHONE (OUTPATIENT)
Dept: PRIMARY CARE CLINIC | Facility: CLINIC | Age: 48
End: 2023-06-07
Payer: MEDICARE

## 2023-06-07 NOTE — TELEPHONE ENCOUNTER
Talked to [employer] to confirm that pt completed questionnaires correctly. States that BP cuff should be shipped to pt soon. Pt notified.

## 2023-06-07 NOTE — TELEPHONE ENCOUNTER
----- Message from Aliza De La Torre sent at 6/7/2023 11:49 AM CDT -----  Pt calling to let you know that she done the two questionnaire and wanted you to check it for her,  can be reached at [phone]

## 2023-06-09 DIAGNOSIS — F19.982 DRUG-INDUCED INSOMNIA: ICD-10-CM

## 2023-06-10 RX ORDER — ZOLPIDEM TARTRATE 10 MG/1
TABLET ORAL
Qty: 30 TABLET | Refills: 0 | Status: SHIPPED | OUTPATIENT
Start: 2023-07-07 | End: 2023-08-14

## 2023-06-26 ENCOUNTER — TELEPHONE (OUTPATIENT)
Dept: PRIMARY CARE CLINIC | Facility: CLINIC | Age: 48
End: 2023-06-26
Payer: MEDICARE

## 2023-06-26 NOTE — TELEPHONE ENCOUNTER
----- Message from Diane Dominguez NP sent at 6/26/2023  8:40 AM CDT -----  Regarding: RE: f/u appt?  Not homebound. She should schedule to see Dr Bautista.     ----- Message -----  From: Alissa Bautista MD  Sent: 6/26/2023   8:00 AM CDT  To: Diane Dominguez NP, Alissa Bautista MD, #  Subject: f/u appt?                                        I refilled zolpidem for just 30 days on 6/10/23.  Is Ms. Daniel homebound?  If she is staying on our panel I will need to see her at some point, if not in clinic, then at least via virtual visit

## 2023-06-26 NOTE — TELEPHONE ENCOUNTER
Patient was contacted and informed that her next OV will have to be with Dr. Bautista. Patient verbally  understood the information given. She does have a virtual appt with VIVIANE Dominguez on today.

## 2023-06-27 ENCOUNTER — TELEPHONE (OUTPATIENT)
Dept: PRIMARY CARE CLINIC | Facility: CLINIC | Age: 48
End: 2023-06-27
Payer: MEDICARE

## 2023-06-27 NOTE — TELEPHONE ENCOUNTER
----- Message from Alissa Bautista MD sent at 6/26/2023  5:15 PM CDT -----  Regarding: FW: f/u appt?  Please schedule Ms. Danile for new pt visit w me sometime in July  ----- Message -----  From: Diane Dominguez NP  Sent: 6/26/2023   8:40 AM CDT  To: Alissa Bautista MD, Michele Maxwell Staff  Subject: RE: f/u appt?                                    Not homebound. She should schedule to see Dr Bautista.     ----- Message -----  From: Alissa Bautista MD  Sent: 6/26/2023   8:00 AM CDT  To: Diane Dominguez NP, Alissa Bautista MD, #  Subject: f/u appt?                                        I refilled zolpidem for just 30 days on 6/10/23.  Is Ms. Daniel homebound?  If she is staying on our panel I will need to see her at some point, if not in clinic, then at least via virtual visit

## 2023-07-14 ENCOUNTER — OFFICE VISIT (OUTPATIENT)
Dept: PRIMARY CARE CLINIC | Facility: CLINIC | Age: 48
End: 2023-07-14
Payer: MEDICARE

## 2023-07-14 VITALS
TEMPERATURE: 99 F | SYSTOLIC BLOOD PRESSURE: 161 MMHG | OXYGEN SATURATION: 100 % | DIASTOLIC BLOOD PRESSURE: 79 MMHG | HEIGHT: 67 IN | HEART RATE: 75 BPM | WEIGHT: 199.88 LBS | BODY MASS INDEX: 31.37 KG/M2

## 2023-07-14 DIAGNOSIS — E04.1 THYROID NODULE GREATER THAN OR EQUAL TO 1 CM IN DIAMETER INCIDENTALLY NOTED ON IMAGING STUDY: Chronic | ICD-10-CM

## 2023-07-14 DIAGNOSIS — E05.90 HYPERTHYROIDISM: ICD-10-CM

## 2023-07-14 DIAGNOSIS — E11.59 HYPERTENSION ASSOCIATED WITH DIABETES: Primary | ICD-10-CM

## 2023-07-14 DIAGNOSIS — K59.03 DRUG-INDUCED CONSTIPATION: Chronic | ICD-10-CM

## 2023-07-14 DIAGNOSIS — I15.2 HYPERTENSION ASSOCIATED WITH DIABETES: Primary | ICD-10-CM

## 2023-07-14 DIAGNOSIS — G47.00 INSOMNIA, UNSPECIFIED TYPE: ICD-10-CM

## 2023-07-14 PROBLEM — K52.9 GASTROENTERITIS: Status: RESOLVED | Noted: 2023-01-12 | Resolved: 2023-07-14

## 2023-07-14 PROBLEM — Z12.11 ENCOUNTER FOR SCREENING COLONOSCOPY: Status: RESOLVED | Noted: 2020-06-09 | Resolved: 2023-07-14

## 2023-07-14 PROBLEM — F19.982 DRUG INDUCED INSOMNIA: Status: RESOLVED | Noted: 2020-12-15 | Resolved: 2023-07-14

## 2023-07-14 PROBLEM — K52.9 COLITIS: Status: RESOLVED | Noted: 2022-12-02 | Resolved: 2023-07-14

## 2023-07-14 PROCEDURE — 3008F PR BODY MASS INDEX (BMI) DOCUMENTED: ICD-10-PCS | Mod: HCNC,CPTII,S$GLB, | Performed by: INTERNAL MEDICINE

## 2023-07-14 PROCEDURE — 99215 PR OFFICE/OUTPT VISIT, EST, LEVL V, 40-54 MIN: ICD-10-PCS | Mod: HCNC,S$GLB,, | Performed by: INTERNAL MEDICINE

## 2023-07-14 PROCEDURE — 3066F PR DOCUMENTATION OF TREATMENT FOR NEPHROPATHY: ICD-10-PCS | Mod: HCNC,CPTII,S$GLB, | Performed by: INTERNAL MEDICINE

## 2023-07-14 PROCEDURE — 99215 OFFICE O/P EST HI 40 MIN: CPT | Mod: HCNC,S$GLB,, | Performed by: INTERNAL MEDICINE

## 2023-07-14 PROCEDURE — 3077F PR MOST RECENT SYSTOLIC BLOOD PRESSURE >= 140 MM HG: ICD-10-PCS | Mod: HCNC,CPTII,S$GLB, | Performed by: INTERNAL MEDICINE

## 2023-07-14 PROCEDURE — 99417 PR PROLONGED SVC, OUTPT, W/WO DIRECT PT CONTACT,  EA ADDTL 15 MIN: ICD-10-PCS | Mod: HCNC,S$GLB,, | Performed by: INTERNAL MEDICINE

## 2023-07-14 PROCEDURE — 3061F PR NEG MICROALBUMINURIA RESULT DOCUMENTED/REVIEW: ICD-10-PCS | Mod: HCNC,CPTII,S$GLB, | Performed by: INTERNAL MEDICINE

## 2023-07-14 PROCEDURE — 99999 PR PBB SHADOW E&M-EST. PATIENT-LVL IV: ICD-10-PCS | Mod: PBBFAC,HCNC,, | Performed by: INTERNAL MEDICINE

## 2023-07-14 PROCEDURE — 3061F NEG MICROALBUMINURIA REV: CPT | Mod: HCNC,CPTII,S$GLB, | Performed by: INTERNAL MEDICINE

## 2023-07-14 PROCEDURE — 3078F PR MOST RECENT DIASTOLIC BLOOD PRESSURE < 80 MM HG: ICD-10-PCS | Mod: HCNC,CPTII,S$GLB, | Performed by: INTERNAL MEDICINE

## 2023-07-14 PROCEDURE — 3066F NEPHROPATHY DOC TX: CPT | Mod: HCNC,CPTII,S$GLB, | Performed by: INTERNAL MEDICINE

## 2023-07-14 PROCEDURE — 3077F SYST BP >= 140 MM HG: CPT | Mod: HCNC,CPTII,S$GLB, | Performed by: INTERNAL MEDICINE

## 2023-07-14 PROCEDURE — 99999 PR PBB SHADOW E&M-EST. PATIENT-LVL IV: CPT | Mod: PBBFAC,HCNC,, | Performed by: INTERNAL MEDICINE

## 2023-07-14 PROCEDURE — 3078F DIAST BP <80 MM HG: CPT | Mod: HCNC,CPTII,S$GLB, | Performed by: INTERNAL MEDICINE

## 2023-07-14 PROCEDURE — 3044F PR MOST RECENT HEMOGLOBIN A1C LEVEL <7.0%: ICD-10-PCS | Mod: HCNC,CPTII,S$GLB, | Performed by: INTERNAL MEDICINE

## 2023-07-14 PROCEDURE — 99417 PROLNG OP E/M EACH 15 MIN: CPT | Mod: HCNC,S$GLB,, | Performed by: INTERNAL MEDICINE

## 2023-07-14 PROCEDURE — 1159F PR MEDICATION LIST DOCUMENTED IN MEDICAL RECORD: ICD-10-PCS | Mod: HCNC,CPTII,S$GLB, | Performed by: INTERNAL MEDICINE

## 2023-07-14 PROCEDURE — 1159F MED LIST DOCD IN RCRD: CPT | Mod: HCNC,CPTII,S$GLB, | Performed by: INTERNAL MEDICINE

## 2023-07-14 PROCEDURE — 3008F BODY MASS INDEX DOCD: CPT | Mod: HCNC,CPTII,S$GLB, | Performed by: INTERNAL MEDICINE

## 2023-07-14 PROCEDURE — 3044F HG A1C LEVEL LT 7.0%: CPT | Mod: HCNC,CPTII,S$GLB, | Performed by: INTERNAL MEDICINE

## 2023-07-14 RX ORDER — AMLODIPINE BESYLATE 10 MG/1
10 TABLET ORAL DAILY
Qty: 90 TABLET | Refills: 3 | Status: SHIPPED | OUTPATIENT
Start: 2023-07-14 | End: 2023-08-10

## 2023-07-14 RX ORDER — CLONIDINE HYDROCHLORIDE 0.1 MG/1
0.1 TABLET ORAL 4 TIMES DAILY PRN
COMMUNITY
End: 2023-08-02 | Stop reason: SDUPTHER

## 2023-07-14 RX ORDER — POLYETHYLENE GLYCOL 3350 17 G/17G
17 POWDER, FOR SOLUTION ORAL DAILY
Qty: 510 G | Refills: 3 | Status: SHIPPED | OUTPATIENT
Start: 2023-07-14 | End: 2023-11-11

## 2023-07-14 NOTE — PROGRESS NOTES
Marilou Daniel  07/14/2023  [id number]    Alissa Bautista MD  Patient Care Team:  Alissa Bautista MD as PCP - General (Internal Medicine)  Joe Yo RD as Dietitian (Endocrinology)  Liza Moon RD, CDE as Dietitian (Diabetes)  Travis Milligan, OD (Ophthalmology)  Diane Dominguez, NP as Nurse Practitioner (Family Medicine)    Visit Type: establish care [facility]    Chief Complaint:  No chief complaint on file.    History of Present Illness: Ms. Marilou Daniel is a 47 year old female presents [facility] with [facility]. She has been followed by Diane Dominguez since Novemenber of 2020. Last seen virtual visit May 5th. This is our first time meeting.     Medical issues include HTN, anemia, MGUS, SLE, leukocytoclastic vasculitis, chronic pain, AVN B hips, DM2 insulin dependent, anxiety, hearing loss    Reports Home BP's have been elevated - in process of onboarding w Digital HTN program  Taking clonidine 0.1 one or two tablets 4x daily due to SBP consistently over 150 DBP over 90    Has been able to wean off of daily oral steriod  No longer getting steriod injections given development of B hip AVN    Chronic pain: Neck back hips (L>R) L knee  Plan for possible B hip replacements - starting w L  Participating in aqua therapy to strengthen L knee    Under a lot of stress - family, chronic pain, chronic illness   Long standing insomnia - takes ambien and pain medicine to sleep  Has been losing weight gradually - though wt today actually higher than earlier this year  No tremor, no diarrhea - constipation more of an issue w chronic opiates    Saw her Rheumatologist Dr. Wayne this morning - extensive labwork ordered - plans to have drawn later today    Recent appointments:   5/05/23 [facility] Hyacinth LCSW anxiety (virtual)  5/05/23 [facility] Angelica (virtual)  5/04/23 [facility] Heme/Onc K[place] (virtual)   4/28/23 [facility] Hyacinth LCSW depression/anxiety  4/24/23 Genetic Brown family h/o  breast cancer (virtual)    Outside appointments  7/14/23 Rheum Dr. Wayne (virtual)   4/21/23 Pain Med [facility] cervical/lumbar radiculopathy  Neurologist Rema IRIZARRY neuropathy (no recent appts)    Missed recently scheduled appt w Cardiologist Dr. Clark [facility] - next appt not for 6 mos  (per Ms. Daniel Dr. Clark was upset w changes made to her BP regimen - ie discontinuation of the olmesartan)    Upcoming appointments:  Future Appointments       Date Provider Specialty Appt Notes    7/28/2023  Lab kovtun    8/4/2023 Maritza Fields MD Hematology and Oncology mary//3mo fu//labs prior//hm         8/07/2023 Opthal Dr. Sanket [facility]igan [facility]    The following were reviewed: Active problem list, medication list, allergies, family history, social history, and Health Maintenance.     History:  Past Medical History:   Diagnosis Date    Anemia     Arthritis     Connective tissue disease 1999    COVID-19 in immunocompromised patient 02/05/2021    Diabetes mellitus     Drug induced insomnia 12/15/2020    R/T chronic steroid use for SLE.    Gastroesophageal reflux disease 03/23/2020    Hypertension     Lupus 1999    Situational anxiety 08/24/2021    Thyroid nodule greater than or equal to 1 cm in diameter incidentally noted on imaging study 11/19/2020    Vasculitis      Past Surgical History:   Procedure Laterality Date    ABLATION      COLONOSCOPY N/A 6/24/2020    Procedure: COLONOSCOPY;  Surgeon: Nevin Penny MD;  Location: [facility];  Service: Endoscopy;  Laterality: N/A;    ESOPHAGOGASTRODUODENOSCOPY N/A 6/24/2020    Procedure: ESOPHAGOGASTRODUODENOSCOPY (EGD);  Surgeon: Nevin Penny MD;  Location: [facility];  Service: Endoscopy;  Laterality: N/A;    RIGHT HEART CATHETERIZATION      TUBAL LIGATION      WRIST SURGERY Bilateral      Family History   Problem Relation Age of Onset    Lung cancer Mother 59        +hx of smoking    Cancer Father         type uk? stomach?     Breast cancer Other 58        UL mastect, mets to back & lungs    Breast cancer Other 61        chemo, XRT, UL mastect?    Autism Other     Asthma Other     Obesity Other     Liver cancer Other 58        mets to lungs, pancreas, stomach    Cancer Other         type uk?     Social History     Socioeconomic History    Marital status:    Tobacco Use    Smoking status: Never    Smokeless tobacco: Never   Substance and Sexual Activity    Alcohol use: Never    Drug use: Never    Sexual activity: Yes     Partners: Male     Birth control/protection: See Surgical Hx     Patient Active Problem List   Diagnosis    Hypertension associated with diabetes    Type 2 diabetes mellitus without complication, with long-term current use of insulin    Systemic lupus erythematosus    Leukocytoclastic vasculitis    Nonintractable headache    Gastroesophageal reflux disease    Neck pain    Hearing loss    Thyroid nodule greater than or equal to 1 cm in diameter incidentally noted on imaging study    Immunocompromised state due to drug therapy    Situational anxiety    Avascular necrosis of bones of both hips    Diabetes mellitus due to underlying condition with complication, with long-term current use of insulin    Primary snoring    Class 1 drug-induced obesity with serious comorbidity and body mass index (BMI) of 31.0 to 31.9 in adult    Iron deficiency anemia    Anemia of infection and chronic disease    Iron deficiency anemia secondary to inadequate dietary iron intake    Insomnia    Drug-induced constipation     Review of patient's allergies indicates:   Allergen Reactions    Azathioprine Nausea And Vomiting     Nausea, vomiting, diarrhea dose and early in the course of therapy    Olmesartan Shortness Of Breath    Feraheme [ferumoxytol] Itching     Shortness of breath, rash    Oxycodone Itching     Other reaction(s): Unknown       Medications:  Current Outpatient Medications on File Prior to Visit   Medication Sig Dispense  Refill    atorvastatin (LIPITOR) 10 MG tablet       betamethasone dipropionate 0.05 % cream APPLY A SMALL AMOUNT TO AFFECTED AREA TOPICALLY TWICE A DAY FOR 2 TO 3 WEEKS AT A TIME AS NEEDED FLARE UPS AVOID FACE & GROIN AREA      blood sugar diagnostic (TRUE METRIX GLUCOSE TEST STRIP) Strp Test 4 times daily. 300 strip 3    blood-glucose meter (TRUE METRIX GLUCOSE METER) Misc Use as directed 1 each 0    blood-glucose meter,continuous (DEXCOM G6 ) Misc 1 Units by Misc.(Non-Drug; Combo Route) route continuous. 1 each 0    blood-glucose sensor (DEXCOM G6 SENSOR) Myrtle 1 each by Misc.(Non-Drug; Combo Route) route every 10 days. 3 each 11    blood-glucose transmitter (DEXCOM G6 TRANSMITTER) Myrtle 1 each by Misc.(Non-Drug; Combo Route) route every 3 (three) months. 1 Device 3    chlorthalidone (HYGROTEN) 25 MG Tab TAKE 1 TABLET BY MOUTH DAILY 30 tablet 2    cloNIDine (CATAPRES) 0.1 MG tablet Take 0.1 mg by mouth 4 (four) times daily as needed for SBP greater than ___ (1st choice) - use as first treatment.      dapsone 100 MG Tab Take 100 mg by mouth once daily at 6am.      esomeprazole (NEXIUM) 40 MG capsule Take 1 capsule (40 mg total) by mouth before breakfast. 90 capsule 3    gabapentin (NEURONTIN) 800 MG tablet Neurontin 800 mg tablet   Take 1 tablet 3 times a day by oral route.      HYDROcodone-acetaminophen (NORCO)  mg per tablet Norco 10 mg-325 mg tablet   Take 1 tablet 3 times a day by oral route as needed.      hydrocortisone 2.5 % ointment APPLY TOPICALLY TO FACE TWICE A DAY AS NEEDED FOR 1 TO 2 WEEKS AT A TIME      hydroxychloroquine (PLAQUENIL) 200 mg tablet Take 200 mg by mouth 2 (two) times daily.       insulin (LANTUS SOLOSTAR U-100 INSULIN) glargine 100 units/mL SubQ pen Inject 10 Units into the skin once daily. 3 mL 11    insulin aspart U-100 (NOVOLOG) 100 unit/mL injection Inject 4 Units into the skin 3 (three) times daily before meals. 10.8 mL 3    labetaloL (NORMODYNE) 300 MG tablet Take 1  tablet (300 mg total) by mouth 2 (two) times daily. 60 tablet 11    mycophenolate (CELLCEPT) 250 mg Cap 3,000 mg once daily.      ondansetron (ZOFRAN) 4 MG tablet Take 1 tablet (4 mg total) by mouth every 8 (eight) hours as needed for Nausea. 12 tablet 0    sertraline (ZOLOFT) 50 MG tablet Take 1 tablet (50 mg total) by mouth once daily. 30 tablet 11    spironolactone (ALDACTONE) 50 MG tablet Take 1 tablet (50 mg total) by mouth once daily. 30 tablet 11    tiZANidine (ZANAFLEX) 4 MG tablet TK 1 T PO TID PRN      TRUEPLUS LANCETS 33 gauge Misc Inject 1 lancet as directed 4 (four) times daily. 100 each 11    zolpidem (AMBIEN) 10 mg Tab TAKE ONE TABLET BY MOUTH NIGHTLY AS NEEDED FOR INSOMNIA 30 tablet 0    [DISCONTINUED] amLODIPine (NORVASC) 5 MG tablet 10 mg.      [DISCONTINUED] dexAMETHasone 0.5 mg/5 mL Soln solution 5 mL swish and spit four times daily 240 mL 2    hydrOXYzine HCL (ATARAX) 25 MG tablet TAKE ONE TABLET BY MOUTH EVERY 12 HOURS AS NEEDED FOR ITCHING 30 tablet 5    LORazepam (ATIVAN) 0.5 MG tablet Take 1 tablet (0.5 mg total) by mouth every 12 (twelve) hours as needed for Anxiety. 30 tablet 2    [DISCONTINUED] ciprofloxacin HCl (CIPRO) 500 MG tablet Take 1 tablet (500 mg total) by mouth 2 (two) times daily. 14 tablet 0    [DISCONTINUED] cloNIDine 0.1 mg/24 hr td ptwk (CATAPRES) 0.1 mg/24 hr Place 1 patch onto the skin every 7 days. (Patient not taking: Reported on 7/14/2023) 4 patch 11     Current Facility-Administered Medications on File Prior to Visit   Medication Dose Route Frequency Provider Last Rate Last Admin    [DISCONTINUED] acetaminophen tablet 650 mg  650 mg Oral Once PRN Reji Dumas MD        [DISCONTINUED] acetaminophen tablet 650 mg  650 mg Oral Once PRN Reji Dumas MD        [DISCONTINUED] sodium chloride 0.9% 500 mL flush bag   Intravenous PRN Reji Dumas MD        [DISCONTINUED] sodium chloride 0.9% flush 10 mL  10 mL Intravenous PRN Reji Dumas MD            Medications have been reviewed and reconciled with patient at visit today.    Review of Systems   Constitutional:  Negative for activity change and appetite change.   HENT:  Negative for dental problem, ear pain, hearing loss, sinus pressure/congestion, sore throat and trouble swallowing.    Eyes:  Positive for visual disturbance. Negative for pain.        Vision getting worse    Respiratory:  Negative for cough and wheezing.    Cardiovascular:  Negative for chest pain and palpitations.   Gastrointestinal:  Positive for constipation. Negative for abdominal pain, blood in stool and nausea.   Genitourinary:  Negative for dysuria and hematuria.   Musculoskeletal:  Positive for arthralgias, back pain, gait problem, leg pain and neck pain.   Integumentary:  Negative for rash and wound.   Neurological:  Negative for tremors.   Psychiatric/Behavioral:  Positive for sleep disturbance. The patient is nervous/anxious.       Exam:  Vitals:    07/14/23 1450   BP: (!) 161/79   Pulse: 75   Temp: 98.7 °F (37.1 °C)     Weight: 90.7 kg (199 lb 14.4 oz)   Body mass index is 31.31 kg/m².    BP Readings from Last 3 Encounters:   07/14/23 (!) 161/79   05/05/23 (!) 165/83   01/11/23 (!) 144/87      Wt Readings from Last 3 Encounters:   07/14/23 1450 90.7 kg (199 lb 14.4 oz)   01/03/23 1017 88.9 kg (196 lb)   12/14/22 1402 89.1 kg (196 lb 6.9 oz)   11/29/22    195 lb   3/29/22    212 lb  12/15/21   215 lb  9/29/21   222 lb  8/24/21   225 lb    Physical Exam  Vitals reviewed.   Constitutional:       General: She is not in acute distress.     Appearance: Normal appearance. She is obese. She is not ill-appearing.   HENT:      Head: Normocephalic and atraumatic.      Right Ear: Tympanic membrane, ear canal and external ear normal.      Left Ear: Tympanic membrane, ear canal and external ear normal.      Nose: Nose normal.      Mouth/Throat:      Mouth: Mucous membranes are moist.      Pharynx: Oropharynx is clear.   Eyes:      General:  No scleral icterus.     Extraocular Movements: Extraocular movements intact.      Conjunctiva/sclera: Conjunctivae normal.      Comments: glasses   Neck:      Vascular: No carotid bruit.      Comments: Goiter more prominent on R side   Cardiovascular:      Rate and Rhythm: Normal rate and regular rhythm.      Heart sounds: Normal heart sounds. No murmur heard.  Pulmonary:      Effort: Pulmonary effort is normal.      Breath sounds: Normal breath sounds. No wheezing, rhonchi or rales.   Abdominal:      General: Bowel sounds are normal.      Tenderness: There is no abdominal tenderness. There is no guarding.   Musculoskeletal:      Right lower leg: No edema.      Left lower leg: No edema.   Lymphadenopathy:      Cervical: No cervical adenopathy.   Skin:     General: Skin is warm and dry.      Findings: Lesion present. No rash.      Comments: Scattered small round erythematous vascular lesions   Neurological:      General: No focal deficit present.      Mental Status: She is alert and oriented to person, place, and time.      Motor: Weakness present.      Coordination: Coordination normal.      Gait: Gait abnormal.      Comments: Ambulates independently w/o AD - able to move from chair onto and off of exam table w minimal difficulty   L hip weakness  Good  strength B   Psychiatric:         Mood and Affect: Mood normal.         Behavior: Behavior normal.         Thought Content: Thought content normal.      Laboratory Reviewed  Lab Results   Component Value Date    WBC 8.19 05/02/2023    HGB 10.7 (L) 05/02/2023    HCT 32.7 (L) 05/02/2023     05/02/2023    MCV 85 05/02/2023    CHOL 162 05/02/2023    TRIG 126 05/02/2023    HDL 39 (L) 05/02/2023    LDLCALC 97.8 05/02/2023    ALT 12 05/02/2023    AST 15 05/02/2023     05/02/2023    K 4.0 05/02/2023     05/02/2023    CREATININE 1.0 05/02/2023    BUN 13 05/02/2023    CO2 24 05/02/2023    TSH 0.464 08/25/2022    FREET4 0.92 12/15/2020    INR 1.0  01/03/2023    HGBA1C 5.9 (H) 05/02/2023    CRP 4.7 11/29/2022     Lab Results   Component Value Date    CALCIUM 9.9 05/02/2023    PHOS 2.8 11/29/2022    No results found for: [id number] results found for: FOLATE   Lab Results   Component Value Date    IRON 77 05/02/2023    TRANSFERRIN 215 05/02/2023    TIBC 318 05/02/2023    FESATURATED 24 05/02/2023      Lab Results   Component Value Date    EGFRNORACEVR >60 05/02/2023    ALBUMIN 4.3 05/02/2023    BNP 53 04/30/2021      Thyroid US 10/06/21 Gland appears enlarged overall R > L   Right lobe measures 5.5 cm x 2.0 cm x 3.4 cm   There is a isoechoic nodule which measures 1.4 x 1.0 x 1.2   Left lobe measures 4.8 cm x 1.6 cm x 1.4  cm   There is a spongiform nodule which measures 1.6 x 1.5 x 1.4 cm   Conclusion Multinodular goiter   Right sided nodule is isoechoic, has a low suspicion pattern.   Left sided nodule is spongiform   Overall stable findings when compared to images from January 2021   No indication for FNA at this time     Assessment:   47 y.o. female with multiple co-morbid illnesses here for continued follow up of medical problems.      The primary encounter diagnosis was Hypertension associated with diabetes. Diagnoses of Hyperthyroidism, Insomnia, unspecified type, Thyroid nodule greater than or equal to 1 cm in diameter incidentally noted on imaging study, and Drug-induced constipation were also pertinent to this visit.      Plan:   1. Hypertension associated with diabetes  Assessment & Plan:  Elevated BP's w amlodipine 10mg,labetalol 300 mg BID, spironolactone 50 mg, chlorthalidone 50 mg and clonidine 0.2 4x daily - check RFP, TFTs - councelled concerning sodium - consider add hydralazine? - see if can contact her cardiologist, Dr. Clark, for advice   Blood sugars well managed at present w Lantus 10 units QHS - denies any low blood sugars - is she possible candidate for GLP1 agonist or SGLT2 inhibitor?    Orders:  -     RENAL FUNCTION PANEL; Future;  Expected date: 07/14/2023    2. Hyperthyroidism  -     TSH; Future; Expected date: 07/14/2023  -     T4, Free; Future; Expected date: 07/14/2023  -     T3; Future; Expected date: 07/14/2023    3. Insomnia, unspecified type  Assessment & Plan:  Cont chronic insomnia depsite discontinuation of steriods - suspect combination of behavioral, anxiety, chronic pain related (what was result of prior sleep study?) encouraged to reconnect w [facility] LCSW Brittani Hyacinth      4. Thyroid nodule greater than or equal to 1 cm in diameter incidentally noted on imaging study  Overview:  Thyroid U/S 2019    Assessment & Plan:  Check TFTs - consider repeat thyroid US      5. Drug-induced constipation  Assessment & Plan:  Recommend trial miralax - encourage movement, hydration, fiber      Other orders  -     amLODIPine (NORVASC) 10 MG tablet; Take 1 tablet (10 mg total) by mouth once daily.  Dispense: 90 tablet; Refill: 3  -     polyethylene glycol (GLYCOLAX) 17 gram/dose powder; Take 17 g by mouth once daily. Can titrate up to 3x daily as needed for goal 1-2 large soft BM daily  Dispense: 510 g; Refill: 3         Health Maintenance         Date Due Completion Date    Pneumococcal Vaccines (Age 0-64) (1 - PCV) Never done ---    Foot Exam Never done ---    TETANUS VACCINE Never done ---    COVID-19 Vaccine (4 - Pfizer series) 02/02/2022 12/8/2021    Colorectal Cancer Screening 06/24/2023 6/24/2020    Mammogram 08/31/2023 8/31/2022    Eye Exam 08/22/2023 8/22/2022    Influenza Vaccine (1) 09/01/2023 ---    Hemoglobin A1c 11/02/2023 5/2/2023    Diabetes Urine Screening 05/02/2024 5/2/2023    Lipid Panel 05/02/2024 5/2/2023    Low Dose Statin 07/14/2024 7/14/2023    Cervical Cancer Screening 09/29/2026 9/29/2021          -Patient's lab results were reviewed and discussed with patient  -Treatment options and alternatives were discussed with the patient. Patient expressed understanding. Patient was given the opportunity to ask questions and  be an active participant in their medical care. Patient had no further questions or concerns at this time.   -Patient is an overall moderate to HIGH risk for health complications from their medical conditions.     Follow up: Follow up in about 3 months (around 10/14/2023) for Follow Up.    Care Plan/Goals: Reviewed No   Goals         Blood Pressure < 140/90 (pt-stated)           After visit summary printed and given to patient upon discharge.  Patient goals and care plan are included in After visit summary.    TOTAL TIME evaluating and managing this patient for this encounter was greater than 90 minutes. This time was spent personally by me on some of the following activities: review of patient's past medical history, assessing age-appropriate health maintenance needs, review of any interval history, review and interpretation of lab results, review and interpretation of imaging test results, review and interpretation of cardiology test results, reviewing consulting specialist notes, obtaining history from the patient and family, examination of the patient, medication reconciliation, managing and/or ordering prescription medications, ordering imaging tests, ordering referral to subspecialty provider(s), educating patient and answering their questions about diagnosis, treatment plan, and goals of treatment, discussing planned follow-up and final documentation of the visit. This time was exclusive of any separately billable procedures for this patient and exclusive of time spent treating any other patients.

## 2023-07-14 NOTE — ASSESSMENT & PLAN NOTE
Elevated BP's w amlodipine 10mg,labetalol 300 mg BID, spironolactone 50 mg, chlorthalidone 50 mg and clonidine 0.2 4x daily - check RFP, TFTs - councelled concerning sodium - consider add hydralazine? - see if can contact her cardiologist, Dr. Clark, for advice   Blood sugars well managed at present w Lantus 10 units QHS - denies any low blood sugars - is she possible candidate for GLP1 agonist or SGLT2 inhibitor?

## 2023-07-14 NOTE — ASSESSMENT & PLAN NOTE
Cont chronic insomnia depsite discontinuation of steriods - suspect combination of behavioral, anxiety, chronic pain related (what was result of prior sleep study?) encouraged to reconnect w [facility] WANW Brittani Claros

## 2023-07-14 NOTE — PATIENT INSTRUCTIONS
Continue efforts to limit salt less than 2 gram daily    Recommend reconnect w [facility] LCSW Brittani Claros    Will call next week regarding lab results and see if we can reach Dr. Clark for recommendations regarding persistently elevated blood pressure

## 2023-07-18 LAB
ALBUMIN SERPL-MCNC: 4.3 G/DL (ref 3.5–5)
BUN SERPL-MCNC: 12 MG/DL (ref 5–25)
CALCIUM SERPL-MCNC: 9.5 MG/DL (ref 8.8–10.6)
CHLORIDE SERPL-SCNC: 107 MMOL/L (ref 100–109)
CO2 SERPL-SCNC: 26 MMOL/L (ref 22–33)
CREAT SERPL-MCNC: 0.79 MG/DL (ref 0.57–1.25)
EST. GFR  (NO RACE VARIABLE): 92
GLUCOSE SERPL-MCNC: 116 MG/DL (ref 70–100)
PHOSPHATE SERPL-MCNC: 3.6 MG/DL (ref 2.3–4.7)
POTASSIUM SERPL-SCNC: 4.2 MMOL/L (ref 3.5–5.1)
SODIUM SERPL-SCNC: 141 MMOL/L (ref 136–145)
T3 SERPL-MCNC: 99 NG/DL (ref 71–180)
T4 FREE SERPL-MCNC: 1 NG/DL (ref 0.7–1.48)
TSH SERPL DL<=0.005 MIU/L-ACNC: 0.39 UIU/ML (ref 0.35–4.94)

## 2023-07-19 ENCOUNTER — TELEPHONE (OUTPATIENT)
Dept: PRIMARY CARE CLINIC | Facility: CLINIC | Age: 48
End: 2023-07-19
Payer: MEDICARE

## 2023-07-19 NOTE — TELEPHONE ENCOUNTER
----- Message from Alissa Bautista MD sent at 7/19/2023 11:23 AM CDT -----  Pt has [facility] - if message below not viewed please call w information below:   Apologies for not getting back w you earlier  Your labwork all looks pretty good.  Have you been in touch with Dr. Clark regarding elevated blood pressures?    Please message or call with any questions or concerns!  Thank you!  Alissa Bautista MD, MPH  [facility]

## 2023-07-19 NOTE — TELEPHONE ENCOUNTER
----- Message from Alissa Bautista MD sent at 7/19/2023  1:01 PM CDT -----  Regarding: 3 mos f/u  Since Ms. aDniel's EAWV is with Diane in August can you please make her Oct 3 mos f/u appt w me? (Unless she has an issue w that!)  Thank you

## 2023-07-19 NOTE — TELEPHONE ENCOUNTER
I'll have to reach out to Reny Ding because of her age, the slot will have to be modified to change providers.

## 2023-07-19 NOTE — PROGRESS NOTES
Pt has [facility] - if message below not viewed please call w information below:   Apologies for not getting back w you earlier  Your labwork all looks pretty good.  Have you been in touch with Dr. Clark regarding elevated blood pressures?    Please message or call with any questions or concerns!  Thank you!  Alissa Bautista MD, MPH  [facility]

## 2023-07-19 NOTE — TELEPHONE ENCOUNTER
Pt states that she missed appt with Dr. Clark and she thought we were going to contact Dr. Hernandez pt I will call office and see if I can get her a sooner appt and will call her back. Pt states that her BP is still running high.

## 2023-07-26 ENCOUNTER — TELEPHONE (OUTPATIENT)
Dept: PRIMARY CARE CLINIC | Facility: CLINIC | Age: 48
End: 2023-07-26
Payer: MEDICARE

## 2023-07-26 NOTE — TELEPHONE ENCOUNTER
----- Message from Alissa Bautista MD sent at 7/19/2023 11:23 AM CDT -----  Pt has [facility] - if message below not viewed please call w information below:   Apologies for not getting back w you earlier  Your labwork all looks pretty good.  Have you been in touch with Dr. Clark regarding elevated blood pressures?    Please message or call with any questions or concerns!  Thank you!  Alissa Bautista MD, MPH  [facility]

## 2023-07-26 NOTE — TELEPHONE ENCOUNTER
Called pt to verify that she had signed up for digital medicine and received her machine. Pt states that she received machine and has been taking BP 3x a day. Told pt I could not see any digital readings. Called Digital Medicine and they verified that pt was not fully enrolled. Instructions given to pt to call [phone] to complete enrollment. Pt verbalized understanding.

## 2023-07-27 ENCOUNTER — TELEPHONE (OUTPATIENT)
Dept: PRIMARY CARE CLINIC | Facility: CLINIC | Age: 48
End: 2023-07-27
Payer: MEDICARE

## 2023-08-02 DIAGNOSIS — I10 ESSENTIAL HYPERTENSION: ICD-10-CM

## 2023-08-02 RX ORDER — CLONIDINE HYDROCHLORIDE 0.1 MG/1
0.1 TABLET ORAL 4 TIMES DAILY PRN
Qty: 90 TABLET | Refills: 5 | Status: SHIPPED | OUTPATIENT
Start: 2023-08-02 | End: 2023-12-12

## 2023-08-02 RX ORDER — LABETALOL 300 MG/1
300 TABLET, FILM COATED ORAL 2 TIMES DAILY
Qty: 60 TABLET | Refills: 11 | Status: SHIPPED | OUTPATIENT
Start: 2023-08-02 | End: 2024-08-01

## 2023-08-04 ENCOUNTER — OFFICE VISIT (OUTPATIENT)
Dept: HEMATOLOGY/ONCOLOGY | Facility: CLINIC | Age: 48
End: 2023-08-04
Payer: MEDICARE

## 2023-08-04 DIAGNOSIS — D50.8 IRON DEFICIENCY ANEMIA SECONDARY TO INADEQUATE DIETARY IRON INTAKE: Primary | ICD-10-CM

## 2023-08-04 DIAGNOSIS — D47.2 MGUS (MONOCLONAL GAMMOPATHY OF UNKNOWN SIGNIFICANCE): ICD-10-CM

## 2023-08-04 PROCEDURE — 99213 OFFICE O/P EST LOW 20 MIN: CPT | Mod: HCNC,95,, | Performed by: INTERNAL MEDICINE

## 2023-08-04 PROCEDURE — 3061F PR NEG MICROALBUMINURIA RESULT DOCUMENTED/REVIEW: ICD-10-PCS | Mod: HCNC,CPTII,95, | Performed by: INTERNAL MEDICINE

## 2023-08-04 PROCEDURE — 3044F HG A1C LEVEL LT 7.0%: CPT | Mod: HCNC,CPTII,95, | Performed by: INTERNAL MEDICINE

## 2023-08-04 PROCEDURE — 3044F PR MOST RECENT HEMOGLOBIN A1C LEVEL <7.0%: ICD-10-PCS | Mod: HCNC,CPTII,95, | Performed by: INTERNAL MEDICINE

## 2023-08-04 PROCEDURE — 99213 PR OFFICE/OUTPT VISIT, EST, LEVL III, 20-29 MIN: ICD-10-PCS | Mod: HCNC,95,, | Performed by: INTERNAL MEDICINE

## 2023-08-04 PROCEDURE — 3066F NEPHROPATHY DOC TX: CPT | Mod: HCNC,CPTII,95, | Performed by: INTERNAL MEDICINE

## 2023-08-04 PROCEDURE — 3066F PR DOCUMENTATION OF TREATMENT FOR NEPHROPATHY: ICD-10-PCS | Mod: HCNC,CPTII,95, | Performed by: INTERNAL MEDICINE

## 2023-08-04 PROCEDURE — 3061F NEG MICROALBUMINURIA REV: CPT | Mod: HCNC,CPTII,95, | Performed by: INTERNAL MEDICINE

## 2023-08-04 NOTE — PROGRESS NOTES
The patient location is:  Home  The chief complaint leading to consultation is:  Follow-up anemia and MGUS    Visit type: audiovisual    Face to Face time with patient:  5 minutes  30 minutes of total time spent on the encounter, which includes face to face time and non-face to face time preparing to see the patient (eg, review of tests), Obtaining and/or reviewing separately obtained history, Documenting clinical information in the electronic or other health record, Independently interpreting results (not separately reported) and communicating results to the patient/family/caregiver, or Care coordination (not separately reported).         Each patient to whom he or she provides medical services by telemedicine is:  (1) informed of the relationship between the physician and patient and the respective role of any other health care provider with respect to management of the patient; and (2) notified that he or she may decline to receive medical services by telemedicine and may withdraw from such care at any time.    Notes:     Subjective:      DATE OF VISIT: 8/4/23    ?  Patient ID:?Marilou Daniel is a 47 y.o. female.?? MR#: [id number]   PRIMARY ONCOLOGIST: Dr. Fields      ? Primary Care Providers:  Alissa Bautista MD, MD (General)     CHIEF COMPLAINT: ?anemia, history of MCTD and SLE, IgA lambda MGUS  ?   HPI    She follows up in virtual visit after repeat labs in surveillance.  Due to reaction to IV iron she has continued on oral iron daily managing with anti constipation agents.      She notes blood pressure poorly controlled recently.  No recent flare of lupus.        Review of Systems    ?   A comprehensive 14-point review of systems was reviewed with patient and was negative other than as specified above.   ?   PAST MEDICAL HISTORY:   Past Medical History:   Diagnosis Date    Anemia     Arthritis     Connective tissue disease 1999    COVID-19 in immunocompromised patient 02/05/2021    Diabetes  mellitus     Drug induced insomnia 12/15/2020    R/T chronic steroid use for SLE.    Gastroesophageal reflux disease 03/23/2020    Hypertension     Lupus 1999    Situational anxiety 08/24/2021    Thyroid nodule greater than or equal to 1 cm in diameter incidentally noted on imaging study 11/19/2020    Vasculitis     ?     PAST SURGICAL HISTORY:   Past Surgical History:   Procedure Laterality Date    ABLATION      COLONOSCOPY N/A 6/24/2020    Procedure: COLONOSCOPY;  Surgeon: Nevin Penny MD;  Location: [facility];  Service: Endoscopy;  Laterality: N/A;    ESOPHAGOGASTRODUODENOSCOPY N/A 6/24/2020    Procedure: ESOPHAGOGASTRODUODENOSCOPY (EGD);  Surgeon: Nevin Penny MD;  Location: [facility];  Service: Endoscopy;  Laterality: N/A;    RIGHT HEART CATHETERIZATION      TUBAL LIGATION      WRIST SURGERY Bilateral       ?   ALLERGIES:   Allergies as of 08/04/2023 - Reviewed 07/14/2023   Allergen Reaction Noted    Azathioprine Nausea And Vomiting 03/16/2021    Olmesartan Shortness Of Breath 03/11/2021    Feraheme [ferumoxytol] Itching 01/11/2023    Oxycodone Itching 03/13/2019      ?   MEDICATIONS:?   No outpatient medications have been marked as taking for the 8/4/23 encounter (Appointment) with Maritza Fields MD.      ?   SOCIAL HISTORY:?   Social History     Tobacco Use    Smoking status: Never    Smokeless tobacco: Never   Substance Use Topics    Alcohol use: Never      ?      ?   FAMILY HISTORY:   family history includes Asthma in an other family member; Autism in an other family member; Breast cancer (age of onset: 58) in an other family member; Breast cancer (age of onset: 63) in an other family member; Cancer in her father and another family member; Liver cancer (age of onset: 60) in an other family member; Lung cancer (age of onset: 59) in her mother; Obesity in an other family member.   ?        Objective:      Physical Exam      ? Limited due to virtual visit  There were no vitals filed for this  visit.     ?   ECOG:?0  General appearance: Generally well appearing, in no acute distress.   Head, eyes, ears, nose, and throat: moist mucous membranes.   Respiratory:  Normal work of breathing  Neurologic: Alert and oriented.   Psychiatric:  Normal mood and affect.    ?   Laboratory:    Lab Results   Component Value Date    WBC 8.19 05/02/2023    RBC 3.86 (L) 05/02/2023    HGB 10.7 (L) 05/02/2023    HCT 32.7 (L) 05/02/2023    MCV 85 05/02/2023    MCH 27.7 05/02/2023    MCHC 32.7 05/02/2023    RDW 12.4 05/02/2023     05/02/2023    MPV 10.5 05/02/2023    GRAN 5.3 05/02/2023    GRAN 64.5 05/02/2023    LYMPH 2.2 05/02/2023    LYMPH 27.0 05/02/2023    MONO 0.5 05/02/2023    MONO 5.7 05/02/2023    EOS 0.2 05/02/2023    BASO 0.03 05/02/2023    EOSINOPHIL 2.2 05/02/2023    BASOPHIL 0.4 05/02/2023       Sodium   Date Value Ref Range Status   07/14/2023 141 136 - 145 mmol/L Final     Potassium   Date Value Ref Range Status   07/14/2023 4.2 3.5 - 5.1 mmol/L Final     Chloride   Date Value Ref Range Status   07/14/2023 107 100 - 109 mmol/L Final     CO2   Date Value Ref Range Status   07/14/2023 26 22 - 33 mmol/L Final     Comment:     ANION GAP (ANIONGAP)         8                mmol/L     8-16       N     Glucose   Date Value Ref Range Status   07/14/2023 116 (H) 70 - 100 mg/dL Final     BUN   Date Value Ref Range Status   07/14/2023 12 5 - 25 mg/dL Final     Creatinine   Date Value Ref Range Status   07/14/2023 0.79 0.57 - 1.25 mg/dL Final     Calcium   Date Value Ref Range Status   07/14/2023 9.5 8.8 - 10.6 mg/dL Final     Total Protein   Date Value Ref Range Status   05/02/2023 7.7 6.0 - 8.4 g/dL Final     Albumin   Date Value Ref Range Status   07/14/2023 4.3 3.5 - 5.0 g/dl Final   05/02/2023 4.3 3.5 - 5.2 g/dL Final     Total Bilirubin   Date Value Ref Range Status   05/02/2023 0.4 0.1 - 1.0 mg/dL Final     Comment:     For infants and newborns, interpretation of results should be based  on gestational age,  weight and in agreement with clinical  observations.    Premature Infant recommended reference ranges:  Up to 24 hours.............<8.0 mg/dL  Up to 48 hours............<12.0 mg/dL  3-5 days..................<15.0 mg/dL  6-29 days.................<15.0 mg/dL       Alkaline Phosphatase   Date Value Ref Range Status   05/02/2023 95 55 - 135 U/L Final     AST   Date Value Ref Range Status   05/02/2023 15 10 - 40 U/L Final     ALT   Date Value Ref Range Status   05/02/2023 12 10 - 44 U/L Final     Anion Gap   Date Value Ref Range Status   05/02/2023 13 8 - 16 mmol/L Final     eGFR if    Date Value Ref Range Status   03/29/2022 >60.0 >60 mL/min/1.73 m^2 Final     eGFR if non    Date Value Ref Range Status   03/29/2022 >60.0 >60 mL/min/1.73 m^2 Final     Comment:     Calculation used to obtain the estimated glomerular filtration  rate (eGFR) is the CKD-EPI equation.          Iron   Date Value Ref Range Status   05/02/2023 77 30 - 160 ug/dL Final     TIBC   Date Value Ref Range Status   05/02/2023 318 250 - 450 ug/dL Final     Saturated Iron   Date Value Ref Range Status   05/02/2023 24 20 - 50 % Final     Ferritin   Date Value Ref Range Status   05/02/2023 262 20.0 - 300.0 ng/mL Final     TSH   Date Value Ref Range Status   07/14/2023 0.390 0.350 - 4.940 uIU/ml Final       No results found for this or any previous visit (from the past 2160 hour(s)).          ?   Assessment/Plan:   There are no diagnoses linked to this encounter.     No diagnosis found.          Plan:     Problem List Items Addressed This Visit    None      Anemia: stable moderate anemia, normocytic hgb 9-10, improved after IV iron January 2023.  We discussed possible multifactorial nature of her anemia with history of mixed connective tissue disease and SLE.  Workup also revealed IgA lambda MGUS 0.3 grams/deciliter.  PET scan without hypermetabolic activity or concern for metastatic disease.  Bone marrow biopsy January 2023  "with slight lambda plasma cell possible neoplasm 5-10%. Noted limited study "suboptimal kappa and lambda ADRIAEN staining on   the decalcified core biopsy (3A). Controls are adequate.    stains increased numbers of scattered and perivascular and rare   non-perivascular small clusters of plasma cells (5-10% of total cellularity)   with lambda light chain predominance / skew by kappa and lambda ADRIANE."   We discussed potential implications of possible plasma cell neoplasm.   Recommend close monitoring continued oral iron supplement due to allergy to IV iron declining further IV formulation continue on 1 tablet daily oral iron tolerating with some constipation using stool softeners as needed.  Close monitoring of plasma cell with peripheral blood and repeat bone marrow biopsy if worsening anemia in absence of iron deficiency.  If recurrent notable iron deficiency would perform repeat EGD and colonoscopy last 2020.    Labs prior to today's appointment only with CMP no renal dysfunction or hypercalcemia but will need to get complete blood work including evaluation of paraprotein iron and overall blood count.       Follow-Up:       Route Chart for Scheduling    Med Onc Chart Routing      Follow up with physician    Follow up with BECKIE 3 months.   Infusion scheduling note    Injection scheduling note    Labs CBC, CMP, free light chains, SPEP, iron and TIBC and ferritin   Scheduling:  Preferred lab:  Lab interval:  monday 7/7/23 at [place]; and in 3 mo 1 wk prior to visit   Imaging    Pharmacy appointment    Other referrals          Therapy Plan Information  Medications  ferric carboxymaltose (INJECTAFER) 750 mg in sodium chloride 0.9% 265 mL infusion  750 mg, Intravenous, 1 time a week  IV Fluids  0.9%  NaCl infusion  Intravenous, 1 time a week  Flushes  sodium chloride 0.9% flush 10 mL  10 mL, Intravenous, 1 time a week  heparin, porcine (PF) 100 unit/mL injection flush 500 Units  500 Units, Intravenous, 1 time a week  sodium " chloride 0.9% 100 mL flush bag  Intravenous, 1 time a week  PRN Medications  EPINEPHrine (EPIPEN) 0.3 mg/0.3 mL pen injection 0.3 mg  0.3 mg, Intramuscular, PRN  diphenhydrAMINE injection 50 mg  50 mg, Intravenous, PRN  methylPREDNISolone sodium succinate injection 125 mg  125 mg, Intravenous, PRN  sodium chloride 0.9% bolus 1,000 mL 1,000 mL  1,000 mL, Intravenous, PRN

## 2023-08-07 ENCOUNTER — LAB VISIT (OUTPATIENT)
Dept: LAB | Facility: HOSPITAL | Age: 48
End: 2023-08-07
Attending: INTERNAL MEDICINE
Payer: MEDICARE

## 2023-08-07 DIAGNOSIS — D50.8 IRON DEFICIENCY ANEMIA SECONDARY TO INADEQUATE DIETARY IRON INTAKE: ICD-10-CM

## 2023-08-07 PROCEDURE — 36415 COLL VENOUS BLD VENIPUNCTURE: CPT | Mod: HCNC | Performed by: INTERNAL MEDICINE

## 2023-08-07 PROCEDURE — 85025 COMPLETE CBC W/AUTO DIFF WBC: CPT | Mod: HCNC | Performed by: INTERNAL MEDICINE

## 2023-08-07 PROCEDURE — 83540 ASSAY OF IRON: CPT | Mod: HCNC | Performed by: INTERNAL MEDICINE

## 2023-08-07 PROCEDURE — 82728 ASSAY OF FERRITIN: CPT | Mod: HCNC | Performed by: INTERNAL MEDICINE

## 2023-08-07 PROCEDURE — 84466 ASSAY OF TRANSFERRIN: CPT | Mod: HCNC | Performed by: INTERNAL MEDICINE

## 2023-08-08 LAB
BASOPHILS # BLD AUTO: 0.04 K/UL (ref 0–0.2)
BASOPHILS NFR BLD: 0.5 % (ref 0–1.9)
DIFFERENTIAL METHOD: ABNORMAL
EOSINOPHIL # BLD AUTO: 0.3 K/UL (ref 0–0.5)
EOSINOPHIL NFR BLD: 3.8 % (ref 0–8)
ERYTHROCYTE [DISTWIDTH] IN BLOOD BY AUTOMATED COUNT: 12.9 % (ref 11.5–14.5)
FERRITIN SERPL-MCNC: 355 NG/ML (ref 20–300)
HCT VFR BLD AUTO: 32.4 % (ref 37–48.5)
HGB BLD-MCNC: 10.4 G/DL (ref 12–16)
IMM GRANULOCYTES # BLD AUTO: 0.02 K/UL (ref 0–0.04)
IMM GRANULOCYTES NFR BLD AUTO: 0.2 % (ref 0–0.5)
IRON SERPL-MCNC: 74 UG/DL (ref 30–160)
LYMPHOCYTES # BLD AUTO: 2.4 K/UL (ref 1–4.8)
LYMPHOCYTES NFR BLD: 28.2 % (ref 18–48)
MCH RBC QN AUTO: 27.5 PG (ref 27–31)
MCHC RBC AUTO-ENTMCNC: 32.1 G/DL (ref 32–36)
MCV RBC AUTO: 86 FL (ref 82–98)
MONOCYTES # BLD AUTO: 0.5 K/UL (ref 0.3–1)
MONOCYTES NFR BLD: 5.4 % (ref 4–15)
NEUTROPHILS # BLD AUTO: 5.3 K/UL (ref 1.8–7.7)
NEUTROPHILS NFR BLD: 61.9 % (ref 38–73)
NRBC BLD-RTO: 0 /100 WBC
PLATELET # BLD AUTO: 288 K/UL (ref 150–450)
PMV BLD AUTO: 11.9 FL (ref 9.2–12.9)
RBC # BLD AUTO: 3.78 M/UL (ref 4–5.4)
SATURATED IRON: 21 % (ref 20–50)
TOTAL IRON BINDING CAPACITY: 345 UG/DL (ref 250–450)
TRANSFERRIN SERPL-MCNC: 233 MG/DL (ref 200–375)
WBC # BLD AUTO: 8.59 K/UL (ref 3.9–12.7)

## 2023-08-09 DIAGNOSIS — D53.9 NUTRITIONAL ANEMIA, UNSPECIFIED: ICD-10-CM

## 2023-08-09 DIAGNOSIS — D47.2 MGUS (MONOCLONAL GAMMOPATHY OF UNKNOWN SIGNIFICANCE): Primary | ICD-10-CM

## 2023-08-14 DIAGNOSIS — F19.982 DRUG-INDUCED INSOMNIA: ICD-10-CM

## 2023-08-14 RX ORDER — ZOLPIDEM TARTRATE 10 MG/1
TABLET ORAL
Qty: 30 TABLET | Refills: 0 | Status: SHIPPED | OUTPATIENT
Start: 2023-08-14 | End: 2023-09-13 | Stop reason: SDUPTHER

## 2023-09-07 ENCOUNTER — TELEPHONE (OUTPATIENT)
Dept: PRIMARY CARE CLINIC | Facility: CLINIC | Age: 48
End: 2023-09-07
Payer: MEDICARE

## 2023-09-07 DIAGNOSIS — E11.9 TYPE 2 DIABETES MELLITUS WITHOUT COMPLICATION, WITH LONG-TERM CURRENT USE OF INSULIN: ICD-10-CM

## 2023-09-07 DIAGNOSIS — Z79.4 TYPE 2 DIABETES MELLITUS WITHOUT COMPLICATION, WITH LONG-TERM CURRENT USE OF INSULIN: ICD-10-CM

## 2023-09-08 RX ORDER — BLOOD-GLUCOSE SENSOR
1 EACH MISCELLANEOUS
Qty: 3 EACH | Refills: 11 | Status: SHIPPED | OUTPATIENT
Start: 2023-09-08 | End: 2023-09-11

## 2023-09-08 RX ORDER — BLOOD-GLUCOSE TRANSMITTER
1 EACH MISCELLANEOUS
Qty: 1 EACH | Refills: 3 | Status: SHIPPED | OUTPATIENT
Start: 2023-09-08 | End: 2023-09-11

## 2023-09-08 RX ORDER — BLOOD-GLUCOSE,RECEIVER,CONT
1 EACH MISCELLANEOUS CONTINUOUS
Qty: 1 EACH | Refills: 0 | Status: SHIPPED | OUTPATIENT
Start: 2023-09-08 | End: 2023-09-11

## 2023-09-08 NOTE — TELEPHONE ENCOUNTER
----- Message from Lizeth Restrepo RN sent at 9/8/2023 10:33 AM CDT -----  Contact: [phone]    ----- Message -----  From: Iván Burleson  Sent: 9/8/2023   8:38 AM CDT  To: Michele [facility] Staff    Pt requesting new prescription for new dexcom because G6 is expiring.

## 2023-09-11 ENCOUNTER — OFFICE VISIT (OUTPATIENT)
Dept: PRIMARY CARE CLINIC | Facility: CLINIC | Age: 48
End: 2023-09-11
Payer: MEDICARE

## 2023-09-11 ENCOUNTER — HOSPITAL ENCOUNTER (OUTPATIENT)
Dept: RADIOLOGY | Facility: HOSPITAL | Age: 48
Discharge: HOME | End: 2023-09-11
Attending: NURSE PRACTITIONER
Payer: MEDICARE

## 2023-09-11 VITALS
DIASTOLIC BLOOD PRESSURE: 60 MMHG | BODY MASS INDEX: 31.65 KG/M2 | OXYGEN SATURATION: 96 % | SYSTOLIC BLOOD PRESSURE: 116 MMHG | WEIGHT: 202.13 LBS | HEART RATE: 86 BPM

## 2023-09-11 DIAGNOSIS — Z00.00 ENCOUNTER FOR PREVENTIVE HEALTH EXAMINATION: ICD-10-CM

## 2023-09-11 DIAGNOSIS — Z79.899 IMMUNOCOMPROMISED STATE DUE TO DRUG THERAPY: ICD-10-CM

## 2023-09-11 DIAGNOSIS — K59.03 DRUG-INDUCED CONSTIPATION: Chronic | ICD-10-CM

## 2023-09-11 DIAGNOSIS — Z79.4 TYPE 2 DIABETES MELLITUS WITHOUT COMPLICATION, WITH LONG-TERM CURRENT USE OF INSULIN: ICD-10-CM

## 2023-09-11 DIAGNOSIS — E04.1 THYROID NODULE: ICD-10-CM

## 2023-09-11 DIAGNOSIS — E11.59 HYPERTENSION ASSOCIATED WITH DIABETES: Primary | Chronic | ICD-10-CM

## 2023-09-11 DIAGNOSIS — K21.9 GASTROESOPHAGEAL REFLUX DISEASE WITHOUT ESOPHAGITIS: ICD-10-CM

## 2023-09-11 DIAGNOSIS — I15.2 HYPERTENSION ASSOCIATED WITH DIABETES: Primary | Chronic | ICD-10-CM

## 2023-09-11 DIAGNOSIS — B99.9 ANEMIA OF INFECTION AND CHRONIC DISEASE: ICD-10-CM

## 2023-09-11 DIAGNOSIS — D50.9 IRON DEFICIENCY ANEMIA, UNSPECIFIED IRON DEFICIENCY ANEMIA TYPE: ICD-10-CM

## 2023-09-11 DIAGNOSIS — D63.8 ANEMIA OF INFECTION AND CHRONIC DISEASE: ICD-10-CM

## 2023-09-11 DIAGNOSIS — E04.1 THYROID NODULE GREATER THAN OR EQUAL TO 1 CM IN DIAMETER INCIDENTALLY NOTED ON IMAGING STUDY: Chronic | ICD-10-CM

## 2023-09-11 DIAGNOSIS — M32.8 OTHER FORMS OF SYSTEMIC LUPUS ERYTHEMATOSUS, UNSPECIFIED ORGAN INVOLVEMENT STATUS: ICD-10-CM

## 2023-09-11 DIAGNOSIS — R07.9 CHEST PAIN, UNSPECIFIED TYPE: ICD-10-CM

## 2023-09-11 DIAGNOSIS — E11.9 TYPE 2 DIABETES MELLITUS WITHOUT COMPLICATION, WITH LONG-TERM CURRENT USE OF INSULIN: ICD-10-CM

## 2023-09-11 DIAGNOSIS — R51.9 NONINTRACTABLE HEADACHE, UNSPECIFIED CHRONICITY PATTERN, UNSPECIFIED HEADACHE TYPE: ICD-10-CM

## 2023-09-11 DIAGNOSIS — F41.8 SITUATIONAL ANXIETY: ICD-10-CM

## 2023-09-11 DIAGNOSIS — D84.821 IMMUNOCOMPROMISED STATE DUE TO DRUG THERAPY: ICD-10-CM

## 2023-09-11 PROCEDURE — 99999 PR PBB SHADOW E&M-EST. PATIENT-LVL IV: CPT | Mod: PBBFAC,HCNC,, | Performed by: NURSE PRACTITIONER

## 2023-09-11 PROCEDURE — 3044F PR MOST RECENT HEMOGLOBIN A1C LEVEL <7.0%: ICD-10-PCS | Mod: HCNC,CPTII,S$GLB, | Performed by: NURSE PRACTITIONER

## 2023-09-11 PROCEDURE — 99999 PR PBB SHADOW E&M-EST. PATIENT-LVL IV: ICD-10-PCS | Mod: PBBFAC,HCNC,, | Performed by: NURSE PRACTITIONER

## 2023-09-11 PROCEDURE — 71046 XR CHEST PA AND LATERAL: ICD-10-PCS | Mod: 26,HCNC,, | Performed by: RADIOLOGY

## 2023-09-11 PROCEDURE — 1159F PR MEDICATION LIST DOCUMENTED IN MEDICAL RECORD: ICD-10-PCS | Mod: HCNC,CPTII,S$GLB, | Performed by: NURSE PRACTITIONER

## 2023-09-11 PROCEDURE — 3066F PR DOCUMENTATION OF TREATMENT FOR NEPHROPATHY: ICD-10-PCS | Mod: HCNC,CPTII,S$GLB, | Performed by: NURSE PRACTITIONER

## 2023-09-11 PROCEDURE — 3074F SYST BP LT 130 MM HG: CPT | Mod: HCNC,CPTII,S$GLB, | Performed by: NURSE PRACTITIONER

## 2023-09-11 PROCEDURE — 3066F NEPHROPATHY DOC TX: CPT | Mod: HCNC,CPTII,S$GLB, | Performed by: NURSE PRACTITIONER

## 2023-09-11 PROCEDURE — 3061F PR NEG MICROALBUMINURIA RESULT DOCUMENTED/REVIEW: ICD-10-PCS | Mod: HCNC,CPTII,S$GLB, | Performed by: NURSE PRACTITIONER

## 2023-09-11 PROCEDURE — 3008F PR BODY MASS INDEX (BMI) DOCUMENTED: ICD-10-PCS | Mod: HCNC,CPTII,S$GLB, | Performed by: NURSE PRACTITIONER

## 2023-09-11 PROCEDURE — G9920 SCRNING PERF AND NEGATIVE: HCPCS | Mod: HCNC,CPTII,S$GLB, | Performed by: NURSE PRACTITIONER

## 2023-09-11 PROCEDURE — G9920 PR SCREENING AND NEGATIVE: ICD-10-PCS | Mod: HCNC,CPTII,S$GLB, | Performed by: NURSE PRACTITIONER

## 2023-09-11 PROCEDURE — G0439 PR MEDICARE ANNUAL WELLNESS SUBSEQUENT VISIT: ICD-10-PCS | Mod: HCNC,S$GLB,, | Performed by: NURSE PRACTITIONER

## 2023-09-11 PROCEDURE — 3074F PR MOST RECENT SYSTOLIC BLOOD PRESSURE < 130 MM HG: ICD-10-PCS | Mod: HCNC,CPTII,S$GLB, | Performed by: NURSE PRACTITIONER

## 2023-09-11 PROCEDURE — 93010 EKG 12-LEAD: ICD-10-PCS | Mod: HCNC,S$GLB,, | Performed by: INTERNAL MEDICINE

## 2023-09-11 PROCEDURE — 71046 X-RAY EXAM CHEST 2 VIEWS: CPT | Mod: 26,HCNC,, | Performed by: RADIOLOGY

## 2023-09-11 PROCEDURE — 93005 ELECTROCARDIOGRAM TRACING: CPT | Mod: HCNC,S$GLB,, | Performed by: NURSE PRACTITIONER

## 2023-09-11 PROCEDURE — G0439 PPPS, SUBSEQ VISIT: HCPCS | Mod: HCNC,S$GLB,, | Performed by: NURSE PRACTITIONER

## 2023-09-11 PROCEDURE — 3078F DIAST BP <80 MM HG: CPT | Mod: HCNC,CPTII,S$GLB, | Performed by: NURSE PRACTITIONER

## 2023-09-11 PROCEDURE — 3078F PR MOST RECENT DIASTOLIC BLOOD PRESSURE < 80 MM HG: ICD-10-PCS | Mod: HCNC,CPTII,S$GLB, | Performed by: NURSE PRACTITIONER

## 2023-09-11 PROCEDURE — 93010 ELECTROCARDIOGRAM REPORT: CPT | Mod: HCNC,S$GLB,, | Performed by: INTERNAL MEDICINE

## 2023-09-11 PROCEDURE — 3044F HG A1C LEVEL LT 7.0%: CPT | Mod: HCNC,CPTII,S$GLB, | Performed by: NURSE PRACTITIONER

## 2023-09-11 PROCEDURE — 93005 EKG 12-LEAD: ICD-10-PCS | Mod: HCNC,S$GLB,, | Performed by: NURSE PRACTITIONER

## 2023-09-11 PROCEDURE — 1159F MED LIST DOCD IN RCRD: CPT | Mod: HCNC,CPTII,S$GLB, | Performed by: NURSE PRACTITIONER

## 2023-09-11 PROCEDURE — 3061F NEG MICROALBUMINURIA REV: CPT | Mod: HCNC,CPTII,S$GLB, | Performed by: NURSE PRACTITIONER

## 2023-09-11 PROCEDURE — 71046 X-RAY EXAM CHEST 2 VIEWS: CPT | Mod: TC,HCNC,FY,PO

## 2023-09-11 PROCEDURE — 3008F BODY MASS INDEX DOCD: CPT | Mod: HCNC,CPTII,S$GLB, | Performed by: NURSE PRACTITIONER

## 2023-09-11 RX ORDER — BLOOD-GLUCOSE SENSOR
1 EACH MISCELLANEOUS
Qty: 3 EACH | Refills: 11 | Status: SHIPPED | OUTPATIENT
Start: 2023-09-11 | End: 2024-09-10

## 2023-09-11 RX ORDER — PANTOPRAZOLE SODIUM 40 MG/1
TABLET, DELAYED RELEASE ORAL
Qty: 360 TABLET | Refills: 0 | Status: SHIPPED | OUTPATIENT
Start: 2023-09-11 | End: 2023-09-11 | Stop reason: SDUPTHER

## 2023-09-11 RX ORDER — PANTOPRAZOLE SODIUM 40 MG/1
TABLET, DELAYED RELEASE ORAL
Qty: 360 TABLET | Refills: 0 | Status: SHIPPED | OUTPATIENT
Start: 2023-09-11 | End: 2024-06-06

## 2023-09-11 RX ORDER — FAMOTIDINE 40 MG/1
40 TABLET, FILM COATED ORAL DAILY
Qty: 30 TABLET | Refills: 2 | Status: SHIPPED | OUTPATIENT
Start: 2023-09-11 | End: 2023-11-30

## 2023-09-11 RX ORDER — FAMOTIDINE 40 MG/1
40 TABLET, FILM COATED ORAL DAILY
Qty: 30 TABLET | Refills: 2 | Status: SHIPPED | OUTPATIENT
Start: 2023-09-11 | End: 2023-09-11 | Stop reason: SDUPTHER

## 2023-09-11 RX ORDER — BLOOD-GLUCOSE,RECEIVER,CONT
1 EACH MISCELLANEOUS ONCE
Qty: 1 EACH | Refills: 3 | Status: SHIPPED | OUTPATIENT
Start: 2023-09-11 | End: 2023-09-11

## 2023-09-11 NOTE — ASSESSMENT & PLAN NOTE
Epigastric pain today.   Stop Nexium. Start pantoprazole 40 mg BID plus Pepcid 40 mg daily. If no relief in 2 weeks then consider abd U/S, HFP, lipase. Consider EGD.

## 2023-09-11 NOTE — ASSESSMENT & PLAN NOTE
Lab Results   Component Value Date    HGB 10.4 (L) 08/07/2023   R/T SLE and with MGUS. Followed by hematology. Selene MCKEON iron.

## 2023-09-11 NOTE — ASSESSMENT & PLAN NOTE
Lab Results   Component Value Date    HGBA1C 5.9 (H) 05/02/2023     Order Dexcom G7 and supplies from [facility].   Continues Lantus.

## 2023-09-11 NOTE — PROGRESS NOTES
Marilou Daniel presented for a follow-up Medicare AWV today. The following components were reviewed and updated:    Medical history  Family History  Social history  Allergies and Current Medications  Health Risk Assessment  Health Maintenance  Care Team    **See Completed Assessments for Annual Wellness visit with in the encounter summary    The following assessments were completed:  Depression Screening  Cognitive function Screening  Timed Get Up Test  Whisper Test  Nutrition Screening    Has appt to see Dr Schmidt October 24, 2023  Takes Norco PRN chronic pain r/t SLE.     Here for AWV.     Diabetes - has CGM. Changing to Dexcom G7.  Never less than 70. Notifies her if <89.   Forgets to eat since off of steroids.   Not taking sertraline routinely. Prescribed nortriptyline but has not started yet.     Lab Results   Component Value Date    HGBA1C 5.9 (H) 05/02/2023     Takes Lantus more as needed since appetite is poor. CBGs are stable when she is home, eating as normal.     Chest tightness, continuous for the past 3 weeks. Takes PPI daily now. Minimally relieved by Tums OTC and daily Nexium. Pain is worse with deep inspiration. Also reproduced with pressure. Onset about 2 weeks ago. Belching with minimal relief. Feels similar to previous dx of CAP. Previously took Protonix but stopped being covered by insurance.     H/A for 3 weeks. Posterior head radiates to left shoulder. Saw neurology today, MRI planned.     Opioid risk score = 0.       Vitals:    09/11/23 1413   BP: 116/60   Pulse: 86   SpO2: 96%   Weight: 91.7 kg (202 lb 1.6 oz)     Following with LCSW routinely and PRN.     Body mass index is 31.65 kg/m².       SBP (mmHg) 134 146 159 168 142  139  139   DBP (mmHg) 77 90 86 91 Abnormally high   87  83  76   Pulse 87 72 84 85 82  87  79       Review of Systems   Constitutional:  Positive for appetite change. Negative for activity change and fatigue.   Respiratory:  Positive for chest tightness. Negative  for cough, shortness of breath and wheezing.    Cardiovascular:  Negative for palpitations and leg swelling.   Gastrointestinal:  Positive for abdominal pain and nausea. Negative for constipation, diarrhea and vomiting.   Genitourinary:  Negative for dysuria.   Musculoskeletal:  Positive for arthralgias (chronic).   Neurological:  Negative for dizziness and headaches.       Physical Exam  Constitutional:       General: She is not in acute distress.     Appearance: She is ill-appearing (chronically).   HENT:      Head: Normocephalic.      Nose: Nose normal.      Mouth/Throat:      Mouth: Mucous membranes are moist.      Pharynx: No oropharyngeal exudate or posterior oropharyngeal erythema.   Eyes:      General: No scleral icterus.  Cardiovascular:      Rate and Rhythm: Normal rate and regular rhythm.      Heart sounds: Murmur heard.   Pulmonary:      Effort: Pulmonary effort is normal.      Breath sounds: Normal breath sounds.   Abdominal:      General: There is no distension.      Palpations: Abdomen is soft. There is no mass.      Tenderness: There is abdominal tenderness (epigastric). There is no right CVA tenderness, left CVA tenderness or guarding.      Comments: Negative Ferrari's   Musculoskeletal:         General: No swelling.      Cervical back: Neck supple. No tenderness.   Lymphadenopathy:      Cervical: Cervical adenopathy present.   Skin:     General: Skin is warm and dry.   Neurological:      Mental Status: She is alert. Mental status is at baseline.      Gait: Gait normal.   Psychiatric:         Mood and Affect: Mood normal.         Behavior: Behavior normal.            Health Maintenance         Date Due Completion Date    Pneumococcal Vaccines (Age 0-64) (1 - PCV) Never done ---    Foot Exam Never done ---    TETANUS VACCINE Never done ---    COVID-19 Vaccine (4 - Pfizer series) 02/02/2022 12/8/2021    Colorectal Cancer Screening 06/24/2023 6/24/2020    Eye Exam 08/22/2023 8/22/2022    Mammogram  08/31/2023 8/31/2022    Influenza Vaccine (1) Never done ---    Hemoglobin A1c 11/02/2023 5/2/2023    Diabetes Urine Screening 05/02/2024 5/2/2023    Lipid Panel 05/02/2024 5/2/2023    Low Dose Statin 09/11/2024 9/11/2023    Cervical Cancer Screening 09/29/2026 9/29/2021              PROBLEM LIST ITEMS ADDRESSED THIS VISIT:    Problem List Items Addressed This Visit          Neuro    Nonintractable headache     Followed by neurology. To have MRI brain tomorrow.             Psychiatric    Situational anxiety     Continue to f/u with LCSW PRN            Cardiac/Vascular    Hypertension associated with diabetes - Primary (Chronic)     Order Dexcom G7 and supplies from [employer] - awaiting faxed form from company.  Controlled. Repeat A1C with next lab.   BP elevated due to h/a. To start clonidine patch.         Relevant Medications    blood-glucose sensor (DEXCOM G7 SENSOR) Myrtle    blood-glucose transmitter (DEXCOM G5 TRANSMITTER) Myrtle    blood-glucose meter,continuous (DEXCOM ) Misc       ID    Anemia of infection and chronic disease     Lab Results   Component Value Date    HGB 10.4 (L) 08/07/2023   R/T SLE and with MGUS. Followed by hematology. Selene PO iron.               Immunology/Multi System    Systemic lupus erythematosus     Followed by Antoinette Wayne and Aminta.          Immunocompromised state due to drug therapy     Infection prevention and surveillance.             Oncology    Iron deficiency anemia     Lab Results   Component Value Date    HGB 10.4 (L) 08/07/2023               Endocrine    Thyroid nodule greater than or equal to 1 cm in diameter incidentally noted on imaging study (Chronic)     Due to repeat thyroid U/S. Will schedule.          Type 2 diabetes mellitus without complication, with long-term current use of insulin     Lab Results   Component Value Date    HGBA1C 5.9 (H) 05/02/2023   Order Dexcom G7 and supplies from [employer].   Continues Lantus.             GI    Drug-induced constipation  (Chronic)     Sx relieved by OTC laxatives PRN         Gastroesophageal reflux disease     Epigastric pain today.   Stop Nexium. Start pantoprazole 40 mg BID plus Pepcid 40 mg daily. If no relief in 2 weeks then consider abd U/S, HFP, lipase. Consider EGD.         Relevant Medications    pantoprazole (PROTONIX) 40 MG tablet    famotidine (PEPCID) 40 MG tablet     Other Visit Diagnoses       Chest pain, unspecified type        Relevant Orders    IN OFFICE EKG 12-LEAD (to [facility])    X-Ray Chest PA And Lateral    Thyroid nodule        Relevant Orders    US Soft Tissue Head Neck Thyroid    Encounter for preventive health examination                  Provided Marilou with a 5-10 year written screening schedule and personal prevention plan. Recommendations were developed using the USPSTF age appropriate recommendations. Education, counseling, and referrals were provided as needed.  After Visit Summary printed and given to patient which includes a list of additional screenings\tests needed.        REBECCA Louis, FNP-C  [facility] [address], [place] [address]  I offered to discuss advanced care planning, including how to pick a person who would make decisions for you if you were unable to make them for yourself, called a health care power of , and what kind of decisions you might make such as use of life sustaining treatments such as ventilators and tube feeding when faced with a life limiting illness recorded on a living will that they will need to know. (How you want to be cared for as you near the end of your natural life)     X Patient is interested in learning more about how to make advanced directives.  I provided them paperwork and offered to discuss this with them.

## 2023-09-11 NOTE — PATIENT INSTRUCTIONS
Counseling and Referral of Other Preventative  (Italic type indicates deductible and co-insurance are waived)    Patient Name: Marilou Daniel  Today's Date: 9/11/2023    Health Maintenance       Date Due Completion Date    Pneumococcal Vaccines (Age 0-64) (1 - PCV) Never done ---    Foot Exam Never done ---    TETANUS VACCINE Never done ---    COVID-19 Vaccine (4 - Pfizer series) 02/02/2022 12/8/2021    Colorectal Cancer Screening 06/24/2023 6/24/2020    Eye Exam 08/22/2023 8/22/2022    Mammogram 08/31/2023 8/31/2022    Influenza Vaccine (1) Never done ---    Hemoglobin A1c 11/02/2023 5/2/2023    Diabetes Urine Screening 05/02/2024 5/2/2023    Lipid Panel 05/02/2024 5/2/2023    Low Dose Statin 09/11/2024 9/11/2023    Cervical Cancer Screening 09/29/2026 9/29/2021        Orders Placed This Encounter   Procedures    X-Ray Chest PA And Lateral    US Soft Tissue Head Neck Thyroid    IN OFFICE EKG 12-LEAD (to [place])     The following information is provided to all patients.  This information is to help you find resources for any of the problems found today that may be affecting your health:                Living healthy guide: www.[facility].[place].gov      Understanding Diabetes: www.diabetes.org      Eating healthy: www.cdc.gov/healthyweight      CDC home safety checklist: www.cdc.gov/steadi/patient.html      Agency on Aging: www.goea.[place].[facility]      Alcoholics anonymous (AA): www.aa.org      Physical Activity: www.joseline.nih.gov/la9joml      Tobacco use: www.quitwithusla.org

## 2023-09-11 NOTE — ASSESSMENT & PLAN NOTE
Order Dexcom G7 and supplies from [facility] - awaiting faxed form from company.  Controlled. Repeat A1C with next lab.   BP elevated due to h/a. To start clonidine patch.

## 2023-09-13 ENCOUNTER — HOSPITAL ENCOUNTER (OUTPATIENT)
Dept: RADIOLOGY | Facility: HOSPITAL | Age: 48
Discharge: HOME | End: 2023-09-13
Attending: NURSE PRACTITIONER
Payer: MEDICARE

## 2023-09-13 DIAGNOSIS — E04.1 THYROID NODULE: ICD-10-CM

## 2023-09-13 DIAGNOSIS — F19.982 DRUG-INDUCED INSOMNIA: ICD-10-CM

## 2023-09-13 PROCEDURE — 76536 US SOFT TISSUE HEAD NECK THYROID: ICD-10-PCS | Mod: 26,HCNC,, | Performed by: RADIOLOGY

## 2023-09-13 PROCEDURE — 76536 US EXAM OF HEAD AND NECK: CPT | Mod: 26,HCNC,, | Performed by: RADIOLOGY

## 2023-09-13 PROCEDURE — 76536 US EXAM OF HEAD AND NECK: CPT | Mod: TC,HCNC

## 2023-09-14 RX ORDER — ZOLPIDEM TARTRATE 10 MG/1
TABLET ORAL
Qty: 30 TABLET | Refills: 0 | Status: SHIPPED | OUTPATIENT
Start: 2023-09-14 | End: 2023-10-11 | Stop reason: SDUPTHER

## 2023-09-15 ENCOUNTER — PATIENT MESSAGE (OUTPATIENT)
Dept: ADMINISTRATIVE | Facility: OTHER | Age: 48
End: 2023-09-15
Payer: MEDICARE

## 2023-09-25 ENCOUNTER — OFFICE VISIT (OUTPATIENT)
Dept: PRIMARY CARE CLINIC | Facility: CLINIC | Age: 48
End: 2023-09-25
Payer: MEDICARE

## 2023-09-25 VITALS
BODY MASS INDEX: 31.71 KG/M2 | DIASTOLIC BLOOD PRESSURE: 92 MMHG | HEIGHT: 67 IN | WEIGHT: 202 LBS | SYSTOLIC BLOOD PRESSURE: 150 MMHG

## 2023-09-25 DIAGNOSIS — E11.59 HYPERTENSION ASSOCIATED WITH DIABETES: Chronic | ICD-10-CM

## 2023-09-25 DIAGNOSIS — I15.2 HYPERTENSION ASSOCIATED WITH DIABETES: Chronic | ICD-10-CM

## 2023-09-25 DIAGNOSIS — E04.1 THYROID NODULE GREATER THAN OR EQUAL TO 1 CM IN DIAMETER INCIDENTALLY NOTED ON IMAGING STUDY: Chronic | ICD-10-CM

## 2023-09-25 DIAGNOSIS — M32.8 OTHER FORMS OF SYSTEMIC LUPUS ERYTHEMATOSUS, UNSPECIFIED ORGAN INVOLVEMENT STATUS: ICD-10-CM

## 2023-09-25 PROCEDURE — 3008F BODY MASS INDEX DOCD: CPT | Mod: HCNC,CPTII,95, | Performed by: NURSE PRACTITIONER

## 2023-09-25 PROCEDURE — 3044F HG A1C LEVEL LT 7.0%: CPT | Mod: HCNC,CPTII,95, | Performed by: NURSE PRACTITIONER

## 2023-09-25 PROCEDURE — 3066F PR DOCUMENTATION OF TREATMENT FOR NEPHROPATHY: ICD-10-PCS | Mod: HCNC,CPTII,95, | Performed by: NURSE PRACTITIONER

## 2023-09-25 PROCEDURE — 1160F RVW MEDS BY RX/DR IN RCRD: CPT | Mod: HCNC,CPTII,95, | Performed by: NURSE PRACTITIONER

## 2023-09-25 PROCEDURE — 3061F NEG MICROALBUMINURIA REV: CPT | Mod: HCNC,CPTII,95, | Performed by: NURSE PRACTITIONER

## 2023-09-25 PROCEDURE — 3008F PR BODY MASS INDEX (BMI) DOCUMENTED: ICD-10-PCS | Mod: HCNC,CPTII,95, | Performed by: NURSE PRACTITIONER

## 2023-09-25 PROCEDURE — 3077F SYST BP >= 140 MM HG: CPT | Mod: HCNC,CPTII,95, | Performed by: NURSE PRACTITIONER

## 2023-09-25 PROCEDURE — 1160F PR REVIEW ALL MEDS BY PRESCRIBER/CLIN PHARMACIST DOCUMENTED: ICD-10-PCS | Mod: HCNC,CPTII,95, | Performed by: NURSE PRACTITIONER

## 2023-09-25 PROCEDURE — 99213 OFFICE O/P EST LOW 20 MIN: CPT | Mod: HCNC,95,, | Performed by: NURSE PRACTITIONER

## 2023-09-25 PROCEDURE — 3044F PR MOST RECENT HEMOGLOBIN A1C LEVEL <7.0%: ICD-10-PCS | Mod: HCNC,CPTII,95, | Performed by: NURSE PRACTITIONER

## 2023-09-25 PROCEDURE — 3061F PR NEG MICROALBUMINURIA RESULT DOCUMENTED/REVIEW: ICD-10-PCS | Mod: HCNC,CPTII,95, | Performed by: NURSE PRACTITIONER

## 2023-09-25 PROCEDURE — 1159F PR MEDICATION LIST DOCUMENTED IN MEDICAL RECORD: ICD-10-PCS | Mod: HCNC,CPTII,95, | Performed by: NURSE PRACTITIONER

## 2023-09-25 PROCEDURE — 3066F NEPHROPATHY DOC TX: CPT | Mod: HCNC,CPTII,95, | Performed by: NURSE PRACTITIONER

## 2023-09-25 PROCEDURE — 99213 PR OFFICE/OUTPT VISIT, EST, LEVL III, 20-29 MIN: ICD-10-PCS | Mod: HCNC,95,, | Performed by: NURSE PRACTITIONER

## 2023-09-25 PROCEDURE — 3080F PR MOST RECENT DIASTOLIC BLOOD PRESSURE >= 90 MM HG: ICD-10-PCS | Mod: HCNC,CPTII,95, | Performed by: NURSE PRACTITIONER

## 2023-09-25 PROCEDURE — 3077F PR MOST RECENT SYSTOLIC BLOOD PRESSURE >= 140 MM HG: ICD-10-PCS | Mod: HCNC,CPTII,95, | Performed by: NURSE PRACTITIONER

## 2023-09-25 PROCEDURE — 3080F DIAST BP >= 90 MM HG: CPT | Mod: HCNC,CPTII,95, | Performed by: NURSE PRACTITIONER

## 2023-09-25 PROCEDURE — 1159F MED LIST DOCD IN RCRD: CPT | Mod: HCNC,CPTII,95, | Performed by: NURSE PRACTITIONER

## 2023-09-25 NOTE — PROGRESS NOTES
Marilou Daniel  10/10/2023  [id number]    Alissa Bautista MD  Patient Care Team:  Alissa Bautista MD as PCP - General (Internal Medicine)  Joe Yo RD as Dietitian (Endocrinology)  Liza Moon RD, CDE as Dietitian (Diabetes)  Travis Milligan OD (Ophthalmology)  Diane Dominguez NP as Nurse Practitioner (Family Medicine)  Adeline Marrero as Digital Medicine Health   Radha Watson, PharmD as Hypertension Digital Medicine Clinician (Pharmacist)  Alissa Bautista MD as Hypertension Digital Medicine Responsible Provider (Internal Medicine)  [employer] as Hypertension Digital Medicine Contract      [facility] Primary Care Note      The patient location is:  Patient Home   The chief complaint leading to consultation is: f/u HTN, DMII      Visit type: Virtual visit with synchronous audio and video  Each patient to whom he or she provides medical services by telemedicine is:  (1) informed of the relationship between the physician and patient and the respective role of any other health care provider with respect to management of the patient; and (2) notified that he or she may decline to receive medical services by telemedicine and may withdraw from such care at any time.    Chief Complaint:  Chief Complaint   Patient presents with    Establish Care     2 week f/u       History of Present Illness:  HPI      The 10-year ASCVD risk score (Gray LOCKETT, et al., 2019) is: 18.9%    Values used to calculate the score:      Age: 47 years      Sex: Female      Is Non- : Yes      Diabetic: Yes      Tobacco smoker: No      Systolic Blood Pressure: 150 mmHg      Is BP treated: Yes      HDL Cholesterol: 39 mg/dL      Total Cholesterol: 162 mg/dL    Started Catapress patches. Taking aldactone 50 mg, labetalol 300 BID, Procardia XL 30 mg BID, clonidine 0.1 BID.      9/11/2023 9/12/2023 9/14/2023 9/16/2023 9/17/2023 9/18/2023   Recent Readings         SBP (mmHg) 149   148  119  100  132  154    SBP (mmHg) 134  158  99  132  152     DBP (mmHg) 84  84  76  61  80  89    DBP (mmHg) 77  84  59  74  90     Pulse 79  79  73  84  66  80    Pulse 87  79  59  80  81        9/19/2023 9/20/2023 9/21/2023 9/22/2023 9/23/2023 9/24/2023   Recent Readings         SBP (mmHg) 145  138  144  148  146  128    SBP (mmHg) 100  159  141  172  154  141    SBP (mmHg)  159  105    136    DBP (mmHg) 85  68  68  81  87  73    DBP (mmHg) 62  86  80  97 (H)  87  84    DBP (mmHg)  88  59    77    Pulse 90  72  87  81  78  60    Pulse 75  85  92  83  79  81    Pulse  81  69    81       9/25/2023   Recent Readings    SBP (mmHg) 148    DBP (mmHg) 87    Pulse 79       Legend:  (H) High      No further CP, dyspepsia.  Cardiology appt 10/24/23. Dr Flavio Schmidt.    Dexcom 7 and supplies. Insurer received paperwork. Nearly out of supplies.     Needs form for window tint.     Ambien half dose ineffective. Difficulty sleeping when she takes nothing. CR Ambien more effective. Also takes tizanidine, gabapentin at HS. Tries to not take nightly.       ROS      The following were reviewed: Active problem list, medication list, allergies, family history, social history, and Health Maintenance.     History:  Past Medical History:   Diagnosis Date    Anemia     Arthritis     Connective tissue disease 1999    COVID-19 in immunocompromised patient 02/05/2021    Diabetes mellitus     Drug induced insomnia 12/15/2020    R/T chronic steroid use for SLE.    Gastroesophageal reflux disease 03/23/2020    Hypertension     Lupus 1999    Situational anxiety 08/24/2021    Thyroid nodule greater than or equal to 1 cm in diameter incidentally noted on imaging study 11/19/2020    Vasculitis      Past Surgical History:   Procedure Laterality Date    ABLATION      COLONOSCOPY N/A 6/24/2020    Procedure: COLONOSCOPY;  Surgeon: Nevin Penny MD;  Location: [facility];  Service: Endoscopy;  Laterality: N/A;    ESOPHAGOGASTRODUODENOSCOPY N/A  6/24/2020    Procedure: ESOPHAGOGASTRODUODENOSCOPY (EGD);  Surgeon: Nevin Penny MD;  Location: [facility];  Service: Endoscopy;  Laterality: N/A;    RIGHT HEART CATHETERIZATION      TUBAL LIGATION      WRIST SURGERY Bilateral      Family History   Problem Relation Age of Onset    Lung cancer Mother 59        +hx of smoking    Cancer Father         type uk? stomach?    Breast cancer Other 58        UL mastect, mets to back & lungs    Breast cancer Other 61        chemo, XRT, UL mastect?    Autism Other     Asthma Other     Obesity Other     Liver cancer Other 58        mets to lungs, pancreas, stomach    Cancer Other         type uk?     Social History     Socioeconomic History    Marital status:    Tobacco Use    Smoking status: Never    Smokeless tobacco: Never   Substance and Sexual Activity    Alcohol use: Never    Drug use: Never    Sexual activity: Yes     Partners: Male     Birth control/protection: See Surgical Hx     Patient Active Problem List   Diagnosis    Hypertension associated with diabetes    Type 2 diabetes mellitus without complication, with long-term current use of insulin    Systemic lupus erythematosus    Leukocytoclastic vasculitis    Nonintractable headache    Gastroesophageal reflux disease    Neck pain    Hearing loss    Thyroid nodule greater than or equal to 1 cm in diameter incidentally noted on imaging study    Immunocompromised state due to drug therapy    Situational anxiety    Avascular necrosis of bones of both hips    Diabetes mellitus due to underlying condition with complication, with long-term current use of insulin    Primary snoring    Class 1 drug-induced obesity with serious comorbidity and body mass index (BMI) of 31.0 to 31.9 in adult    Iron deficiency anemia    Anemia of infection and chronic disease    Iron deficiency anemia secondary to inadequate dietary iron intake    Insomnia    Drug-induced constipation     Review of patient's allergies indicates:    Allergen Reactions    Azathioprine Nausea And Vomiting     Nausea, vomiting, diarrhea dose and early in the course of therapy    Olmesartan Shortness Of Breath    Feraheme [ferumoxytol] Itching     Shortness of breath, rash    Oxycodone Itching     Other reaction(s): Unknown       Medications:  Current Outpatient Medications on File Prior to Visit   Medication Sig Dispense Refill    atorvastatin (LIPITOR) 10 MG tablet       betamethasone dipropionate 0.05 % cream APPLY A SMALL AMOUNT TO AFFECTED AREA TOPICALLY TWICE A DAY FOR 2 TO 3 WEEKS AT A TIME AS NEEDED FLARE UPS AVOID FACE & GROIN AREA      blood sugar diagnostic (TRUE METRIX GLUCOSE TEST STRIP) Strp Test 4 times daily. 300 strip 3    blood-glucose meter (TRUE METRIX GLUCOSE METER) Misc Use as directed 1 each 0    blood-glucose sensor (DEXCOM G7 SENSOR) Myrtle 1 each by Misc.(Non-Drug; Combo Route) route every 10 days. 3 each 11    blood-glucose transmitter (DEXCOM G5 TRANSMITTER) Myrtle 1 each by Misc.(Non-Drug; Combo Route) route every 3 (three) months. 1 each 3    cloNIDine (CATAPRES) 0.1 MG tablet Take 1 tablet (0.1 mg total) by mouth 4 (four) times daily as needed (elevated BP). 90 tablet 5    cloNIDine 0.1 mg/24 hr td ptwk (CATAPRES) 0.1 mg/24 hr Place 1 patch onto the skin every 7 days. 4 patch 2    dapsone 100 MG Tab Take 100 mg by mouth once daily at 6am.      esomeprazole (NEXIUM) 40 MG capsule Take 1 capsule (40 mg total) by mouth before breakfast. 90 capsule 3    famotidine (PEPCID) 40 MG tablet Take 1 tablet (40 mg total) by mouth once daily. 30 tablet 2    gabapentin (NEURONTIN) 800 MG tablet Neurontin 800 mg tablet   Take 1 tablet 3 times a day by oral route.      HYDROcodone-acetaminophen (NORCO)  mg per tablet Norco 10 mg-325 mg tablet   Take 1 tablet 3 times a day by oral route as needed.      hydrocortisone 2.5 % ointment APPLY TOPICALLY TO FACE TWICE A DAY AS NEEDED FOR 1 TO 2 WEEKS AT A TIME      hydroxychloroquine (PLAQUENIL) 200 mg  tablet Take 200 mg by mouth 2 (two) times daily.       hydrOXYzine HCL (ATARAX) 25 MG tablet TAKE ONE TABLET BY MOUTH EVERY 12 HOURS AS NEEDED FOR ITCHING 30 tablet 5    insulin (LANTUS SOLOSTAR U-100 INSULIN) glargine 100 units/mL SubQ pen Inject 10 Units into the skin once daily. 3 mL 11    labetaloL (NORMODYNE) 300 MG tablet Take 1 tablet (300 mg total) by mouth 2 (two) times daily. 60 tablet 11    mycophenolate (CELLCEPT) 250 mg Cap 3,000 mg once daily.      NIFEdipine (PROCARDIA-XL) 30 MG (OSM) 24 hr tablet Take 1 tablet (30 mg total) by mouth every 12 (twelve) hours. 60 tablet 11    ondansetron (ZOFRAN) 4 MG tablet Take 1 tablet (4 mg total) by mouth every 8 (eight) hours as needed for Nausea. 12 tablet 0    pantoprazole (PROTONIX) 40 MG tablet Take 1 tablet (40 mg total) by mouth 2 (two) times daily for 90 days, THEN 1 tablet (40 mg total) once daily. 360 tablet 0    polyethylene glycol (GLYCOLAX) 17 gram/dose powder Take 17 g by mouth once daily. Can titrate up to 3x daily as needed for goal 1-2 large soft BM daily 510 g 3    spironolactone (ALDACTONE) 50 MG tablet Take 1 tablet (50 mg total) by mouth once daily. 30 tablet 11    tiZANidine (ZANAFLEX) 4 MG tablet TK 1 T PO TID PRN      TRUEPLUS LANCETS 33 gauge Misc Inject 1 lancet as directed 4 (four) times daily. 100 each 11    zolpidem (AMBIEN) 10 mg Tab TAKE 1 TABLET BY MOUTH AT BEDTIME AS NEEDED FOR INSOMNIA 30 tablet 0    blood-glucose meter,continuous (DEXCOM ) Misc 1 each by Misc.(Non-Drug; Combo Route) route once. for 1 dose 1 each 3    insulin aspart U-100 (NOVOLOG) 100 unit/mL injection Inject 4 Units into the skin 3 (three) times daily before meals. 10.8 mL 3    LORazepam (ATIVAN) 0.5 MG tablet Take 1 tablet (0.5 mg total) by mouth every 12 (twelve) hours as needed for Anxiety. 30 tablet 2    sertraline (ZOLOFT) 50 MG tablet Take 1 tablet (50 mg total) by mouth once daily. 30 tablet 11    [DISCONTINUED] amLODIPine (NORVASC) 10 MG tablet  Take 1 tablet (10 mg total) by mouth once daily. 90 tablet 3     No current facility-administered medications on file prior to visit.       Medications have been reviewed and reconciled with patient at visit today.    Barriers to medications present (no )    Adverse reactions to current medications (no)      Exam:  Vitals:    09/25/23 1408   BP: (!) 150/92     Weight: 91.6 kg (202 lb)   Body mass index is 31.64 kg/m².      BP Readings from Last 3 Encounters:   09/25/23 (!) 150/92   09/11/23 116/60   07/14/23 (!) 161/79     Wt Readings from Last 3 Encounters:   09/25/23 1408 91.6 kg (202 lb)   09/11/23 1413 91.7 kg (202 lb 1.6 oz)   07/14/23 1450 90.7 kg (199 lb 14.4 oz)            Physical Exam    Laboratory Reviewed: (Yes)  Lab Results   Component Value Date    WBC 8.59 08/07/2023    HGB 10.4 (L) 08/07/2023    HCT 32.4 (L) 08/07/2023     08/07/2023    CHOL 162 05/02/2023    TRIG 126 05/02/2023    HDL 39 (L) 05/02/2023    ALT 12 05/02/2023    AST 15 05/02/2023     07/14/2023    K 4.2 07/14/2023     07/14/2023    CREATININE 0.79 07/14/2023    BUN 12 07/14/2023    CO2 26 07/14/2023    TSH 0.390 07/14/2023    INR 1.0 01/03/2023    HGBA1C 5.9 (H) 05/02/2023           Health Maintenance  Health Maintenance Topics with due status: Not Due       Topic Last Completion Date    Cervical Cancer Screening 09/29/2021    Diabetes Urine Screening 05/02/2023    Lipid Panel 05/02/2023    Low Dose Statin 09/25/2023     Health Maintenance Due   Topic Date Due    Pneumococcal Vaccines (Age 0-64) (1 - PCV) Never done    Foot Exam  Never done    TETANUS VACCINE  Never done    Colorectal Cancer Screening  06/24/2023    Eye Exam  08/22/2023    Mammogram  08/31/2023    Influenza Vaccine (1) Never done    COVID-19 Vaccine (4 - 2023-24 season) 09/01/2023    Hemoglobin A1c  11/02/2023       Assessment:  Problem List Items Addressed This Visit          Cardiac/Vascular    Hypertension associated with diabetes (Chronic)      Lab Results   Component Value Date    HGBA1C 5.9 (H) 05/02/2023   Fair control of HTN, good DMII control. Cont POC. CGM and supplies ordered with assistance of staff.             Immunology/Multi System    Systemic lupus erythematosus     Better control overall. Off of prednisone. Followed closely by Dr Wayne            Endocrine    Thyroid nodule greater than or equal to 1 cm in diameter incidentally noted on imaging study (Chronic)     Repeat U/S. She does not want to repeat biopsy due to painful procedure.              Plan:  Hypertension associated with diabetes    Other forms of systemic lupus erythematosus, unspecified organ involvement status    Thyroid nodule greater than or equal to 1 cm in diameter incidentally noted on imaging study      -Patient's lab results were reviewed and discussed with patient  -Treatment options and alternatives were discussed with the patient. Patient expressed understanding. Patient was given the opportunity to ask questions and be an active participant in their medical care. Patient had no further questions or concerns at this time.   -Documentation of patient's health and condition was obtained from family member who was present during visit.  -Patient is an overall moderate risk for health complications from their medical conditions.       Follow up: Follow up in about 3 months (around 12/25/2023).      Total medical decision making time was 21 min.  The following issues were discussed: Diagnoses of Hypertension associated with diabetes, Other forms of systemic lupus erythematosus, unspecified organ involvement status, and Thyroid nodule greater than or equal to 1 cm in diameter incidentally noted on imaging study were pertinent to this visit.    Health maintenance needs, recent test results and goals of care discussed with pt and questions answered.

## 2023-10-11 DIAGNOSIS — F19.982 DRUG-INDUCED INSOMNIA: ICD-10-CM

## 2023-10-11 RX ORDER — ZOLPIDEM TARTRATE 10 MG/1
TABLET ORAL
Qty: 30 TABLET | Refills: 2 | Status: SHIPPED | OUTPATIENT
Start: 2023-10-11 | End: 2023-12-18 | Stop reason: SDUPTHER

## 2023-10-12 ENCOUNTER — PATIENT MESSAGE (OUTPATIENT)
Dept: RESEARCH | Facility: CLINIC | Age: 48
End: 2023-10-12
Payer: MEDICARE

## 2023-10-30 ENCOUNTER — PATIENT MESSAGE (OUTPATIENT)
Dept: RESEARCH | Facility: CLINIC | Age: 48
End: 2023-10-30
Payer: MEDICARE

## 2023-11-30 DIAGNOSIS — K21.9 GASTROESOPHAGEAL REFLUX DISEASE WITHOUT ESOPHAGITIS: ICD-10-CM

## 2023-11-30 RX ORDER — FAMOTIDINE 40 MG/1
40 TABLET, FILM COATED ORAL
Qty: 30 TABLET | Refills: 2 | Status: SHIPPED | OUTPATIENT
Start: 2023-11-30

## 2023-12-07 ENCOUNTER — TELEPHONE (OUTPATIENT)
Dept: PRIMARY CARE CLINIC | Facility: CLINIC | Age: 48
End: 2023-12-07
Payer: MEDICARE

## 2023-12-07 VITALS — DIASTOLIC BLOOD PRESSURE: 75 MMHG | SYSTOLIC BLOOD PRESSURE: 122 MMHG

## 2023-12-18 DIAGNOSIS — F19.982 DRUG-INDUCED INSOMNIA: ICD-10-CM

## 2023-12-18 RX ORDER — ZOLPIDEM TARTRATE 10 MG/1
TABLET ORAL
Qty: 30 TABLET | Refills: 2 | Status: SHIPPED | OUTPATIENT
Start: 2023-12-18 | End: 2024-01-19 | Stop reason: SDUPTHER

## 2023-12-22 DIAGNOSIS — K52.9 GASTROENTERITIS: ICD-10-CM

## 2023-12-22 DIAGNOSIS — Z79.4 TYPE 2 DIABETES MELLITUS WITHOUT COMPLICATION, WITH LONG-TERM CURRENT USE OF INSULIN: ICD-10-CM

## 2023-12-22 DIAGNOSIS — E11.9 TYPE 2 DIABETES MELLITUS WITHOUT COMPLICATION, WITH LONG-TERM CURRENT USE OF INSULIN: ICD-10-CM

## 2023-12-22 DIAGNOSIS — L29.9 PRURITUS: ICD-10-CM

## 2023-12-22 RX ORDER — ONDANSETRON 4 MG/1
TABLET, ORALLY DISINTEGRATING ORAL
Qty: 30 TABLET | Refills: 1 | Status: SHIPPED | OUTPATIENT
Start: 2023-12-22

## 2023-12-22 RX ORDER — INSULIN GLARGINE 100 [IU]/ML
10 INJECTION, SOLUTION SUBCUTANEOUS DAILY
Qty: 3 ML | Refills: 11 | Status: SHIPPED | OUTPATIENT
Start: 2023-12-22 | End: 2024-12-21

## 2023-12-22 RX ORDER — INSULIN ASPART 100 [IU]/ML
4 INJECTION, SOLUTION INTRAVENOUS; SUBCUTANEOUS
Qty: 10.8 ML | Refills: 3 | OUTPATIENT
Start: 2023-12-22 | End: 2024-12-21

## 2023-12-22 RX ORDER — INSULIN GLARGINE 100 [IU]/ML
20 INJECTION, SOLUTION SUBCUTANEOUS DAILY
Qty: 6 ML | Refills: 11 | OUTPATIENT
Start: 2023-12-22 | End: 2024-12-21

## 2023-12-22 RX ORDER — INSULIN ASPART 100 [IU]/ML
4 INJECTION, SOLUTION INTRAVENOUS; SUBCUTANEOUS
Qty: 10.8 ML | Refills: 3 | Status: SHIPPED | OUTPATIENT
Start: 2023-12-22 | End: 2024-12-21

## 2023-12-22 RX ORDER — HYDROXYZINE HYDROCHLORIDE 25 MG/1
TABLET, FILM COATED ORAL
Qty: 30 TABLET | Refills: 5 | Status: SHIPPED | OUTPATIENT
Start: 2023-12-22

## 2023-12-28 ENCOUNTER — OFFICE VISIT (OUTPATIENT)
Dept: PRIMARY CARE CLINIC | Facility: CLINIC | Age: 48
End: 2023-12-28
Payer: MEDICARE

## 2023-12-28 ENCOUNTER — TELEPHONE (OUTPATIENT)
Dept: PRIMARY CARE CLINIC | Facility: CLINIC | Age: 48
End: 2023-12-28
Payer: MEDICARE

## 2023-12-28 VITALS — SYSTOLIC BLOOD PRESSURE: 140 MMHG | DIASTOLIC BLOOD PRESSURE: 98 MMHG | HEART RATE: 98 BPM

## 2023-12-28 DIAGNOSIS — Z12.11 ENCOUNTER FOR SCREENING COLONOSCOPY: ICD-10-CM

## 2023-12-28 DIAGNOSIS — M87.052 AVASCULAR NECROSIS OF BONES OF BOTH HIPS: ICD-10-CM

## 2023-12-28 DIAGNOSIS — I15.2 HYPERTENSION ASSOCIATED WITH DIABETES: Primary | Chronic | ICD-10-CM

## 2023-12-28 DIAGNOSIS — E04.1 THYROID NODULE GREATER THAN OR EQUAL TO 1 CM IN DIAMETER INCIDENTALLY NOTED ON IMAGING STUDY: Chronic | ICD-10-CM

## 2023-12-28 DIAGNOSIS — M32.8 OTHER FORMS OF SYSTEMIC LUPUS ERYTHEMATOSUS, UNSPECIFIED ORGAN INVOLVEMENT STATUS: ICD-10-CM

## 2023-12-28 DIAGNOSIS — E11.59 HYPERTENSION ASSOCIATED WITH DIABETES: Primary | Chronic | ICD-10-CM

## 2023-12-28 DIAGNOSIS — M31.0 LEUKOCYTOCLASTIC VASCULITIS: ICD-10-CM

## 2023-12-28 DIAGNOSIS — E11.9 TYPE 2 DIABETES MELLITUS WITHOUT COMPLICATION, WITH LONG-TERM CURRENT USE OF INSULIN: ICD-10-CM

## 2023-12-28 DIAGNOSIS — Z79.52 CURRENT CHRONIC USE OF SYSTEMIC STEROIDS: ICD-10-CM

## 2023-12-28 DIAGNOSIS — B99.9 ANEMIA OF INFECTION AND CHRONIC DISEASE: ICD-10-CM

## 2023-12-28 DIAGNOSIS — Z79.899 OTHER LONG TERM (CURRENT) DRUG THERAPY: ICD-10-CM

## 2023-12-28 DIAGNOSIS — M87.051 AVASCULAR NECROSIS OF BONES OF BOTH HIPS: ICD-10-CM

## 2023-12-28 DIAGNOSIS — Z79.4 TYPE 2 DIABETES MELLITUS WITHOUT COMPLICATION, WITH LONG-TERM CURRENT USE OF INSULIN: ICD-10-CM

## 2023-12-28 DIAGNOSIS — D63.8 ANEMIA OF INFECTION AND CHRONIC DISEASE: ICD-10-CM

## 2023-12-28 PROCEDURE — 3077F SYST BP >= 140 MM HG: CPT | Mod: HCNC,CPTII,95, | Performed by: NURSE PRACTITIONER

## 2023-12-28 PROCEDURE — 3044F PR MOST RECENT HEMOGLOBIN A1C LEVEL <7.0%: ICD-10-PCS | Mod: HCNC,CPTII,95, | Performed by: NURSE PRACTITIONER

## 2023-12-28 PROCEDURE — 3080F DIAST BP >= 90 MM HG: CPT | Mod: HCNC,CPTII,95, | Performed by: NURSE PRACTITIONER

## 2023-12-28 PROCEDURE — 99215 OFFICE O/P EST HI 40 MIN: CPT | Mod: HCNC,95,, | Performed by: NURSE PRACTITIONER

## 2023-12-28 PROCEDURE — 3044F HG A1C LEVEL LT 7.0%: CPT | Mod: HCNC,CPTII,95, | Performed by: NURSE PRACTITIONER

## 2023-12-28 PROCEDURE — 3077F PR MOST RECENT SYSTOLIC BLOOD PRESSURE >= 140 MM HG: ICD-10-PCS | Mod: HCNC,CPTII,95, | Performed by: NURSE PRACTITIONER

## 2023-12-28 PROCEDURE — 3061F NEG MICROALBUMINURIA REV: CPT | Mod: HCNC,CPTII,95, | Performed by: NURSE PRACTITIONER

## 2023-12-28 PROCEDURE — 99215 PR OFFICE/OUTPT VISIT, EST, LEVL V, 40-54 MIN: ICD-10-PCS | Mod: HCNC,95,, | Performed by: NURSE PRACTITIONER

## 2023-12-28 PROCEDURE — 3066F PR DOCUMENTATION OF TREATMENT FOR NEPHROPATHY: ICD-10-PCS | Mod: HCNC,CPTII,95, | Performed by: NURSE PRACTITIONER

## 2023-12-28 PROCEDURE — 3066F NEPHROPATHY DOC TX: CPT | Mod: HCNC,CPTII,95, | Performed by: NURSE PRACTITIONER

## 2023-12-28 PROCEDURE — 3061F PR NEG MICROALBUMINURIA RESULT DOCUMENTED/REVIEW: ICD-10-PCS | Mod: HCNC,CPTII,95, | Performed by: NURSE PRACTITIONER

## 2023-12-28 PROCEDURE — 3080F PR MOST RECENT DIASTOLIC BLOOD PRESSURE >= 90 MM HG: ICD-10-PCS | Mod: HCNC,CPTII,95, | Performed by: NURSE PRACTITIONER

## 2023-12-28 RX ORDER — DULAGLUTIDE 0.75 MG/.5ML
0.75 INJECTION, SOLUTION SUBCUTANEOUS
Qty: 4 PEN | Refills: 2 | Status: SHIPPED | OUTPATIENT
Start: 2023-12-28 | End: 2024-01-29 | Stop reason: DRUGHIGH

## 2023-12-28 NOTE — ASSESSMENT & PLAN NOTE
Lab Results   Component Value Date    HGB 10.4 (L) 08/07/2023     Continues PO iron. Repeat CBC. B12, folate.

## 2023-12-28 NOTE — ASSESSMENT & PLAN NOTE
Better control overall.   Continues clonidine TID, doxazosin, labetalol, spirinolactone.   Allergy to ARB.

## 2023-12-28 NOTE — PROGRESS NOTES
Marilou Daniel  12/28/2023  [id number]    Alissa Bautista MD  Patient Care Team:  Alissa Bautista MD as PCP - General (Internal Medicine)  Joe Yo RD as Dietitian (Endocrinology)  Liza Moon RD, CDE as Dietitian (Diabetes)  Travis Milligan, STEPHANIE (Ophthalmology)  Diane Dominguez NP as Nurse Practitioner (Family Medicine)  Adeline Marrero as Digital Medicine Health   Radha Watson, PharmD as Hypertension Digital Medicine Clinician (Pharmacist)  Alissa Bautista MD as Hypertension Digital Medicine Responsible Provider (Internal Medicine)  Medicare, [employer] Managed as Hypertension Digital Medicine Contract      [facility] Primary Care Note      The patient location is:  Patient Home   The chief complaint leading to consultation is: 3 month f/u      Visit type: Virtual visit with synchronous audio and video  Each patient to whom he or she provides medical services by telemedicine is:  (1) informed of the relationship between the physician and patient and the respective role of any other health care provider with respect to management of the patient; and (2) notified that he or she may decline to receive medical services by telemedicine and may withdraw from such care at any time.    Chief Complaint:  Chief Complaint   Patient presents with    Follow-up       History of Present Illness:  HPI    3 month follow up.     LOV 9/25/23 with me. CGM supplies reordered.   To repeat thyroid U/S.    Elevated CBGs. Less stable.   Wants to have digital med for diabetes also.     Cardiologist believes elevated BP r/t pain. Increased doxazosin to 4 mg BID. Clonidine patches not effective. Shortage so out of meds.   Pain primarily neck and back. Want her to have surgery. Considering breast reduction surgery to relieve neck and back pain. Burning nerves, no relief.     Continues to f/u with Dr Wayne. SLE stable, sx controlled.   No new GI sx. Constipation relieved by stool  softeners.     Using Novolog insulin as ss. Elevated CBGs just prior to [place].       The 10-year ASCVD risk score (Gray LOCKETT, et al., 2019) is: 15%    Values used to calculate the score:      Age: 48 years      Sex: Female      Is Non- : Yes      Diabetic: Yes      Tobacco smoker: No      Systolic Blood Pressure: 140 mmHg      Is BP treated: Yes      HDL Cholesterol: 39 mg/dL      Total Cholesterol: 162 mg/dL    9/7/23 Neurology Dr Hdz.   ASSESSMENT/PLAN   Cervicogenic headache  - MRI Brain without Contrast  - MRI Cervical Spine without Contrast  -start Nortriptyline.   Neuropathic pain of both feet    History of carpal tunnel release of both wrists    History of systemic lupus erythematosus (SLE) (HCC)    Medications Placed This Encounter   Medications   nortriptyline (Pamelor) 10 mg capsule   Sig: Take 1 capsule po qhs x 1 week then increase to 2 capsle po qhs   Dispense: 60 capsule   Refill: 1     Return in about 6 weeks (around 10/19/2023).      12/23/2023 12/21/2023 12/17/2023 12/15/2023 12/13/2023                   Recent Readings   SBP (mmHg) 130 132 120 152  154  134 124   DBP (mmHg) 71 77 64 86  89  75 82   Pulse 81 72 73 71  73  72 68         ROS      The following were reviewed: Active problem list, medication list, allergies, family history, social history, and Health Maintenance.     History:  Past Medical History:   Diagnosis Date    Anemia     Arthritis     Connective tissue disease 1999    COVID-19 in immunocompromised patient 02/05/2021    Diabetes mellitus     Drug induced insomnia 12/15/2020    R/T chronic steroid use for SLE.    Gastroesophageal reflux disease 03/23/2020    Hypertension     Lupus 1999    Situational anxiety 08/24/2021    Thyroid nodule greater than or equal to 1 cm in diameter incidentally noted on imaging study 11/19/2020    Vasculitis      Past Surgical History:   Procedure Laterality Date    ABLATION      COLONOSCOPY N/A 6/24/2020     Procedure: COLONOSCOPY;  Surgeon: Nevin Penny MD;  Location: [facility] ENDO;  Service: Endoscopy;  Laterality: N/A;    ESOPHAGOGASTRODUODENOSCOPY N/A 6/24/2020    Procedure: ESOPHAGOGASTRODUODENOSCOPY (EGD);  Surgeon: Nevin Penny MD;  Location: [facility] ENDO;  Service: Endoscopy;  Laterality: N/A;    RIGHT HEART CATHETERIZATION      TUBAL LIGATION      WRIST SURGERY Bilateral      Family History   Problem Relation Age of Onset    Lung cancer Mother 59        +hx of smoking    Cancer Father         type uk? stomach?    Breast cancer Other 58        UL mastect, mets to back & lungs    Breast cancer Other 61        chemo, XRT, UL mastect?    Autism Other     Asthma Other     Obesity Other     Liver cancer Other 58        mets to lungs, pancreas, stomach    Cancer Other         type uk?     Social History     Socioeconomic History    Marital status:    Tobacco Use    Smoking status: Never    Smokeless tobacco: Never   Substance and Sexual Activity    Alcohol use: Never    Drug use: Never    Sexual activity: Yes     Partners: Male     Birth control/protection: See Surgical Hx     Patient Active Problem List   Diagnosis    Hypertension associated with diabetes    Type 2 diabetes mellitus without complication, with long-term current use of insulin    Systemic lupus erythematosus    Leukocytoclastic vasculitis    Nonintractable headache    Gastroesophageal reflux disease    Neck pain    Hearing loss    Thyroid nodule greater than or equal to 1 cm in diameter incidentally noted on imaging study    Immunocompromised state due to drug therapy    Situational anxiety    Avascular necrosis of bones of both hips    Diabetes mellitus due to underlying condition with complication, with long-term current use of insulin    Primary snoring    Class 1 drug-induced obesity with serious comorbidity and body mass index (BMI) of 31.0 to 31.9 in adult    Iron deficiency anemia    Anemia of infection and chronic disease    Iron  deficiency anemia secondary to inadequate dietary iron intake    Insomnia    Drug-induced constipation     Review of patient's allergies indicates:   Allergen Reactions    Azathioprine Nausea And Vomiting     Nausea, vomiting, diarrhea dose and early in the course of therapy    Olmesartan Shortness Of Breath    Feraheme [ferumoxytol] Itching     Shortness of breath, rash    Oxycodone Itching     Other reaction(s): Unknown       Medications:  Current Outpatient Medications on File Prior to Visit   Medication Sig Dispense Refill    atorvastatin (LIPITOR) 10 MG tablet       betamethasone dipropionate 0.05 % cream APPLY A SMALL AMOUNT TO AFFECTED AREA TOPICALLY TWICE A DAY FOR 2 TO 3 WEEKS AT A TIME AS NEEDED FLARE UPS AVOID FACE & GROIN AREA      cloNIDine (CATAPRES) 0.1 MG tablet Take 0.2 mg by mouth 3 (three) times daily. Take 2 (0.1 mg) tablets three times a day      dapsone 100 MG Tab Take 100 mg by mouth once daily at 6am.      doxazosin (CARDURA) 2 MG tablet Take 4 mg by mouth every 12 (twelve) hours. Take 2 (2mg) tablets two times a day      famotidine (PEPCID) 40 MG tablet TAKE ONE TABLET BY MOUTH ONCE A DAY 30 tablet 2    gabapentin (NEURONTIN) 800 MG tablet Neurontin 800 mg tablet   Take 1 tablet 3 times a day by oral route.      HYDROcodone-acetaminophen (NORCO)  mg per tablet Norco 10 mg-325 mg tablet   Take 1 tablet 3 times a day by oral route as needed.      hydrocortisone 2.5 % ointment APPLY TOPICALLY TO FACE TWICE A DAY AS NEEDED FOR 1 TO 2 WEEKS AT A TIME      hydroxychloroquine (PLAQUENIL) 200 mg tablet Take 200 mg by mouth 2 (two) times daily.       hydrOXYzine HCL (ATARAX) 25 MG tablet TAKE ONE TABLET BY MOUTH EVERY 12 HOURS AS NEEDED FOR ITCHING 30 tablet 5    insulin (LANTUS SOLOSTAR U-100 INSULIN) glargine 100 units/mL SubQ pen Inject 10 Units into the skin once daily. 3 mL 11    insulin aspart U-100 (NOVOLOG) 100 unit/mL injection Inject 4 Units into the skin 3 (three) times daily before  meals. 10.8 mL 3    labetaloL (NORMODYNE) 300 MG tablet Take 1 tablet (300 mg total) by mouth 2 (two) times daily. 60 tablet 11    blood sugar diagnostic (TRUE METRIX GLUCOSE TEST STRIP) Strp Test 4 times daily. 100 strip 0    blood-glucose meter (TRUE METRIX GLUCOSE METER) Misc Use as directed 1 each 0    blood-glucose meter,continuous (DEXCOM ) Misc 1 each by Misc.(Non-Drug; Combo Route) route once. for 1 dose 1 each 3    blood-glucose sensor (DEXCOM G7 SENSOR) Myrtle 1 each by Misc.(Non-Drug; Combo Route) route every 10 days. 3 each 11    blood-glucose transmitter (DEXCOM G5 TRANSMITTER) Myrtle 1 each by Misc.(Non-Drug; Combo Route) route every 3 (three) months. 1 each 3    LORazepam (ATIVAN) 0.5 MG tablet Take 1 tablet (0.5 mg total) by mouth every 12 (twelve) hours as needed for Anxiety. 30 tablet 2    mycophenolate (CELLCEPT) 250 mg Cap 3,000 mg once daily.      NIFEdipine (PROCARDIA-XL) 30 MG (OSM) 24 hr tablet Take 1 tablet (30 mg total) by mouth every 12 (twelve) hours. 60 tablet 11    ondansetron (ZOFRAN-ODT) 4 MG TbDL DISSOLVE 1 TABLET BY MOUTH EVERY 8 HOURS AS NEEDED 30 tablet 1    pantoprazole (PROTONIX) 40 MG tablet Take 1 tablet (40 mg total) by mouth 2 (two) times daily for 90 days, THEN 1 tablet (40 mg total) once daily. 360 tablet 0    sertraline (ZOLOFT) 50 MG tablet Take 1 tablet (50 mg total) by mouth once daily. 30 tablet 11    spironolactone (ALDACTONE) 50 MG tablet Take 1 tablet (50 mg total) by mouth once daily. 30 tablet 11    tiZANidine (ZANAFLEX) 4 MG tablet TK 1 T PO TID PRN      TRUEPLUS LANCETS 33 gauge Misc Inject 1 lancet as directed 4 (four) times daily. 100 each 11    zolpidem (AMBIEN) 10 mg Tab TAKE 1 TABLET BY MOUTH AT BEDTIME AS NEEDED FOR INSOMNIA 30 tablet 2    [DISCONTINUED] amLODIPine (NORVASC) 10 MG tablet Take 1 tablet (10 mg total) by mouth once daily. 90 tablet 3     No current facility-administered medications on file prior to visit.       Medications have been  reviewed and reconciled with patient at visit today.    Barriers to medications present (no )    Adverse reactions to current medications (no)      Exam:  Vitals:    12/28/23 1123   BP: (!) 140/98   Pulse: 98         There is no height or weight on file to calculate BMI.      BP Readings from Last 3 Encounters:   12/28/23 (!) 140/98   12/07/23 122/75   09/25/23 (!) 150/92     Wt Readings from Last 3 Encounters:   09/25/23 1408 91.6 kg (202 lb)   09/11/23 1413 91.7 kg (202 lb 1.6 oz)   07/14/23 1450 90.7 kg (199 lb 14.4 oz)        9/13/23   EXAM: US SOFT TISSUE HEAD NECK THYROID     CLINICAL HISTORY: Nontoxic multinodular goiter     FINDINGS:  Comparisons are made to 11/24/2020.  The right thyroid lobe measures 6.6 x 2.7 x 1.8 cm, and the left thyroid lobe measures 6.8 x 2.3 x 2.0 cm. The isthmus measures 3 mm in thickness.     Stable isoechoic/spongiform nodules within the inferior right thyroid gland, the largest measuring 1.4 cm and the smaller measuring 0.8 cm (unchanged in size or appearance in the interim).  Stable 6 mm complicated cyst in the mid right thyroid gland.  Stable small calcification in the mid right thyroid gland.  Subcentimeter mm cysts are present within the right thyroid gland.     Stable lobular spongiform nodule within the inferior left thyroid gland measuring 1.8 cm in greatest length.  Multiple colloid cysts measuring up to 5 mm noted as well.     The adjacent soft tissues are normal.        Impression:     1. Stable multinodular goiter.     TI-RADS Grading  0 points - Benign (No FNA)  2 points - Not suspicious (No FNA)  3 points - Mildly suspicious                   (1.5 cm to 2.4 cm - Follow up)                   (2.5 cm or greater - FNA)  4-6 points - Moderately suspicious                   (1.0 cm to 1.4 cm - Follow up)                   (1.5 cm or greater - FNA)  7 or greater points - Highly suspicious                    (0.5 cm to 0.9 cm - Follow up)                    (1.0 cm or  greater - FNA)       Physical Exam  Constitutional:       General: She is not in acute distress.  Pulmonary:      Effort: Pulmonary effort is normal.   Neurological:      Mental Status: She is alert.   Psychiatric:         Mood and Affect: Mood normal.         Behavior: Behavior normal.         Thought Content: Thought content normal.         Judgment: Judgment normal.         Laboratory Reviewed: (Yes)  Lab Results   Component Value Date    WBC 8.59 08/07/2023    HGB 10.4 (L) 08/07/2023    HCT 32.4 (L) 08/07/2023     08/07/2023    CHOL 162 05/02/2023    TRIG 126 05/02/2023    HDL 39 (L) 05/02/2023    ALT 12 05/02/2023    AST 15 05/02/2023     07/14/2023    K 4.2 07/14/2023     07/14/2023    CREATININE 0.79 07/14/2023    BUN 12 07/14/2023    CO2 26 07/14/2023    TSH 0.390 07/14/2023    INR 1.0 01/03/2023    HGBA1C 5.9 (H) 05/02/2023           Health Maintenance  Health Maintenance Topics with due status: Not Due       Topic Last Completion Date    Cervical Cancer Screening 09/29/2021    Diabetes Urine Screening 05/02/2023    Lipid Panel 05/02/2023    Eye Exam 08/21/2023    Low Dose Statin 09/25/2023     Health Maintenance Due   Topic Date Due    Pneumococcal Vaccines (Age 0-64) (1 - PCV) Never done    Foot Exam  Never done    TETANUS VACCINE  Never done    Shingles Vaccine (1 of 2) Never done    Colorectal Cancer Screening  06/24/2023    Mammogram  08/31/2023    Influenza Vaccine (1) Never done    COVID-19 Vaccine (4 - 2023-24 season) 09/01/2023    Hemoglobin A1c  11/02/2023       Assessment:  Problem List Items Addressed This Visit          Cardiac/Vascular    Hypertension associated with diabetes - Primary (Chronic)     Better control overall.   Continues clonidine TID, doxazosin, labetalol, spirinolactone.   Allergy to ARB.         Relevant Medications    dulaglutide (TRULICITY) 0.75 mg/0.5 mL pen injector    Other Relevant Orders    CBC Auto Differential    Comprehensive Metabolic Panel     Hemoglobin A1C       ID    Anemia of infection and chronic disease     Lab Results   Component Value Date    HGB 10.4 (L) 08/07/2023   Continues PO iron. Repeat CBC. B12, folate.          Relevant Orders    VITAMIN B12    FOLATE       Immunology/Multi System    Systemic lupus erythematosus     Encouraged follow up with rheumatology.          Relevant Orders    Ambulatory referral/consult to Endo Procedure     Leukocytoclastic vasculitis     Continue follow up with dermatology and rheumatology.             Endocrine    Thyroid nodule greater than or equal to 1 cm in diameter incidentally noted on imaging study (Chronic)     Continue surveillance. Decline repeat FNA.         Relevant Orders    TSH    T4, Free    T3    Type 2 diabetes mellitus without complication, with long-term current use of insulin     Better control.   Continues CGM, Lantus and Novolog insulins.   Metformin?         Relevant Medications    dulaglutide (TRULICITY) 0.75 mg/0.5 mL pen injector       Orthopedic    Avascular necrosis of bones of both hips     Followed by pain mgmt.           Other Visit Diagnoses       Current chronic use of systemic steroids        Relevant Orders    DXA Bone Density Axial Skeleton 1 or more sites    Other long term (current) drug therapy        Relevant Orders    VITAMIN B12    FOLATE    Encounter for screening colonoscopy        Relevant Orders    Ambulatory referral/consult to Endo Procedure               Plan:  Hypertension associated with diabetes  -     CBC Auto Differential; Future; Expected date: 12/28/2023  -     Comprehensive Metabolic Panel; Future; Expected date: 12/28/2023  -     Hemoglobin A1C; Future; Expected date: 12/28/2023    Anemia of infection and chronic disease  -     VITAMIN B12; Future; Expected date: 12/28/2023  -     FOLATE; Future; Expected date: 12/28/2023    Other forms of systemic lupus erythematosus, unspecified organ involvement status  -     Ambulatory  referral/consult to Endo Procedure ; Future; Expected date: 12/29/2023    Leukocytoclastic vasculitis    Thyroid nodule greater than or equal to 1 cm in diameter incidentally noted on imaging study  -     TSH; Future; Expected date: 12/28/2023  -     T4, Free; Future; Expected date: 12/28/2023  -     T3; Future; Expected date: 12/28/2023    Type 2 diabetes mellitus without complication, with long-term current use of insulin  -     dulaglutide (TRULICITY) 0.75 mg/0.5 mL pen injector; Inject 0.75 mg into the skin every 7 days.  Dispense: 4 pen ; Refill: 2    Avascular necrosis of bones of both hips    Current chronic use of systemic steroids  -     DXA Bone Density Axial Skeleton 1 or more sites; Future; Expected date: 12/28/2023    Other long term (current) drug therapy  -     VITAMIN B12; Future; Expected date: 12/28/2023  -     FOLATE; Future; Expected date: 12/28/2023    Encounter for screening colonoscopy  -     Ambulatory referral/consult to Endo Procedure ; Future; Expected date: 12/29/2023      -Patient's lab results were reviewed and discussed with patient  -Treatment options and alternatives were discussed with the patient. Patient expressed understanding. Patient was given the opportunity to ask questions and be an active participant in their medical care. Patient had no further questions or concerns at this time.   -Documentation of patient's health and condition was obtained from family member who was present during visit.  -Patient is an overall moderate risk for health complications from their medical conditions.       Follow up: Follow up in about 4 weeks (around 1/25/2024) for Diabetes management, HTN management.      Total medical decision making time was 64 min.  The following issues were discussed: The primary encounter diagnosis was Hypertension associated with diabetes. Diagnoses of Anemia of infection and chronic disease, Other forms of systemic lupus erythematosus, unspecified  organ involvement status, Leukocytoclastic vasculitis, Thyroid nodule greater than or equal to 1 cm in diameter incidentally noted on imaging study, Type 2 diabetes mellitus without complication, with long-term current use of insulin, Avascular necrosis of bones of both hips, Current chronic use of systemic steroids, Other long term (current) drug therapy, and Encounter for screening colonoscopy were also pertinent to this visit.    Health maintenance needs, recent test results and goals of care discussed with pt and questions answered.

## 2023-12-28 NOTE — Clinical Note
Marilou would like digital medicine to help manage her diabetes. They monitor her bP now. Would you write order plz?

## 2023-12-28 NOTE — PATIENT INSTRUCTIONS
If you are feeling unwell, we'd like to be the first ones to know here at [facility]! Please give us a call. Same day appointments are our top priority to keep you well and out of the emergency rooms and hospitals. Call [phone] for our direct line. After hours advice is always available. Please call [phone] after hours to speak to the on-call team.

## 2024-01-02 ENCOUNTER — HOSPITAL ENCOUNTER (OUTPATIENT)
Dept: PREADMISSION TESTING | Facility: HOSPITAL | Age: 49
Discharge: HOME | End: 2024-01-02
Attending: STUDENT IN AN ORGANIZED HEALTH CARE EDUCATION/TRAINING PROGRAM
Payer: MEDICARE

## 2024-01-02 ENCOUNTER — PATIENT MESSAGE (OUTPATIENT)
Dept: PREADMISSION TESTING | Facility: HOSPITAL | Age: 49
End: 2024-01-02

## 2024-01-02 DIAGNOSIS — M32.8 OTHER FORMS OF SYSTEMIC LUPUS ERYTHEMATOSUS, UNSPECIFIED ORGAN INVOLVEMENT STATUS: Primary | ICD-10-CM

## 2024-01-02 DIAGNOSIS — Z12.11 ENCOUNTER FOR SCREENING COLONOSCOPY: ICD-10-CM

## 2024-01-02 RX ORDER — POLYETHYLENE GLYCOL 3350, SODIUM SULFATE ANHYDROUS, SODIUM BICARBONATE, SODIUM CHLORIDE, POTASSIUM CHLORIDE 236; 22.74; 6.74; 5.86; 2.97 G/4L; G/4L; G/4L; G/4L; G/4L
4 POWDER, FOR SOLUTION ORAL ONCE
Qty: 4000 ML | Refills: 0 | Status: SHIPPED | OUTPATIENT
Start: 2024-01-02 | End: 2024-01-02

## 2024-01-04 ENCOUNTER — TELEPHONE (OUTPATIENT)
Dept: PRIMARY CARE CLINIC | Facility: CLINIC | Age: 49
End: 2024-01-04
Payer: MEDICARE

## 2024-01-04 NOTE — TELEPHONE ENCOUNTER
----- Message from Ashley Mendoza sent at 1/4/2024  9:37 AM CST -----  Pt called asking if she could receive a call back,she stated that she received a message in [employer] that she doesn't understand.Please contact pt at [phone] for any further information.

## 2024-01-05 ENCOUNTER — HOSPITAL ENCOUNTER (OUTPATIENT)
Dept: RADIOLOGY | Facility: HOSPITAL | Age: 49
Discharge: HOME | End: 2024-01-05
Attending: INTERNAL MEDICINE
Payer: MEDICARE

## 2024-01-05 DIAGNOSIS — Z12.31 ENCOUNTER FOR SCREENING MAMMOGRAM FOR BREAST CANCER: ICD-10-CM

## 2024-01-05 PROCEDURE — 77063 BREAST TOMOSYNTHESIS BI: CPT | Mod: 26,HCNC,, | Performed by: RADIOLOGY

## 2024-01-05 PROCEDURE — 77067 SCR MAMMO BI INCL CAD: CPT | Mod: 26,HCNC,, | Performed by: RADIOLOGY

## 2024-01-05 PROCEDURE — 77067 SCR MAMMO BI INCL CAD: CPT | Mod: TC,HCNC

## 2024-01-08 ENCOUNTER — TELEPHONE (OUTPATIENT)
Dept: PRIMARY CARE CLINIC | Facility: CLINIC | Age: 49
End: 2024-01-08
Payer: MEDICARE

## 2024-01-08 NOTE — TELEPHONE ENCOUNTER
----- Message from Alissa Bautista MD sent at 1/8/2024  2:05 PM CST -----  Pt has [facility] - if message below not viewed please call w information below:   Mammogram is normal.    Please message or call with any questions or concerns!  Thank you!  Alissa Bautista MD, MPH  [facility]

## 2024-01-08 NOTE — PROGRESS NOTES
Pt has [facility] - if message below not viewed please call w information below:   Mammogram is normal.    Please message or call with any questions or concerns!  Thank you!  Alissa Bautista MD, MPH  [facility]

## 2024-01-09 ENCOUNTER — TELEPHONE (OUTPATIENT)
Dept: PRIMARY CARE CLINIC | Facility: CLINIC | Age: 49
End: 2024-01-09
Payer: MEDICARE

## 2024-01-09 NOTE — TELEPHONE ENCOUNTER
Pt called stated that she is not currently taking Vitamin D or Calcium     Pt states that if she needs to take it can you send rx or does she just get over the counter.     [facility].

## 2024-01-18 DIAGNOSIS — F19.982 DRUG-INDUCED INSOMNIA: ICD-10-CM

## 2024-01-18 RX ORDER — ZOLPIDEM TARTRATE 10 MG/1
TABLET ORAL
Qty: 30 TABLET | Refills: 2 | Status: CANCELLED | OUTPATIENT
Start: 2024-01-18

## 2024-01-19 DIAGNOSIS — F19.982 DRUG-INDUCED INSOMNIA: ICD-10-CM

## 2024-01-19 RX ORDER — ZOLPIDEM TARTRATE 10 MG/1
TABLET ORAL
Qty: 30 TABLET | Refills: 0 | Status: SHIPPED | OUTPATIENT
Start: 2024-01-19 | End: 2024-04-26 | Stop reason: SDUPTHER

## 2024-01-22 NOTE — TELEPHONE ENCOUNTER
----- Message from Ashley Mendoza sent at 1/22/2024  1:41 PM CST -----  Regarding: Prescription  Pt called asking if she could receive a call back.She stated that she has attempted to get a refill on her sleeping medication on multiple occasions but hasn't heard anything.Please contact pt at [phone] for any further information.

## 2024-01-29 ENCOUNTER — OFFICE VISIT (OUTPATIENT)
Dept: PRIMARY CARE CLINIC | Facility: CLINIC | Age: 49
End: 2024-01-29
Payer: MEDICARE

## 2024-01-29 DIAGNOSIS — D50.9 IRON DEFICIENCY ANEMIA, UNSPECIFIED IRON DEFICIENCY ANEMIA TYPE: Primary | ICD-10-CM

## 2024-01-29 DIAGNOSIS — E04.1 THYROID NODULE GREATER THAN OR EQUAL TO 1 CM IN DIAMETER INCIDENTALLY NOTED ON IMAGING STUDY: Chronic | ICD-10-CM

## 2024-01-29 DIAGNOSIS — Z79.4 TYPE 2 DIABETES MELLITUS WITHOUT COMPLICATION, WITH LONG-TERM CURRENT USE OF INSULIN: ICD-10-CM

## 2024-01-29 DIAGNOSIS — M87.052 AVASCULAR NECROSIS OF BONES OF BOTH HIPS: ICD-10-CM

## 2024-01-29 DIAGNOSIS — M87.051 AVASCULAR NECROSIS OF BONES OF BOTH HIPS: ICD-10-CM

## 2024-01-29 DIAGNOSIS — E11.9 TYPE 2 DIABETES MELLITUS WITHOUT COMPLICATION, WITH LONG-TERM CURRENT USE OF INSULIN: ICD-10-CM

## 2024-01-29 DIAGNOSIS — Z92.241 PERSONAL HISTORY OF SYSTEMIC STEROID THERAPY: ICD-10-CM

## 2024-01-29 PROCEDURE — 1159F MED LIST DOCD IN RCRD: CPT | Mod: HCNC,CPTII,95, | Performed by: NURSE PRACTITIONER

## 2024-01-29 PROCEDURE — 3044F HG A1C LEVEL LT 7.0%: CPT | Mod: HCNC,CPTII,95, | Performed by: NURSE PRACTITIONER

## 2024-01-29 PROCEDURE — 99214 OFFICE O/P EST MOD 30 MIN: CPT | Mod: HCNC,95,, | Performed by: NURSE PRACTITIONER

## 2024-01-29 RX ORDER — DULAGLUTIDE 1.5 MG/.5ML
1.5 INJECTION, SOLUTION SUBCUTANEOUS
Qty: 4 PEN | Refills: 5 | Status: SHIPPED | OUTPATIENT
Start: 2024-01-29 | End: 2024-03-05

## 2024-01-29 NOTE — PROGRESS NOTES
Marilou Daniel  03/05/2024  [id number]    Alissa Bautista MD  Patient Care Team:  Alissa Bautista MD as PCP - General (Internal Medicine)  Joe Yo RD as Dietitian (Endocrinology)  Liaz Moon RD, CDE as Dietitian (Diabetes)  Travis Milligan, STEPHANIE (Ophthalmology)  Diane Dominguez NP as Nurse Practitioner (Family Medicine)  Adeline Marrero as Digital Medicine Health   Radha Watson, PharmD as Hypertension Digital Medicine Clinician (Pharmacist)  Alissa Bautista MD as Hypertension Digital Medicine Responsible Provider (Internal Medicine)  Medicare, [employer] Managed as Hypertension Digital Medicine Contract      [facility] Primary Care Note      The patient location is:  Patient Home   The chief complaint leading to consultation is: DM      Visit type: Virtual visit with synchronous audio and video  Each patient to whom he or she provides medical services by telemedicine is:  (1) informed of the relationship between the physician and patient and the respective role of any other health care provider with respect to management of the patient; and (2) notified that he or she may decline to receive medical services by telemedicine and may withdraw from such care at any time.    Chief Complaint:  Chief Complaint   Patient presents with    Follow-up     Pt has a follow up for new medication.        History of Present Illness:  HPI      Doing very well with Trulicity. Not using short acting insulin at all. Require PPI a little more often, some constipation but manages with OTCs.     BP much better controlled.   115/72 today.       The 10-year ASCVD risk score (Gray LOCKETT, et al., 2019) is: 15%    Values used to calculate the score:      Age: 48 years      Sex: Female      Is Non- : Yes      Diabetic: Yes      Tobacco smoker: No      Systolic Blood Pressure: 140 mmHg      Is BP treated: Yes      HDL Cholesterol: 39 mg/dL      Total  Cholesterol: 162 mg/dL      ROS      The following were reviewed: Active problem list, medication list, allergies, family history, social history, and Health Maintenance.     History:  Past Medical History:   Diagnosis Date    Anemia     Arthritis     Connective tissue disease 1999    COVID-19 in immunocompromised patient 02/05/2021    Diabetes mellitus     Drug induced insomnia 12/15/2020    R/T chronic steroid use for SLE.    Gastroesophageal reflux disease 03/23/2020    Hypertension     Lupus 1999    Situational anxiety 08/24/2021    Thyroid nodule greater than or equal to 1 cm in diameter incidentally noted on imaging study 11/19/2020    Vasculitis      Past Surgical History:   Procedure Laterality Date    ABLATION      COLONOSCOPY N/A 6/24/2020    Procedure: COLONOSCOPY;  Surgeon: Nevin Penny MD;  Location: [facility];  Service: Endoscopy;  Laterality: N/A;    ESOPHAGOGASTRODUODENOSCOPY N/A 6/24/2020    Procedure: ESOPHAGOGASTRODUODENOSCOPY (EGD);  Surgeon: Nevin Penny MD;  Location: [facility];  Service: Endoscopy;  Laterality: N/A;    RIGHT HEART CATHETERIZATION      TUBAL LIGATION      WRIST SURGERY Bilateral      Family History   Problem Relation Age of Onset    Lung cancer Mother 59        +hx of smoking    Cancer Father         type uk? stomach?    Breast cancer Other 58        UL mastect, mets to back & lungs    Breast cancer Other 61        chemo, XRT, UL mastect?    Autism Other     Asthma Other     Obesity Other     Liver cancer Other 58        mets to lungs, pancreas, stomach    Cancer Other         type uk?     Social History     Socioeconomic History    Marital status:    Tobacco Use    Smoking status: Never    Smokeless tobacco: Never   Substance and Sexual Activity    Alcohol use: Never    Drug use: Never    Sexual activity: Yes     Partners: Male     Birth control/protection: See Surgical Hx     Patient Active Problem List   Diagnosis    Hypertension associated with diabetes     Type 2 diabetes mellitus without complication, with long-term current use of insulin    Systemic lupus erythematosus    Leukocytoclastic vasculitis    Nonintractable headache    Gastroesophageal reflux disease    Neck pain    Hearing loss    Thyroid nodule greater than or equal to 1 cm in diameter incidentally noted on imaging study    Immunocompromised state due to drug therapy    Situational anxiety    Avascular necrosis of bones of both hips    Diabetes mellitus due to underlying condition with complication, with long-term current use of insulin    Primary snoring    Class 1 drug-induced obesity with serious comorbidity and body mass index (BMI) of 31.0 to 31.9 in adult    Iron deficiency anemia    Anemia of infection and chronic disease    Iron deficiency anemia secondary to inadequate dietary iron intake    Insomnia    Drug-induced constipation    History of colon polyps    Personal history of systemic steroid therapy     Review of patient's allergies indicates:   Allergen Reactions    Azathioprine Nausea And Vomiting     Nausea, vomiting, diarrhea dose and early in the course of therapy    Olmesartan Shortness Of Breath    Feraheme [ferumoxytol] Itching     Shortness of breath, rash    Oxycodone Itching     Other reaction(s): Unknown       Medications:  Current Outpatient Medications on File Prior to Visit   Medication Sig Dispense Refill    atorvastatin (LIPITOR) 10 MG tablet       betamethasone dipropionate 0.05 % cream APPLY A SMALL AMOUNT TO AFFECTED AREA TOPICALLY TWICE A DAY FOR 2 TO 3 WEEKS AT A TIME AS NEEDED FLARE UPS AVOID FACE & GROIN AREA      blood sugar diagnostic (TRUE METRIX GLUCOSE TEST STRIP) Strp Test 4 times daily. 100 strip 0    blood-glucose meter (TRUE METRIX GLUCOSE METER) Misc Use as directed 1 each 0    blood-glucose sensor (DEXCOM G7 SENSOR) Myrtle 1 each by Misc.(Non-Drug; Combo Route) route every 10 days. 3 each 11    blood-glucose transmitter (DEXCOM G5 TRANSMITTER) Myrtle 1 each  by Misc.(Non-Drug; Combo Route) route every 3 (three) months. 1 each 3    cloNIDine (CATAPRES) 0.1 MG tablet Take 0.2 mg by mouth 3 (three) times daily. Take 2 (0.1 mg) tablets three times a day      dapsone 100 MG Tab Take 100 mg by mouth once daily at 6am.      doxazosin (CARDURA) 2 MG tablet Take 4 mg by mouth every 12 (twelve) hours. Take 2 (2mg) tablets two times a day      famotidine (PEPCID) 40 MG tablet TAKE ONE TABLET BY MOUTH ONCE A DAY 30 tablet 2    gabapentin (NEURONTIN) 800 MG tablet Neurontin 800 mg tablet   Take 1 tablet 3 times a day by oral route.      HYDROcodone-acetaminophen (NORCO)  mg per tablet Norco 10 mg-325 mg tablet   Take 1 tablet 3 times a day by oral route as needed.      hydrocortisone 2.5 % ointment APPLY TOPICALLY TO FACE TWICE A DAY AS NEEDED FOR 1 TO 2 WEEKS AT A TIME      hydroxychloroquine (PLAQUENIL) 200 mg tablet Take 200 mg by mouth 2 (two) times daily.       hydrOXYzine HCL (ATARAX) 25 MG tablet TAKE ONE TABLET BY MOUTH EVERY 12 HOURS AS NEEDED FOR ITCHING 30 tablet 5    insulin (LANTUS SOLOSTAR U-100 INSULIN) glargine 100 units/mL SubQ pen Inject 10 Units into the skin once daily. 3 mL 11    insulin aspart U-100 (NOVOLOG) 100 unit/mL injection Inject 4 Units into the skin 3 (three) times daily before meals. 10.8 mL 3    labetaloL (NORMODYNE) 300 MG tablet Take 1 tablet (300 mg total) by mouth 2 (two) times daily. 60 tablet 11    mycophenolate (CELLCEPT) 250 mg Cap 3,000 mg once daily.      NIFEdipine (PROCARDIA-XL) 30 MG (OSM) 24 hr tablet Take 1 tablet (30 mg total) by mouth every 12 (twelve) hours. 60 tablet 11    ondansetron (ZOFRAN-ODT) 4 MG TbDL DISSOLVE 1 TABLET BY MOUTH EVERY 8 HOURS AS NEEDED 30 tablet 1    pantoprazole (PROTONIX) 40 MG tablet Take 1 tablet (40 mg total) by mouth 2 (two) times daily for 90 days, THEN 1 tablet (40 mg total) once daily. 360 tablet 0    spironolactone (ALDACTONE) 50 MG tablet Take 1 tablet (50 mg total) by mouth once daily. 30  tablet 11    tiZANidine (ZANAFLEX) 4 MG tablet TK 1 T PO TID PRN      TRUEPLUS LANCETS 33 gauge Misc Inject 1 lancet as directed 4 (four) times daily. 100 each 11    zolpidem (AMBIEN) 10 mg Tab TAKE 1/2 to 1 TABLET BY MOUTH AT BEDTIME AS NEEDED FOR INSOMNIA 30 tablet 0    blood-glucose meter,continuous (DEXCOM ) Misc 1 each by Misc.(Non-Drug; Combo Route) route once. for 1 dose 1 each 3    LORazepam (ATIVAN) 0.5 MG tablet Take 1 tablet (0.5 mg total) by mouth every 12 (twelve) hours as needed for Anxiety. 30 tablet 2    sertraline (ZOLOFT) 50 MG tablet Take 1 tablet (50 mg total) by mouth once daily. 30 tablet 11    [DISCONTINUED] amLODIPine (NORVASC) 10 MG tablet Take 1 tablet (10 mg total) by mouth once daily. 90 tablet 3     No current facility-administered medications on file prior to visit.       Medications have been reviewed and reconciled with patient at visit today.    Barriers to medications present (no )    Adverse reactions to current medications (no)      Exam:  There were no vitals filed for this visit.      There is no height or weight on file to calculate BMI.      BP Readings from Last 3 Encounters:   12/28/23 (!) 140/98   12/07/23 122/75   09/25/23 (!) 150/92     Wt Readings from Last 3 Encounters:   09/25/23 1408 91.6 kg (202 lb)   09/11/23 1413 91.7 kg (202 lb 1.6 oz)   07/14/23 1450 90.7 kg (199 lb 14.4 oz)            Physical Exam  Constitutional:       General: She is not in acute distress.     Appearance: She is not ill-appearing.   Eyes:      General: No scleral icterus.  Pulmonary:      Effort: Pulmonary effort is normal.   Neurological:      Mental Status: She is alert.   Psychiatric:         Mood and Affect: Mood normal.         Behavior: Behavior normal.         Thought Content: Thought content normal.         Laboratory Reviewed: (Yes)  Lab Results   Component Value Date    WBC 8.59 08/07/2023    HGB 10.4 (L) 08/07/2023    HCT 32.4 (L) 08/07/2023     08/07/2023    CHOL  162 05/02/2023    TRIG 126 05/02/2023    HDL 39 (L) 05/02/2023    ALT 10 01/02/2024    AST 14 01/02/2024     01/02/2024    K 4.0 01/02/2024     01/02/2024    CREATININE 0.8 01/02/2024    BUN 8 01/02/2024    CO2 26 01/02/2024    TSH 0.468 01/02/2024    INR 1.0 01/03/2023    HGBA1C 6.1 (H) 01/02/2024           Health Maintenance  Health Maintenance Topics with due status: Not Due       Topic Last Completion Date    Cervical Cancer Screening 09/29/2021    Diabetes Urine Screening 05/02/2023    Lipid Panel 05/02/2023    Eye Exam 08/21/2023    Hemoglobin A1c 01/02/2024    Mammogram 01/05/2024    Low Dose Statin 01/29/2024     Health Maintenance Due   Topic Date Due    Pneumococcal Vaccines (Age 0-64) (1 of 2 - PCV) Never done    Foot Exam  Never done    TETANUS VACCINE  Never done    Shingles Vaccine (1 of 2) Never done    Colorectal Cancer Screening  06/24/2023    Influenza Vaccine (1) Never done    COVID-19 Vaccine (4 - 2023-24 season) 09/01/2023       Assessment:  Problem List Items Addressed This Visit          Oncology    Iron deficiency anemia - Primary     Repeat CBC and need iron studies dated 11/2023.            Endocrine    Thyroid nodule greater than or equal to 1 cm in diameter incidentally noted on imaging study (Chronic)     Repeat thyroid ultrasound. Previously intolerant of biopsy.   Has seen Dr Jarvis previously for this.         Type 2 diabetes mellitus without complication, with long-term current use of insulin     Controlled. Will increase Trulicity to 1.5 mg weekly.   Now using insulin inconsistently.          Relevant Medications    dulaglutide (TRULICITY) 1.5 mg/0.5 mL pen injector    Personal history of systemic steroid therapy     For SLE.   Now minimal use for flares only per Dr Wayne.   AVN hips.   HTN and DMII better controlled.  Osteopenia on DXA Jan, 2024.            Orthopedic    Avascular necrosis of bones of both hips     Followed by orthopedist [facility].   Also  followed by pain mgmt.               Plan:  Iron deficiency anemia, unspecified iron deficiency anemia type    Type 2 diabetes mellitus without complication, with long-term current use of insulin  -     dulaglutide (TRULICITY) 1.5 mg/0.5 mL pen injector; Inject 1.5 mg into the skin every 7 days.  Dispense: 4 pen ; Refill: 5    Thyroid nodule greater than or equal to 1 cm in diameter incidentally noted on imaging study    Avascular necrosis of bones of both hips    Personal history of systemic steroid therapy      -Patient's lab results were reviewed and discussed with patient  -Treatment options and alternatives were discussed with the patient. Patient expressed understanding. Patient was given the opportunity to ask questions and be an active participant in their medical care. Patient had no further questions or concerns at this time.   -Documentation of patient's health and condition was obtained from family member who was present during visit.  -Patient is an overall moderate risk for health complications from their medical conditions.       Follow up: Follow up in about 6 weeks (around 3/11/2024).      Total medical decision making time was 38 min.  The following issues were discussed: The primary encounter diagnosis was Iron deficiency anemia, unspecified iron deficiency anemia type. Diagnoses of Type 2 diabetes mellitus without complication, with long-term current use of insulin, Thyroid nodule greater than or equal to 1 cm in diameter incidentally noted on imaging study, Avascular necrosis of bones of both hips, and Personal history of systemic steroid therapy were also pertinent to this visit.    Health maintenance needs, recent test results and goals of care discussed with pt and questions answered.

## 2024-01-30 PROBLEM — Z86.010 HISTORY OF COLON POLYPS: Status: ACTIVE | Noted: 2024-01-30

## 2024-01-30 PROBLEM — Z86.0100 HISTORY OF COLON POLYPS: Status: ACTIVE | Noted: 2024-01-30

## 2024-02-28 ENCOUNTER — PATIENT MESSAGE (OUTPATIENT)
Dept: ADMINISTRATIVE | Facility: OTHER | Age: 49
End: 2024-02-28
Payer: MEDICARE

## 2024-03-01 ENCOUNTER — TELEPHONE (OUTPATIENT)
Dept: PRIMARY CARE CLINIC | Facility: CLINIC | Age: 49
End: 2024-03-01
Payer: MEDICARE

## 2024-03-01 NOTE — TELEPHONE ENCOUNTER
----- Message from Ashley Mendoza sent at 2/29/2024  4:21 PM CST -----  Pt called asking if you could give her a call to discuss her lab results,she asked if you could call her at [phone].

## 2024-03-05 ENCOUNTER — OFFICE VISIT (OUTPATIENT)
Dept: PRIMARY CARE CLINIC | Facility: CLINIC | Age: 49
End: 2024-03-05
Payer: MEDICARE

## 2024-03-05 DIAGNOSIS — E11.59 HYPERTENSION ASSOCIATED WITH DIABETES: Chronic | ICD-10-CM

## 2024-03-05 DIAGNOSIS — R51.9 NONINTRACTABLE HEADACHE, UNSPECIFIED CHRONICITY PATTERN, UNSPECIFIED HEADACHE TYPE: Primary | ICD-10-CM

## 2024-03-05 DIAGNOSIS — M87.052 AVASCULAR NECROSIS OF BONES OF BOTH HIPS: ICD-10-CM

## 2024-03-05 DIAGNOSIS — M87.051 AVASCULAR NECROSIS OF BONES OF BOTH HIPS: ICD-10-CM

## 2024-03-05 DIAGNOSIS — I15.2 HYPERTENSION ASSOCIATED WITH DIABETES: Chronic | ICD-10-CM

## 2024-03-05 DIAGNOSIS — B99.9 ANEMIA OF INFECTION AND CHRONIC DISEASE: ICD-10-CM

## 2024-03-05 DIAGNOSIS — Z92.241 PERSONAL HISTORY OF SYSTEMIC STEROID THERAPY: ICD-10-CM

## 2024-03-05 DIAGNOSIS — D63.8 ANEMIA OF INFECTION AND CHRONIC DISEASE: ICD-10-CM

## 2024-03-05 DIAGNOSIS — Z12.11 ENCOUNTER FOR SCREENING COLONOSCOPY: ICD-10-CM

## 2024-03-05 DIAGNOSIS — M32.8 OTHER FORMS OF SYSTEMIC LUPUS ERYTHEMATOSUS, UNSPECIFIED ORGAN INVOLVEMENT STATUS: ICD-10-CM

## 2024-03-05 DIAGNOSIS — E04.1 THYROID NODULE GREATER THAN OR EQUAL TO 1 CM IN DIAMETER INCIDENTALLY NOTED ON IMAGING STUDY: Chronic | ICD-10-CM

## 2024-03-05 PROCEDURE — 3044F HG A1C LEVEL LT 7.0%: CPT | Mod: HCNC,CPTII,95, | Performed by: NURSE PRACTITIONER

## 2024-03-05 PROCEDURE — 99215 OFFICE O/P EST HI 40 MIN: CPT | Mod: HCNC,95,, | Performed by: NURSE PRACTITIONER

## 2024-03-05 NOTE — ASSESSMENT & PLAN NOTE
Managed closely by Dr Wayne.   Much better controlled. Now off of prednisone!  Continues cellcept.

## 2024-03-05 NOTE — ASSESSMENT & PLAN NOTE
For SLE.   Now minimal use for flares only per Dr Wayne.   AVN hips.   HTN and DMII better controlled.  Osteopenia on DXA Jan, 2024.

## 2024-03-05 NOTE — ASSESSMENT & PLAN NOTE
BP Readings from Last 3 Encounters:   12/28/23 (!) 140/98   12/07/23 122/75   09/25/23 (!) 150/92     Lab Results   Component Value Date    HGBA1C 6.1 (H) 01/02/2024     Continues digital med for HTN mgmt, Dexcom for DMII.

## 2024-03-05 NOTE — PATIENT INSTRUCTIONS
If you are feeling unwell, we'd like to be the first ones to know here at [facility]! Please give us a call. Same day appointments are our top priority to keep you well and out of the emergency rooms and hospitals. Call [phone] for our direct line. After hours advice is always available. Please call [phone] after hours to speak to the on-call team.

## 2024-03-05 NOTE — PATIENT INSTRUCTIONS
If you are feeling unwell, we'd like to be the first ones to know here at [facility]! Please give us a call. Same day appointments are our top priority to keep you well and out of the emergency rooms and hospitals. Call [phone] for our direct line. After hours advice is always available. Please call [phone] after hours to speak to the on-call team.

## 2024-03-05 NOTE — ASSESSMENT & PLAN NOTE
Need iron studies requested to be drawn in November 2023.   Hgb at [facility] 2 weeks ago 9.6.  Will refer to hematology. Previous iron infusions?

## 2024-03-05 NOTE — PROGRESS NOTES
Mairlou Daniel  04/16/2024  [id number]    Alissa Bautista MD  Patient Care Team:  Alissa Bautista MD as PCP - General (Internal Medicine)  Joe Yo RD as Dietitian (Endocrinology)  Liza Moon RD, CDE as Dietitian (Diabetes)  Travis Milligan, OD (Ophthalmology)  Diane Dominguez NP as Nurse Practitioner (Family Medicine)  Adeline Marrero as Digital Medicine Health   Radha Watson, PharmD as Hypertension Digital Medicine Clinician (Pharmacist)  Alissa Bautista MD as Hypertension Digital Medicine Responsible Provider (Internal Medicine)  Medicare, [employer] Managed as Hypertension Digital Medicine Contract      [facility] Primary Care Note      The patient location is:  Patient Home   The chief complaint leading to consultation is: DMII follow up      Visit type: Virtual visit with synchronous audio and video  Each patient to whom he or she provides medical services by telemedicine is:  (1) informed of the relationship between the physician and patient and the respective role of any other health care provider with respect to management of the patient; and (2) notified that he or she may decline to receive medical services by telemedicine and may withdraw from such care at any time.    Chief Complaint:  No chief complaint on file.      History of Present Illness:  HPI    Trulicity dose increased Jan 29.  DMII much better controlled.   HTN better controlled.     Was to have surgery 3/14/24. Breast reduction.   Hgb 9.6 on 2/15/24.   Symptomatic, chills, fatigue.   Cannot tolerate PO iron. Has appt to see hematology.  This has been rescheduled to end of May.     Doing well with Trulicity. Eye exam yesterday.   Some retinopathy noted.   Not an eye problem with Plaquenil.    Intermittent fasting now.   Digital med not able to monitor DMII.   Using insulin about 3 times weekly.   No recent low CBGs other than when she doesn't eat.   Dexcom alarms when below 100.      Plans to follow up with Dr Jarvis.     Down to 190 lbs.     SLE out of control the past 3 days. Previous months had been doing well. Attributes to stress.     Ambien 10 mg barely working. Previously tried multiple sleep meds that were ineffective. Has been awake multiple days when she missed doses.     Colonoscopy scheduled but could not take prep. Interested in pill prep.     Taking OTC vitamin D.     Urinating frequently for several months. Doesn't feel like she is fully emptying her bladder.        3/3/2024 2/29/2024 2/28/2024 2/25/2024 2/24/2024                   Recent Readings   SBP (mmHg) 111 157 107 112 128   DBP (mmHg) 70 89 72 66 85   Pulse 81 69 84 89 85       The 10-year ASCVD risk score (Gray DK, et al., 2019) is: 7.2%    Values used to calculate the score:      Age: 48 years      Sex: Female      Is Non- : Yes      Diabetic: Yes      Tobacco smoker: No      Systolic Blood Pressure: 116 mmHg      Is BP treated: Yes      HDL Cholesterol: 39 mg/dL      Total Cholesterol: 162 mg/dL      ROS      The following were reviewed: Active problem list, medication list, allergies, family history, social history, and Health Maintenance.     History:  Past Medical History:   Diagnosis Date    Anemia     Arthritis     Connective tissue disease 1999    COVID-19 in immunocompromised patient 02/05/2021    Diabetes mellitus     Drug induced insomnia 12/15/2020    R/T chronic steroid use for SLE.    Gastroesophageal reflux disease 03/23/2020    Hypertension     Lupus 1999    Situational anxiety 08/24/2021    Thyroid nodule greater than or equal to 1 cm in diameter incidentally noted on imaging study 11/19/2020    Vasculitis      Past Surgical History:   Procedure Laterality Date    ABLATION      COLONOSCOPY N/A 6/24/2020    Procedure: COLONOSCOPY;  Surgeon: Nevin Penny MD;  Location: [facility];  Service: Endoscopy;  Laterality: N/A;    COLONOSCOPY N/A 3/28/2024    Procedure:  COLONOSCOPY;  Surgeon: José Manuel Huerta MD;  Location: [facility] ENDO;  Service: Endoscopy;  Laterality: N/A;    ESOPHAGOGASTRODUODENOSCOPY N/A 6/24/2020    Procedure: ESOPHAGOGASTRODUODENOSCOPY (EGD);  Surgeon: Nevin Penny MD;  Location: [facility] ENDO;  Service: Endoscopy;  Laterality: N/A;    RIGHT HEART CATHETERIZATION      TUBAL LIGATION      WRIST SURGERY Bilateral      Family History   Problem Relation Name Age of Onset    Lung cancer Mother Nichole 59        +hx of smoking    Cancer Father Jack         type uk? stomach?    Breast cancer Other Nimo 58        UL mastect, mets to back & lungs    Breast cancer Other Roz 61        chemo, XRT, UL mastect?    Autism Other Angel Jr     Asthma Other Ирина     Obesity Other Ирина     Liver cancer Other Afshin 58        mets to lungs, pancreas, stomach    Cancer Other Reji         type uk?     Social History     Socioeconomic History    Marital status:    Tobacco Use    Smoking status: Never    Smokeless tobacco: Never   Substance and Sexual Activity    Alcohol use: Never    Drug use: Never    Sexual activity: Yes     Partners: Male     Birth control/protection: See Surgical Hx     Social Determinants of Health     Financial Resource Strain: Medium Risk (3/14/2024)    Overall Financial Resource Strain (CARDIA)     Difficulty of Paying Living Expenses: Somewhat hard   Food Insecurity: Unknown (3/14/2024)    Hunger Vital Sign     Worried About Running Out of Food in the Last Year: Never true     Ran Out of Food in the Last Year: Patient declined   Transportation Needs: No Transportation Needs (3/14/2024)    PRAPARE - Transportation     Lack of Transportation (Medical): No     Lack of Transportation (Non-Medical): No   Physical Activity: Inactive (3/14/2024)    Exercise Vital Sign     Days of Exercise per Week: 0 days     Minutes of Exercise per Session: 30 min   Stress: Stress Concern Present (3/14/2024)    [employer] -  Occupational Stress Questionnaire     Feeling of Stress : To some extent   Social Connections: Unknown (3/14/2024)    Social Connection and Isolation Panel [NHANES]     Frequency of Communication with Friends and Family: More than three times a week     Frequency of Social Gatherings with Friends and Family: More than three times a week     Active Member of Clubs or Organizations: Yes     Attends Club or Organization Meetings: More than 4 times per year     Marital Status:    Housing Stability: Low Risk  (3/14/2024)    Housing Stability Vital Sign     Unable to Pay for Housing in the Last Year: No     Number of Places Lived in the Last Year: 1     Unstable Housing in the Last Year: No     Patient Active Problem List   Diagnosis    Hypertension associated with diabetes    Type 2 diabetes mellitus without complication, with long-term current use of insulin    Systemic lupus erythematosus    Leukocytoclastic vasculitis    Nonintractable headache    Gastroesophageal reflux disease    Neck pain    Hearing loss    Thyroid nodule greater than or equal to 1 cm in diameter incidentally noted on imaging study    Immunocompromised state due to drug therapy    Situational anxiety    Avascular necrosis of bones of both hips    Diabetes mellitus due to underlying condition with complication, with long-term current use of insulin    Primary snoring    Class 1 drug-induced obesity with serious comorbidity and body mass index (BMI) of 31.0 to 31.9 in adult    Anemia of infection and chronic disease    Iron deficiency anemia secondary to inadequate dietary iron intake    Insomnia    Drug-induced constipation    History of colon polyps    Personal history of systemic steroid therapy    MGUS (monoclonal gammopathy of unknown significance)     Review of patient's allergies indicates:   Allergen Reactions    Azathioprine Nausea And Vomiting     Nausea, vomiting, diarrhea dose and early in the course of therapy    Olmesartan  Shortness Of Breath    Feraheme [ferumoxytol] Itching     Shortness of breath, rash    Oxycodone Itching     Other reaction(s): Unknown       Medications:  Current Outpatient Medications on File Prior to Visit   Medication Sig Dispense Refill    dapsone 100 MG Tab Take 100 mg by mouth once daily at 6am.      doxazosin (CARDURA) 2 MG tablet Take 4 mg by mouth every 12 (twelve) hours. Take 2 (2mg) tablets two times a day      famotidine (PEPCID) 40 MG tablet TAKE ONE TABLET BY MOUTH ONCE A DAY 30 tablet 2    gabapentin (NEURONTIN) 800 MG tablet Neurontin 800 mg tablet   Take 1 tablet 3 times a day by oral route.      labetaloL (NORMODYNE) 300 MG tablet Take 1 tablet (300 mg total) by mouth 2 (two) times daily. 60 tablet 11    NIFEdipine (PROCARDIA-XL) 30 MG (OSM) 24 hr tablet Take 1 tablet (30 mg total) by mouth every 12 (twelve) hours. 60 tablet 11    pantoprazole (PROTONIX) 40 MG tablet Take 1 tablet (40 mg total) by mouth 2 (two) times daily for 90 days, THEN 1 tablet (40 mg total) once daily. 360 tablet 0    zolpidem (AMBIEN) 10 mg Tab TAKE 1/2 to 1 TABLET BY MOUTH AT BEDTIME AS NEEDED FOR INSOMNIA 30 tablet 0    atorvastatin (LIPITOR) 10 MG tablet       betamethasone dipropionate 0.05 % cream APPLY A SMALL AMOUNT TO AFFECTED AREA TOPICALLY TWICE A DAY FOR 2 TO 3 WEEKS AT A TIME AS NEEDED FLARE UPS AVOID FACE & GROIN AREA      blood sugar diagnostic (TRUE METRIX GLUCOSE TEST STRIP) Strp Test 4 times daily. 100 strip 0    blood-glucose meter (TRUE METRIX GLUCOSE METER) Misc Use as directed 1 each 0    blood-glucose meter,continuous (DEXCOM ) Misc 1 each by Misc.(Non-Drug; Combo Route) route once. for 1 dose 1 each 3    blood-glucose sensor (DEXCOM G7 SENSOR) Myrtle 1 each by Misc.(Non-Drug; Combo Route) route every 10 days. 3 each 11    blood-glucose transmitter (DEXCOM G5 TRANSMITTER) Myrtle 1 each by Misc.(Non-Drug; Combo Route) route every 3 (three) months. 1 each 3    HYDROcodone-acetaminophen (NORCO)   mg per tablet Norco 10 mg-325 mg tablet   Take 1 tablet 3 times a day by oral route as needed.      hydrocortisone 2.5 % ointment APPLY TOPICALLY TO FACE TWICE A DAY AS NEEDED FOR 1 TO 2 WEEKS AT A TIME      hydroxychloroquine (PLAQUENIL) 200 mg tablet Take 200 mg by mouth 2 (two) times daily.       hydrOXYzine HCL (ATARAX) 25 MG tablet TAKE ONE TABLET BY MOUTH EVERY 12 HOURS AS NEEDED FOR ITCHING 30 tablet 5    insulin (LANTUS SOLOSTAR U-100 INSULIN) glargine 100 units/mL SubQ pen Inject 10 Units into the skin once daily. 3 mL 11    insulin aspart U-100 (NOVOLOG) 100 unit/mL injection Inject 4 Units into the skin 3 (three) times daily before meals. 10.8 mL 3    LORazepam (ATIVAN) 0.5 MG tablet Take 1 tablet (0.5 mg total) by mouth every 12 (twelve) hours as needed for Anxiety. 30 tablet 2    mycophenolate (CELLCEPT) 250 mg Cap 3,000 mg once daily.      ondansetron (ZOFRAN-ODT) 4 MG TbDL DISSOLVE 1 TABLET BY MOUTH EVERY 8 HOURS AS NEEDED 30 tablet 1    spironolactone (ALDACTONE) 50 MG tablet Take 1 tablet (50 mg total) by mouth once daily. 30 tablet 11    tiZANidine (ZANAFLEX) 4 MG tablet TK 1 T PO TID PRN      TRUEPLUS LANCETS 33 gauge Misc Inject 1 lancet as directed 4 (four) times daily. 100 each 11    [DISCONTINUED] amLODIPine (NORVASC) 10 MG tablet Take 1 tablet (10 mg total) by mouth once daily. 90 tablet 3     No current facility-administered medications on file prior to visit.       Medications have been reviewed and reconciled with patient at visit today.    Barriers to medications present (no )    Adverse reactions to current medications (no)      Exam:  There were no vitals filed for this visit.      There is no height or weight on file to calculate BMI.      BP Readings from Last 3 Encounters:   04/15/24 116/72   04/10/24 136/87   04/08/24 126/80     Wt Readings from Last 3 Encounters:   04/10/24 1052 92.9 kg (204 lb 12.9 oz)   03/28/24 0804 88 kg (194 lb)   09/25/23 1408 91.6 kg (202 lb)             Physical Exam    Laboratory Reviewed: (Yes)  Lab Results   Component Value Date    WBC 7.72 03/11/2024    HGB 8.5 (L) 03/11/2024    HCT 25.6 (L) 03/11/2024     03/11/2024    CHOL 162 05/02/2023    TRIG 126 05/02/2023    HDL 39 (L) 05/02/2023    ALT 12 03/11/2024    AST 12 03/11/2024     03/11/2024    K 3.7 03/11/2024     03/11/2024    CREATININE 0.8 03/11/2024    BUN 10 03/11/2024    CO2 28 03/11/2024    TSH 0.468 01/02/2024    INR 1.0 01/03/2023    HGBA1C 6.1 (H) 01/02/2024       Ref Range & Units 2 wk ago Comments    White Blood Cell Count 4.0 - 11.0 1000/uL 9.3    Red Blood Cell Count 3.80 - 5.30 mill/uL 3.42 Low     Hemoglobin 12.0 - 16.0 g/dL 9.6 Low     Hematocrit 37.0 - 47.0 % 29.8 Low     Mean Corpuscular Volume 80 - 100 fL 87    Mean Corpuscular Hemoglobin Conc 31.0 - 37.0 g/dL 32.2    Red Cell Distribution Width 12.1 - 14.9 % 14.7    Platelet Count 150 - 375 K/uL 364 MEAN PLATELET VOLUME (MPV)   10.8             fL         6.5-12.0   N   Neutrophils % 44 - 81 % 63    Lymphocytes % 21 - 47 % 30    Monocytes % 2 - 11 % 5    Eosinophils % 0 - 7 % 2    Basophils % 0 - 1 % 0    Neutrophils Abs 1.5 - 10.0 1000/UL 5.9    Lymphocytes Abs 1.3 - 2.9 1000/ul 2.8    Monocytes Abs 0.1 - 1.0 1000/ul 0.5    Eosinophils Abs 0.0 - 0.7 1000/UL 0.2    Basophils Abs 0.0 - 0.1 1000/UL 0.0    Immature Granulocytes 0.0 - 0.6 % 0.2    Immature Grans (Abs) 0.00 - 0.09 1000/ul <0.03    nRBC 0.0 - 0.0 /100 WBCs 0.0          Health Maintenance  Health Maintenance Topics with due status: Not Due       Topic Last Completion Date    Cervical Cancer Screening 09/29/2021    Eye Exam 08/21/2023    Hemoglobin A1c 01/02/2024    Mammogram 01/05/2024    Colorectal Cancer Screening 03/28/2024    Low Dose Statin 04/10/2024     Health Maintenance Due   Topic Date Due    Foot Exam  Never done    TETANUS VACCINE  Never done    Shingles Vaccine (1 of 2) Never done    Influenza Vaccine (1) Never done    Diabetes Urine  Screening  05/02/2024    Lipid Panel  05/02/2024    COVID-19 Vaccine (5 - 2023-24 season) 05/11/2024       Assessment:  Problem List Items Addressed This Visit          Neuro    Nonintractable headache - Primary     Cervicogenic per neurologist, Dr Kothari.             Cardiac/Vascular    Hypertension associated with diabetes (Chronic)     BP Readings from Last 3 Encounters:   12/28/23 (!) 140/98   12/07/23 122/75   09/25/23 (!) 150/92     Lab Results   Component Value Date    HGBA1C 6.1 (H) 01/02/2024     Continues digital med for HTN mgmt, Dexcom for DMII.            Relevant Medications    empagliflozin (JARDIANCE) 10 mg tablet       ID    Anemia of infection and chronic disease     Need iron studies requested to be drawn in November 2023.   Hgb at [facility] 2 weeks ago 9.6.  Will refer to hematology. Previous iron infusions?            Immunology/Multi System    Systemic lupus erythematosus     Managed closely by Dr Wayne.   Much better controlled. Now off of prednisone!  Continues cellcept.            Endocrine    Thyroid nodule greater than or equal to 1 cm in diameter incidentally noted on imaging study (Chronic)     Repeat thyroid ultrasound. Previously intolerant of biopsy.   Has seen Dr Jarvis previously for this.         Personal history of systemic steroid therapy     For SLE.   Now minimal use for flares only per Dr Wayne.   AVN hips.   HTN and DMII better controlled.  Osteopenia on DXA Jan, 2024.            Orthopedic    Avascular necrosis of bones of both hips     Follow[facility] by [facility].   Also followed by pain mgmt.           Other Visit Diagnoses       Encounter for screening colonoscopy        Relevant Orders    Ambulatory referral/consult to Endo Procedure               Plan:  Nonintractable headache, unspecified chronicity pattern, unspecified headache type    Hypertension associated with diabetes  -     empagliflozin (JARDIANCE) 10 mg tablet; Take 1 tablet (10 mg  total) by mouth once daily.  Dispense: 30 tablet; Refill: 11    Anemia of infection and chronic disease  -     Cancel: Comprehensive Metabolic Panel; Future; Expected date: 03/05/2024    Other forms of systemic lupus erythematosus, unspecified organ involvement status    Thyroid nodule greater than or equal to 1 cm in diameter incidentally noted on imaging study    Personal history of systemic steroid therapy    Avascular necrosis of bones of both hips    Encounter for screening colonoscopy  -     Ambulatory referral/consult to Endo Procedure ; Future; Expected date: 03/06/2024      -Patient's lab results were reviewed and discussed with patient  -Treatment options and alternatives were discussed with the patient. Patient expressed understanding. Patient was given the opportunity to ask questions and be an active participant in their medical care. Patient had no further questions or concerns at this time.   -Documentation of patient's health and condition was obtained from family member who was present during visit.  -Patient is an overall moderate risk for health complications from their medical conditions.       Follow up: Follow up in about 2 months (around 5/5/2024).      Total medical decision making time was 47 min.  The following issues were discussed: The primary encounter diagnosis was Nonintractable headache, unspecified chronicity pattern, unspecified headache type. Diagnoses of Hypertension associated with diabetes, Anemia of infection and chronic disease, Other forms of systemic lupus erythematosus, unspecified organ involvement status, Thyroid nodule greater than or equal to 1 cm in diameter incidentally noted on imaging study, Personal history of systemic steroid therapy, Avascular necrosis of bones of both hips, and Encounter for screening colonoscopy were also pertinent to this visit.    Health maintenance needs, recent test results and goals of care discussed with pt and questions  answered.

## 2024-03-05 NOTE — ASSESSMENT & PLAN NOTE
Repeat thyroid ultrasound. Previously intolerant of biopsy.   Has seen Dr Jarvis previously for this.

## 2024-03-06 ENCOUNTER — TELEPHONE (OUTPATIENT)
Dept: GASTROENTEROLOGY | Facility: CLINIC | Age: 49
End: 2024-03-06
Payer: MEDICARE

## 2024-03-06 ENCOUNTER — HOSPITAL ENCOUNTER (OUTPATIENT)
Dept: PREADMISSION TESTING | Facility: HOSPITAL | Age: 49
Discharge: HOME | End: 2024-03-06
Attending: FAMILY MEDICINE
Payer: MEDICARE

## 2024-03-06 DIAGNOSIS — D53.9 NUTRITIONAL ANEMIA, UNSPECIFIED: ICD-10-CM

## 2024-03-06 DIAGNOSIS — Z12.11 ENCOUNTER FOR SCREENING COLONOSCOPY: ICD-10-CM

## 2024-03-06 DIAGNOSIS — D47.2 MGUS (MONOCLONAL GAMMOPATHY OF UNKNOWN SIGNIFICANCE): Primary | ICD-10-CM

## 2024-03-06 RX ORDER — SOD SULF/POT CHLORIDE/MAG SULF 1.479 G
12 TABLET ORAL DAILY
Qty: 24 TABLET | Refills: 0 | Status: ON HOLD | OUTPATIENT
Start: 2024-03-06 | End: 2024-03-28 | Stop reason: HOSPADM

## 2024-03-06 NOTE — TELEPHONE ENCOUNTER
Prior auth done on cover my meds for sutab. Pt can not tolerated liquid preps, makes her vomit.   Pending response from cover my meds.

## 2024-03-11 ENCOUNTER — LAB VISIT (OUTPATIENT)
Dept: LAB | Facility: HOSPITAL | Age: 49
End: 2024-03-11
Attending: NURSE PRACTITIONER
Payer: MEDICARE

## 2024-03-11 DIAGNOSIS — D47.2 MGUS (MONOCLONAL GAMMOPATHY OF UNKNOWN SIGNIFICANCE): ICD-10-CM

## 2024-03-11 DIAGNOSIS — D53.9 NUTRITIONAL ANEMIA, UNSPECIFIED: ICD-10-CM

## 2024-03-11 LAB
ALBUMIN SERPL BCP-MCNC: 3.8 G/DL (ref 3.5–5.2)
ALP SERPL-CCNC: 83 U/L (ref 55–135)
ALT SERPL W/O P-5'-P-CCNC: 12 U/L (ref 10–44)
ANION GAP SERPL CALC-SCNC: 5 MMOL/L (ref 8–16)
AST SERPL-CCNC: 12 U/L (ref 10–40)
BASOPHILS # BLD AUTO: 0.03 K/UL (ref 0–0.2)
BASOPHILS NFR BLD: 0.4 % (ref 0–1.9)
BILIRUB SERPL-MCNC: 0.4 MG/DL (ref 0.1–1)
BUN SERPL-MCNC: 10 MG/DL (ref 6–20)
CALCIUM SERPL-MCNC: 9.1 MG/DL (ref 8.7–10.5)
CHLORIDE SERPL-SCNC: 110 MMOL/L (ref 95–110)
CO2 SERPL-SCNC: 28 MMOL/L (ref 23–29)
CREAT SERPL-MCNC: 0.8 MG/DL (ref 0.5–1.4)
DIFFERENTIAL METHOD BLD: ABNORMAL
EOSINOPHIL # BLD AUTO: 0.2 K/UL (ref 0–0.5)
EOSINOPHIL NFR BLD: 3 % (ref 0–8)
ERYTHROCYTE [DISTWIDTH] IN BLOOD BY AUTOMATED COUNT: 14.2 % (ref 11.5–14.5)
EST. GFR  (NO RACE VARIABLE): >60 ML/MIN/1.73 M^2
FERRITIN SERPL-MCNC: 345 NG/ML (ref 20–300)
GLUCOSE SERPL-MCNC: 147 MG/DL (ref 70–110)
HCT VFR BLD AUTO: 25.6 % (ref 37–48.5)
HGB BLD-MCNC: 8.5 G/DL (ref 12–16)
IGA SERPL-MCNC: 457 MG/DL (ref 40–350)
IGG SERPL-MCNC: 986 MG/DL (ref 650–1600)
IGM SERPL-MCNC: 38 MG/DL (ref 50–300)
IMM GRANULOCYTES # BLD AUTO: 0.02 K/UL (ref 0–0.04)
IMM GRANULOCYTES NFR BLD AUTO: 0.3 % (ref 0–0.5)
IRON SERPL-MCNC: 44 UG/DL (ref 30–160)
LDH SERPL L TO P-CCNC: 150 U/L (ref 110–260)
LYMPHOCYTES # BLD AUTO: 2 K/UL (ref 1–4.8)
LYMPHOCYTES NFR BLD: 25.5 % (ref 18–48)
MCH RBC QN AUTO: 29 PG (ref 27–31)
MCHC RBC AUTO-ENTMCNC: 33.2 G/DL (ref 32–36)
MCV RBC AUTO: 87 FL (ref 82–98)
MONOCYTES # BLD AUTO: 0.5 K/UL (ref 0.3–1)
MONOCYTES NFR BLD: 6.6 % (ref 4–15)
NEUTROPHILS # BLD AUTO: 5 K/UL (ref 1.8–7.7)
NEUTROPHILS NFR BLD: 64.2 % (ref 38–73)
NRBC BLD-RTO: 0 /100 WBC
PLATELET # BLD AUTO: 256 K/UL (ref 150–450)
PMV BLD AUTO: 9.6 FL (ref 9.2–12.9)
POTASSIUM SERPL-SCNC: 3.7 MMOL/L (ref 3.5–5.1)
PROT SERPL-MCNC: 6.6 G/DL (ref 6–8.4)
RBC # BLD AUTO: 2.93 M/UL (ref 4–5.4)
SATURATED IRON: 16 % (ref 20–50)
SODIUM SERPL-SCNC: 143 MMOL/L (ref 136–145)
TOTAL IRON BINDING CAPACITY: 272 UG/DL (ref 250–450)
TRANSFERRIN SERPL-MCNC: 184 MG/DL (ref 200–375)
WBC # BLD AUTO: 7.72 K/UL (ref 3.9–12.7)

## 2024-03-11 PROCEDURE — 84165 PROTEIN E-PHORESIS SERUM: CPT | Mod: 26,HCNC,, | Performed by: PATHOLOGY

## 2024-03-11 PROCEDURE — 36415 COLL VENOUS BLD VENIPUNCTURE: CPT | Mod: HCNC | Performed by: NURSE PRACTITIONER

## 2024-03-11 PROCEDURE — 84165 PROTEIN E-PHORESIS SERUM: CPT | Mod: HCNC | Performed by: NURSE PRACTITIONER

## 2024-03-11 PROCEDURE — 86334 IMMUNOFIX E-PHORESIS SERUM: CPT | Mod: 26,HCNC,, | Performed by: PATHOLOGY

## 2024-03-11 PROCEDURE — 83521 IG LIGHT CHAINS FREE EACH: CPT | Mod: HCNC | Performed by: NURSE PRACTITIONER

## 2024-03-11 PROCEDURE — 80053 COMPREHEN METABOLIC PANEL: CPT | Mod: HCNC | Performed by: NURSE PRACTITIONER

## 2024-03-11 PROCEDURE — 86334 IMMUNOFIX E-PHORESIS SERUM: CPT | Mod: HCNC | Performed by: NURSE PRACTITIONER

## 2024-03-11 PROCEDURE — 82728 ASSAY OF FERRITIN: CPT | Mod: HCNC | Performed by: NURSE PRACTITIONER

## 2024-03-11 PROCEDURE — 83540 ASSAY OF IRON: CPT | Mod: HCNC | Performed by: NURSE PRACTITIONER

## 2024-03-11 PROCEDURE — 83615 LACTATE (LD) (LDH) ENZYME: CPT | Mod: HCNC | Performed by: NURSE PRACTITIONER

## 2024-03-11 PROCEDURE — 85025 COMPLETE CBC W/AUTO DIFF WBC: CPT | Mod: HCNC | Performed by: NURSE PRACTITIONER

## 2024-03-11 PROCEDURE — 82784 ASSAY IGA/IGD/IGG/IGM EACH: CPT | Mod: 59,HCNC | Performed by: NURSE PRACTITIONER

## 2024-03-12 LAB
ALBUMIN SERPL ELPH-MCNC: 3.83 G/DL (ref 3.35–5.55)
ALPHA1 GLOB SERPL ELPH-MCNC: 0.28 G/DL (ref 0.17–0.41)
ALPHA2 GLOB SERPL ELPH-MCNC: 0.52 G/DL (ref 0.43–0.99)
B-GLOBULIN SERPL ELPH-MCNC: 0.88 G/DL (ref 0.5–1.1)
GAMMA GLOB SERPL ELPH-MCNC: 0.9 G/DL (ref 0.67–1.58)
INTERPRETATION SERPL IFE-IMP: NORMAL
KAPPA LC SER QL IA: 3.33 MG/DL (ref 0.33–1.94)
KAPPA LC/LAMBDA SER IA: 1.47 (ref 0.26–1.65)
LAMBDA LC SER QL IA: 2.27 MG/DL (ref 0.57–2.63)
PATHOLOGIST INTERPRETATION IFE: NORMAL
PATHOLOGIST INTERPRETATION SPE: NORMAL
PROT SERPL-MCNC: 6.4 G/DL (ref 6–8.4)

## 2024-03-14 ENCOUNTER — OFFICE VISIT (OUTPATIENT)
Dept: HEMATOLOGY/ONCOLOGY | Facility: CLINIC | Age: 49
End: 2024-03-14
Payer: MEDICARE

## 2024-03-14 DIAGNOSIS — D50.8 IRON DEFICIENCY ANEMIA SECONDARY TO INADEQUATE DIETARY IRON INTAKE: Primary | ICD-10-CM

## 2024-03-14 DIAGNOSIS — D47.2 MGUS (MONOCLONAL GAMMOPATHY OF UNKNOWN SIGNIFICANCE): ICD-10-CM

## 2024-03-14 PROCEDURE — 3044F HG A1C LEVEL LT 7.0%: CPT | Mod: HCNC,CPTII,95,

## 2024-03-14 PROCEDURE — 1159F MED LIST DOCD IN RCRD: CPT | Mod: HCNC,CPTII,95,

## 2024-03-14 PROCEDURE — 99214 OFFICE O/P EST MOD 30 MIN: CPT | Mod: HCNC,95,,

## 2024-03-14 PROCEDURE — 1160F RVW MEDS BY RX/DR IN RCRD: CPT | Mod: HCNC,CPTII,95,

## 2024-03-18 ENCOUNTER — TELEPHONE (OUTPATIENT)
Dept: HEMATOLOGY/ONCOLOGY | Facility: CLINIC | Age: 49
End: 2024-03-18
Payer: MEDICARE

## 2024-03-18 NOTE — TELEPHONE ENCOUNTER
----- Message from Nhi Mendoza sent at 3/18/2024  3:11 PM CDT -----  Contact: Marilou Chung is calling in regards to her infusion order.please call back at .[phone]             Thanks  SKY

## 2024-03-19 PROBLEM — D47.2 MGUS (MONOCLONAL GAMMOPATHY OF UNKNOWN SIGNIFICANCE): Status: ACTIVE | Noted: 2024-03-19

## 2024-03-19 RX ORDER — HEPARIN 100 UNIT/ML
500 SYRINGE INTRAVENOUS
Status: CANCELLED | OUTPATIENT
Start: 2024-03-19

## 2024-03-19 RX ORDER — EPINEPHRINE 0.3 MG/.3ML
0.3 INJECTION SUBCUTANEOUS ONCE AS NEEDED
Status: CANCELLED | OUTPATIENT
Start: 2024-04-23

## 2024-03-19 RX ORDER — DIPHENHYDRAMINE HYDROCHLORIDE 50 MG/ML
50 INJECTION INTRAMUSCULAR; INTRAVENOUS ONCE AS NEEDED
Status: CANCELLED | OUTPATIENT
Start: 2024-04-09

## 2024-03-19 RX ORDER — SODIUM CHLORIDE 0.9 % (FLUSH) 0.9 %
10 SYRINGE (ML) INJECTION
Status: CANCELLED | OUTPATIENT
Start: 2024-04-30

## 2024-03-19 RX ORDER — DIPHENHYDRAMINE HYDROCHLORIDE 50 MG/ML
50 INJECTION INTRAMUSCULAR; INTRAVENOUS ONCE AS NEEDED
Status: CANCELLED | OUTPATIENT
Start: 2024-03-27

## 2024-03-19 RX ORDER — SODIUM CHLORIDE 0.9 % (FLUSH) 0.9 %
10 SYRINGE (ML) INJECTION
Status: CANCELLED | OUTPATIENT
Start: 2024-04-09

## 2024-03-19 RX ORDER — EPINEPHRINE 0.3 MG/.3ML
0.3 INJECTION SUBCUTANEOUS ONCE AS NEEDED
Status: CANCELLED | OUTPATIENT
Start: 2024-05-07

## 2024-03-19 RX ORDER — SODIUM CHLORIDE 0.9 % (FLUSH) 0.9 %
10 SYRINGE (ML) INJECTION
Status: CANCELLED | OUTPATIENT
Start: 2024-04-16

## 2024-03-19 RX ORDER — SODIUM CHLORIDE 0.9 % (FLUSH) 0.9 %
10 SYRINGE (ML) INJECTION
Status: CANCELLED | OUTPATIENT
Start: 2024-04-23

## 2024-03-19 RX ORDER — HEPARIN 100 UNIT/ML
500 SYRINGE INTRAVENOUS
Status: CANCELLED | OUTPATIENT
Start: 2024-04-09

## 2024-03-19 RX ORDER — HEPARIN 100 UNIT/ML
500 SYRINGE INTRAVENOUS
Status: CANCELLED | OUTPATIENT
Start: 2024-03-27

## 2024-03-19 RX ORDER — METHYLPREDNISOLONE SOD SUCC 125 MG
40 VIAL (EA) INJECTION
Status: CANCELLED
Start: 2024-03-27

## 2024-03-19 RX ORDER — HEPARIN 100 UNIT/ML
500 SYRINGE INTRAVENOUS
Status: CANCELLED | OUTPATIENT
Start: 2024-04-16

## 2024-03-19 RX ORDER — SODIUM CHLORIDE 0.9 % (FLUSH) 0.9 %
10 SYRINGE (ML) INJECTION
Status: CANCELLED | OUTPATIENT
Start: 2024-03-19

## 2024-03-19 RX ORDER — DIPHENHYDRAMINE HYDROCHLORIDE 50 MG/ML
50 INJECTION INTRAMUSCULAR; INTRAVENOUS ONCE AS NEEDED
Status: CANCELLED | OUTPATIENT
Start: 2024-03-19

## 2024-03-19 RX ORDER — METHYLPREDNISOLONE SOD SUCC 125 MG
40 VIAL (EA) INJECTION
Status: CANCELLED
Start: 2024-03-19

## 2024-03-19 RX ORDER — EPINEPHRINE 0.3 MG/.3ML
0.3 INJECTION SUBCUTANEOUS ONCE AS NEEDED
Status: CANCELLED | OUTPATIENT
Start: 2024-03-27

## 2024-03-19 RX ORDER — HEPARIN 100 UNIT/ML
500 SYRINGE INTRAVENOUS
Status: CANCELLED | OUTPATIENT
Start: 2024-04-30

## 2024-03-19 RX ORDER — SODIUM CHLORIDE 0.9 % (FLUSH) 0.9 %
10 SYRINGE (ML) INJECTION
Status: CANCELLED | OUTPATIENT
Start: 2024-05-07

## 2024-03-19 RX ORDER — DIPHENHYDRAMINE HYDROCHLORIDE 50 MG/ML
50 INJECTION INTRAMUSCULAR; INTRAVENOUS ONCE AS NEEDED
Status: CANCELLED | OUTPATIENT
Start: 2024-04-30

## 2024-03-19 RX ORDER — DIPHENHYDRAMINE HYDROCHLORIDE 50 MG/ML
50 INJECTION INTRAMUSCULAR; INTRAVENOUS ONCE AS NEEDED
Status: CANCELLED | OUTPATIENT
Start: 2024-05-07

## 2024-03-19 RX ORDER — DIPHENHYDRAMINE HYDROCHLORIDE 50 MG/ML
50 INJECTION INTRAMUSCULAR; INTRAVENOUS ONCE AS NEEDED
Status: CANCELLED | OUTPATIENT
Start: 2024-04-23

## 2024-03-19 RX ORDER — EPINEPHRINE 0.3 MG/.3ML
0.3 INJECTION SUBCUTANEOUS ONCE AS NEEDED
Status: CANCELLED | OUTPATIENT
Start: 2024-04-16

## 2024-03-19 RX ORDER — DIPHENHYDRAMINE HYDROCHLORIDE 50 MG/ML
50 INJECTION INTRAMUSCULAR; INTRAVENOUS ONCE AS NEEDED
Status: CANCELLED | OUTPATIENT
Start: 2024-04-16

## 2024-03-19 RX ORDER — EPINEPHRINE 0.3 MG/.3ML
0.3 INJECTION SUBCUTANEOUS ONCE AS NEEDED
Status: CANCELLED | OUTPATIENT
Start: 2024-04-09

## 2024-03-19 RX ORDER — SODIUM CHLORIDE 0.9 % (FLUSH) 0.9 %
10 SYRINGE (ML) INJECTION
Status: CANCELLED | OUTPATIENT
Start: 2024-03-27

## 2024-03-19 RX ORDER — EPINEPHRINE 0.3 MG/.3ML
0.3 INJECTION SUBCUTANEOUS ONCE AS NEEDED
Status: CANCELLED | OUTPATIENT
Start: 2024-04-30

## 2024-03-19 RX ORDER — HEPARIN 100 UNIT/ML
500 SYRINGE INTRAVENOUS
Status: CANCELLED | OUTPATIENT
Start: 2024-05-07

## 2024-03-19 RX ORDER — HEPARIN 100 UNIT/ML
500 SYRINGE INTRAVENOUS
Status: CANCELLED | OUTPATIENT
Start: 2024-04-23

## 2024-03-19 RX ORDER — EPINEPHRINE 0.3 MG/.3ML
0.3 INJECTION SUBCUTANEOUS ONCE AS NEEDED
Status: CANCELLED | OUTPATIENT
Start: 2024-03-19

## 2024-03-19 RX ORDER — METHYLPREDNISOLONE SOD SUCC 125 MG
40 VIAL (EA) INJECTION
Status: CANCELLED
Start: 2024-04-09

## 2024-03-19 NOTE — ASSESSMENT & PLAN NOTE
KENDALL: Faint IgG lambda specific monoclonal protein band in beta identified.   SPEP: Normal total protein. Paraprotein peak in beta-2 = 0.28 g/dL (previously, 0.29 g/dL).   FLC-R : 1.47    PET scan 12/2022 without hypermetabolic activity or concern for metastatic disease. Bone marrow biopsy January 2023 with slight lambda plasma cell possible neoplasm 5-10%.     Plan for continued close monitoring of plasma cell with peripheral blood and repeat bone marrow biopsy if worsening anemia in absence of iron deficiency.     Monoclonal protein present but   3.0 g/dL  · With exception of anemia, no CRAB features or other indicators of active myeloma

## 2024-03-19 NOTE — ASSESSMENT & PLAN NOTE
Lab Results   Component Value Date    HGB 8.5 (L) 03/11/2024   Likely multifactorial anemia with history of mixed connective tissue disease and SLE.   Lab Results   Component Value Date    IRON 44 03/11/2024    TRANSFERRIN 184 (L) 03/11/2024    TIBC 272 03/11/2024    FESATURATED 16 (L) 03/11/2024      Mildly low iron saturation   Will plan for Venofer x 3 doses for full repletion with premed prior

## 2024-03-19 NOTE — H&P (VIEW-ONLY)
Subjective:      Patient ID: Marilou Daniel is a 48 y.o. female.    Chief Complaint: Follow-up (BALJEET/MGUS)    The patient location is: [place]  The chief complaint leading to consultation is: follow-up    Visit type: audiovisual    Face to Face time with patient: 20  40 minutes of total time spent on the encounter, which includes face to face time and non-face to face time preparing to see the patient (eg, review of tests), Obtaining and/or reviewing separately obtained history, Documenting clinical information in the electronic or other health record, Independently interpreting results (not separately reported) and communicating results to the patient/family/caregiver, or Care coordination (not separately reported).         Each patient to whom he or she provides medical services by telemedicine is:  (1) informed of the relationship between the physician and patient and the respective role of any other health care provider with respect to management of the patient; and (2) notified that he or she may decline to receive medical services by telemedicine and may withdraw from such care at any time.    Notes:    HPI:  Ms. Daniel a very pleasant 48-year-old woman with SLE and mixed connective tissue disease who presents today for follow-up of iron deficiency anemia and MGUS. She has had IV iron therapy in the past, most recently a single dose of Feraheme 01/2023 which she noted she reacted to and did not return for 2nd dose. She notes no cycles since [facility] >7 years ago. She notes upcoming plans for both knee surgery and breast reduction.        Social History     Socioeconomic History    Marital status:    Tobacco Use    Smoking status: Never    Smokeless tobacco: Never   Substance and Sexual Activity    Alcohol use: Never    Drug use: Never    Sexual activity: Yes     Partners: Male     Birth control/protection: See Surgical Hx     Social Determinants of Health     Financial Resource Strain: Medium Risk  (3/14/2024)    Overall Financial Resource Strain (CARDIA)     Difficulty of Paying Living Expenses: Somewhat hard   Food Insecurity: Unknown (3/14/2024)    Hunger Vital Sign     Worried About Running Out of Food in the Last Year: Never true     Ran Out of Food in the Last Year: Patient declined   Transportation Needs: No Transportation Needs (3/14/2024)    PRAPARE - Transportation     Lack of Transportation (Medical): No     Lack of Transportation (Non-Medical): No   Physical Activity: Inactive (3/14/2024)    Exercise Vital Sign     Days of Exercise per Week: 0 days     Minutes of Exercise per Session: 30 min   Stress: Stress Concern Present (3/14/2024)    [employer] - Occupational Stress Questionnaire     Feeling of Stress : To some extent   Social Connections: Unknown (3/14/2024)    Social Connection and Isolation Panel [NHANES]     Frequency of Communication with Friends and Family: More than three times a week     Frequency of Social Gatherings with Friends and Family: More than three times a week     Active Member of Clubs or Organizations: Yes     Attends Club or Organization Meetings: More than 4 times per year     Marital Status:    Housing Stability: Low Risk  (3/14/2024)    Housing Stability Vital Sign     Unable to Pay for Housing in the Last Year: No     Number of Places Lived in the Last Year: 1     Unstable Housing in the Last Year: No       Family History   Problem Relation Age of Onset    Lung cancer Mother 59        +hx of smoking    Cancer Father         type uk? stomach?    Breast cancer Other 56        UL mastect, mets to back & lungs    Breast cancer Other 61        chemo, XRT, UL mastect?    Autism Other     Asthma Other     Obesity Other     Liver cancer Other 58        mets to lungs, pancreas, stomach    Cancer Other         type uk?       Past Surgical History:   Procedure Laterality Date    ABLATION      COLONOSCOPY N/A 6/24/2020    Procedure:  COLONOSCOPY;  Surgeon: Nevin Penny MD;  Location: [facility];  Service: Endoscopy;  Laterality: N/A;    ESOPHAGOGASTRODUODENOSCOPY N/A 6/24/2020    Procedure: ESOPHAGOGASTRODUODENOSCOPY (EGD);  Surgeon: Nevin Penny MD;  Location: [facility] ENDO;  Service: Endoscopy;  Laterality: N/A;    RIGHT HEART CATHETERIZATION      TUBAL LIGATION      WRIST SURGERY Bilateral        Past Medical History:   Diagnosis Date    Anemia     Arthritis     Connective tissue disease 1999    COVID-19 in immunocompromised patient 02/05/2021    Diabetes mellitus     Drug induced insomnia 12/15/2020    R/T chronic steroid use for SLE.    Gastroesophageal reflux disease 03/23/2020    Hypertension     Lupus 1999    Situational anxiety 08/24/2021    Thyroid nodule greater than or equal to 1 cm in diameter incidentally noted on imaging study 11/19/2020    Vasculitis        Review of Systems   Musculoskeletal:  Positive for arthralgias and myalgias.       Medication List with Changes/Refills   Current Medications    ATORVASTATIN (LIPITOR) 10 MG TABLET        BETAMETHASONE DIPROPIONATE 0.05 % CREAM    APPLY A SMALL AMOUNT TO AFFECTED AREA TOPICALLY TWICE A DAY FOR 2 TO 3 WEEKS AT A TIME AS NEEDED FLARE UPS AVOID FACE & GROIN AREA    BLOOD SUGAR DIAGNOSTIC (TRUE METRIX GLUCOSE TEST STRIP) STRP    Test 4 times daily.    BLOOD-GLUCOSE METER (TRUE METRIX GLUCOSE METER) MISC    Use as directed    BLOOD-GLUCOSE METER,CONTINUOUS (DEXCOM ) MISC    1 each by Misc.(Non-Drug; Combo Route) route once. for 1 dose    BLOOD-GLUCOSE SENSOR (DEXCOM G7 SENSOR) ARSH    1 each by Misc.(Non-Drug; Combo Route) route every 10 days.    BLOOD-GLUCOSE TRANSMITTER (DEXCOM G5 TRANSMITTER) ARSH    1 each by Misc.(Non-Drug; Combo Route) route every 3 (three) months.    CLONIDINE (CATAPRES) 0.1 MG TABLET    Take 0.2 mg by mouth 3 (three) times daily. Take 2 (0.1 mg) tablets three times a day    DAPSONE 100 MG TAB    Take 100 mg by mouth once daily at 6am.     DOXAZOSIN (CARDURA) 2 MG TABLET    Take 4 mg by mouth every 12 (twelve) hours. Take 2 (2mg) tablets two times a day    EMPAGLIFLOZIN (JARDIANCE) 10 MG TABLET    Take 1 tablet (10 mg total) by mouth once daily.    FAMOTIDINE (PEPCID) 40 MG TABLET    TAKE ONE TABLET BY MOUTH ONCE A DAY    GABAPENTIN (NEURONTIN) 800 MG TABLET    Neurontin 800 mg tablet   Take 1 tablet 3 times a day by oral route.    HYDROCODONE-ACETAMINOPHEN (NORCO)  MG PER TABLET    Norco 10 mg-325 mg tablet   Take 1 tablet 3 times a day by oral route as needed.    HYDROCORTISONE 2.5 % OINTMENT    APPLY TOPICALLY TO FACE TWICE A DAY AS NEEDED FOR 1 TO 2 WEEKS AT A TIME    HYDROXYCHLOROQUINE (PLAQUENIL) 200 MG TABLET    Take 200 mg by mouth 2 (two) times daily.     HYDROXYZINE HCL (ATARAX) 25 MG TABLET    TAKE ONE TABLET BY MOUTH EVERY 12 HOURS AS NEEDED FOR ITCHING    INSULIN (LANTUS SOLOSTAR U-100 INSULIN) GLARGINE 100 UNITS/ML SUBQ PEN    Inject 10 Units into the skin once daily.    INSULIN ASPART U-100 (NOVOLOG) 100 UNIT/ML INJECTION    Inject 4 Units into the skin 3 (three) times daily before meals.    LABETALOL (NORMODYNE) 300 MG TABLET    Take 1 tablet (300 mg total) by mouth 2 (two) times daily.    LORAZEPAM (ATIVAN) 0.5 MG TABLET    Take 1 tablet (0.5 mg total) by mouth every 12 (twelve) hours as needed for Anxiety.    MYCOPHENOLATE (CELLCEPT) 250 MG CAP    3,000 mg once daily.    NIFEDIPINE (PROCARDIA-XL) 30 MG (OSM) 24 HR TABLET    Take 1 tablet (30 mg total) by mouth every 12 (twelve) hours.    ONDANSETRON (ZOFRAN-ODT) 4 MG TBDL    DISSOLVE 1 TABLET BY MOUTH EVERY 8 HOURS AS NEEDED    PANTOPRAZOLE (PROTONIX) 40 MG TABLET    Take 1 tablet (40 mg total) by mouth 2 (two) times daily for 90 days, THEN 1 tablet (40 mg total) once daily.    SERTRALINE (ZOLOFT) 50 MG TABLET    Take 1 tablet (50 mg total) by mouth once daily.    SOD SULF-POT CHLORIDE-MAG SULF (SUTAB) 1.479-0.188- 0.225 GRAM TABLET    Take 12 tablets by mouth once daily. Take  according to package instructions with indicated amount of water.    SPIRONOLACTONE (ALDACTONE) 50 MG TABLET    Take 1 tablet (50 mg total) by mouth once daily.    TIZANIDINE (ZANAFLEX) 4 MG TABLET    TK 1 T PO TID PRN    TRUEPLUS LANCETS 33 GAUGE MISC    Inject 1 lancet as directed 4 (four) times daily.    ZOLPIDEM (AMBIEN) 10 MG TAB    TAKE 1/2 to 1 TABLET BY MOUTH AT BEDTIME AS NEEDED FOR INSOMNIA        Objective:   There were no vitals filed for this visit.    Physical Exam  Vitals reviewed: Virtual visit.   Constitutional:       Appearance: Normal appearance.   Neurological:      Mental Status: She is alert.   Psychiatric:         Mood and Affect: Mood normal.         Behavior: Behavior normal.         Lab Results   Component Value Date    WBC 7.72 03/11/2024    HGB 8.5 (L) 03/11/2024    HCT 25.6 (L) 03/11/2024    MCV 87 03/11/2024     03/11/2024       Lab Results   Component Value Date     03/11/2024    K 3.7 03/11/2024     03/11/2024    CO2 28 03/11/2024    BUN 10 03/11/2024    CREATININE 0.8 03/11/2024    CALCIUM 9.1 03/11/2024    ANIONGAP 5 (L) 03/11/2024    ESTGFRAFRICA >60.0 03/29/2022    EGFRNONAA >60.0 03/29/2022     Lab Results   Component Value Date    ALT 12 03/11/2024    AST 12 03/11/2024    ALKPHOS 83 03/11/2024    BILITOT 0.4 03/11/2024       Assessment/Plan:     Problem List Items Addressed This Visit          Oncology    Iron deficiency anemia secondary to inadequate dietary iron intake - Primary     Lab Results   Component Value Date    HGB 8.5 (L) 03/11/2024   Likely multifactorial anemia with history of mixed connective tissue disease and SLE.   Lab Results   Component Value Date    IRON 44 03/11/2024    TRANSFERRIN 184 (L) 03/11/2024    TIBC 272 03/11/2024    FESATURATED 16 (L) 03/11/2024      Mildly low iron saturation   Will plan for Venofer x 3 doses for full repletion with premed prior           Relevant Orders    CBC Auto Differential    Comprehensive Metabolic Panel     Ferritin    Iron and TIBC    MGUS (monoclonal gammopathy of unknown significance)     KENDALL: Faint IgG lambda specific monoclonal protein band in beta identified.   SPEP: Normal total protein. Paraprotein peak in beta-2 = 0.28 g/dL (previously, 0.29 g/dL).   FLC-R : 1.47    PET scan 12/2022 without hypermetabolic activity or concern for metastatic disease. Bone marrow biopsy January 2023 with slight lambda plasma cell possible neoplasm 5-10%.     Plan for continued close monitoring of plasma cell with peripheral blood and repeat bone marrow biopsy if worsening anemia in absence of iron deficiency.     Monoclonal protein present but   3.0 g/dL  · With exception of anemia, no CRAB features or other indicators of active myeloma          Relevant Orders    CBC Auto Differential    Comprehensive Metabolic Panel    Ferritin    Iron and TIBC             Med Onc Chart Routing      Follow up with physician    Follow up with BECKIE 2 months. s/p iron thrrapy with lab 1-2 days prior   Infusion scheduling note New or changed treatment   Venofer x 3   Injection scheduling note    Labs CBC, CMP, ferritin and iron and TIBC   Scheduling:  Preferred lab:  Lab interval:     Imaging    Pharmacy appointment    Other referrals                   BHARAT RuckerP-C  Hematology/Oncology

## 2024-03-19 NOTE — PROGRESS NOTES
Subjective:      Patient ID: Marilou Daniel is a 48 y.o. female.    Chief Complaint: Follow-up (BALJEET/MGUS)    The patient location is: [place]  The chief complaint leading to consultation is: follow-up    Visit type: audiovisual    Face to Face time with patient: 20  40 minutes of total time spent on the encounter, which includes face to face time and non-face to face time preparing to see the patient (eg, review of tests), Obtaining and/or reviewing separately obtained history, Documenting clinical information in the electronic or other health record, Independently interpreting results (not separately reported) and communicating results to the patient/family/caregiver, or Care coordination (not separately reported).         Each patient to whom he or she provides medical services by telemedicine is:  (1) informed of the relationship between the physician and patient and the respective role of any other health care provider with respect to management of the patient; and (2) notified that he or she may decline to receive medical services by telemedicine and may withdraw from such care at any time.    Notes:    HPI:  Ms. Daniel a very pleasant 48-year-old woman with SLE and mixed connective tissue disease who presents today for follow-up of iron deficiency anemia and MGUS. She has had IV iron therapy in the past, most recently a single dose of Feraheme 01/2023 which she noted she reacted to and did not return for 2nd dose. She notes no cycles since [facility] >7 years ago. She notes upcoming plans for both knee surgery and breast reduction.        Social History     Socioeconomic History    Marital status:    Tobacco Use    Smoking status: Never    Smokeless tobacco: Never   Substance and Sexual Activity    Alcohol use: Never    Drug use: Never    Sexual activity: Yes     Partners: Male     Birth control/protection: See Surgical Hx     Social Determinants of Health     Financial Resource Strain: Medium Risk  (3/14/2024)    Overall Financial Resource Strain (CARDIA)     Difficulty of Paying Living Expenses: Somewhat hard   Food Insecurity: Unknown (3/14/2024)    Hunger Vital Sign     Worried About Running Out of Food in the Last Year: Never true     Ran Out of Food in the Last Year: Patient declined   Transportation Needs: No Transportation Needs (3/14/2024)    PRAPARE - Transportation     Lack of Transportation (Medical): No     Lack of Transportation (Non-Medical): No   Physical Activity: Inactive (3/14/2024)    Exercise Vital Sign     Days of Exercise per Week: 0 days     Minutes of Exercise per Session: 30 min   Stress: Stress Concern Present (3/14/2024)    [employer] [place] [employer] - Occupational Stress Questionnaire     Feeling of Stress : To some extent   Social Connections: Unknown (3/14/2024)    Social Connection and Isolation Panel [NHANES]     Frequency of Communication with Friends and Family: More than three times a week     Frequency of Social Gatherings with Friends and Family: More than three times a week     Active Member of Clubs or Organizations: Yes     Attends Club or Organization Meetings: More than 4 times per year     Marital Status:    Housing Stability: Low Risk  (3/14/2024)    Housing Stability Vital Sign     Unable to Pay for Housing in the Last Year: No     Number of Places Lived in the Last Year: 1     Unstable Housing in the Last Year: No       Family History   Problem Relation Age of Onset    Lung cancer Mother 59        +hx of smoking    Cancer Father         type uk? stomach?    Breast cancer Other 56        UL mastect, mets to back & lungs    Breast cancer Other 61        chemo, XRT, UL mastect?    Autism Other     Asthma Other     Obesity Other     Liver cancer Other 58        mets to lungs, pancreas, stomach    Cancer Other         type uk?       Past Surgical History:   Procedure Laterality Date    ABLATION      COLONOSCOPY N/A 6/24/2020    Procedure:  COLONOSCOPY;  Surgeon: Nevin Penny MD;  Location: [facility];  Service: Endoscopy;  Laterality: N/A;    ESOPHAGOGASTRODUODENOSCOPY N/A 6/24/2020    Procedure: ESOPHAGOGASTRODUODENOSCOPY (EGD);  Surgeon: Nevin Penny MD;  Location: [facility] ENDO;  Service: Endoscopy;  Laterality: N/A;    RIGHT HEART CATHETERIZATION      TUBAL LIGATION      WRIST SURGERY Bilateral        Past Medical History:   Diagnosis Date    Anemia     Arthritis     Connective tissue disease 1999    COVID-19 in immunocompromised patient 02/05/2021    Diabetes mellitus     Drug induced insomnia 12/15/2020    R/T chronic steroid use for SLE.    Gastroesophageal reflux disease 03/23/2020    Hypertension     Lupus 1999    Situational anxiety 08/24/2021    Thyroid nodule greater than or equal to 1 cm in diameter incidentally noted on imaging study 11/19/2020    Vasculitis        Review of Systems   Musculoskeletal:  Positive for arthralgias and myalgias.       Medication List with Changes/Refills   Current Medications    ATORVASTATIN (LIPITOR) 10 MG TABLET        BETAMETHASONE DIPROPIONATE 0.05 % CREAM    APPLY A SMALL AMOUNT TO AFFECTED AREA TOPICALLY TWICE A DAY FOR 2 TO 3 WEEKS AT A TIME AS NEEDED FLARE UPS AVOID FACE & GROIN AREA    BLOOD SUGAR DIAGNOSTIC (TRUE METRIX GLUCOSE TEST STRIP) STRP    Test 4 times daily.    BLOOD-GLUCOSE METER (TRUE METRIX GLUCOSE METER) MISC    Use as directed    BLOOD-GLUCOSE METER,CONTINUOUS (DEXCOM ) MISC    1 each by Misc.(Non-Drug; Combo Route) route once. for 1 dose    BLOOD-GLUCOSE SENSOR (DEXCOM G7 SENSOR) ARSH    1 each by Misc.(Non-Drug; Combo Route) route every 10 days.    BLOOD-GLUCOSE TRANSMITTER (DEXCOM G5 TRANSMITTER) ARSH    1 each by Misc.(Non-Drug; Combo Route) route every 3 (three) months.    CLONIDINE (CATAPRES) 0.1 MG TABLET    Take 0.2 mg by mouth 3 (three) times daily. Take 2 (0.1 mg) tablets three times a day    DAPSONE 100 MG TAB    Take 100 mg by mouth once daily at 6am.     DOXAZOSIN (CARDURA) 2 MG TABLET    Take 4 mg by mouth every 12 (twelve) hours. Take 2 (2mg) tablets two times a day    EMPAGLIFLOZIN (JARDIANCE) 10 MG TABLET    Take 1 tablet (10 mg total) by mouth once daily.    FAMOTIDINE (PEPCID) 40 MG TABLET    TAKE ONE TABLET BY MOUTH ONCE A DAY    GABAPENTIN (NEURONTIN) 800 MG TABLET    Neurontin 800 mg tablet   Take 1 tablet 3 times a day by oral route.    HYDROCODONE-ACETAMINOPHEN (NORCO)  MG PER TABLET    Norco 10 mg-325 mg tablet   Take 1 tablet 3 times a day by oral route as needed.    HYDROCORTISONE 2.5 % OINTMENT    APPLY TOPICALLY TO FACE TWICE A DAY AS NEEDED FOR 1 TO 2 WEEKS AT A TIME    HYDROXYCHLOROQUINE (PLAQUENIL) 200 MG TABLET    Take 200 mg by mouth 2 (two) times daily.     HYDROXYZINE HCL (ATARAX) 25 MG TABLET    TAKE ONE TABLET BY MOUTH EVERY 12 HOURS AS NEEDED FOR ITCHING    INSULIN (LANTUS SOLOSTAR U-100 INSULIN) GLARGINE 100 UNITS/ML SUBQ PEN    Inject 10 Units into the skin once daily.    INSULIN ASPART U-100 (NOVOLOG) 100 UNIT/ML INJECTION    Inject 4 Units into the skin 3 (three) times daily before meals.    LABETALOL (NORMODYNE) 300 MG TABLET    Take 1 tablet (300 mg total) by mouth 2 (two) times daily.    LORAZEPAM (ATIVAN) 0.5 MG TABLET    Take 1 tablet (0.5 mg total) by mouth every 12 (twelve) hours as needed for Anxiety.    MYCOPHENOLATE (CELLCEPT) 250 MG CAP    3,000 mg once daily.    NIFEDIPINE (PROCARDIA-XL) 30 MG (OSM) 24 HR TABLET    Take 1 tablet (30 mg total) by mouth every 12 (twelve) hours.    ONDANSETRON (ZOFRAN-ODT) 4 MG TBDL    DISSOLVE 1 TABLET BY MOUTH EVERY 8 HOURS AS NEEDED    PANTOPRAZOLE (PROTONIX) 40 MG TABLET    Take 1 tablet (40 mg total) by mouth 2 (two) times daily for 90 days, THEN 1 tablet (40 mg total) once daily.    SERTRALINE (ZOLOFT) 50 MG TABLET    Take 1 tablet (50 mg total) by mouth once daily.    SOD SULF-POT CHLORIDE-MAG SULF (SUTAB) 1.479-0.188- 0.225 GRAM TABLET    Take 12 tablets by mouth once daily. Take  according to package instructions with indicated amount of water.    SPIRONOLACTONE (ALDACTONE) 50 MG TABLET    Take 1 tablet (50 mg total) by mouth once daily.    TIZANIDINE (ZANAFLEX) 4 MG TABLET    TK 1 T PO TID PRN    TRUEPLUS LANCETS 33 GAUGE MISC    Inject 1 lancet as directed 4 (four) times daily.    ZOLPIDEM (AMBIEN) 10 MG TAB    TAKE 1/2 to 1 TABLET BY MOUTH AT BEDTIME AS NEEDED FOR INSOMNIA        Objective:   There were no vitals filed for this visit.    Physical Exam  Vitals reviewed: Virtual visit.   Constitutional:       Appearance: Normal appearance.   Neurological:      Mental Status: She is alert.   Psychiatric:         Mood and Affect: Mood normal.         Behavior: Behavior normal.         Lab Results   Component Value Date    WBC 7.72 03/11/2024    HGB 8.5 (L) 03/11/2024    HCT 25.6 (L) 03/11/2024    MCV 87 03/11/2024     03/11/2024       Lab Results   Component Value Date     03/11/2024    K 3.7 03/11/2024     03/11/2024    CO2 28 03/11/2024    BUN 10 03/11/2024    CREATININE 0.8 03/11/2024    CALCIUM 9.1 03/11/2024    ANIONGAP 5 (L) 03/11/2024    ESTGFRAFRICA >60.0 03/29/2022    EGFRNONAA >60.0 03/29/2022     Lab Results   Component Value Date    ALT 12 03/11/2024    AST 12 03/11/2024    ALKPHOS 83 03/11/2024    BILITOT 0.4 03/11/2024       Assessment/Plan:     Problem List Items Addressed This Visit          Oncology    Iron deficiency anemia secondary to inadequate dietary iron intake - Primary     Lab Results   Component Value Date    HGB 8.5 (L) 03/11/2024   Likely multifactorial anemia with history of mixed connective tissue disease and SLE.   Lab Results   Component Value Date    IRON 44 03/11/2024    TRANSFERRIN 184 (L) 03/11/2024    TIBC 272 03/11/2024    FESATURATED 16 (L) 03/11/2024      Mildly low iron saturation   Will plan for Venofer x 3 doses for full repletion with premed prior           Relevant Orders    CBC Auto Differential    Comprehensive Metabolic Panel     Ferritin    Iron and TIBC    MGUS (monoclonal gammopathy of unknown significance)     KENDALL: Faint IgG lambda specific monoclonal protein band in beta identified.   SPEP: Normal total protein. Paraprotein peak in beta-2 = 0.28 g/dL (previously, 0.29 g/dL).   FLC-R : 1.47    PET scan 12/2022 without hypermetabolic activity or concern for metastatic disease. Bone marrow biopsy January 2023 with slight lambda plasma cell possible neoplasm 5-10%.     Plan for continued close monitoring of plasma cell with peripheral blood and repeat bone marrow biopsy if worsening anemia in absence of iron deficiency.     Monoclonal protein present but   3.0 g/dL  · With exception of anemia, no CRAB features or other indicators of active myeloma          Relevant Orders    CBC Auto Differential    Comprehensive Metabolic Panel    Ferritin    Iron and TIBC             Med Onc Chart Routing      Follow up with physician    Follow up with BECKIE 2 months. s/p iron thrrapy with lab 1-2 days prior   Infusion scheduling note New or changed treatment   Venofer x 3   Injection scheduling note    Labs CBC, CMP, ferritin and iron and TIBC   Scheduling:  Preferred lab:  Lab interval:     Imaging    Pharmacy appointment    Other referrals                   BHARAT RuckerP-C  Hematology/Oncology

## 2024-03-20 ENCOUNTER — TELEPHONE (OUTPATIENT)
Dept: INFUSION THERAPY | Facility: HOSPITAL | Age: 49
End: 2024-03-20
Payer: MEDICARE

## 2024-03-23 ENCOUNTER — PATIENT MESSAGE (OUTPATIENT)
Dept: GASTROENTEROLOGY | Facility: CLINIC | Age: 49
End: 2024-03-23
Payer: MEDICARE

## 2024-03-26 ENCOUNTER — INFUSION (OUTPATIENT)
Dept: INFUSION THERAPY | Facility: HOSPITAL | Age: 49
End: 2024-03-26
Payer: MEDICARE

## 2024-03-26 VITALS
HEART RATE: 67 BPM | TEMPERATURE: 98 F | DIASTOLIC BLOOD PRESSURE: 81 MMHG | OXYGEN SATURATION: 97 % | SYSTOLIC BLOOD PRESSURE: 132 MMHG | RESPIRATION RATE: 16 BRPM

## 2024-03-26 DIAGNOSIS — D63.8 ANEMIA OF INFECTION AND CHRONIC DISEASE: ICD-10-CM

## 2024-03-26 DIAGNOSIS — D50.8 IRON DEFICIENCY ANEMIA SECONDARY TO INADEQUATE DIETARY IRON INTAKE: Primary | ICD-10-CM

## 2024-03-26 DIAGNOSIS — D50.9 IRON DEFICIENCY ANEMIA, UNSPECIFIED IRON DEFICIENCY ANEMIA TYPE: ICD-10-CM

## 2024-03-26 DIAGNOSIS — B99.9 ANEMIA OF INFECTION AND CHRONIC DISEASE: ICD-10-CM

## 2024-03-26 PROCEDURE — 63600175 PHARM REV CODE 636 W HCPCS: Mod: HCNC

## 2024-03-26 PROCEDURE — 96375 TX/PRO/DX INJ NEW DRUG ADDON: CPT | Mod: HCNC

## 2024-03-26 PROCEDURE — 96374 THER/PROPH/DIAG INJ IV PUSH: CPT | Mod: HCNC

## 2024-03-26 RX ORDER — METHYLPREDNISOLONE SOD SUCC 125 MG
40 VIAL (EA) INJECTION
Status: COMPLETED | OUTPATIENT
Start: 2024-03-26 | End: 2024-03-26

## 2024-03-26 RX ORDER — SODIUM CHLORIDE 0.9 % (FLUSH) 0.9 %
10 SYRINGE (ML) INJECTION
Status: DISCONTINUED | OUTPATIENT
Start: 2024-03-26 | End: 2024-03-26 | Stop reason: HOSPADM

## 2024-03-26 RX ADMIN — IRON SUCROSE 200 MG: 20 INJECTION, SOLUTION INTRAVENOUS at 07:03

## 2024-03-26 RX ADMIN — METHYLPREDNISOLONE SODIUM SUCCINATE 40 MG: 125 INJECTION, POWDER, FOR SOLUTION INTRAMUSCULAR; INTRAVENOUS at 07:03

## 2024-03-26 NOTE — PLAN OF CARE
Problem: Adult Inpatient Plan of Care  Goal: Plan of Care Review  Outcome: Ongoing, Progressing  Flowsheets (Taken 3/26/2024 0713)  Plan of Care Reviewed With: patient  Goal: Optimal Comfort and Wellbeing  Outcome: Ongoing, Progressing  Intervention: Provide Person-Centered Care  Flowsheets (Taken 3/26/2024 0713)  Trust Relationship/Rapport:   care explained   questions encouraged   choices provided   reassurance provided   thoughts/feelings acknowledged   emotional support provided   empathic listening provided   questions answered

## 2024-03-28 ENCOUNTER — PATIENT MESSAGE (OUTPATIENT)
Dept: GASTROENTEROLOGY | Facility: HOSPITAL | Age: 49
End: 2024-03-28
Payer: MEDICARE

## 2024-03-28 ENCOUNTER — HOSPITAL ENCOUNTER (OUTPATIENT)
Facility: HOSPITAL | Age: 49
Discharge: HOME | End: 2024-03-28
Attending: INTERNAL MEDICINE | Admitting: INTERNAL MEDICINE
Payer: MEDICARE

## 2024-03-28 ENCOUNTER — ANESTHESIA EVENT (OUTPATIENT)
Dept: ENDOSCOPY | Facility: HOSPITAL | Age: 49
End: 2024-03-28
Payer: MEDICARE

## 2024-03-28 ENCOUNTER — ANESTHESIA (OUTPATIENT)
Dept: ENDOSCOPY | Facility: HOSPITAL | Age: 49
End: 2024-03-28
Payer: MEDICARE

## 2024-03-28 DIAGNOSIS — Z86.010 PERSONAL HISTORY OF COLONIC POLYPS: ICD-10-CM

## 2024-03-28 DIAGNOSIS — Z86.010 HISTORY OF COLON POLYPS: Primary | ICD-10-CM

## 2024-03-28 LAB
B-HCG UR QL: NEGATIVE
CTP QC/QA: YES

## 2024-03-28 PROCEDURE — 37000008 HC ANESTHESIA 1ST 15 MINUTES: Mod: HCNC | Performed by: INTERNAL MEDICINE

## 2024-03-28 PROCEDURE — 25000003 PHARM REV CODE 250: Mod: HCNC | Performed by: NURSE ANESTHETIST, CERTIFIED REGISTERED

## 2024-03-28 PROCEDURE — G0105 COLORECTAL SCRN; HI RISK IND: HCPCS | Mod: HCNC | Performed by: INTERNAL MEDICINE

## 2024-03-28 PROCEDURE — 63600175 PHARM REV CODE 636 W HCPCS: Mod: HCNC | Performed by: NURSE ANESTHETIST, CERTIFIED REGISTERED

## 2024-03-28 PROCEDURE — 37000009 HC ANESTHESIA EA ADD 15 MINS: Mod: HCNC | Performed by: INTERNAL MEDICINE

## 2024-03-28 PROCEDURE — 81025 URINE PREGNANCY TEST: CPT | Mod: HCNC | Performed by: INTERNAL MEDICINE

## 2024-03-28 PROCEDURE — G0105 COLORECTAL SCRN; HI RISK IND: HCPCS | Mod: HCNC,,, | Performed by: INTERNAL MEDICINE

## 2024-03-28 RX ORDER — SODIUM CHLORIDE 0.9 % (FLUSH) 0.9 %
2 SYRINGE (ML) INJECTION
Status: DISCONTINUED | OUTPATIENT
Start: 2024-03-28 | End: 2024-03-28 | Stop reason: HOSPADM

## 2024-03-28 RX ORDER — PROPOFOL 10 MG/ML
VIAL (ML) INTRAVENOUS
Status: DISCONTINUED | OUTPATIENT
Start: 2024-03-28 | End: 2024-03-28

## 2024-03-28 RX ORDER — LIDOCAINE HYDROCHLORIDE 10 MG/ML
INJECTION, SOLUTION EPIDURAL; INFILTRATION; INTRACAUDAL; PERINEURAL
Status: DISCONTINUED | OUTPATIENT
Start: 2024-03-28 | End: 2024-03-28

## 2024-03-28 RX ADMIN — PROPOFOL 100 MG: 10 INJECTION, EMULSION INTRAVENOUS at 09:03

## 2024-03-28 RX ADMIN — PROPOFOL 20 MG: 10 INJECTION, EMULSION INTRAVENOUS at 09:03

## 2024-03-28 RX ADMIN — LIDOCAINE HYDROCHLORIDE 50 MG: 10 SOLUTION INTRAVENOUS at 09:03

## 2024-03-28 RX ADMIN — PROPOFOL 40 MG: 10 INJECTION, EMULSION INTRAVENOUS at 09:03

## 2024-03-28 RX ADMIN — SODIUM CHLORIDE, SODIUM LACTATE, POTASSIUM CHLORIDE, AND CALCIUM CHLORIDE: .6; .31; .03; .02 INJECTION, SOLUTION INTRAVENOUS at 09:03

## 2024-03-28 NOTE — BRIEF OP NOTE
Endoscopy Discharge Summary      Admit Date: 3/28/2024    Discharge Date and Time:  3/28/2024 9:31 AM    Attending Physician: José Manuel Huerta MD     Discharge Physician: José Manuel Huerta MD     Principal Admitting Diagnoses: History of colon polyps         Discharge Diagnosis: The primary encounter diagnosis was History of colon polyps. A diagnosis of Personal history of colonic polyps was also pertinent to this visit.     Discharged Condition: Good    Indication for Admission: History of colon polyps     Hospital Course: Patient was admitted for an inpatient procedure and tolerated the procedure well with no complications.    Significant Diagnostic Studies:  COLON    Pathology (if any):  Specimen (24h ago, onward)      None            Disposition: Home.    Bleeding: None    No Implants         Follow Up/Patient Instructions:   Current Discharge Medication List        CONTINUE these medications which have NOT CHANGED    Details   atorvastatin (LIPITOR) 10 MG tablet       cloNIDine (CATAPRES) 0.1 MG tablet Take 0.2 mg by mouth 3 (three) times daily. Take 2 (0.1 mg) tablets three times a day      dapsone 100 MG Tab Take 100 mg by mouth once daily at 6am.      doxazosin (CARDURA) 2 MG tablet Take 4 mg by mouth every 12 (twelve) hours. Take 2 (2mg) tablets two times a day      empagliflozin (JARDIANCE) 10 mg tablet Take 1 tablet (10 mg total) by mouth once daily.  Qty: 30 tablet, Refills: 11    Associated Diagnoses: Hypertension associated with diabetes      famotidine (PEPCID) 40 MG tablet TAKE ONE TABLET BY MOUTH ONCE A DAY  Qty: 30 tablet, Refills: 2    Comments: This prescription was filled on 11/30/2023. Any refills authorized will be placed on file.  Associated Diagnoses: Gastroesophageal reflux disease without esophagitis      gabapentin (NEURONTIN) 800 MG tablet Neurontin 800 mg tablet   Take 1 tablet 3 times a day by oral route.      HYDROcodone-acetaminophen (NORCO)  mg per tablet Norco 10 mg-325 mg  tablet   Take 1 tablet 3 times a day by oral route as needed.    Comments: <!--EPICS-->Quantity prescribed more than 7 day supply? Press F2 and select one:[id number]<BR><!--EPICE-->      hydroxychloroquine (PLAQUENIL) 200 mg tablet Take 200 mg by mouth 2 (two) times daily.       hydrOXYzine HCL (ATARAX) 25 MG tablet TAKE ONE TABLET BY MOUTH EVERY 12 HOURS AS NEEDED FOR ITCHING  Qty: 30 tablet, Refills: 5    Comments: *PATIENT NEEDS THE PENS*  Associated Diagnoses: Pruritus      insulin (LANTUS SOLOSTAR U-100 INSULIN) glargine 100 units/mL SubQ pen Inject 10 Units into the skin once daily.  Qty: 3 mL, Refills: 11    Associated Diagnoses: Type 2 diabetes mellitus without complication, with long-term current use of insulin      insulin aspart U-100 (NOVOLOG) 100 unit/mL injection Inject 4 Units into the skin 3 (three) times daily before meals.  Qty: 10.8 mL, Refills: 3    Associated Diagnoses: Type 2 diabetes mellitus without complication, with long-term current use of insulin      labetaloL (NORMODYNE) 300 MG tablet Take 1 tablet (300 mg total) by mouth 2 (two) times daily.  Qty: 60 tablet, Refills: 11    Comments: .  Associated Diagnoses: Essential hypertension      mycophenolate (CELLCEPT) 250 mg Cap 3,000 mg once daily.      NIFEdipine (PROCARDIA-XL) 30 MG (OSM) 24 hr tablet Take 1 tablet (30 mg total) by mouth every 12 (twelve) hours.  Qty: 60 tablet, Refills: 11    Comments: Stop taking the 60 mg tablets to split the dose for  ankle swelling  Associated Diagnoses: Hypertension associated with diabetes      pantoprazole (PROTONIX) 40 MG tablet Take 1 tablet (40 mg total) by mouth 2 (two) times daily for 90 days, THEN 1 tablet (40 mg total) once daily.  Qty: 360 tablet, Refills: 0    Associated Diagnoses: Gastroesophageal reflux disease without esophagitis      spironolactone (ALDACTONE) 50 MG tablet Take 1 tablet (50 mg total) by mouth once daily.  Qty: 30 tablet, Refills: 11    Comments: .  Associated Diagnoses:  Essential hypertension      tiZANidine (ZANAFLEX) 4 MG tablet TK 1 T PO TID PRN      zolpidem (AMBIEN) 10 mg Tab TAKE 1/2 to 1 TABLET BY MOUTH AT BEDTIME AS NEEDED FOR INSOMNIA  Qty: 30 tablet, Refills: 0    Associated Diagnoses: Drug-induced insomnia      betamethasone dipropionate 0.05 % cream APPLY A SMALL AMOUNT TO AFFECTED AREA TOPICALLY TWICE A DAY FOR 2 TO 3 WEEKS AT A TIME AS NEEDED FLARE UPS AVOID FACE & GROIN AREA      blood sugar diagnostic (TRUE METRIX GLUCOSE TEST STRIP) Strp Test 4 times daily.  Qty: 100 strip, Refills: 0    Associated Diagnoses: Type 2 diabetes mellitus without complication, with long-term current use of insulin      blood-glucose meter (TRUE METRIX GLUCOSE METER) Misc Use as directed  Qty: 1 each, Refills: 0      blood-glucose meter,continuous (DEXCOM ) Misc 1 each by Misc.(Non-Drug; Combo Route) route once. for 1 dose  Qty: 1 each, Refills: 3    Comments: Dexcom G7  Associated Diagnoses: Hypertension associated with diabetes      blood-glucose sensor (DEXCOM G7 SENSOR) Myrtle 1 each by Misc.(Non-Drug; Combo Route) route every 10 days.  Qty: 3 each, Refills: 11    Associated Diagnoses: Hypertension associated with diabetes      blood-glucose transmitter (DEXCOM G5 TRANSMITTER) Myrtle 1 each by Misc.(Non-Drug; Combo Route) route every 3 (three) months.  Qty: 1 each, Refills: 3    Associated Diagnoses: Hypertension associated with diabetes      hydrocortisone 2.5 % ointment APPLY TOPICALLY TO FACE TWICE A DAY AS NEEDED FOR 1 TO 2 WEEKS AT A TIME      LORazepam (ATIVAN) 0.5 MG tablet Take 1 tablet (0.5 mg total) by mouth every 12 (twelve) hours as needed for Anxiety.  Qty: 30 tablet, Refills: 2    Comments: This prescription was filled on 5/21/2022. Any refills authorized will be placed on file.  Associated Diagnoses: Anxiety      ondansetron (ZOFRAN-ODT) 4 MG TbDL DISSOLVE 1 TABLET BY MOUTH EVERY 8 HOURS AS NEEDED  Qty: 30 tablet, Refills: 1    Comments: This prescription was  filled on 12/22/2023. Any refills authorized will be placed on file.  Associated Diagnoses: Gastroenteritis      TRUEPLUS LANCETS 33 gauge Misc Inject 1 lancet as directed 4 (four) times daily.  Qty: 100 each, Refills: 11    Associated Diagnoses: Type 2 diabetes mellitus without complication, with long-term current use of insulin           STOP taking these medications       sertraline (ZOLOFT) 50 MG tablet Comments:   Reason for Stopping:         sod sulf-pot chloride-mag sulf (SUTAB) 1.479-0.188- 0.225 gram tablet Comments:   Reason for Stopping:               Discharge Procedure Orders   Diet general     No dressing needed     Call MD for:  temperature >100.4     Call MD for:  persistent nausea and vomiting     Call MD for:  severe uncontrolled pain     Call MD for:  difficulty breathing, headache or visual disturbances     Call MD for:  redness, tenderness, or signs of infection (pain, swelling, redness, odor or green/yellow discharge around incision site)     Call MD for:  hives     Call MD for:  persistent dizziness or light-headedness        Follow-up Information       Alissa Bautista MD Follow up.    Specialty: Internal Medicine  Why: As needed  Contact information:  [address]  [place] [address] [phone]

## 2024-03-28 NOTE — INTERVAL H&P NOTE
"The patient has been examined and the H&P has been reviewed:    I concur with the findings and no changes have occurred since H&P was written.    Procedure risks, benefits and alternative options discussed and understood by patient/family.    Vitals:    03/28/24 0804   BP: (!) 115/57   BP Location: Left arm   Patient Position: Lying   Pulse: 63   Resp: 17   Temp: 98.4 °F (36.9 °C)   TempSrc: Temporal   SpO2: 100%   Weight: 88 kg (194 lb)   Height: 5' 7" (1.702 m)         Active Hospital Problems    Diagnosis  POA    *History of colon polyps [Z86.010]  Not Applicable     Colonoscopy 06/2020: ascending colon TVA and two tubular adenomas in descending colon        Resolved Hospital Problems   No resolved problems to display.     "

## 2024-03-28 NOTE — ANESTHESIA POSTPROCEDURE EVALUATION
Anesthesia Post Evaluation    Patient: Marilou Daniel    Procedure(s) Performed: Procedure(s) (LRB):  COLONOSCOPY (N/A)    Final Anesthesia Type: MAC      Patient location during evaluation: PACU  Patient participation: Yes- Able to Participate  Level of consciousness: awake and alert  Post-procedure vital signs: reviewed and stable  Pain management: adequate  Airway patency: patent    PONV status at discharge: No PONV  Anesthetic complications: no      Cardiovascular status: blood pressure returned to baseline  Respiratory status: unassisted  Hydration status: euvolemic  Follow-up not needed.              Vitals Value Taken Time   /66 03/28/24 0932   Temp 98 03/28/24 0932   Pulse 66 03/28/24 0932   Resp 12 03/28/24 0932   SpO2 98 03/28/24 0932         No case tracking events are documented in the log.      Pain/Apoorva Score: No data recorded

## 2024-03-28 NOTE — TRANSFER OF CARE
"Anesthesia Transfer of Care Note    Patient: Marilou Daniel    Procedure(s) Performed: Procedure(s) (LRB):  COLONOSCOPY (N/A)    Patient location: PACU    Anesthesia Type: MAC    Transport from OR: Transported from OR on room air with adequate spontaneous ventilation    Post pain: adequate analgesia    Post assessment: no apparent anesthetic complications    Post vital signs: stable    Level of consciousness: awake    Nausea/Vomiting: no nausea/vomiting    Complications: none    Transfer of care protocol was followed      Last vitals: Visit Vitals  BP (!) 115/57 (BP Location: Left arm, Patient Position: Lying)   Pulse 63   Temp 36.9 °C (98.4 °F) (Temporal)   Resp 17   Ht 5' 7" (1.702 m)   Wt 88 kg (194 lb)   SpO2 100%   Breastfeeding No   BMI 30.38 kg/m²     "

## 2024-03-28 NOTE — PROVATION PATIENT INSTRUCTIONS
Discharge Summary/Instructions after an Endoscopic Procedure  Patient Name: Marilou Daniel  Patient MRN: [id number]  Patient YOB: 1975 Thursday, March 28, 2024 José Manuel Huerta MD  Dear patient,  As a result of recent federal legislation (The Federal Cures Act), you may   receive lab or pathology results from your procedure in your [facility]   account before your physician is able to contact you. Your physician or   their representative will relay the results to you with their   recommendations at their soonest availability.  Thank you,  RESTRICTIONS:  During your procedure today, you received medications for sedation.  These   medications may affect your judgment, balance and coordination.  Therefore,   for 24 hours, you have the following restrictions:   - DO NOT drive a car, operate machinery, make legal/financial decisions,   sign important papers or drink alcohol.    ACTIVITY:  Today: no heavy lifting, straining or running due to procedural   sedation/anesthesia.  The following day: return to full activity including work.  DIET:  Eat and drink normally unless instructed otherwise.     TREATMENT FOR COMMON SIDE EFFECTS:  - Mild abdominal pain, nausea, belching, bloating or excessive gas:  rest,   eat lightly and use a heating pad.  - Sore Throat: treat with throat lozenges and/or gargle with warm salt   water.  - Because air was used during the procedure, expelling large amounts of air   from your rectum or belching is normal.  - If a bowel prep was taken, you may not have a bowel movement for 1-3 days.    This is normal.  SYMPTOMS TO WATCH FOR AND REPORT TO YOUR PHYSICIAN:  1. Abdominal pain or bloating, other than gas cramps.  2. Chest pain.  3. Back pain.  4. Signs of infection such as: chills or fever occurring within 24 hours   after the procedure.  5. Rectal bleeding, which would show as bright red, maroon, or black stools.   (A tablespoon of blood from the rectum is not serious, especially  if   hemorrhoids are present.)  6. Vomiting.  7. Weakness or dizziness.  GO DIRECTLY TO THE NEAREST EMERGENCY ROOM IF YOU HAVE ANY OF THE FOLLOWING:      Difficulty breathing              Chills and/or fever over 101 F   Persistent vomiting and/or vomiting blood   Severe abdominal pain   Severe chest pain   Black, tarry stools   Bleeding- more than one tablespoon   Any other symptom or condition that you feel may need urgent attention  Your doctor recommends these additional instructions:  If any biopsies were taken, your doctors clinic will contact you in 1 to 2   weeks with any results.  - The patient will be observed post-procedure, until all discharge criteria   are met.   - Discharge patient to home (ambulatory).   - Patient has a contact number available for emergencies.  The signs and   symptoms of potential delayed complications were discussed with the   patient.  Return to normal activities tomorrow.  Written discharge   instructions were provided to the patient.   - Resume previous diet.   - Continue present medications.   - Repeat colonoscopy in 5 years for surveillance.   - Return to referring physician as previously scheduled.  For questions, problems or results please call your physician José Manuel Huerta MD at Work:  [phone]  If you have any questions about the above instructions, call the GI   department at [phone] or call the endoscopy unit at [phone]   from 7am until 3 pm.  [facility], EMERGENCY ROOM PHONE NUMBER:   [phone]  IF A COMPLICATION OR EMERGENCY SITUATION ARISES AND YOU ARE UNABLE TO REACH   YOUR PHYSICIAN - GO DIRECTLY TO THE EMERGENCY ROOM.  I have read or have had read to me these discharge instructions for my   procedure and have received a written copy.  I understand these   instructions and will follow-up with my physician if I have any questions.     __________________________________        _____________________________________  Nurse Signature                                          Patient/Designated   Responsible Party Signature  José Manuel Huerta MD  3/28/2024 9:30:18 AM  This report has been verified and signed electronically.  Dear patient,  As a result of recent federal legislation (The Federal Cures Act), you may   receive lab or pathology results from your procedure in your [facility]   account before your physician is able to contact you. Your physician or   their representative will relay the results to you with their   recommendations at their soonest availability.  Thank you,  PROVATION

## 2024-03-28 NOTE — DISCHARGE INSTRUCTIONS
Dear Marilou Daniel      If you develop fevers, severe abdominal pain, significant bleeding, nausea or vomiting, please contact us or come to our Emergency Department at [facility].  Our  contact number is [phone] available for emergencies or any question that you may have.      You may return to normal activities tomorrow.  Written discharge instructions were provided to you today and have them handy since you may not remember our conversation today.       If you take Aspirin, please resume taking it at your prior dose today. If we obtained biopsies or removed polyps during your procedure, we will contact you within 5 to 6 days with the results.      Thanks for trusting us with your healthcare needs.      Sincerely,     José Manuel Huerta M.D.        To rate your experience with Dr. Huerta, please click on the link below     [email or web address]

## 2024-03-28 NOTE — ANESTHESIA PREPROCEDURE EVALUATION
03/28/2024  Marilou Daniel is a 48 y.o., female.      Pre-op Assessment    I have reviewed the Patient Summary Reports.     I have reviewed the Nursing Notes. I have reviewed the NPO Status.   I have reviewed the Medications.     Review of Systems  Anesthesia Hx:  No problems with previous Anesthesia                Social:  Non-Smoker       Hematology/Oncology:  Hematology Normal   Oncology Normal                                   EENT/Dental:  EENT/Dental Normal           Cardiovascular:     Hypertension, well controlled                                        Pulmonary:  Pulmonary Normal                       Renal/:  Renal/ Normal                 Hepatic/GI:  Bowel Prep.   GERD, poorly controlled             Musculoskeletal:  Musculoskeletal Normal        Rheumatic Disease, Lupus         Neurological:      Headaches                                 Endocrine:  Diabetes, well controlled, type 2         Morbid Obesity / BMI > 40  Dermatological:  Skin Normal    Psych:  Psychiatric Normal                    Physical Exam  General: Well nourished    Airway:  Mallampati: II   Mouth Opening: Normal  TM Distance: Normal  Tongue: Normal  Neck ROM: Normal ROM    Dental:  Intact    Chest/Lungs:  Clear to auscultation    Heart:  Rate: Normal        Anesthesia Plan  Type of Anesthesia, risks & benefits discussed:    Anesthesia Type: MAC  Intra-op Monitoring Plan: Standard ASA Monitors  Induction:  IV  Informed Consent: Informed consent signed with the Patient and all parties understand the risks and agree with anesthesia plan.  All questions answered. Patient consented to blood products? Yes  ASA Score: 3    Ready For Surgery From Anesthesia Perspective.     .

## 2024-04-01 VITALS
DIASTOLIC BLOOD PRESSURE: 65 MMHG | BODY MASS INDEX: 30.45 KG/M2 | RESPIRATION RATE: 18 BRPM | TEMPERATURE: 98 F | OXYGEN SATURATION: 100 % | HEIGHT: 67 IN | SYSTOLIC BLOOD PRESSURE: 128 MMHG | HEART RATE: 71 BPM | WEIGHT: 194 LBS

## 2024-04-03 ENCOUNTER — PATIENT MESSAGE (OUTPATIENT)
Dept: PULMONOLOGY | Facility: CLINIC | Age: 49
End: 2024-04-03
Payer: MEDICARE

## 2024-04-08 ENCOUNTER — INFUSION (OUTPATIENT)
Dept: INFUSION THERAPY | Facility: HOSPITAL | Age: 49
End: 2024-04-08
Payer: MEDICARE

## 2024-04-08 VITALS
TEMPERATURE: 98 F | HEART RATE: 76 BPM | OXYGEN SATURATION: 100 % | DIASTOLIC BLOOD PRESSURE: 80 MMHG | RESPIRATION RATE: 18 BRPM | SYSTOLIC BLOOD PRESSURE: 126 MMHG

## 2024-04-08 DIAGNOSIS — D50.8 IRON DEFICIENCY ANEMIA SECONDARY TO INADEQUATE DIETARY IRON INTAKE: Primary | ICD-10-CM

## 2024-04-08 DIAGNOSIS — D63.8 ANEMIA OF INFECTION AND CHRONIC DISEASE: ICD-10-CM

## 2024-04-08 DIAGNOSIS — B99.9 ANEMIA OF INFECTION AND CHRONIC DISEASE: ICD-10-CM

## 2024-04-08 DIAGNOSIS — D50.9 IRON DEFICIENCY ANEMIA, UNSPECIFIED IRON DEFICIENCY ANEMIA TYPE: ICD-10-CM

## 2024-04-08 PROCEDURE — 96374 THER/PROPH/DIAG INJ IV PUSH: CPT | Mod: HCNC

## 2024-04-08 PROCEDURE — 96375 TX/PRO/DX INJ NEW DRUG ADDON: CPT | Mod: HCNC

## 2024-04-08 PROCEDURE — 63600175 PHARM REV CODE 636 W HCPCS: Mod: HCNC

## 2024-04-08 RX ADMIN — METHYLPREDNISOLONE SODIUM SUCCINATE 40 MG: 40 INJECTION, POWDER, FOR SOLUTION INTRAMUSCULAR; INTRAVENOUS at 08:04

## 2024-04-08 RX ADMIN — IRON SUCROSE 200 MG: 20 INJECTION, SOLUTION INTRAVENOUS at 08:04

## 2024-04-08 NOTE — NURSING
Infusion # Venofer - 200 mg for 3 doses  Last dose- 3/26/24      Recent labs? 3/11/24  Last Hem/Onc provider visit- Seen by Jeremy on 3/14/24     Premeds-Solumedrol 40 mg IVP     Venofer 200 mg administered IV at a 2 minute rate per orders; see MAR and vitals for more details. Monitored for post infusion for any s/sx of reaction. Tolerated well without adverse events, discharged and ambulatory out of clinic.

## 2024-04-09 RX ORDER — CLONIDINE HYDROCHLORIDE 0.1 MG/1
0.2 TABLET ORAL 3 TIMES DAILY
Qty: 180 TABLET | Refills: 0 | Status: SHIPPED | OUTPATIENT
Start: 2024-04-09 | End: 2024-05-09

## 2024-04-10 ENCOUNTER — OFFICE VISIT (OUTPATIENT)
Dept: HEMATOLOGY/ONCOLOGY | Facility: CLINIC | Age: 49
End: 2024-04-10
Payer: MEDICARE

## 2024-04-10 VITALS
DIASTOLIC BLOOD PRESSURE: 87 MMHG | SYSTOLIC BLOOD PRESSURE: 136 MMHG | RESPIRATION RATE: 20 BRPM | HEART RATE: 76 BPM | WEIGHT: 204.81 LBS | TEMPERATURE: 98 F | OXYGEN SATURATION: 98 % | BODY MASS INDEX: 32.15 KG/M2 | HEIGHT: 67 IN

## 2024-04-10 DIAGNOSIS — D50.8 IRON DEFICIENCY ANEMIA SECONDARY TO INADEQUATE DIETARY IRON INTAKE: Primary | ICD-10-CM

## 2024-04-10 DIAGNOSIS — I15.2 HYPERTENSION ASSOCIATED WITH DIABETES: Chronic | ICD-10-CM

## 2024-04-10 DIAGNOSIS — M32.8 OTHER FORMS OF SYSTEMIC LUPUS ERYTHEMATOSUS, UNSPECIFIED ORGAN INVOLVEMENT STATUS: ICD-10-CM

## 2024-04-10 DIAGNOSIS — E11.59 HYPERTENSION ASSOCIATED WITH DIABETES: Chronic | ICD-10-CM

## 2024-04-10 DIAGNOSIS — Z79.899 IMMUNOCOMPROMISED STATE DUE TO DRUG THERAPY: ICD-10-CM

## 2024-04-10 DIAGNOSIS — D84.821 IMMUNOCOMPROMISED STATE DUE TO DRUG THERAPY: ICD-10-CM

## 2024-04-10 PROBLEM — D50.9 IRON DEFICIENCY ANEMIA: Status: RESOLVED | Noted: 2022-12-02 | Resolved: 2024-04-10

## 2024-04-10 PROCEDURE — 3079F DIAST BP 80-89 MM HG: CPT | Mod: HCNC,CPTII,S$GLB, | Performed by: INTERNAL MEDICINE

## 2024-04-10 PROCEDURE — 1159F MED LIST DOCD IN RCRD: CPT | Mod: HCNC,CPTII,S$GLB, | Performed by: INTERNAL MEDICINE

## 2024-04-10 PROCEDURE — 3008F BODY MASS INDEX DOCD: CPT | Mod: HCNC,CPTII,S$GLB, | Performed by: INTERNAL MEDICINE

## 2024-04-10 PROCEDURE — 99999 PR PBB SHADOW E&M-EST. PATIENT-LVL V: CPT | Mod: PBBFAC,HCNC,, | Performed by: INTERNAL MEDICINE

## 2024-04-10 PROCEDURE — 99214 OFFICE O/P EST MOD 30 MIN: CPT | Mod: HCNC,S$GLB,, | Performed by: INTERNAL MEDICINE

## 2024-04-10 PROCEDURE — 3075F SYST BP GE 130 - 139MM HG: CPT | Mod: HCNC,CPTII,S$GLB, | Performed by: INTERNAL MEDICINE

## 2024-04-10 PROCEDURE — 1160F RVW MEDS BY RX/DR IN RCRD: CPT | Mod: HCNC,CPTII,S$GLB, | Performed by: INTERNAL MEDICINE

## 2024-04-10 PROCEDURE — 3044F HG A1C LEVEL LT 7.0%: CPT | Mod: HCNC,CPTII,S$GLB, | Performed by: INTERNAL MEDICINE

## 2024-04-10 NOTE — PROGRESS NOTES
Subjective:       Patient ID: Marilou Daniel is a 48 y.o. female.    Chief Complaint: Results and Anemia    HPI:  48-year-old female history of iron deficiency is preparing to have a knee replacement in proximally 6 weeks.  The patient has had 2 doses of IV Venofer patient had infusion reaction to Injectafer.  Patient returns for clinical follow-up    Past Medical History:   Diagnosis Date    Anemia     Arthritis     Connective tissue disease 1999    COVID-19 in immunocompromised patient 02/05/2021    Diabetes mellitus     Drug induced insomnia 12/15/2020    R/T chronic steroid use for SLE.    Gastroesophageal reflux disease 03/23/2020    Hypertension     Lupus 1999    Situational anxiety 08/24/2021    Thyroid nodule greater than or equal to 1 cm in diameter incidentally noted on imaging study 11/19/2020    Vasculitis      Family History   Problem Relation Age of Onset    Lung cancer Mother 59        +hx of smoking    Cancer Father         type uk? stomach?    Breast cancer Other 58        UL mastect, mets to back & lungs    Breast cancer Other 61        chemo, XRT, UL mastect?    Autism Other     Asthma Other     Obesity Other     Liver cancer Other 58        mets to lungs, pancreas, stomach    Cancer Other         type uk?     Social History     Socioeconomic History    Marital status:    Tobacco Use    Smoking status: Never    Smokeless tobacco: Never   Substance and Sexual Activity    Alcohol use: Never    Drug use: Never    Sexual activity: Yes     Partners: Male     Birth control/protection: See Surgical Hx     Social Determinants of Health     Financial Resource Strain: Medium Risk (3/14/2024)    Overall Financial Resource Strain (CARDIA)     Difficulty of Paying Living Expenses: Somewhat hard   Food Insecurity: Unknown (3/14/2024)    Hunger Vital Sign     Worried About Running Out of Food in the Last Year: Never true     Ran Out of Food in the Last Year: Patient declined   Transportation  Needs: No Transportation Needs (3/14/2024)    PRAPARE - Transportation     Lack of Transportation (Medical): No     Lack of Transportation (Non-Medical): No   Physical Activity: Inactive (3/14/2024)    Exercise Vital Sign     Days of Exercise per Week: 0 days     Minutes of Exercise per Session: 30 min   Stress: Stress Concern Present (3/14/2024)    [employer] - Occupational Stress Questionnaire     Feeling of Stress : To some extent   Social Connections: Unknown (3/14/2024)    Social Connection and Isolation Panel [NHANES]     Frequency of Communication with Friends and Family: More than three times a week     Frequency of Social Gatherings with Friends and Family: More than three times a week     Active Member of Clubs or Organizations: Yes     Attends Club or Organization Meetings: More than 4 times per year     Marital Status:    Housing Stability: Low Risk  (3/14/2024)    Housing Stability Vital Sign     Unable to Pay for Housing in the Last Year: No     Number of Places Lived in the Last Year: 1     Unstable Housing in the Last Year: No     Past Surgical History:   Procedure Laterality Date    ABLATION      COLONOSCOPY N/A 6/24/2020    Procedure: COLONOSCOPY;  Surgeon: Nevin Penny MD;  Location: [facility];  Service: Endoscopy;  Laterality: N/A;    COLONOSCOPY N/A 3/28/2024    Procedure: COLONOSCOPY;  Surgeon: José Manuel Huerta MD;  Location: [facility];  Service: Endoscopy;  Laterality: N/A;    ESOPHAGOGASTRODUODENOSCOPY N/A 6/24/2020    Procedure: ESOPHAGOGASTRODUODENOSCOPY (EGD);  Surgeon: Nevin Penny MD;  Location: [facility];  Service: Endoscopy;  Laterality: N/A;    RIGHT HEART CATHETERIZATION      TUBAL LIGATION      WRIST SURGERY Bilateral        Labs:  Lab Results   Component Value Date    WBC 7.72 03/11/2024    HGB 8.5 (L) 03/11/2024    HCT 25.6 (L) 03/11/2024    MCV 87 03/11/2024     03/11/2024     BMP  Lab Results   Component Value Date      03/11/2024    K 3.7 03/11/2024     03/11/2024    CO2 28 03/11/2024    BUN 10 03/11/2024    CREATININE 0.8 03/11/2024    CALCIUM 9.1 03/11/2024    ANIONGAP 5 (L) 03/11/2024    ESTGFRAFRICA >60.0 03/29/2022    EGFRNONAA >60.0 03/29/2022     Lab Results   Component Value Date    ALT 12 03/11/2024    AST 12 03/11/2024    ALKPHOS 83 03/11/2024    BILITOT 0.4 03/11/2024       Lab Results   Component Value Date    IRON 44 03/11/2024    TIBC 272 03/11/2024    FERRITIN 345 (H) 03/11/2024     Lab Results   Component Value Date    DSDMYXMJ36 523 01/02/2024     Lab Results   Component Value Date    FOLATE 6.8 01/02/2024     Lab Results   Component Value Date    TSH 0.468 01/02/2024         Review of Systems   Constitutional:  Negative for activity change, appetite change, chills, diaphoresis, fatigue, fever and unexpected weight change.   HENT:  Negative for congestion, dental problem, drooling, ear discharge, ear pain, facial swelling, hearing loss, mouth sores, nosebleeds, postnasal drip, rhinorrhea, sinus pressure, sneezing, sore throat, tinnitus, trouble swallowing and voice change.    Eyes:  Negative for photophobia, pain, discharge, redness, itching and visual disturbance.   Respiratory:  Negative for cough, choking, chest tightness, shortness of breath, wheezing and stridor.    Cardiovascular:  Negative for chest pain, palpitations and leg swelling.   Gastrointestinal:  Negative for abdominal distention, abdominal pain, anal bleeding, blood in stool, constipation, diarrhea, nausea, rectal pain and vomiting.   Endocrine: Negative for cold intolerance, heat intolerance, polydipsia, polyphagia and polyuria.   Genitourinary:  Negative for decreased urine volume, difficulty urinating, dyspareunia, dysuria, enuresis, flank pain, frequency, genital sores, hematuria, menstrual problem, pelvic pain, urgency, vaginal bleeding, vaginal discharge and vaginal pain.   Musculoskeletal:  Positive for arthralgias. Negative for back  pain, gait problem, joint swelling, myalgias, neck pain and neck stiffness.   Skin:  Negative for color change, pallor and rash.   Allergic/Immunologic: Negative for environmental allergies, food allergies and immunocompromised state.   Neurological:  Negative for dizziness, tremors, seizures, syncope, facial asymmetry, speech difficulty, weakness, light-headedness, numbness and headaches.   Hematological:  Negative for adenopathy. Does not bruise/bleed easily.   Psychiatric/Behavioral:  Negative for agitation, behavioral problems, confusion, decreased concentration, dysphoric mood, hallucinations, self-injury, sleep disturbance and suicidal ideas. The patient is not nervous/anxious and is not hyperactive.        Objective:      Physical Exam  Vitals reviewed.   Constitutional:       General: She is not in acute distress.     Appearance: She is well-developed. She is not diaphoretic.   HENT:      Head: Normocephalic and atraumatic.      Right Ear: External ear normal.      Left Ear: External ear normal.      Nose: Nose normal.      Right Sinus: No maxillary sinus tenderness or frontal sinus tenderness.      Left Sinus: No maxillary sinus tenderness or frontal sinus tenderness.      Mouth/Throat:      Pharynx: No oropharyngeal exudate.   Eyes:      General: Lids are normal. No scleral icterus.        Right eye: No discharge.         Left eye: No discharge.      Conjunctiva/sclera: Conjunctivae normal.      Right eye: Right conjunctiva is not injected. No hemorrhage.     Left eye: Left conjunctiva is not injected. No hemorrhage.     Pupils: Pupils are equal, round, and reactive to light.   Neck:      Thyroid: No thyromegaly.      Vascular: No JVD.      Trachea: No tracheal deviation.   Cardiovascular:      Rate and Rhythm: Normal rate.   Pulmonary:      Effort: Pulmonary effort is normal. No respiratory distress.      Breath sounds: No stridor.   Chest:      Chest wall: No tenderness.   Abdominal:      General: Bowel  sounds are normal. There is no distension.      Palpations: Abdomen is soft. There is no hepatomegaly, splenomegaly or mass.      Tenderness: There is no abdominal tenderness. There is no rebound.   Musculoskeletal:         General: No tenderness. Normal range of motion.      Cervical back: Normal range of motion and neck supple.   Lymphadenopathy:      Cervical: No cervical adenopathy.      Upper Body:      Right upper body: No supraclavicular adenopathy.      Left upper body: No supraclavicular adenopathy.   Skin:     General: Skin is dry.      Findings: No erythema or rash.   Neurological:      Mental Status: She is alert and oriented to person, place, and time.      Cranial Nerves: No cranial nerve deficit.      Coordination: Coordination normal.      Gait: Gait abnormal.   Psychiatric:         Behavior: Behavior normal.         Thought Content: Thought content normal.         Judgment: Judgment normal.             Assessment:      1. Iron deficiency anemia secondary to inadequate dietary iron intake    2. Other forms of systemic lupus erythematosus, unspecified organ involvement status    3. Immunocompromised state due to drug therapy    4. Hypertension associated with diabetes           Med Onc Chart Routing      Follow up with physician . Return to clinic to see me in approximately 3-4 months with CBC iron ferritin in saturated iron prior   Follow up with [facility]    Infusion scheduling note    Injection scheduling note    Labs    Imaging    Pharmacy appointment    Other referrals         Needs EGD to be completed              Plan:     Would recommend patient have 3rd dose of Venofer next week one-week later CBC serum iron TIBC and ferritin will follow-up 3 months for response after surgery.  Do recommend that patient have EGD completion recent colonoscopies done.  She is scheduled to have a knee replacement like to make sure there is no evidence of upper tract iron deficiency since she has not had periods in  over a year in terms of documentation.  Discussed above with her        Dale Powell Jr, MD FACP

## 2024-04-11 ENCOUNTER — HOSPITAL ENCOUNTER (OUTPATIENT)
Dept: PREADMISSION TESTING | Facility: HOSPITAL | Age: 49
Discharge: HOME | End: 2024-04-11
Attending: INTERNAL MEDICINE
Payer: MEDICARE

## 2024-04-11 DIAGNOSIS — D50.8 IRON DEFICIENCY ANEMIA SECONDARY TO INADEQUATE DIETARY IRON INTAKE: ICD-10-CM

## 2024-04-12 ENCOUNTER — HOSPITAL ENCOUNTER (OUTPATIENT)
Dept: PREADMISSION TESTING | Facility: HOSPITAL | Age: 49
Discharge: HOME | End: 2024-04-12
Attending: INTERNAL MEDICINE
Payer: MEDICARE

## 2024-04-15 ENCOUNTER — INFUSION (OUTPATIENT)
Dept: INFUSION THERAPY | Facility: HOSPITAL | Age: 49
End: 2024-04-15
Payer: MEDICARE

## 2024-04-15 VITALS
SYSTOLIC BLOOD PRESSURE: 116 MMHG | TEMPERATURE: 98 F | OXYGEN SATURATION: 98 % | DIASTOLIC BLOOD PRESSURE: 72 MMHG | RESPIRATION RATE: 18 BRPM | HEART RATE: 68 BPM

## 2024-04-15 DIAGNOSIS — D63.8 ANEMIA OF INFECTION AND CHRONIC DISEASE: ICD-10-CM

## 2024-04-15 DIAGNOSIS — D50.9 IRON DEFICIENCY ANEMIA, UNSPECIFIED IRON DEFICIENCY ANEMIA TYPE: ICD-10-CM

## 2024-04-15 DIAGNOSIS — D50.8 IRON DEFICIENCY ANEMIA SECONDARY TO INADEQUATE DIETARY IRON INTAKE: Primary | ICD-10-CM

## 2024-04-15 DIAGNOSIS — B99.9 ANEMIA OF INFECTION AND CHRONIC DISEASE: ICD-10-CM

## 2024-04-15 PROCEDURE — 96375 TX/PRO/DX INJ NEW DRUG ADDON: CPT | Mod: HCNC

## 2024-04-15 PROCEDURE — 63600175 PHARM REV CODE 636 W HCPCS: Mod: HCNC

## 2024-04-15 PROCEDURE — 96374 THER/PROPH/DIAG INJ IV PUSH: CPT | Mod: HCNC

## 2024-04-15 RX ADMIN — METHYLPREDNISOLONE SODIUM SUCCINATE 40 MG: 40 INJECTION, POWDER, FOR SOLUTION INTRAMUSCULAR; INTRAVENOUS at 08:04

## 2024-04-15 RX ADMIN — IRON SUCROSE 200 MG: 20 INJECTION, SOLUTION INTRAVENOUS at 08:04

## 2024-04-15 NOTE — DISCHARGE INSTRUCTIONS
Thank you for letting me take care of YOU!!    [facility]  [address] [facility]  [id number] [place] [address] Drive  [phone] phone     [phone] fax  Hours of Operation: Monday- Friday 8:00am- 5:00pm  After hours phone  [phone]  Hematology / Oncology Physicians on call:    Dr. Andre Valdez        Nurse Practitioners:    Tita Lama, VIVIANE Roche, IVON Thomas NP Khelsea Conley, VIVIANE Gold, NP      Please don't hesitate to call if you have any concerns.

## 2024-04-21 DIAGNOSIS — K21.9 GASTROESOPHAGEAL REFLUX DISEASE WITHOUT ESOPHAGITIS: ICD-10-CM

## 2024-04-22 ENCOUNTER — LAB VISIT (OUTPATIENT)
Dept: LAB | Facility: HOSPITAL | Age: 49
End: 2024-04-22
Attending: INTERNAL MEDICINE
Payer: MEDICARE

## 2024-04-22 DIAGNOSIS — D50.8 IRON DEFICIENCY ANEMIA SECONDARY TO INADEQUATE DIETARY IRON INTAKE: ICD-10-CM

## 2024-04-22 LAB
BASOPHILS # BLD AUTO: 0.03 K/UL (ref 0–0.2)
BASOPHILS NFR BLD: 0.3 % (ref 0–1.9)
DIFFERENTIAL METHOD BLD: ABNORMAL
EOSINOPHIL # BLD AUTO: 0.2 K/UL (ref 0–0.5)
EOSINOPHIL NFR BLD: 1.7 % (ref 0–8)
ERYTHROCYTE [DISTWIDTH] IN BLOOD BY AUTOMATED COUNT: 13.5 % (ref 11.5–14.5)
FERRITIN SERPL-MCNC: 834 NG/ML (ref 20–300)
HCT VFR BLD AUTO: 31.6 % (ref 37–48.5)
HGB BLD-MCNC: 10.5 G/DL (ref 12–16)
IMM GRANULOCYTES # BLD AUTO: 0.02 K/UL (ref 0–0.04)
IMM GRANULOCYTES NFR BLD AUTO: 0.2 % (ref 0–0.5)
IRON SERPL-MCNC: 53 UG/DL (ref 30–160)
LYMPHOCYTES # BLD AUTO: 2.6 K/UL (ref 1–4.8)
LYMPHOCYTES NFR BLD: 25.4 % (ref 18–48)
MCH RBC QN AUTO: 28.6 PG (ref 27–31)
MCHC RBC AUTO-ENTMCNC: 33.2 G/DL (ref 32–36)
MCV RBC AUTO: 86 FL (ref 82–98)
MONOCYTES # BLD AUTO: 0.6 K/UL (ref 0.3–1)
MONOCYTES NFR BLD: 5.7 % (ref 4–15)
NEUTROPHILS # BLD AUTO: 6.9 K/UL (ref 1.8–7.7)
NEUTROPHILS NFR BLD: 66.7 % (ref 38–73)
NRBC BLD-RTO: 0 /100 WBC
PLATELET # BLD AUTO: 278 K/UL (ref 150–450)
PMV BLD AUTO: 10.7 FL (ref 9.2–12.9)
RBC # BLD AUTO: 3.67 M/UL (ref 4–5.4)
SATURATED IRON: 18 % (ref 20–50)
TOTAL IRON BINDING CAPACITY: 302 UG/DL (ref 250–450)
TRANSFERRIN SERPL-MCNC: 204 MG/DL (ref 200–375)
WBC # BLD AUTO: 10.27 K/UL (ref 3.9–12.7)

## 2024-04-22 PROCEDURE — 83540 ASSAY OF IRON: CPT | Mod: HCNC | Performed by: INTERNAL MEDICINE

## 2024-04-22 PROCEDURE — 85025 COMPLETE CBC W/AUTO DIFF WBC: CPT | Mod: HCNC | Performed by: INTERNAL MEDICINE

## 2024-04-22 PROCEDURE — 36415 COLL VENOUS BLD VENIPUNCTURE: CPT | Mod: HCNC | Performed by: INTERNAL MEDICINE

## 2024-04-22 PROCEDURE — 82728 ASSAY OF FERRITIN: CPT | Mod: HCNC | Performed by: INTERNAL MEDICINE

## 2024-04-22 NOTE — PROGRESS NOTES
Preoperative History and Physical  [address], [place]                                                                   Chief Complaint: Preoperative evaluation     History of Present Illness:      Marilou Daniel is a 48 y.o. female who presents to the office today for a preoperative consultation at the request of Adelfo Arizmendi MD who plans on performing breast reduction on May 16.     Cardiac clearance - 4/23/24 Darryl Schmidt MD  Moderate risk for cardiac event during breast reduction.       4/17/24 Gibran Wayne MD Rheumatology -  Preop examination for breast reduction  Written and verbal instructions to pt today:   Stop Cellcept the week before, the week of and the week after surgery  You may resume if you have no infections and are healing properly  You will need to have a preop exam with your primary care doc, cardiology     Functional Status:      The patient is able to climb a flight of stairs. The patient is able to ambulate without difficulty. The patient's functional status is not affected by the surgical problem. The patient's functional status is not affected by shortness of breath, chest pain, dyspnea on exertion and fatigue.    MET score greater than 4    Past Medical History:      Past Medical History:   Diagnosis Date    Anemia     Arthritis     Connective tissue disease 1999    COVID-19 in immunocompromised patient 02/05/2021    Diabetes mellitus     Drug induced insomnia 12/15/2020    R/T chronic steroid use for SLE.    Gastroesophageal reflux disease 03/23/2020    Hypertension     Lupus 1999    Situational anxiety 08/24/2021    Thyroid nodule greater than or equal to 1 cm in diameter incidentally noted on imaging study 11/19/2020    Vasculitis         Past Surgical History:      Past Surgical History:   Procedure Laterality Date    ABLATION      COLONOSCOPY N/A 6/24/2020    Procedure: COLONOSCOPY;  Surgeon: Nevin Penny MD;  Location: [facility];  Service: Endoscopy;  Laterality: N/A;     COLONOSCOPY N/A 3/28/2024    Procedure: COLONOSCOPY;  Surgeon: José Manuel Huerta MD;  Location: [facility] ENDO;  Service: Endoscopy;  Laterality: N/A;    ESOPHAGOGASTRODUODENOSCOPY N/A 6/24/2020    Procedure: ESOPHAGOGASTRODUODENOSCOPY (EGD);  Surgeon: Nevin Penny MD;  Location: [facility] ENDO;  Service: Endoscopy;  Laterality: N/A;    RIGHT HEART CATHETERIZATION      TUBAL LIGATION      WRIST SURGERY Bilateral         Social History:      Social History     Socioeconomic History    Marital status:    Tobacco Use    Smoking status: Never    Smokeless tobacco: Never   Substance and Sexual Activity    Alcohol use: Never    Drug use: Never    Sexual activity: Yes     Partners: Male     Birth control/protection: See Surgical Hx     Social Determinants of Health     Financial Resource Strain: Medium Risk (3/14/2024)    Overall Financial Resource Strain (CARDIA)     Difficulty of Paying Living Expenses: Somewhat hard   Food Insecurity: Unknown (3/14/2024)    Hunger Vital Sign     Worried About Running Out of Food in the Last Year: Never true     Ran Out of Food in the Last Year: Patient declined   Transportation Needs: No Transportation Needs (3/14/2024)    PRAPARE - Transportation     Lack of Transportation (Medical): No     Lack of Transportation (Non-Medical): No   Physical Activity: Inactive (3/14/2024)    Exercise Vital Sign     Days of Exercise per Week: 0 days     Minutes of Exercise per Session: 30 min   Stress: Stress Concern Present (3/14/2024)    [employer] [place] [employer] - Occupational Stress Questionnaire     Feeling of Stress : To some extent   Social Connections: Unknown (3/14/2024)    Social Connection and Isolation Panel [NHANES]     Frequency of Communication with Friends and Family: More than three times a week     Frequency of Social Gatherings with Friends and Family: More than three times a week     Active Member of Clubs or Organizations: Yes     Attends Club or Organization  Meetings: More than 4 times per year     Marital Status:    Housing Stability: Low Risk  (3/14/2024)    Housing Stability Vital Sign     Unable to Pay for Housing in the Last Year: No     Number of Places Lived in the Last Year: 1     Unstable Housing in the Last Year: No        Family History:      Family History   Problem Relation Name Age of Onset    Lung cancer Mother Nichole 59        +hx of smoking    Cancer Father Jack         type uk? stomach?    Breast cancer Other Nimo 58        UL mastect, mets to back & lungs    Breast cancer Other Roz 61        chemo, XRT, UL mastect?    Autism Other Angel Jr     Asthma Other Ирина     Obesity Other Ирина     Liver cancer Other Afshin 58        mets to lungs, pancreas, stomach    Cancer Other Reji         type uk?       Allergies:      Review of patient's allergies indicates:   Allergen Reactions    Azathioprine Nausea And Vomiting     Nausea, vomiting, diarrhea dose and early in the course of therapy    Olmesartan Shortness Of Breath    Feraheme [ferumoxytol] Itching     Shortness of breath, rash    Oxycodone Itching     Other reaction(s): Unknown       Medications:      Current Outpatient Medications   Medication Sig Dispense Refill    atorvastatin (LIPITOR) 10 MG tablet       betamethasone dipropionate 0.05 % cream APPLY A SMALL AMOUNT TO AFFECTED AREA TOPICALLY TWICE A DAY FOR 2 TO 3 WEEKS AT A TIME AS NEEDED FLARE UPS AVOID FACE & GROIN AREA      blood sugar diagnostic (TRUE METRIX GLUCOSE TEST STRIP) Strp Test 4 times daily. 100 strip 0    blood-glucose meter (TRUE METRIX GLUCOSE METER) Misc Use as directed 1 each 0    blood-glucose sensor (DEXCOM G7 SENSOR) Myrtle 1 each by Misc.(Non-Drug; Combo Route) route every 10 days. 3 each 11    blood-glucose transmitter (DEXCOM G5 TRANSMITTER) Myrtle 1 each by Misc.(Non-Drug; Combo Route) route every 3 (three) months. 1 each 3    cloNIDine (CATAPRES) 0.1 MG tablet Take 2 tablets (0.2 mg total) by mouth 3  (three) times daily. Take 2 (0.1 mg) tablets three times a day 180 tablet 0    dapsone 100 MG Tab Take 100 mg by mouth once daily at 6am.      doxazosin (CARDURA) 2 MG tablet Take 4 mg by mouth every 12 (twelve) hours. Take 2 (2mg) tablets two times a day      empagliflozin (JARDIANCE) 10 mg tablet Take 1 tablet (10 mg total) by mouth once daily. 30 tablet 11    gabapentin (NEURONTIN) 800 MG tablet Neurontin 800 mg tablet   Take 1 tablet 3 times a day by oral route.      HYDROcodone-acetaminophen (NORCO)  mg per tablet Norco 10 mg-325 mg tablet   Take 1 tablet 3 times a day by oral route as needed.      hydrocortisone 2.5 % ointment APPLY TOPICALLY TO FACE TWICE A DAY AS NEEDED FOR 1 TO 2 WEEKS AT A TIME      hydroxychloroquine (PLAQUENIL) 200 mg tablet Take 200 mg by mouth 2 (two) times daily.       hydrOXYzine HCL (ATARAX) 25 MG tablet TAKE ONE TABLET BY MOUTH EVERY 12 HOURS AS NEEDED FOR ITCHING 30 tablet 5    insulin (LANTUS SOLOSTAR U-100 INSULIN) glargine 100 units/mL SubQ pen Inject 10 Units into the skin once daily. 3 mL 11    insulin aspart U-100 (NOVOLOG) 100 unit/mL injection Inject 4 Units into the skin 3 (three) times daily before meals. 10.8 mL 3    labetaloL (NORMODYNE) 300 MG tablet Take 1 tablet (300 mg total) by mouth 2 (two) times daily. 60 tablet 11    mycophenolate (CELLCEPT) 250 mg Cap 3,000 mg once daily.      NIFEdipine (PROCARDIA-XL) 30 MG (OSM) 24 hr tablet Take 1 tablet (30 mg total) by mouth every 12 (twelve) hours. 60 tablet 11    ondansetron (ZOFRAN-ODT) 4 MG TbDL DISSOLVE 1 TABLET BY MOUTH EVERY 8 HOURS AS NEEDED 30 tablet 1    pantoprazole (PROTONIX) 40 MG tablet Take 1 tablet (40 mg total) by mouth 2 (two) times daily for 90 days, THEN 1 tablet (40 mg total) once daily. 360 tablet 0    tiZANidine (ZANAFLEX) 4 MG tablet TK 1 T PO TID PRN      TRUEPLUS LANCETS 33 gauge Misc Inject 1 lancet as directed 4 (four) times daily. 100 each 11    blood-glucose meter,continuous (DEXCOM  ) Misc 1 each by Misc.(Non-Drug; Combo Route) route once. for 1 dose 1 each 3    famotidine (PEPCID) 40 MG tablet TAKE ONE TABLET BY MOUTH ONCE A DAY 30 tablet 2    LORazepam (ATIVAN) 0.5 MG tablet Take 1 tablet (0.5 mg total) by mouth every 12 (twelve) hours as needed for Anxiety. 30 tablet 2    zolpidem (AMBIEN) 10 mg Tab TAKE 1/2 to 1 TABLET BY MOUTH AT BEDTIME AS NEEDED FOR INSOMNIA 30 tablet 0     No current facility-administered medications for this visit.       Vitals:      Vitals:    04/24/24 1102   BP: 128/70   Pulse: (!) 57   Temp: 98 °F (36.7 °C)       Review of Systems:        Constitutional: Negative for fever, chills, weight loss, malaise/fatigue and diaphoresis.   HENT: Negative for hearing loss, ear pain, nosebleeds, congestion, sore throat, neck pain, tinnitus and ear discharge.    Eyes: Negative for blurred vision, double vision, photophobia, pain, discharge and redness.   Respiratory: Negative for cough, hemoptysis, sputum production, shortness of breath, wheezing and stridor.    Cardiovascular: Negative for chest pain, palpitations, orthopnea, claudication, leg swelling and PND.   Gastrointestinal: Negative for heartburn, nausea, vomiting, abdominal pain, diarrhea, constipation, blood in stool and melena.   Genitourinary: Negative for dysuria, urgency, frequency, hematuria and flank pain.   Musculoskeletal: Negative for falls, + myalgias, + back pain, + joint pain  Skin: Negative for itching and rash.   Neurological: Negative for dizziness, tingling, tremors, sensory change, speech change, focal weakness, seizures, loss of consciousness, weakness and headaches.   Endo/Heme/Allergies: Negative for environmental allergies and polydipsia. Does not bruise/bleed easily.   Psychiatric/Behavioral: Negative for depression, suicidal ideas, hallucinations, memory loss and substance abuse. The patient is not nervous/anxious and does not have insomnia.    All 14 systems reviewed and negative except  as noted above.    Physical Exam:      Constitutional: Appears well-developed, well-nourished and in no acute distress.  Patient is oriented to person, place, and time.   Head: Normocephalic and atraumatic. Mucous membranes moist.  Neck: Neck supple no mass.   Cardiovascular: Normal rate and regular rhythm.  S1 S2 appreciated by ascultation.  Pulmonary/Chest: Effort normal and clear to auscultation bilaterally. No respiratory distress.   Abdomen: Soft. Non-tender and non-distended. Bowel sounds are normal.   Neurological: Patient is alert and oriented to person, place and time. Moves all extremities.  Skin: Warm and dry. No lesions.  Extremities: No clubbing, cyanosis or edema.    Laboratory data:      Reviewed and noted in plan where applicable. Please see chart for full laboratory data.    Lab Results   Component Value Date    WBC 10.27 04/22/2024    HGB 10.5 (L) 04/22/2024    HCT 31.6 (L) 04/22/2024    MCV 86 04/22/2024     04/22/2024     Blood Urea Nitrogen 5 - 25 mg/dL 13   Creatinine 0.55 - 1.02 mg/dL 0.89   Comment:     EGFR  79   Comment:   Reference Range:   >= 60 ml/min/1.73mSquared.   Sodium 136 - 145 mmol/L 141   Potassium 3.5 - 5.1 mmol/L 4.2   Chloride 100 - 109 mmol/L 106   Carbon Dioxide 22 - 33 mmol/L 24   Comment: ANION GAP (ANIONGAP)         11               mmol/L     8-16       N   Calcium 8.8 - 10.6 mg/dL 10.2   Protein Total 6.0 - 8.3 g/dL 7.4   Albumin Level 3.5 - 5.0 g/dl 4.5   Bilirubin Total 0.2 - 1.2 mg/dL 0.6   Alkaline Phosphatase Level 40 - 150 U/L 94   AST 10 - 58 U/L 15   ALT <=50 U/L 12        Predictors of intubation difficulty:       Morbid obesity? no   Anatomically abnormal facies? no   Prominent incisors? no   Receding mandible? no   Short, thick neck? no   Neck range of motion: normal   Dentition: No chipped, loose, or missing teeth.    Cardiographics:      ECG: done with Cardiologist, Bob Clark MD  Echocardiogram: not indicated    Imaging:      Chest x-ray: not  indicated  Result:  1/5/24  Mammo Digital Screening Bilat w/ Barrett     History:  Patient is 48 y.o. and is seen for a screening mammogram.        Films Compared:  Compared to: 08/31/2022 Mammo Digital Screening Bilat w/ Barrett, 08/12/2021 Mammo Digital Screening Bilat w/ Barrett, and 06/22/2020 Mammo Digital Screening Bilat w/ Barrett      Findings:  This procedure was performed using tomosynthesis.   Computer-aided detection was utilized in the interpretation of this examination.     The breasts have scattered areas of fibroglandular density. There is no evidence of suspicious masses, microcalcifications or architectural distortion.     Impression:   No mammographic evidence of malignancy.     BI-RADS Category 1: Negative     Recommendation:  Routine screening mammogram in 1 year is recommended.      Assessment and Plan:      Chronic thoracic back pain  Known risk factors for perioperative complications: Diabetes mellitus    Difficulty with intubation is not anticipated.    Cardiac Risk Estimation: Based on the Revised Cardiac Risk index, patient is a Class risk III with a 10.1% risk of a major cardiac event in a low risk procedure.    1.) Preoperative workup as follows: ECG, hemoglobin, hematocrit, electrolytes, creatinine, glucose, liver function studies.  2.) Change in medication regimen before surgery: discontinue ASA 6 days before surgery, discontinue NSAIDs  5 days before surgery, hold   3.) Prophylaxis for cardiac events with perioperative beta-blockers: not indicated.  4.) Invasive hemodynamic monitoring perioperatively: at the discretion of anesthesiologist.  5.) Deep vein thrombosis prophylaxis postoperatively: regimen to be chosen by surgical team.  6.) Surveillance for postoperative MI with ECG immediately postoperatively and on postoperati ve days 1 and 2 AND troponin levels 24 hours postoperatively and on day 4 or hospital discharge (whichever comes first): not indicated.  7.) Current medications which may  produce withdrawal symptoms if withheld perioperatively: hydrocodone  8.) Other measures: Careful attention to perioperative glycemic control   Postoperative hypertension management with IV hydralazine until able to take oral medications.     Hypertension associated with diabetes  Labetalol 300 mg bid   Nifedipine 30 mg bid    Immunocompromised state due to drug therapy  Dapsone 100 mg daily at 6 AM  Stop Cellcept the week before, the week of and the week after surgery  Hydroxychloroquine 200 mg bid    Diabetes mellitus due to underlying condition with complication, with long-term current use of insulin  Lantus 10 units daily  Aspart 4 units tid before meals  Lab Results   Component Value Date    HGBA1C 6.1 (H) 01/02/2024

## 2024-04-24 ENCOUNTER — OFFICE VISIT (OUTPATIENT)
Dept: PRIMARY CARE CLINIC | Facility: CLINIC | Age: 49
End: 2024-04-24
Payer: MEDICARE

## 2024-04-24 VITALS
HEART RATE: 57 BPM | SYSTOLIC BLOOD PRESSURE: 128 MMHG | OXYGEN SATURATION: 95 % | DIASTOLIC BLOOD PRESSURE: 70 MMHG | TEMPERATURE: 98 F | BODY MASS INDEX: 31.49 KG/M2 | WEIGHT: 201.06 LBS

## 2024-04-24 DIAGNOSIS — M32.9 SYSTEMIC LUPUS ERYTHEMATOSUS, UNSPECIFIED SLE TYPE, UNSPECIFIED ORGAN INVOLVEMENT STATUS: ICD-10-CM

## 2024-04-24 DIAGNOSIS — Z79.899 IMMUNOCOMPROMISED STATE DUE TO DRUG THERAPY: ICD-10-CM

## 2024-04-24 DIAGNOSIS — I15.2 HYPERTENSION ASSOCIATED WITH DIABETES: Primary | Chronic | ICD-10-CM

## 2024-04-24 DIAGNOSIS — M54.6 CHRONIC BILATERAL THORACIC BACK PAIN: ICD-10-CM

## 2024-04-24 DIAGNOSIS — E08.8 DIABETES MELLITUS DUE TO UNDERLYING CONDITION WITH COMPLICATION, WITH LONG-TERM CURRENT USE OF INSULIN: ICD-10-CM

## 2024-04-24 DIAGNOSIS — D84.821 IMMUNOCOMPROMISED STATE DUE TO DRUG THERAPY: ICD-10-CM

## 2024-04-24 DIAGNOSIS — Z79.4 DIABETES MELLITUS DUE TO UNDERLYING CONDITION WITH COMPLICATION, WITH LONG-TERM CURRENT USE OF INSULIN: ICD-10-CM

## 2024-04-24 DIAGNOSIS — E11.59 HYPERTENSION ASSOCIATED WITH DIABETES: Primary | Chronic | ICD-10-CM

## 2024-04-24 DIAGNOSIS — G89.29 CHRONIC BILATERAL THORACIC BACK PAIN: ICD-10-CM

## 2024-04-24 PROCEDURE — 3074F SYST BP LT 130 MM HG: CPT | Mod: HCNC,CPTII,S$GLB, | Performed by: NURSE PRACTITIONER

## 2024-04-24 PROCEDURE — 3044F HG A1C LEVEL LT 7.0%: CPT | Mod: HCNC,CPTII,S$GLB, | Performed by: NURSE PRACTITIONER

## 2024-04-24 PROCEDURE — 3078F DIAST BP <80 MM HG: CPT | Mod: HCNC,CPTII,S$GLB, | Performed by: NURSE PRACTITIONER

## 2024-04-24 PROCEDURE — 99999 PR PBB SHADOW E&M-EST. PATIENT-LVL II: CPT | Mod: PBBFAC,HCNC,, | Performed by: NURSE PRACTITIONER

## 2024-04-24 PROCEDURE — 99215 OFFICE O/P EST HI 40 MIN: CPT | Mod: HCNC,S$GLB,, | Performed by: NURSE PRACTITIONER

## 2024-04-24 PROCEDURE — 1159F MED LIST DOCD IN RCRD: CPT | Mod: HCNC,CPTII,S$GLB, | Performed by: NURSE PRACTITIONER

## 2024-04-24 PROCEDURE — 3008F BODY MASS INDEX DOCD: CPT | Mod: HCNC,CPTII,S$GLB, | Performed by: NURSE PRACTITIONER

## 2024-04-26 DIAGNOSIS — F19.982 DRUG-INDUCED INSOMNIA: ICD-10-CM

## 2024-04-26 RX ORDER — ZOLPIDEM TARTRATE 10 MG/1
TABLET ORAL
Qty: 30 TABLET | Refills: 0 | Status: SHIPPED | OUTPATIENT
Start: 2024-04-26 | End: 2024-05-27 | Stop reason: SDUPTHER

## 2024-04-26 RX ORDER — FAMOTIDINE 40 MG/1
40 TABLET, FILM COATED ORAL
Qty: 30 TABLET | Refills: 2 | Status: SHIPPED | OUTPATIENT
Start: 2024-04-26

## 2024-04-28 PROBLEM — M54.6 CHRONIC THORACIC BACK PAIN: Status: ACTIVE | Noted: 2024-04-28

## 2024-04-28 PROBLEM — G89.29 CHRONIC THORACIC BACK PAIN: Status: ACTIVE | Noted: 2024-04-28

## 2024-04-28 NOTE — ASSESSMENT & PLAN NOTE
Known risk factors for perioperative complications: Diabetes mellitus    Difficulty with intubation is not anticipated.    Cardiac Risk Estimation: Based on the Revised Cardiac Risk index, patient is a Class risk III with a 10.1% risk of a major cardiac event in a low risk procedure.    1.) Preoperative workup as follows: ECG, hemoglobin, hematocrit, electrolytes, creatinine, glucose, liver function studies.  2.) Change in medication regimen before surgery: discontinue ASA 6 days before surgery, discontinue NSAIDs  5 days before surgery, hold   3.) Prophylaxis for cardiac events with perioperative beta-blockers: not indicated.  4.) Invasive hemodynamic monitoring perioperatively: at the discretion of anesthesiologist.  5.) Deep vein thrombosis prophylaxis postoperatively: regimen to be chosen by surgical team.  6.) Surveillance for postoperative MI with ECG immediately postoperatively and on postoperati ve days 1 and 2 AND troponin levels 24 hours postoperatively and on day 4 or hospital discharge (whichever comes first): not indicated.  7.) Current medications which may produce withdrawal symptoms if withheld perioperatively: hydrocodone  8.) Other measures: Careful attention to perioperative glycemic control   Postoperative hypertension management with IV hydralazine until able to take oral medications.

## 2024-04-28 NOTE — ASSESSMENT & PLAN NOTE
Lantus 10 units daily  Aspart 4 units tid before meals  Lab Results   Component Value Date    HGBA1C 6.1 (H) 01/02/2024

## 2024-04-28 NOTE — ASSESSMENT & PLAN NOTE
Dapsone 100 mg daily at 6 AM  Stop Cellcept the week before, the week of and the week after surgery  Hydroxychloroquine 200 mg bid

## 2024-05-27 DIAGNOSIS — F19.982 DRUG-INDUCED INSOMNIA: ICD-10-CM

## 2024-05-27 RX ORDER — ZOLPIDEM TARTRATE 10 MG/1
TABLET ORAL
Qty: 30 TABLET | Refills: 0 | Status: SHIPPED | OUTPATIENT
Start: 2024-05-27

## 2024-06-18 ENCOUNTER — OFFICE VISIT (OUTPATIENT)
Dept: PRIMARY CARE CLINIC | Facility: CLINIC | Age: 49
End: 2024-06-18
Payer: MEDICARE

## 2024-06-18 ENCOUNTER — TELEPHONE (OUTPATIENT)
Dept: PRIMARY CARE CLINIC | Facility: CLINIC | Age: 49
End: 2024-06-18
Payer: MEDICARE

## 2024-06-18 ENCOUNTER — LAB VISIT (OUTPATIENT)
Dept: LAB | Facility: HOSPITAL | Age: 49
End: 2024-06-18
Payer: MEDICARE

## 2024-06-18 DIAGNOSIS — R35.0 URINE FREQUENCY: ICD-10-CM

## 2024-06-18 DIAGNOSIS — R35.0 URINE FREQUENCY: Primary | ICD-10-CM

## 2024-06-18 LAB
BACTERIA #/AREA URNS HPF: NORMAL /HPF
BILIRUB UR QL STRIP: NEGATIVE
CLARITY UR: CLEAR
COLOR UR: YELLOW
GLUCOSE UR QL STRIP: NEGATIVE
HGB UR QL STRIP: NEGATIVE
KETONES UR QL STRIP: NEGATIVE
LEUKOCYTE ESTERASE UR QL STRIP: ABNORMAL
MICROSCOPIC COMMENT: NORMAL
NITRITE UR QL STRIP: NEGATIVE
PH UR STRIP: 6 [PH] (ref 5–8)
PROT UR QL STRIP: NEGATIVE
RBC #/AREA URNS HPF: 0 /HPF (ref 0–4)
SP GR UR STRIP: 1.02 (ref 1–1.03)
URN SPEC COLLECT METH UR: ABNORMAL
UROBILINOGEN UR STRIP-ACNC: ABNORMAL MG/DL
WBC #/AREA URNS HPF: 3 /HPF (ref 0–5)

## 2024-06-18 PROCEDURE — 99212 OFFICE O/P EST SF 10 MIN: CPT | Mod: HCNC,95,, | Performed by: NURSE PRACTITIONER

## 2024-06-18 PROCEDURE — 81000 URINALYSIS NONAUTO W/SCOPE: CPT | Mod: HCNC | Performed by: NURSE PRACTITIONER

## 2024-06-18 PROCEDURE — 3044F HG A1C LEVEL LT 7.0%: CPT | Mod: HCNC,CPTII,95, | Performed by: NURSE PRACTITIONER

## 2024-06-18 NOTE — TELEPHONE ENCOUNTER
Pt complaining of lower back pain x 4 days- kidney area    States gradually getting worse    Urinary frequency, abd pain     Denies fever    Virtual appt scheduled for today

## 2024-06-18 NOTE — PROGRESS NOTES
Primary Care Telemedicine Appointment      The patient location is:  Patient Home   The chief complaint leading to consultation is: lower back pain  Total time spent on medical decision making was 10 min.    Visit type: Virtual visit with synchronous audio only and video  Each patient to whom he or she provides medical services by telemedicine is:  (1) informed of the relationship between the physician and patient and the respective role of any other health care provider with respect to management of the patient; and (2) notified that he or she may decline to receive medical services by telemedicine and may withdraw from such care at any time.      Subjective:      Patient ID: Marilou Daniel is a 48 y.o. female     Chief Complaint: Urinary Tract Infection    Prior to this visit, patient's last encounter with PCP was 4/24/2024.    49 yo female with Lupus and chronic pain presents via video with c/o left and right back pain and concerns for her kidneys.  Pain in bilateral lower back - about 4 days. Associated with urinary frequency and lower abdominal discomfort.   Denies fever, chills and other urinary symptoms.   Past Surgical History:   Procedure Laterality Date    ABLATION      COLONOSCOPY N/A 6/24/2020    Procedure: COLONOSCOPY;  Surgeon: Nevin Penny MD;  Location: [facility];  Service: Endoscopy;  Laterality: N/A;    COLONOSCOPY N/A 3/28/2024    Procedure: COLONOSCOPY;  Surgeon: José Manuel Huerta MD;  Location: [facility];  Service: Endoscopy;  Laterality: N/A;    ESOPHAGOGASTRODUODENOSCOPY N/A 6/24/2020    Procedure: ESOPHAGOGASTRODUODENOSCOPY (EGD);  Surgeon: Nevin Penny MD;  Location: [facility];  Service: Endoscopy;  Laterality: N/A;    RIGHT HEART CATHETERIZATION      TUBAL LIGATION      WRIST SURGERY Bilateral        Past Medical History:   Diagnosis Date    Anemia     Arthritis     Connective tissue disease 1999    COVID-19 in immunocompromised patient 02/05/2021    Diabetes mellitus      Drug induced insomnia 12/15/2020    R/T chronic steroid use for SLE.    Gastroesophageal reflux disease 03/23/2020    Hypertension     Lupus 1999    Situational anxiety 08/24/2021    Thyroid nodule greater than or equal to 1 cm in diameter incidentally noted on imaging study 11/19/2020    Vasculitis        Review of Systems    Objective:   There were no vitals filed for this visit.      There is no height or weight on file to calculate BMI.  BP Readings from Last 3 Encounters:   04/24/24 128/70   04/15/24 116/72   04/10/24 136/87      Wt Readings from Last 3 Encounters:   04/24/24 1102 91.2 kg (201 lb 1 oz)   04/10/24 1052 92.9 kg (204 lb 12.9 oz)   03/28/24 0804 88 kg (194 lb)        Physical Exam  Constitutional:       General: She is not in acute distress.     Appearance: She is not ill-appearing.   HENT:      Head: Normocephalic.   Eyes:      Conjunctiva/sclera: Conjunctivae normal.   Pulmonary:      Effort: Pulmonary effort is normal. No respiratory distress.   Neurological:      Mental Status: She is alert and oriented to person, place, and time.         Lab Results   Component Value Date    WBC 10.27 04/22/2024    HGB 10.5 (L) 04/22/2024    HCT 31.6 (L) 04/22/2024     04/22/2024    CHOL 162 05/02/2023    TRIG 126 05/02/2023    HDL 39 (L) 05/02/2023    ALT 12 03/11/2024    AST 12 03/11/2024     03/11/2024    K 3.7 03/11/2024     03/11/2024    CREATININE 0.8 03/11/2024    BUN 10 03/11/2024    CO2 28 03/11/2024    TSH 0.468 01/02/2024    INR 1.0 01/03/2023    HGBA1C 6.1 (H) 01/02/2024         Assessment:       Plan:     Problem List Items Addressed This Visit    None  Visit Diagnoses       Urine frequency    -  Primary    Relevant Orders    Urinalysis, Reflex to Urine Culture Urine, Clean Catch            Health Maintenance         Date Due Completion Date    Foot Exam Never done ---    TETANUS VACCINE Never done ---    Shingles Vaccine (1 of 2) Never done ---    Diabetes Urine Screening  05/02/2024 5/2/2023    Lipid Panel 05/02/2024 5/2/2023    COVID-19 Vaccine (5 - 2023-24 season) 05/11/2024 3/16/2024    Hemoglobin A1c 07/02/2024 1/2/2024    Influenza Vaccine (Season Ended) 09/01/2024 ---    Mammogram 01/05/2025 1/5/2024    Eye Exam 03/20/2025 3/20/2024    Low Dose Statin 04/24/2025 4/24/2024    Cervical Cancer Screening 09/29/2026 9/29/2021    Colorectal Cancer Screening 03/28/2029 3/28/2024            1. Urine frequency  -     Urinalysis, Reflex to Urine Culture Urine, Clean Catch; Future; Expected date: 06/18/2024         No follow-ups on file.              REBECCA Cardona, NP-C  [facility] [address] [place] [address]  [phone] ph  [phone] fax

## 2024-06-28 DIAGNOSIS — F19.982 DRUG-INDUCED INSOMNIA: ICD-10-CM

## 2024-06-28 RX ORDER — ZOLPIDEM TARTRATE 10 MG/1
TABLET ORAL
Qty: 30 TABLET | Refills: 0 | Status: SHIPPED | OUTPATIENT
Start: 2024-06-28

## 2024-07-08 ENCOUNTER — PATIENT OUTREACH (OUTPATIENT)
Dept: ADMINISTRATIVE | Facility: HOSPITAL | Age: 49
End: 2024-07-08
Payer: MEDICARE

## 2024-07-29 DIAGNOSIS — F19.982 DRUG-INDUCED INSOMNIA: ICD-10-CM

## 2024-07-29 RX ORDER — ERGOCALCIFEROL 1.25 MG/1
50000 CAPSULE ORAL
COMMUNITY
Start: 2024-04-17 | End: 2025-04-17

## 2024-07-29 RX ORDER — ZOLPIDEM TARTRATE 10 MG/1
TABLET ORAL
Qty: 30 TABLET | Refills: 0 | Status: SHIPPED | OUTPATIENT
Start: 2024-07-29

## 2024-07-29 NOTE — TELEPHONE ENCOUNTER
Pt visited clinic earlier today, unscheduled, to ask to reschedule wellness visit and schedule a visit with PCP prior to surgery planned for 09/11/2024. Pt is also requesting a refill for ambien. Appts scheduled, phoned pt to notify of dates/times - no answer.     Rfill request sent to PCP

## 2024-08-05 DIAGNOSIS — L29.9 PRURITUS: ICD-10-CM

## 2024-08-06 RX ORDER — HYDROXYZINE HYDROCHLORIDE 25 MG/1
TABLET, FILM COATED ORAL
Qty: 30 TABLET | Refills: 5 | OUTPATIENT
Start: 2024-08-06

## 2024-08-15 ENCOUNTER — TELEPHONE (OUTPATIENT)
Dept: PRIMARY CARE CLINIC | Facility: CLINIC | Age: 49
End: 2024-08-15
Payer: MEDICARE

## 2024-08-15 ENCOUNTER — OFFICE VISIT (OUTPATIENT)
Dept: PRIMARY CARE CLINIC | Facility: CLINIC | Age: 49
End: 2024-08-15
Payer: MEDICARE

## 2024-08-15 VITALS
WEIGHT: 200.19 LBS | HEIGHT: 67 IN | OXYGEN SATURATION: 98 % | DIASTOLIC BLOOD PRESSURE: 70 MMHG | HEART RATE: 73 BPM | BODY MASS INDEX: 31.42 KG/M2 | SYSTOLIC BLOOD PRESSURE: 138 MMHG | TEMPERATURE: 98 F

## 2024-08-15 DIAGNOSIS — Z79.899 IMMUNOCOMPROMISED STATE DUE TO DRUG THERAPY: ICD-10-CM

## 2024-08-15 DIAGNOSIS — E11.9 TYPE 2 DIABETES MELLITUS WITHOUT COMPLICATION, WITH LONG-TERM CURRENT USE OF INSULIN: ICD-10-CM

## 2024-08-15 DIAGNOSIS — E11.3212 TYPE 2 DIABETES MELLITUS WITH LEFT EYE AFFECTED BY MILD NONPROLIFERATIVE RETINOPATHY AND MACULAR EDEMA, WITHOUT LONG-TERM CURRENT USE OF INSULIN: ICD-10-CM

## 2024-08-15 DIAGNOSIS — M87.051 AVASCULAR NECROSIS OF BONES OF BOTH HIPS: ICD-10-CM

## 2024-08-15 DIAGNOSIS — Z79.4 TYPE 2 DIABETES MELLITUS WITHOUT COMPLICATION, WITH LONG-TERM CURRENT USE OF INSULIN: ICD-10-CM

## 2024-08-15 DIAGNOSIS — M87.052 AVASCULAR NECROSIS OF BONES OF BOTH HIPS: ICD-10-CM

## 2024-08-15 DIAGNOSIS — Z01.818 PREOPERATIVE CLEARANCE: ICD-10-CM

## 2024-08-15 DIAGNOSIS — D84.821 IMMUNOCOMPROMISED STATE DUE TO DRUG THERAPY: ICD-10-CM

## 2024-08-15 DIAGNOSIS — M31.0 LEUKOCYTOCLASTIC VASCULITIS: ICD-10-CM

## 2024-08-15 DIAGNOSIS — I15.2 HYPERTENSION ASSOCIATED WITH DIABETES: Chronic | ICD-10-CM

## 2024-08-15 DIAGNOSIS — D50.8 IRON DEFICIENCY ANEMIA SECONDARY TO INADEQUATE DIETARY IRON INTAKE: ICD-10-CM

## 2024-08-15 DIAGNOSIS — G89.29 CHRONIC PAIN OF RIGHT KNEE: ICD-10-CM

## 2024-08-15 DIAGNOSIS — I70.0 AORTIC ATHEROSCLEROSIS: ICD-10-CM

## 2024-08-15 DIAGNOSIS — M25.561 CHRONIC PAIN OF RIGHT KNEE: ICD-10-CM

## 2024-08-15 DIAGNOSIS — I10 ESSENTIAL HYPERTENSION: Primary | ICD-10-CM

## 2024-08-15 DIAGNOSIS — R79.1 ABNORMAL COAGULATION PROFILE: ICD-10-CM

## 2024-08-15 DIAGNOSIS — E11.59 HYPERTENSION ASSOCIATED WITH DIABETES: Chronic | ICD-10-CM

## 2024-08-15 DIAGNOSIS — M32.9 SYSTEMIC LUPUS ERYTHEMATOSUS, UNSPECIFIED SLE TYPE, UNSPECIFIED ORGAN INVOLVEMENT STATUS: ICD-10-CM

## 2024-08-15 PROCEDURE — 3008F BODY MASS INDEX DOCD: CPT | Mod: HCNC,CPTII,S$GLB, | Performed by: NURSE PRACTITIONER

## 2024-08-15 PROCEDURE — 99214 OFFICE O/P EST MOD 30 MIN: CPT | Mod: HCNC,S$GLB,, | Performed by: NURSE PRACTITIONER

## 2024-08-15 PROCEDURE — 93005 ELECTROCARDIOGRAM TRACING: CPT | Mod: HCNC,S$GLB,, | Performed by: NURSE PRACTITIONER

## 2024-08-15 PROCEDURE — 3061F NEG MICROALBUMINURIA REV: CPT | Mod: HCNC,CPTII,S$GLB, | Performed by: NURSE PRACTITIONER

## 2024-08-15 PROCEDURE — 87081 CULTURE SCREEN ONLY: CPT | Mod: HCNC | Performed by: NURSE PRACTITIONER

## 2024-08-15 PROCEDURE — 3066F NEPHROPATHY DOC TX: CPT | Mod: HCNC,CPTII,S$GLB, | Performed by: NURSE PRACTITIONER

## 2024-08-15 PROCEDURE — 3078F DIAST BP <80 MM HG: CPT | Mod: HCNC,CPTII,S$GLB, | Performed by: NURSE PRACTITIONER

## 2024-08-15 PROCEDURE — 93010 ELECTROCARDIOGRAM REPORT: CPT | Mod: HCNC,S$GLB,, | Performed by: INTERNAL MEDICINE

## 2024-08-15 PROCEDURE — 3075F SYST BP GE 130 - 139MM HG: CPT | Mod: HCNC,CPTII,S$GLB, | Performed by: NURSE PRACTITIONER

## 2024-08-15 PROCEDURE — 3044F HG A1C LEVEL LT 7.0%: CPT | Mod: HCNC,CPTII,S$GLB, | Performed by: NURSE PRACTITIONER

## 2024-08-15 PROCEDURE — 99999 PR PBB SHADOW E&M-EST. PATIENT-LVL IV: CPT | Mod: PBBFAC,HCNC,, | Performed by: NURSE PRACTITIONER

## 2024-08-15 NOTE — PROGRESS NOTES
Preoperative History and Physical  63 Plus, [place]                                                                   Chief Complaint: Preoperative evaluation     History of Present Illness:      Marilou Daniel is a 48 y.o. female who presents to the office today for a preoperative consultation at the request of Rajinder Dale MD who plans on performing right total knee arthroplasty on September 11.     Denies lupus flare  Trulicity helping blood sugar - stop week before sx    Per Rheumatology:   Stop Cellcept the week before, the week of and the week after surgery  You may resume if you have no infections and are healing properly  You will need to have a preop exam with your primary care doc     Insulin x 2 doses in 1 week  Wearing Dexcom    8/28/24 - Saw Hematology, Dr. Powell, due to BALJEET and H/H 9.9/32.0.  Decision regarding need for Fe infusion pending - If she is iron deficient additional intravenous iron can be given & will need to clear upper tract. Previous upper endoscopies in 2020. I have told this you may not have her hemoglobin high enough level to proceed with her knee replacement. She is very concerned in this need to check with her operative surgeon. Once I have the iron levels to determine my will see back over the next few days      7/25/2024 7/28/2024 8/4/2024 8/7/2024 8/11/2024   Recent Readings        SBP (mmHg) 101  135  160  120  129    SBP (mmHg)  139       DBP (mmHg) 69  85  93 (H)  88  70    DBP (mmHg)  82          Cardiac Clearance is pending per Cardiology  Needs improvement in Hgb/Hct prior to surgery      Functional Status:      The patient is not able to climb a flight of stairs. The patient is able to ambulate short without difficulty. The patient's functional status is affected by the surgical problem. The patient's functional status is not affected by shortness of breath, chest pain, dyspnea on exertion and fatigue.    MET score greater than 4    Past Medical History:       Past Medical History:   Diagnosis Date    Anemia     Arthritis     Connective tissue disease 1999    COVID-19 in immunocompromised patient 02/05/2021    Diabetes mellitus     Drug induced insomnia 12/15/2020    R/T chronic steroid use for SLE.    Gastroesophageal reflux disease 03/23/2020    Hypertension     Lupus 1999    Situational anxiety 08/24/2021    Thyroid nodule greater than or equal to 1 cm in diameter incidentally noted on imaging study 11/19/2020    Vasculitis         Past Surgical History:      Past Surgical History:   Procedure Laterality Date    ABLATION      COLONOSCOPY N/A 6/24/2020    Procedure: COLONOSCOPY;  Surgeon: Nevin Penny MD;  Location: [facility];  Service: Endoscopy;  Laterality: N/A;    COLONOSCOPY N/A 3/28/2024    Procedure: COLONOSCOPY;  Surgeon: José Manuel Huerta MD;  Location: [facility] ENDO;  Service: Endoscopy;  Laterality: N/A;    ESOPHAGOGASTRODUODENOSCOPY N/A 6/24/2020    Procedure: ESOPHAGOGASTRODUODENOSCOPY (EGD);  Surgeon: Nevin Penny MD;  Location: [facility];  Service: Endoscopy;  Laterality: N/A;    RIGHT HEART CATHETERIZATION      TUBAL LIGATION      WRIST SURGERY Bilateral         Social History:      Social History     Socioeconomic History    Marital status:    Tobacco Use    Smoking status: Never    Smokeless tobacco: Never   Substance and Sexual Activity    Alcohol use: Never    Drug use: Never    Sexual activity: Yes     Partners: Male     Birth control/protection: See Surgical Hx     Social Determinants of Health     Financial Resource Strain: Low Risk  (8/26/2024)    Overall Financial Resource Strain (CARDIA)     Difficulty of Paying Living Expenses: Not hard at all   Food Insecurity: No Food Insecurity (8/26/2024)    Hunger Vital Sign     Worried About Running Out of Food in the Last Year: Never true     Ran Out of Food in the Last Year: Never true   Transportation Needs: No Transportation Needs (8/26/2024)    PRAPARE - Transportation     Lack of  Transportation (Medical): No     Lack of Transportation (Non-Medical): No   Physical Activity: Insufficiently Active (8/26/2024)    Exercise Vital Sign     Days of Exercise per Week: 1 day     Minutes of Exercise per Session: 30 min   Stress: No Stress Concern Present (8/26/2024)    [employer] - Occupational Stress Questionnaire     Feeling of Stress : Not at all   Housing Stability: Low Risk  (8/26/2024)    Housing Stability Vital Sign     Unable to Pay for Housing in the Last Year: No     Homeless in the Last Year: No        Family History:      Family History   Problem Relation Name Age of Onset    Lung cancer Mother Nichole 59        +hx of smoking    Cancer Father Jack         type uk? stomach?    Breast cancer Other Nimo 58        UL mastect, mets to back & lungs    Breast cancer Other Roz 63        chemo, XRT, UL mastect?    Autism Other Angel Argueta     Asthma Other Ирина     Obesity Other Ирина     Liver cancer Other Afshin 58        mets to lungs, pancreas, stomach    Cancer Other Reji         type ?       Allergies:      Review of patient's allergies indicates:   Allergen Reactions    Azathioprine Nausea And Vomiting     Nausea, vomiting, diarrhea dose and early in the course of therapy    Olmesartan Shortness Of Breath    Feraheme [ferumoxytol] Itching     Shortness of breath, rash    Oxycodone Itching     Other reaction(s): Unknown       Medications:      Current Outpatient Medications   Medication Sig    atorvastatin (LIPITOR) 10 MG tablet     betamethasone dipropionate 0.05 % cream APPLY A SMALL AMOUNT TO AFFECTED AREA TOPICALLY TWICE A DAY FOR 2 TO 3 WEEKS AT A TIME AS NEEDED FLARE UPS AVOID FACE & GROIN AREA    blood sugar diagnostic (TRUE METRIX GLUCOSE TEST STRIP) Strp Test 4 times daily.    blood-glucose meter (TRUE METRIX GLUCOSE METER) Misc Use as directed    blood-glucose sensor (DEXCOM G7 SENSOR) Myrtle 1 each by Misc.(Non-Drug; Combo Route) route  every 10 days.    blood-glucose transmitter (DEXCOM G5 TRANSMITTER) Myrtle 1 each by Misc.(Non-Drug; Combo Route) route every 3 (three) months.    dapsone 100 MG Tab Take 100 mg by mouth once daily at 6am.    doxazosin (CARDURA) 2 MG tablet Take 4 mg by mouth every 12 (twelve) hours. Take 2 (2mg) tablets two times a day    empagliflozin (JARDIANCE) 10 mg tablet Take 1 tablet (10 mg total) by mouth once daily.    ergocalciferol (ERGOCALCIFEROL) 50,000 unit Cap Take 50,000 Units by mouth.    famotidine (PEPCID) 40 MG tablet TAKE ONE TABLET BY MOUTH ONCE A DAY    gabapentin (NEURONTIN) 800 MG tablet Neurontin 800 mg tablet   Take 1 tablet 3 times a day by oral route.    HYDROcodone-acetaminophen (NORCO)  mg per tablet Norco 10 mg-325 mg tablet   Take 1 tablet 3 times a day by oral route as needed.    hydrocortisone 2.5 % ointment APPLY TOPICALLY TO FACE TWICE A DAY AS NEEDED FOR 1 TO 2 WEEKS AT A TIME    hydroxychloroquine (PLAQUENIL) 200 mg tablet Take 200 mg by mouth 2 (two) times daily.     hydrOXYzine HCL (ATARAX) 25 MG tablet TAKE ONE TABLET BY MOUTH EVERY 12 HOURS AS NEEDED FOR ITCHING    insulin (LANTUS SOLOSTAR U-100 INSULIN) glargine 100 units/mL SubQ pen Inject 10 Units into the skin once daily.    insulin aspart U-100 (NOVOLOG) 100 unit/mL injection Inject 4 Units into the skin 3 (three) times daily before meals.    mycophenolate (CELLCEPT) 250 mg Cap 3,000 mg once daily.    NIFEdipine (PROCARDIA-XL) 30 MG (OSM) 24 hr tablet Take 1 tablet (30 mg total) by mouth every 12 (twelve) hours.    ondansetron (ZOFRAN-ODT) 4 MG TbDL DISSOLVE 1 TABLET BY MOUTH EVERY 8 HOURS AS NEEDED    tiZANidine (ZANAFLEX) 4 MG tablet TK 1 T PO TID PRN    TRUEPLUS LANCETS 33 gauge Misc Inject 1 lancet as directed 4 (four) times daily.    zolpidem (AMBIEN) 10 mg Tab TAKE 1/2 to 1 TABLET BY MOUTH AT BEDTIME AS NEEDED FOR INSOMNIA    blood-glucose meter,continuous (DEXCOM ) Misc 1 each by Misc.(Non-Drug; Combo Route) route  once. for 1 dose    cloNIDine (CATAPRES) 0.1 MG tablet Take 2 tablets (0.2 mg total) by mouth 3 (three) times daily. Take 2 (0.1 mg) tablets three times a day    labetaloL (NORMODYNE) 300 MG tablet Take 1 tablet (300 mg total) by mouth 2 (two) times daily.    LORazepam (ATIVAN) 0.5 MG tablet Take 1 tablet (0.5 mg total) by mouth every 12 (twelve) hours as needed for Anxiety.    pantoprazole (PROTONIX) 40 MG tablet Take 1 tablet (40 mg total) by mouth 2 (two) times daily for 90 days, THEN 1 tablet (40 mg total) once daily.     No current facility-administered medications for this visit.       Vitals:      Wt Readings from Last 3 Encounters:   08/28/24 92.6 kg (204 lb 2.3 oz)   08/26/24 91.5 kg (201 lb 11.5 oz)   08/15/24 90.8 kg (200 lb 2.8 oz)     Temp Readings from Last 3 Encounters:   08/28/24 97.7 °F (36.5 °C) (Temporal)   08/26/24 97.7 °F (36.5 °C) (Skin)   08/15/24 97.7 °F (36.5 °C) (Skin)     BP Readings from Last 3 Encounters:   08/28/24 (!) 154/81   08/26/24 (!) 122/58   08/15/24 138/70     Pulse Readings from Last 3 Encounters:   08/28/24 (!) 59   08/26/24 89   08/15/24 73        Review of Systems:        Constitutional: Negative for fever, chills, weight loss, malaise/fatigue and diaphoresis.   HENT: Negative for hearing loss, ear pain, nosebleeds, congestion, sore throat, neck pain, tinnitus and ear discharge.    Eyes: Negative for blurred vision, double vision, photophobia, pain, discharge and redness.   Respiratory: Negative for cough, hemoptysis, sputum production, shortness of breath, wheezing and stridor.    Cardiovascular: Negative for chest pain, palpitations, orthopnea, claudication, leg swelling and PND.   Gastrointestinal: Negative for heartburn, nausea, vomiting, abdominal pain, diarrhea, constipation, blood in stool and melena.   Genitourinary: Negative for dysuria, urgency, frequency, hematuria and flank pain.   Musculoskeletal: Negative for myalgias, back pain, joint pain and falls. +  arthralgias  Skin: Negative for itching and rash.   Neurological: Negative for dizziness, tingling, tremors, sensory change, speech change, focal weakness, seizures, loss of consciousness, weakness and headaches.   Endo/Heme/Allergies: Negative for environmental allergies and polydipsia. Does not bruise/bleed easily.   Psychiatric/Behavioral: Negative for depression, suicidal ideas, hallucinations, memory loss and substance abuse. The patient is not nervous/anxious and does not have insomnia.    All 14 systems reviewed and negative except as noted above.    Physical Exam:      Constitutional: Appears well-developed, well-nourished and in no acute distress.  Patient is oriented to person, place, and time.   Head: Normocephalic and atraumatic. Mucous membranes moist.  Neck: Neck supple no mass.   Cardiovascular: Normal rate and regular rhythm.  S1 S2 appreciated by ascultation.  Pulmonary/Chest: Effort normal and clear to auscultation bilaterally. No respiratory distress.   Abdomen: Soft. Non-tender and non-distended. Bowel sounds are normal.   Neurological: Patient is alert and oriented to person, place and time. Moves all extremities.  Skin: Warm and dry. No lesions.  Extremities: No clubbing, cyanosis or edema.    Laboratory data:      Lab Results   Component Value Date    WBC 6.68 08/28/2024    HGB 9.1 (L) 08/28/2024    HCT 28.5 (L) 08/28/2024    MCV 88 08/28/2024     08/28/2024     CMP  Sodium   Date Value Ref Range Status   08/28/2024 143 136 - 145 mmol/L Final     Potassium   Date Value Ref Range Status   08/28/2024 4.2 3.5 - 5.1 mmol/L Final     Chloride   Date Value Ref Range Status   08/28/2024 111 (H) 95 - 110 mmol/L Final     CO2   Date Value Ref Range Status   08/28/2024 24 23 - 29 mmol/L Final     Glucose   Date Value Ref Range Status   08/28/2024 84 70 - 110 mg/dL Final     BUN   Date Value Ref Range Status   08/28/2024 11 6 - 20 mg/dL Final     Creatinine   Date Value Ref Range Status    08/28/2024 0.8 0.5 - 1.4 mg/dL Final     Calcium   Date Value Ref Range Status   08/28/2024 9.4 8.7 - 10.5 mg/dL Final     Total Protein   Date Value Ref Range Status   08/28/2024 7.0 6.0 - 8.4 g/dL Final     Albumin   Date Value Ref Range Status   08/28/2024 3.8 3.5 - 5.2 g/dL Final     Total Bilirubin   Date Value Ref Range Status   08/28/2024 0.4 0.1 - 1.0 mg/dL Final     Comment:     For infants and newborns, interpretation of results should be based  on gestational age, weight and in agreement with clinical  observations.    Premature Infant recommended reference ranges:  Up to 24 hours.............<8.0 mg/dL  Up to 48 hours............<12.0 mg/dL  3-5 days..................<15.0 mg/dL  6-29 days.................<15.0 mg/dL       Alkaline Phosphatase   Date Value Ref Range Status   08/28/2024 79 55 - 135 U/L Final     AST   Date Value Ref Range Status   08/28/2024 16 10 - 40 U/L Final     ALT   Date Value Ref Range Status   08/28/2024 10 10 - 44 U/L Final     Anion Gap   Date Value Ref Range Status   08/28/2024 8 8 - 16 mmol/L Final     eGFR   Date Value Ref Range Status   08/28/2024 >60 >60 mL/min/1.73 m^2 Final      Lab Results   Component Value Date    INR 1.0 08/16/2024    INR 1.0 01/03/2023    INR 1.1 03/24/2020      aPTT 21.0 - 32.0 sec                8/16/24 31 .9         Component 12 d ago   MRSA Surveillance Screen No MRSA isolated   Resulting Agency [facility]              Specimen Collected: 08/15/24 15:20 CDT             Predictors of intubation difficulty:       Morbid obesity? no   Anatomically abnormal facies? no     Prominent incisors? no   Receding mandible? no   Short, thick neck? no   Neck range of motion: normal   Dentition:  2 partials removable      Cardiographics:      ECG: Vent. Rate : 073 BPM     Atrial Rate : 073 BPM      P-R Int : 174 ms          QRS Dur : 080 ms       QT Int : 394 ms       P-R-T Axes : 072 049 038 degrees      QTc Int : 434 ms     Program found technically poor ECG    Normal sinus rhythm   Normal ECG   When compared with ECG of 11-SEP-2023 16:14,   No significant change was found   Confirmed by KOLE MANNING MD ([id number]) on 8/16/2024 9:56:27 AM     Echocardiogram:  Per Cardiology    Imaging:      Chest x-ray: EXAM:  XR CHEST PA AND LATERAL     CLINICAL HISTORY:  Chest pain     FINDINGS:     Comparison chest 02/04/2021.     The heart is normal in size.     The lungs are clear.     Impression:        Normal chest     Finalized on: 9/11/2023     Assessment and Plan:      Type 2 diabetes mellitus without complication, with long-term current use of insulin  Lab Results   Component Value Date    HGBA1C 6.1 (H) 01/02/2024    Trulicity stopped 1 week prior to surgery  Continue lantus 10 units daily  Hold any oral hyperglycemic medications  Sliding scale insulin    Iron deficiency anemia secondary to inadequate dietary iron intake     Likely post op acute blood loss anemia - Monitor for S/S of blood loss, monitor H & H,  transfuse prn    Lab Results   Component Value Date    WBC 7.56 08/16/2024    HGB 9.9 (L) 08/16/2024    HCT 32.0 (L) 08/16/2024    MCV 91 08/16/2024     08/16/2024          Hypertension associated with diabetes  Clonidine 0.2 mg tid  Labetalol 300 mg bid  Nifedipine 30 mg daily  Doxazosin 2 mg bid  Hydralazine prn SBP > 170    Systemic lupus erythematosus  Managed closely by Dr Wayne.   Much better controlled. Now off of prednisone!    Stop Cellcept the week before, the week of and the week after surgery  You may resume if you have no infections and are healing proper  Also, taking Plaquenil    Knee pain, right  Known risk factors for perioperative complications: Anemia  Diabetes mellitus  Immunosuppression, Lupus, Vasculitis    Difficulty with intubation is not anticipated.    Cardiac Risk Estimation: Based on the Revised Cardiac Risk index, patient is a Class risk II with a  6 % risk of a major cardiac event in a moderate risk orthopedic procedure. Pt is moderate  to high risk for this intermediate risk orthopedic surgery.  Cleared for surgery pending cardiac clearance and improvement in Hgb/Hct.     1.) Preoperative workup as follows: ECG, hemoglobin, hematocrit, electrolytes, creatinine, glucose, liver function studies, coagulation studies.  2.) Change in medication regimen before surgery: discontinue ASA 6 days before surgery, discontinue NSAIDs  5 days before surgery, hold Metformin 24 hours prior to surgery.  3.) Prophylaxis for cardiac events with perioperative beta-blockers: not indicated.  4.) Invasive hemodynamic monitoring perioperatively: at the discretion of anesthesiologist.  5.) Deep vein thrombosis prophylaxis postoperatively: regimen to be chosen by surgical team.  6.) Surveillance for postoperative MI with ECG immediately postoperatively and on postoperati ve days 1 and 2 AND troponin levels 24 hours postoperatively and on day 4 or hospital discharge (whichever comes first): at the discretion of anesthesiologist.  7.) Current medications which may produce withdrawal symptoms if withheld perioperatively:   8.) Other measures: Careful attention to perioperative glycemic control ( ).  Postoperative hypertension management with IV hydralazine until able to take oral medications.  Postoperative incentive spirometry to prevent pneumonia.

## 2024-08-15 NOTE — TELEPHONE ENCOUNTER
----- Message from Ruth Schrader sent at 8/15/2024  8:40 AM CDT -----  Regarding: appt  Pt would like to call and discuss appt options. She is double booked for appts today and is not sure if she will be able to make it to her 2pm. Please give her a call.

## 2024-08-16 ENCOUNTER — LAB VISIT (OUTPATIENT)
Dept: LAB | Facility: HOSPITAL | Age: 49
End: 2024-08-16
Attending: NURSE PRACTITIONER
Payer: MEDICARE

## 2024-08-16 DIAGNOSIS — R79.1 ABNORMAL COAGULATION PROFILE: ICD-10-CM

## 2024-08-16 DIAGNOSIS — Z01.818 PREOPERATIVE CLEARANCE: ICD-10-CM

## 2024-08-16 LAB
ALBUMIN SERPL BCP-MCNC: 4.1 G/DL (ref 3.5–5.2)
ALP SERPL-CCNC: 96 U/L (ref 55–135)
ALT SERPL W/O P-5'-P-CCNC: 10 U/L (ref 10–44)
ANION GAP SERPL CALC-SCNC: 10 MMOL/L (ref 8–16)
AST SERPL-CCNC: 14 U/L (ref 10–40)
BASOPHILS # BLD AUTO: 0.05 K/UL (ref 0–0.2)
BASOPHILS NFR BLD: 0.7 % (ref 0–1.9)
BILIRUB SERPL-MCNC: 0.3 MG/DL (ref 0.1–1)
BUN SERPL-MCNC: 13 MG/DL (ref 6–20)
CALCIUM SERPL-MCNC: 9.8 MG/DL (ref 8.7–10.5)
CHLORIDE SERPL-SCNC: 107 MMOL/L (ref 95–110)
CO2 SERPL-SCNC: 24 MMOL/L (ref 23–29)
CREAT SERPL-MCNC: 0.9 MG/DL (ref 0.5–1.4)
DIFFERENTIAL METHOD BLD: ABNORMAL
EOSINOPHIL # BLD AUTO: 0.4 K/UL (ref 0–0.5)
EOSINOPHIL NFR BLD: 5.4 % (ref 0–8)
ERYTHROCYTE [DISTWIDTH] IN BLOOD BY AUTOMATED COUNT: 13.7 % (ref 11.5–14.5)
EST. GFR  (NO RACE VARIABLE): >60 ML/MIN/1.73 M^2
GLUCOSE SERPL-MCNC: 121 MG/DL (ref 70–110)
HCT VFR BLD AUTO: 32 % (ref 37–48.5)
HGB BLD-MCNC: 9.9 G/DL (ref 12–16)
IMM GRANULOCYTES # BLD AUTO: 0.02 K/UL (ref 0–0.04)
IMM GRANULOCYTES NFR BLD AUTO: 0.3 % (ref 0–0.5)
LYMPHOCYTES # BLD AUTO: 2.7 K/UL (ref 1–4.8)
LYMPHOCYTES NFR BLD: 35.1 % (ref 18–48)
MCH RBC QN AUTO: 28.3 PG (ref 27–31)
MCHC RBC AUTO-ENTMCNC: 30.9 G/DL (ref 32–36)
MCV RBC AUTO: 91 FL (ref 82–98)
MONOCYTES # BLD AUTO: 0.4 K/UL (ref 0.3–1)
MONOCYTES NFR BLD: 5.8 % (ref 4–15)
NEUTROPHILS # BLD AUTO: 4 K/UL (ref 1.8–7.7)
NEUTROPHILS NFR BLD: 52.7 % (ref 38–73)
NRBC BLD-RTO: 0 /100 WBC
OHS QRS DURATION: 80 MS
OHS QTC CALCULATION: 434 MS
PLATELET # BLD AUTO: 261 K/UL (ref 150–450)
PMV BLD AUTO: 12.1 FL (ref 9.2–12.9)
POTASSIUM SERPL-SCNC: 4.1 MMOL/L (ref 3.5–5.1)
PROT SERPL-MCNC: 7.2 G/DL (ref 6–8.4)
RBC # BLD AUTO: 3.5 M/UL (ref 4–5.4)
SODIUM SERPL-SCNC: 141 MMOL/L (ref 136–145)
WBC # BLD AUTO: 7.56 K/UL (ref 3.9–12.7)

## 2024-08-16 PROCEDURE — 85610 PROTHROMBIN TIME: CPT | Mod: HCNC | Performed by: NURSE PRACTITIONER

## 2024-08-16 PROCEDURE — 80053 COMPREHEN METABOLIC PANEL: CPT | Mod: HCNC | Performed by: NURSE PRACTITIONER

## 2024-08-16 PROCEDURE — 85730 THROMBOPLASTIN TIME PARTIAL: CPT | Mod: HCNC | Performed by: NURSE PRACTITIONER

## 2024-08-16 PROCEDURE — 36415 COLL VENOUS BLD VENIPUNCTURE: CPT | Mod: HCNC,PN | Performed by: NURSE PRACTITIONER

## 2024-08-16 PROCEDURE — 85025 COMPLETE CBC W/AUTO DIFF WBC: CPT | Mod: HCNC | Performed by: NURSE PRACTITIONER

## 2024-08-17 LAB — MRSA SPEC QL CULT: NORMAL

## 2024-08-19 ENCOUNTER — PATIENT OUTREACH (OUTPATIENT)
Dept: ADMINISTRATIVE | Facility: HOSPITAL | Age: 49
End: 2024-08-19
Payer: MEDICARE

## 2024-08-19 DIAGNOSIS — E11.9 TYPE 2 DIABETES MELLITUS WITHOUT COMPLICATION, WITH LONG-TERM CURRENT USE OF INSULIN: Primary | ICD-10-CM

## 2024-08-19 DIAGNOSIS — Z79.4 TYPE 2 DIABETES MELLITUS WITHOUT COMPLICATION, WITH LONG-TERM CURRENT USE OF INSULIN: Primary | ICD-10-CM

## 2024-08-19 LAB
APTT PPP: 31.9 SEC (ref 21–32)
INR PPP: 1 (ref 0.8–1.2)
PROTHROMBIN TIME: 10.5 SEC (ref 9–12.5)

## 2024-08-19 NOTE — PROGRESS NOTES
Lab visit report: Patient has lab appointment scheduled on 8/26/24, diabetic orders entered and linked to appointment.

## 2024-08-26 ENCOUNTER — OFFICE VISIT (OUTPATIENT)
Dept: PRIMARY CARE CLINIC | Facility: CLINIC | Age: 49
End: 2024-08-26
Payer: MEDICARE

## 2024-08-26 VITALS
HEIGHT: 67 IN | BODY MASS INDEX: 31.66 KG/M2 | TEMPERATURE: 98 F | WEIGHT: 201.75 LBS | HEART RATE: 89 BPM | OXYGEN SATURATION: 95 % | SYSTOLIC BLOOD PRESSURE: 122 MMHG | DIASTOLIC BLOOD PRESSURE: 58 MMHG

## 2024-08-26 DIAGNOSIS — Z79.4 TYPE 2 DIABETES MELLITUS WITHOUT COMPLICATION, WITH LONG-TERM CURRENT USE OF INSULIN: ICD-10-CM

## 2024-08-26 DIAGNOSIS — M31.0 LEUKOCYTOCLASTIC VASCULITIS: ICD-10-CM

## 2024-08-26 DIAGNOSIS — E11.59 HYPERTENSION ASSOCIATED WITH DIABETES: Chronic | ICD-10-CM

## 2024-08-26 DIAGNOSIS — I15.2 HYPERTENSION ASSOCIATED WITH DIABETES: Chronic | ICD-10-CM

## 2024-08-26 DIAGNOSIS — I70.0 AORTIC ATHEROSCLEROSIS: ICD-10-CM

## 2024-08-26 DIAGNOSIS — D84.821 IMMUNOCOMPROMISED STATE DUE TO DRUG THERAPY: ICD-10-CM

## 2024-08-26 DIAGNOSIS — D47.2 MGUS (MONOCLONAL GAMMOPATHY OF UNKNOWN SIGNIFICANCE): ICD-10-CM

## 2024-08-26 DIAGNOSIS — F41.8 SITUATIONAL ANXIETY: ICD-10-CM

## 2024-08-26 DIAGNOSIS — E11.9 TYPE 2 DIABETES MELLITUS WITHOUT COMPLICATION, WITH LONG-TERM CURRENT USE OF INSULIN: ICD-10-CM

## 2024-08-26 DIAGNOSIS — Z00.00 ENCOUNTER FOR PREVENTIVE HEALTH EXAMINATION: Primary | ICD-10-CM

## 2024-08-26 DIAGNOSIS — M87.051 AVASCULAR NECROSIS OF BONES OF BOTH HIPS: ICD-10-CM

## 2024-08-26 DIAGNOSIS — Z79.4 DIABETES MELLITUS DUE TO UNDERLYING CONDITION WITH COMPLICATION, WITH LONG-TERM CURRENT USE OF INSULIN: ICD-10-CM

## 2024-08-26 DIAGNOSIS — Z79.899 IMMUNOCOMPROMISED STATE DUE TO DRUG THERAPY: ICD-10-CM

## 2024-08-26 DIAGNOSIS — F19.982 DRUG-INDUCED INSOMNIA: ICD-10-CM

## 2024-08-26 DIAGNOSIS — M32.9 SYSTEMIC LUPUS ERYTHEMATOSUS, UNSPECIFIED SLE TYPE, UNSPECIFIED ORGAN INVOLVEMENT STATUS: ICD-10-CM

## 2024-08-26 DIAGNOSIS — E08.8 DIABETES MELLITUS DUE TO UNDERLYING CONDITION WITH COMPLICATION, WITH LONG-TERM CURRENT USE OF INSULIN: ICD-10-CM

## 2024-08-26 DIAGNOSIS — M87.052 AVASCULAR NECROSIS OF BONES OF BOTH HIPS: ICD-10-CM

## 2024-08-26 DIAGNOSIS — E11.3212 TYPE 2 DIABETES MELLITUS WITH LEFT EYE AFFECTED BY MILD NONPROLIFERATIVE RETINOPATHY AND MACULAR EDEMA, WITHOUT LONG-TERM CURRENT USE OF INSULIN: ICD-10-CM

## 2024-08-26 PROCEDURE — 3061F NEG MICROALBUMINURIA REV: CPT | Mod: HCNC,CPTII,S$GLB, | Performed by: NURSE PRACTITIONER

## 2024-08-26 PROCEDURE — 3066F NEPHROPATHY DOC TX: CPT | Mod: HCNC,CPTII,S$GLB, | Performed by: NURSE PRACTITIONER

## 2024-08-26 PROCEDURE — G0439 PPPS, SUBSEQ VISIT: HCPCS | Mod: HCNC,S$GLB,, | Performed by: NURSE PRACTITIONER

## 2024-08-26 PROCEDURE — 99999 PR PBB SHADOW E&M-EST. PATIENT-LVL III: CPT | Mod: PBBFAC,HCNC,, | Performed by: NURSE PRACTITIONER

## 2024-08-26 PROCEDURE — 3044F HG A1C LEVEL LT 7.0%: CPT | Mod: HCNC,CPTII,S$GLB, | Performed by: NURSE PRACTITIONER

## 2024-08-26 PROCEDURE — 3074F SYST BP LT 130 MM HG: CPT | Mod: HCNC,CPTII,S$GLB, | Performed by: NURSE PRACTITIONER

## 2024-08-26 PROCEDURE — G9919 SCRN ND POS ND PROV OF REC: HCPCS | Mod: HCNC,CPTII,S$GLB, | Performed by: NURSE PRACTITIONER

## 2024-08-26 PROCEDURE — 3078F DIAST BP <80 MM HG: CPT | Mod: HCNC,CPTII,S$GLB, | Performed by: NURSE PRACTITIONER

## 2024-08-26 NOTE — PROGRESS NOTES
"Marilou Daniel presented for a follow-up Medicare AWV today. The following components were reviewed and updated:    Medical history  Family History  Social history  Allergies and Current Medications  Health Risk Assessment  Health Maintenance  Care Team    **See Completed Assessments for Annual Wellness visit with in the encounter summary    The following assessments were completed:  Depression Screening  Cognitive function Screening  Timed Get Up Test  Whisper Test  Nutrition Screening    Vitals:    08/26/24 1010   BP: (!) 122/58   BP Location: Right arm   Patient Position: Sitting   Pulse: 89   Temp: 97.7 °F (36.5 °C)   TempSrc: Skin   SpO2: 95%   Weight: 91.5 kg (201 lb 11.5 oz)   Height: 5' 7" (1.702 m)     Body mass index is 31.59 kg/m².            Review of Systems   Gastrointestinal:  Positive for constipation.   Musculoskeletal:  Positive for arthralgias, gait problem and myalgias.   Skin:  Negative for pallor and rash.   Neurological:  Positive for numbness and headaches. Negative for dizziness and light-headedness.   Psychiatric/Behavioral:  Positive for sleep disturbance. Negative for decreased concentration. The patient is not nervous/anxious.            Physical Exam  Constitutional:       General: She is not in acute distress.     Appearance: She is not ill-appearing.   HENT:      Head: Normocephalic.   Eyes:      Conjunctiva/sclera: Conjunctivae normal.   Pulmonary:      Effort: Pulmonary effort is normal. No respiratory distress.   Neurological:      Mental Status: She is alert and oriented to person, place, and time.            Health Maintenance         Date Due Completion Date    Foot Exam Never done ---    TETANUS VACCINE Never done ---    Shingles Vaccine (1 of 2) Never done ---    Influenza Vaccine (1) Never done ---    COVID-19 Vaccine (5 - 2023-24 season) 09/01/2024 3/16/2024    Mammogram 01/05/2025 1/5/2024    Hemoglobin A1c 02/28/2025 8/28/2024    Eye Exam 03/20/2025 3/20/2024    High " Dose Statin 08/26/2025 8/26/2024    Diabetes Urine Screening 08/28/2025 8/28/2024    Lipid Panel 08/28/2025 8/28/2024    Cervical Cancer Screening 09/29/2026 9/29/2021    Colorectal Cancer Screening 03/28/2029 3/28/2024              PROBLEM LIST ITEMS ADDRESSED THIS VISIT:    1. Encounter for preventive health examination  Assessment & Plan:  2024 - Needs foot exam    Review for Opioid Screening: Pt does have Rx for Opioids      Review for Substance Use Disorders: Patient does not use illicit substances as reported - pt is prescribed ambien for insomnia        2. Diabetes mellitus due to underlying condition with complication, with long-term current use of insulin  Assessment & Plan:  Lab Results   Component Value Date    HGBA1C 4.6 08/28/2024        empagliflozin - 10 mg  insulin aspart U-100 - 100 unit/mL  insulin glargine U-100 (Lantus) Inpn - 100 unit/mL (3 mL)  TRULICITY Pnij - 1.5 mg/0.5 mL  F/U with PCP    Orders:  -     dulaglutide (TRULICITY) 1.5 mg/0.5 mL pen injector; Inject 1.5 mg into the skin every 7 days.  Dispense: 4 pen ; Refill: 11    3. Drug-induced insomnia  -     zolpidem (AMBIEN) 10 mg Tab; TAKE 1/2 to 1 TABLET BY MOUTH AT BEDTIME AS NEEDED FOR INSOMNIA  Dispense: 30 tablet; Refill: 0    4. Situational anxiety  Assessment & Plan:  Continue to f/u with LCSW PRN       5. Hypertension associated with diabetes  Assessment & Plan:  Clonidine 0.2 mg tid  Labetalol 300 mg bid  Nifedipine 30 mg daily  Doxazosin 2 mg bid  Hydralazine prn SBP > 170  F/U with PCP or Cardiology        6. Aortic atherosclerosis  Overview:  2022  Abdominal aorta is normal in caliber without significant calcific atherosclerosis. 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     Assessment & Plan:  Chronic, stable  Continue current tx plan  F/U with PCP       7. Systemic lupus erythematosus, unspecified SLE type, unspecified organ involvement status  Assessment & Plan:  Managed closely by Dr Wayne.   Much better controlled.  Now off of prednisone  Continue Cellcept, Plaquenil and dapsone  F/U with Rheumatology         8. Leukocytoclastic vasculitis  Overview:  Rash on skin unclear if this is a    Assessment & Plan:  Continue to follow up with Dermatology and Rheumatology       9. Immunocompromised state due to drug therapy  Assessment & Plan:  Monitor for S/S of infection  F/U with PCP      10. MGUS (monoclonal gammopathy of unknown significance)  Assessment & Plan:  KENDALL: Faint IgG lambda specific monoclonal protein band in beta identified.   SPEP: Normal total protein. Paraprotein peak in beta-2 = 0.28 g/dL (previously, 0.29 g/dL).   FLC-R : 1.47     PET scan 12/2022 without hypermetabolic activity or concern for metastatic disease. Bone marrow biopsy January 2023 with slight lambda plasma cell possible neoplasm 5-10%.      Plan for continued close monitoring of plasma cell with peripheral blood and repeat bone marrow biopsy if worsening anemia in absence of iron deficiency.      Monoclonal protein present but   3.0 g/dL  · With exception of anemia, no CRAB features or other indicators of active myeloma       11. Type 2 diabetes mellitus without complication, with long-term current use of insulin  Assessment & Plan:  Lab Results   Component Value Date    HGBA1C 4.6 08/28/2024    empagliflozin - 10 mg  insulin aspart U-100 - 100 unit/mL  insulin glargine U-100 (Lantus) Inpn - 100 unit/mL (3 mL)  TRULICITY Pnij - 1.5 mg/0.5 mL   Uses dexcom monitoring  To adjust dose as blood sugar average 85 - any symptoms?  F/U with PcP        12. 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 Avascular necrosis of bones of both hips  Assessment & Plan:  Follow[facility].   Also followed by pain mgmt.              Provided Marilou with a 5-10 year written screening schedule and personal prevention plan. Recommendations were developed using the USPSTF age appropriate recommendations. Education, counseling, and referrals were provided as needed.  After Visit Summary printed and given to patient which includes a list of additional screenings\tests needed.    Future Appointments   Date Time Provider Department Center   9/3/2024  7:40 AM Dale Powell MD [facility] HEM ONC [facility]   9/4/2024 10:20 AM Alissa Bautista MD [facility]   10/30/2024  9:00 AM Alissa Bautista MD [facility]   12/26/2024 12:55 PM LABORATORY, [facility] LAB [facility]   12/30/2024  1:20 PM Dale Powell MD [facility] HEM ONC [facility]          REBECCA Cardona, NP-C  [facility] [address] [place] [address]    I offered to discuss advanced care planning, including how to pick a person who would make decisions for you if you were unable to make them for yourself, called a health care power of , and what kind of decisions you might make such as use of life sustaining treatments such as ventilators and tube feeding when faced with a life limiting illness recorded on a living will that they will need to know. (How you want to be cared for as you near the end of your natural life)     X Patient is interested in learning more about how to make advanced directives.  I provided them paperwork and offered to discuss this with them.

## 2024-08-27 ENCOUNTER — TELEPHONE (OUTPATIENT)
Dept: PRIMARY CARE CLINIC | Facility: CLINIC | Age: 49
End: 2024-08-27
Payer: MEDICARE

## 2024-08-27 PROBLEM — T84.82XA ARTHROFIBROSIS OF TOTAL KNEE REPLACEMENT: Status: ACTIVE | Noted: 2024-08-27

## 2024-08-27 PROBLEM — M25.561 KNEE PAIN, RIGHT: Status: ACTIVE | Noted: 2024-08-27

## 2024-08-27 PROBLEM — I70.0 AORTIC ATHEROSCLEROSIS: Status: ACTIVE | Noted: 2024-08-27

## 2024-08-27 PROBLEM — E66.01 MORBID (SEVERE) OBESITY DUE TO EXCESS CALORIES: Status: ACTIVE | Noted: 2024-08-27

## 2024-08-27 PROBLEM — E11.3212 TYPE 2 DIABETES MELLITUS WITH LEFT EYE AFFECTED BY MILD NONPROLIFERATIVE RETINOPATHY AND MACULAR EDEMA, WITHOUT LONG-TERM CURRENT USE OF INSULIN: Status: ACTIVE | Noted: 2024-08-27

## 2024-08-27 PROBLEM — T84.82XA ARTHROFIBROSIS OF TOTAL KNEE REPLACEMENT: Status: RESOLVED | Noted: 2024-08-27 | Resolved: 2024-08-27

## 2024-08-27 NOTE — ASSESSMENT & PLAN NOTE
Managed closely by Dr Wayne.   Much better controlled. Now off of prednisone!    Stop Cellcept the week before, the week of and the week after surgery  You may resume if you have no infections and are healing proper  Also, taking Plaquenil

## 2024-08-27 NOTE — ASSESSMENT & PLAN NOTE
Lab Results   Component Value Date    HGBA1C 6.1 (H) 01/02/2024    Trulicity stopped 1 week prior to surgery  Continue lantus 10 units daily  Hold any oral hyperglycemic medications  Sliding scale insulin

## 2024-08-27 NOTE — ASSESSMENT & PLAN NOTE
Likely post op acute blood loss anemia - Monitor for S/S of blood loss, monitor H & H,  transfuse prn    Lab Results   Component Value Date    WBC 7.56 08/16/2024    HGB 9.9 (L) 08/16/2024    HCT 32.0 (L) 08/16/2024    MCV 91 08/16/2024     08/16/2024         ,

## 2024-08-27 NOTE — ASSESSMENT & PLAN NOTE
Known risk factors for perioperative complications: Anemia  Diabetes mellitus  Immunosuppression, Lupus, Vasculitis    Difficulty with intubation is not anticipated.    Cardiac Risk Estimation: Based on the Revised Cardiac Risk index, patient is a Class risk II with a  6 % risk of a major cardiac event in a moderate risk orthopedic procedure. Pt is moderate to high risk for this intermediate risk orthopedic surgery.  Cleared for surgery pending cardiac clearance and improvement in Hgb/Hct.     1.) Preoperative workup as follows: ECG, hemoglobin, hematocrit, electrolytes, creatinine, glucose, liver function studies, coagulation studies.  2.) Change in medication regimen before surgery: discontinue ASA 6 days before surgery, discontinue NSAIDs  5 days before surgery, hold Metformin 24 hours prior to surgery.  3.) Prophylaxis for cardiac events with perioperative beta-blockers: not indicated.  4.) Invasive hemodynamic monitoring perioperatively: at the discretion of anesthesiologist.  5.) Deep vein thrombosis prophylaxis postoperatively: regimen to be chosen by surgical team.  6.) Surveillance for postoperative MI with ECG immediately postoperatively and on postoperati ve days 1 and 2 AND troponin levels 24 hours postoperatively and on day 4 or hospital discharge (whichever comes first): at the discretion of anesthesiologist.  7.) Current medications which may produce withdrawal symptoms if withheld perioperatively:   8.) Other measures: Careful attention to perioperative glycemic control ( ).  Postoperative hypertension management with IV hydralazine until able to take oral medications.  Postoperative incentive spirometry to prevent pneumonia.

## 2024-08-27 NOTE — TELEPHONE ENCOUNTER
Please call pt and ask when she is scheduled for a cardiac clearance for surgery - knee replacement.    Alexia

## 2024-08-27 NOTE — ASSESSMENT & PLAN NOTE
Clonidine 0.2 mg tid  Labetalol 300 mg bid  Nifedipine 30 mg daily  Doxazosin 2 mg bid  Hydralazine prn SBP > 170

## 2024-08-28 ENCOUNTER — LAB VISIT (OUTPATIENT)
Dept: LAB | Facility: HOSPITAL | Age: 49
End: 2024-08-28
Payer: MEDICARE

## 2024-08-28 ENCOUNTER — OFFICE VISIT (OUTPATIENT)
Dept: HEMATOLOGY/ONCOLOGY | Facility: CLINIC | Age: 49
End: 2024-08-28
Payer: MEDICARE

## 2024-08-28 VITALS
DIASTOLIC BLOOD PRESSURE: 81 MMHG | HEART RATE: 59 BPM | HEIGHT: 67 IN | WEIGHT: 204.13 LBS | BODY MASS INDEX: 32.04 KG/M2 | TEMPERATURE: 98 F | OXYGEN SATURATION: 99 % | SYSTOLIC BLOOD PRESSURE: 154 MMHG

## 2024-08-28 DIAGNOSIS — D47.2 MGUS (MONOCLONAL GAMMOPATHY OF UNKNOWN SIGNIFICANCE): ICD-10-CM

## 2024-08-28 DIAGNOSIS — D63.8 ANEMIA OF INFECTION AND CHRONIC DISEASE: ICD-10-CM

## 2024-08-28 DIAGNOSIS — D51.3 OTHER DIETARY VITAMIN B12 DEFICIENCY ANEMIA: ICD-10-CM

## 2024-08-28 DIAGNOSIS — D50.8 IRON DEFICIENCY ANEMIA SECONDARY TO INADEQUATE DIETARY IRON INTAKE: ICD-10-CM

## 2024-08-28 DIAGNOSIS — M32.8 OTHER FORMS OF SYSTEMIC LUPUS ERYTHEMATOSUS, UNSPECIFIED ORGAN INVOLVEMENT STATUS: ICD-10-CM

## 2024-08-28 DIAGNOSIS — B99.9 ANEMIA OF INFECTION AND CHRONIC DISEASE: ICD-10-CM

## 2024-08-28 DIAGNOSIS — D50.8 IRON DEFICIENCY ANEMIA SECONDARY TO INADEQUATE DIETARY IRON INTAKE: Primary | ICD-10-CM

## 2024-08-28 DIAGNOSIS — Z79.4 TYPE 2 DIABETES MELLITUS WITHOUT COMPLICATION, WITH LONG-TERM CURRENT USE OF INSULIN: ICD-10-CM

## 2024-08-28 DIAGNOSIS — E66.01 MORBID (SEVERE) OBESITY DUE TO EXCESS CALORIES: ICD-10-CM

## 2024-08-28 DIAGNOSIS — Z79.899 IMMUNOCOMPROMISED STATE DUE TO DRUG THERAPY: ICD-10-CM

## 2024-08-28 DIAGNOSIS — M31.0 LEUKOCYTOCLASTIC VASCULITIS: ICD-10-CM

## 2024-08-28 DIAGNOSIS — D84.821 IMMUNOCOMPROMISED STATE DUE TO DRUG THERAPY: ICD-10-CM

## 2024-08-28 DIAGNOSIS — E11.9 TYPE 2 DIABETES MELLITUS WITHOUT COMPLICATION, WITH LONG-TERM CURRENT USE OF INSULIN: ICD-10-CM

## 2024-08-28 LAB
ALBUMIN SERPL BCP-MCNC: 3.8 G/DL (ref 3.5–5.2)
ALBUMIN/CREAT UR: NORMAL UG/MG (ref 0–30)
ALP SERPL-CCNC: 79 U/L (ref 55–135)
ALT SERPL W/O P-5'-P-CCNC: 10 U/L (ref 10–44)
ANION GAP SERPL CALC-SCNC: 8 MMOL/L (ref 8–16)
AST SERPL-CCNC: 16 U/L (ref 10–40)
BASOPHILS # BLD AUTO: 0.03 K/UL (ref 0–0.2)
BASOPHILS NFR BLD: 0.4 % (ref 0–1.9)
BILIRUB SERPL-MCNC: 0.4 MG/DL (ref 0.1–1)
BUN SERPL-MCNC: 11 MG/DL (ref 6–20)
CALCIUM SERPL-MCNC: 9.4 MG/DL (ref 8.7–10.5)
CHLORIDE SERPL-SCNC: 111 MMOL/L (ref 95–110)
CHOLEST SERPL-MCNC: 150 MG/DL (ref 120–199)
CHOLEST/HDLC SERPL: 3.3 {RATIO} (ref 2–5)
CO2 SERPL-SCNC: 24 MMOL/L (ref 23–29)
CREAT SERPL-MCNC: 0.8 MG/DL (ref 0.5–1.4)
CREAT UR-MCNC: 92 MG/DL (ref 15–325)
DIFFERENTIAL METHOD BLD: ABNORMAL
EOSINOPHIL # BLD AUTO: 0.5 K/UL (ref 0–0.5)
EOSINOPHIL NFR BLD: 6.7 % (ref 0–8)
ERYTHROCYTE [DISTWIDTH] IN BLOOD BY AUTOMATED COUNT: 13.4 % (ref 11.5–14.5)
EST. GFR  (NO RACE VARIABLE): >60 ML/MIN/1.73 M^2
ESTIMATED AVG GLUCOSE: 85 MG/DL (ref 68–131)
FERRITIN SERPL-MCNC: 522 NG/ML (ref 20–300)
GLUCOSE SERPL-MCNC: 84 MG/DL (ref 70–110)
HBA1C MFR BLD: 4.6 % (ref 4–5.6)
HCT VFR BLD AUTO: 28.5 % (ref 37–48.5)
HDLC SERPL-MCNC: 46 MG/DL (ref 40–75)
HDLC SERPL: 30.7 % (ref 20–50)
HGB BLD-MCNC: 9.1 G/DL (ref 12–16)
IMM GRANULOCYTES # BLD AUTO: 0.01 K/UL (ref 0–0.04)
IMM GRANULOCYTES NFR BLD AUTO: 0.1 % (ref 0–0.5)
IRON SERPL-MCNC: 63 UG/DL (ref 30–160)
LDLC SERPL CALC-MCNC: 96 MG/DL (ref 63–159)
LYMPHOCYTES # BLD AUTO: 2.1 K/UL (ref 1–4.8)
LYMPHOCYTES NFR BLD: 31 % (ref 18–48)
MCH RBC QN AUTO: 28.2 PG (ref 27–31)
MCHC RBC AUTO-ENTMCNC: 31.9 G/DL (ref 32–36)
MCV RBC AUTO: 88 FL (ref 82–98)
MICROALBUMIN UR DL<=1MG/L-MCNC: <5 UG/ML
MONOCYTES # BLD AUTO: 0.5 K/UL (ref 0.3–1)
MONOCYTES NFR BLD: 7.5 % (ref 4–15)
NEUTROPHILS # BLD AUTO: 3.6 K/UL (ref 1.8–7.7)
NEUTROPHILS NFR BLD: 54.3 % (ref 38–73)
NONHDLC SERPL-MCNC: 104 MG/DL
NRBC BLD-RTO: 0 /100 WBC
PLATELET # BLD AUTO: 244 K/UL (ref 150–450)
PMV BLD AUTO: 10.8 FL (ref 9.2–12.9)
POTASSIUM SERPL-SCNC: 4.2 MMOL/L (ref 3.5–5.1)
PROT SERPL-MCNC: 7 G/DL (ref 6–8.4)
RBC # BLD AUTO: 3.23 M/UL (ref 4–5.4)
SATURATED IRON: 24 % (ref 20–50)
SODIUM SERPL-SCNC: 143 MMOL/L (ref 136–145)
TOTAL IRON BINDING CAPACITY: 265 UG/DL (ref 250–450)
TRANSFERRIN SERPL-MCNC: 179 MG/DL (ref 200–375)
TRIGL SERPL-MCNC: 40 MG/DL (ref 30–150)
WBC # BLD AUTO: 6.68 K/UL (ref 3.9–12.7)

## 2024-08-28 PROCEDURE — 1159F MED LIST DOCD IN RCRD: CPT | Mod: HCNC,CPTII,S$GLB, | Performed by: INTERNAL MEDICINE

## 2024-08-28 PROCEDURE — 3077F SYST BP >= 140 MM HG: CPT | Mod: HCNC,CPTII,S$GLB, | Performed by: INTERNAL MEDICINE

## 2024-08-28 PROCEDURE — 83540 ASSAY OF IRON: CPT | Mod: HCNC

## 2024-08-28 PROCEDURE — 99214 OFFICE O/P EST MOD 30 MIN: CPT | Mod: HCNC,S$GLB,, | Performed by: INTERNAL MEDICINE

## 2024-08-28 PROCEDURE — 82728 ASSAY OF FERRITIN: CPT | Mod: HCNC

## 2024-08-28 PROCEDURE — 3044F HG A1C LEVEL LT 7.0%: CPT | Mod: HCNC,CPTII,S$GLB, | Performed by: INTERNAL MEDICINE

## 2024-08-28 PROCEDURE — 99999 PR PBB SHADOW E&M-EST. PATIENT-LVL V: CPT | Mod: PBBFAC,HCNC,, | Performed by: INTERNAL MEDICINE

## 2024-08-28 PROCEDURE — 3079F DIAST BP 80-89 MM HG: CPT | Mod: HCNC,CPTII,S$GLB, | Performed by: INTERNAL MEDICINE

## 2024-08-28 PROCEDURE — 85025 COMPLETE CBC W/AUTO DIFF WBC: CPT | Mod: HCNC

## 2024-08-28 PROCEDURE — 36415 COLL VENOUS BLD VENIPUNCTURE: CPT | Mod: HCNC | Performed by: INTERNAL MEDICINE

## 2024-08-28 PROCEDURE — 1160F RVW MEDS BY RX/DR IN RCRD: CPT | Mod: HCNC,CPTII,S$GLB, | Performed by: INTERNAL MEDICINE

## 2024-08-28 PROCEDURE — 83036 HEMOGLOBIN GLYCOSYLATED A1C: CPT | Mod: HCNC | Performed by: INTERNAL MEDICINE

## 2024-08-28 PROCEDURE — 3008F BODY MASS INDEX DOCD: CPT | Mod: HCNC,CPTII,S$GLB, | Performed by: INTERNAL MEDICINE

## 2024-08-28 PROCEDURE — 82570 ASSAY OF URINE CREATININE: CPT | Mod: HCNC | Performed by: INTERNAL MEDICINE

## 2024-08-28 PROCEDURE — 80053 COMPREHEN METABOLIC PANEL: CPT | Mod: HCNC

## 2024-08-28 PROCEDURE — 80061 LIPID PANEL: CPT | Mod: HCNC | Performed by: INTERNAL MEDICINE

## 2024-08-28 NOTE — PROGRESS NOTES
Subjective:       Patient ID: Marilou Daniel is a 48 y.o. female.    Chief Complaint: Results and Anemia    HPI:  48-year-old female history of recurrent iron deficiency anemia with colonoscopies completed in 2024.  The patient has received intravenous iron.  Laboratory studies today demonstrates hemoglobin of 9.1 iron levels are pending she is scheduled to have surgery in early September for a right knee replacement in his requesting and requiring hemoglobin to be at a higher level patient is seen in here for review    Past Medical History:   Diagnosis Date    Anemia     Arthritis     Connective tissue disease 1999    COVID-19 in immunocompromised patient 02/05/2021    Diabetes mellitus     Drug induced insomnia 12/15/2020    R/T chronic steroid use for SLE.    Gastroesophageal reflux disease 03/23/2020    Hypertension     Lupus 1999    Situational anxiety 08/24/2021    Thyroid nodule greater than or equal to 1 cm in diameter incidentally noted on imaging study 11/19/2020    Vasculitis      Family History   Problem Relation Name Age of Onset    Lung cancer Mother Nichole 59        +hx of smoking    Cancer Father Jakc         type uk? stomach?    Breast cancer Other Nimo 58        UL mastect, mets to back & lungs    Breast cancer Other Roz 63        chemo, XRT, UL mastect?    Autism Other Angel      Asthma Other Ирина     Obesity Other Ирина     Liver cancer Other Afshin 58        mets to lungs, pancreas, stomach    Cancer Other Reji         type uk?     Social History     Socioeconomic History    Marital status:    Tobacco Use    Smoking status: Never    Smokeless tobacco: Never   Substance and Sexual Activity    Alcohol use: Never    Drug use: Never    Sexual activity: Yes     Partners: Male     Birth control/protection: See Surgical Hx     Social Determinants of Health     Financial Resource Strain: Low Risk  (8/26/2024)    Overall Financial Resource Strain (CARDIA)      Difficulty of Paying Living Expenses: Not hard at all   Food Insecurity: No Food Insecurity (8/26/2024)    Hunger Vital Sign     Worried About Running Out of Food in the Last Year: Never true     Ran Out of Food in the Last Year: Never true   Transportation Needs: No Transportation Needs (8/26/2024)    PRAPARE - Transportation     Lack of Transportation (Medical): No     Lack of Transportation (Non-Medical): No   Physical Activity: Insufficiently Active (8/26/2024)    Exercise Vital Sign     Days of Exercise per Week: 1 day     Minutes of Exercise per Session: 30 min   Stress: No Stress Concern Present (8/26/2024)    [employer] - Occupational Stress Questionnaire     Feeling of Stress : Not at all   Housing Stability: Low Risk  (8/26/2024)    Housing Stability Vital Sign     Unable to Pay for Housing in the Last Year: No     Homeless in the Last Year: No     Past Surgical History:   Procedure Laterality Date    ABLATION      COLONOSCOPY N/A 6/24/2020    Procedure: COLONOSCOPY;  Surgeon: Nevin Penny MD;  Location: [facility];  Service: Endoscopy;  Laterality: N/A;    COLONOSCOPY N/A 3/28/2024    Procedure: COLONOSCOPY;  Surgeon: José Manuel Huerta MD;  Location: [facility];  Service: Endoscopy;  Laterality: N/A;    ESOPHAGOGASTRODUODENOSCOPY N/A 6/24/2020    Procedure: ESOPHAGOGASTRODUODENOSCOPY (EGD);  Surgeon: Nevin Penny MD;  Location: [facility];  Service: Endoscopy;  Laterality: N/A;    RIGHT HEART CATHETERIZATION      TUBAL LIGATION      WRIST SURGERY Bilateral        Labs:  Lab Results   Component Value Date    WBC 6.68 08/28/2024    HGB 9.1 (L) 08/28/2024    HCT 28.5 (L) 08/28/2024    MCV 88 08/28/2024     08/28/2024     BMP  Lab Results   Component Value Date     08/28/2024    K 4.2 08/28/2024     (H) 08/28/2024    CO2 24 08/28/2024    BUN 11 08/28/2024    CREATININE 0.8 08/28/2024    CALCIUM 9.4 08/28/2024    ANIONGAP 8 08/28/2024    ESTGFRAFRICA  >60.0 03/29/2022    EGFRNONAA >60.0 03/29/2022     Lab Results   Component Value Date    ALT 10 08/28/2024    AST 16 08/28/2024    ALKPHOS 79 08/28/2024    BILITOT 0.4 08/28/2024       Lab Results   Component Value Date    IRON 53 04/22/2024    TIBC 302 04/22/2024    FERRITIN 834 (H) 04/22/2024     Lab Results   Component Value Date    YSWVORRC08 523 01/02/2024     Lab Results   Component Value Date    FOLATE 6.8 01/02/2024     Lab Results   Component Value Date    TSH 0.468 01/02/2024         Review of Systems   Constitutional:  Negative for activity change, appetite change, chills, diaphoresis, fatigue, fever and unexpected weight change.   HENT:  Negative for congestion, dental problem, drooling, ear discharge, ear pain, facial swelling, hearing loss, mouth sores, nosebleeds, postnasal drip, rhinorrhea, sinus pressure, sneezing, sore throat, tinnitus, trouble swallowing and voice change.    Eyes:  Negative for photophobia, pain, discharge, redness, itching and visual disturbance.   Respiratory:  Negative for cough, choking, chest tightness, shortness of breath, wheezing and stridor.    Cardiovascular:  Negative for chest pain, palpitations and leg swelling.   Gastrointestinal:  Negative for abdominal distention, abdominal pain, anal bleeding, blood in stool, constipation, diarrhea, nausea, rectal pain and vomiting.   Endocrine: Negative for cold intolerance, heat intolerance, polydipsia, polyphagia and polyuria.   Genitourinary:  Negative for decreased urine volume, difficulty urinating, dyspareunia, dysuria, enuresis, flank pain, frequency, genital sores, hematuria, menstrual problem, pelvic pain, urgency, vaginal bleeding, vaginal discharge and vaginal pain.   Musculoskeletal:  Positive for arthralgias and gait problem. Negative for back pain, joint swelling, myalgias, neck pain and neck stiffness.   Skin:  Negative for color change, pallor and rash.   Allergic/Immunologic: Negative for environmental allergies,  food allergies and immunocompromised state.   Neurological:  Positive for weakness. Negative for dizziness, tremors, seizures, syncope, facial asymmetry, speech difficulty, light-headedness, numbness and headaches.   Hematological:  Negative for adenopathy. Does not bruise/bleed easily.   Psychiatric/Behavioral:  Positive for dysphoric mood. Negative for agitation, behavioral problems, confusion, decreased concentration, hallucinations, self-injury, sleep disturbance and suicidal ideas. The patient is nervous/anxious. The patient is not hyperactive.        Objective:      Physical Exam  Vitals reviewed.   Constitutional:       General: She is not in acute distress.     Appearance: She is well-developed. She is not diaphoretic.   HENT:      Head: Normocephalic and atraumatic.      Right Ear: External ear normal.      Left Ear: External ear normal.      Nose: Nose normal.      Right Sinus: No maxillary sinus tenderness or frontal sinus tenderness.      Left Sinus: No maxillary sinus tenderness or frontal sinus tenderness.      Mouth/Throat:      Pharynx: No oropharyngeal exudate.   Eyes:      General: Lids are normal. No scleral icterus.        Right eye: No discharge.         Left eye: No discharge.      Conjunctiva/sclera: Conjunctivae normal.      Right eye: Right conjunctiva is not injected. No hemorrhage.     Left eye: Left conjunctiva is not injected. No hemorrhage.     Pupils: Pupils are equal, round, and reactive to light.   Neck:      Thyroid: No thyromegaly.      Vascular: No JVD.      Trachea: No tracheal deviation.   Cardiovascular:      Rate and Rhythm: Normal rate.   Pulmonary:      Effort: Pulmonary effort is normal. No respiratory distress.      Breath sounds: No stridor.   Chest:      Chest wall: No tenderness.   Abdominal:      General: Bowel sounds are normal. There is no distension.      Palpations: Abdomen is soft. There is no hepatomegaly, splenomegaly or mass.      Tenderness: There is no  abdominal tenderness. There is no rebound.   Musculoskeletal:         General: No tenderness. Normal range of motion.      Cervical back: Normal range of motion and neck supple.   Lymphadenopathy:      Cervical: No cervical adenopathy.      Upper Body:      Right upper body: No supraclavicular adenopathy.      Left upper body: No supraclavicular adenopathy.   Skin:     General: Skin is dry.      Findings: No erythema or rash.   Neurological:      Mental Status: She is alert and oriented to person, place, and time.      Cranial Nerves: No cranial nerve deficit.      Motor: Weakness present.      Coordination: Coordination abnormal.      Gait: Gait abnormal.   Psychiatric:         Behavior: Behavior normal.         Thought Content: Thought content normal.         Judgment: Judgment normal.             Assessment:      1. Iron deficiency anemia secondary to inadequate dietary iron intake    2. MGUS (monoclonal gammopathy of unknown significance)    3. Other dietary vitamin B12 deficiency anemia    4. Anemia of infection and chronic disease    5. Immunocompromised state due to drug therapy    6. Other forms of systemic lupus erythematosus, unspecified organ involvement status    7. Leukocytoclastic vasculitis    8. Morbid (severe) obesity due to excess calories           Med Onc Chart Routing      Follow up with physician . Return to clinic to see me in proximally 4 months with standing labs CBC CMP serum iron TIBC ferritin SPEP and free light chain   Follow up with [facility]    Infusion scheduling note    Injection scheduling note Awaiting results of iron levels today to determine whether not intravenous iron is warranted   Labs    Imaging    Pharmacy appointment    Other referrals                   Plan:     Unfortunately patient is iron levels have not returned yet we will see whether not she has continued with iron deficiency.  If she is iron deficient additional intravenous iron can be given will need to clear upper  tract.  Previous upper endoscopies in 2020.  I have told this you may not have her hemoglobin high enough level to proceed with her knee replacement.  She is very concerned in this need to check with her operative surgeon once I have the iron levels to determine my will see back over the next few days to see back for follow-up and will continue follow-up with me over the next several months        Dale Powell Jr, MD FACP

## 2024-08-29 ENCOUNTER — TELEPHONE (OUTPATIENT)
Dept: PRIMARY CARE CLINIC | Facility: CLINIC | Age: 49
End: 2024-08-29
Payer: MEDICARE

## 2024-08-29 ENCOUNTER — TELEPHONE (OUTPATIENT)
Dept: HEMATOLOGY/ONCOLOGY | Facility: CLINIC | Age: 49
End: 2024-08-29
Payer: MEDICARE

## 2024-08-29 NOTE — PROGRESS NOTES
Pt has [facility] - if message below not viewed please call w information below:   Good day Ms. Daniel    Your A1c is too low if still taking insulin with increased risk for dangerous low blood sugars.  What is your current insulin regimen? Is your diabetes being managed by someone outside of [facility] also?  Do you still have the continuous glucose monitor? If not, how often do you check your blood sugars?    Our next appointment is not until the end of October. I will ask staff to see if can get you in sooner.    Please message or call with any questions or concerns!  Thank you!  Alissa Bautista MD, MPH  [facility]

## 2024-08-29 NOTE — TELEPHONE ENCOUNTER
----- Message from Li Desai sent at 8/29/2024  9:58 AM CDT -----  Regarding: appt  Contact: [phone]  Pt stated the appt dated 9/3 is supposed to be a VV. Attempted to r/s, no access. Pt is asking to please speak to someone on staff as well.

## 2024-09-02 PROBLEM — Z00.00 ENCOUNTER FOR PREVENTIVE HEALTH EXAMINATION: Status: ACTIVE | Noted: 2024-09-02

## 2024-09-02 RX ORDER — ZOLPIDEM TARTRATE 10 MG/1
TABLET ORAL
Qty: 30 TABLET | Refills: 0 | Status: SHIPPED | OUTPATIENT
Start: 2024-09-02

## 2024-09-02 RX ORDER — DULAGLUTIDE 1.5 MG/.5ML
1.5 INJECTION, SOLUTION SUBCUTANEOUS
Qty: 4 PEN | Refills: 11 | Status: SHIPPED | OUTPATIENT
Start: 2024-09-02 | End: 2025-09-02

## 2024-09-02 NOTE — ASSESSMENT & PLAN NOTE
Lab Results   Component Value Date    HGBA1C 4.6 08/28/2024        empagliflozin - 10 mg  insulin aspart U-100 - 100 unit/mL  insulin glargine U-100 (Lantus) Inpn - 100 unit/mL (3 mL)  TRULICITY Pnij - 1.5 mg/0.5 mL  F/U with PCP

## 2024-09-02 NOTE — ASSESSMENT & PLAN NOTE
2024 - Needs foot exam    Review for Opioid Screening: Pt does have Rx for Opioids      Review for Substance Use Disorders: Patient does not use illicit substances as reported - pt is prescribed ambien for insomnia

## 2024-09-02 NOTE — ASSESSMENT & PLAN NOTE
Managed closely by Dr Wayne.   Much better controlled. Now off of prednisone  Continue Cellcept, Plaquenil and dapsone  F/U with Rheumatology

## 2024-09-02 NOTE — ASSESSMENT & PLAN NOTE
Clonidine 0.2 mg tid  Labetalol 300 mg bid  Nifedipine 30 mg daily  Doxazosin 2 mg bid  Hydralazine prn SBP > 170  F/U with PCP or Cardiology

## 2024-09-02 NOTE — ASSESSMENT & PLAN NOTE
Lab Results   Component Value Date    HGBA1C 4.6 08/28/2024    empagliflozin - 10 mg  insulin aspart U-100 - 100 unit/mL  insulin glargine U-100 (Lantus) Inpn - 100 unit/mL (3 mL)  TRULICITY Pnij - 1.5 mg/0.5 mL   Uses dexcom monitoring  To adjust dose as blood sugar average 85 - any symptoms?  F/U with PcP

## 2024-09-02 NOTE — PATIENT INSTRUCTIONS
Counseling and Referral of Other Preventative  (Italic type indicates deductible and co-insurance are waived)    Patient Name: Marilou Daniel  Today's Date: 9/2/2024    Health Maintenance       Date Due Completion Date    Foot Exam Never done ---    TETANUS VACCINE Never done ---    Shingles Vaccine (1 of 2) Never done ---    Influenza Vaccine (1) Never done ---    COVID-19 Vaccine (5 - 2023-24 season) 09/01/2024 3/16/2024    Mammogram 01/05/2025 1/5/2024    Hemoglobin A1c 02/28/2025 8/28/2024    Eye Exam 03/20/2025 3/20/2024    High Dose Statin 08/26/2025 8/26/2024    Diabetes Urine Screening 08/28/2025 8/28/2024    Lipid Panel 08/28/2025 8/28/2024    Cervical Cancer Screening 09/29/2026 9/29/2021    Colorectal Cancer Screening 03/28/2029 3/28/2024        No orders of the defined types were placed in this encounter.    The following information is provided to all patients.  This information is to help you find resources for any of the problems found today that may be affecting your health:                  Living healthy guide: www.[facility].[place].gov      Understanding Diabetes: www.diabetes.org      Eating healthy: www.cdc.gov/healthyweight      CDC home safety checklist: www.cdc.gov/steadi/patient.html      Agency on Aging: www.goea.[place].[facility]      Alcoholics anonymous (AA): www.aa.org      Physical Activity: www.joseline.nih.gov/do8isau      Tobacco use: www.quitwithusla.org

## 2024-09-03 ENCOUNTER — OFFICE VISIT (OUTPATIENT)
Dept: HEMATOLOGY/ONCOLOGY | Facility: CLINIC | Age: 49
End: 2024-09-03
Payer: MEDICARE

## 2024-09-03 DIAGNOSIS — D84.821 IMMUNOCOMPROMISED STATE DUE TO DRUG THERAPY: ICD-10-CM

## 2024-09-03 DIAGNOSIS — M87.052 AVASCULAR NECROSIS OF BONES OF BOTH HIPS: ICD-10-CM

## 2024-09-03 DIAGNOSIS — Q93.3 DELETION OF SHORT ARM OF CHROMOSOME 4: ICD-10-CM

## 2024-09-03 DIAGNOSIS — Z79.899 IMMUNOCOMPROMISED STATE DUE TO DRUG THERAPY: ICD-10-CM

## 2024-09-03 DIAGNOSIS — M87.051 AVASCULAR NECROSIS OF BONES OF BOTH HIPS: ICD-10-CM

## 2024-09-03 DIAGNOSIS — M32.8 OTHER FORMS OF SYSTEMIC LUPUS ERYTHEMATOSUS, UNSPECIFIED ORGAN INVOLVEMENT STATUS: ICD-10-CM

## 2024-09-03 DIAGNOSIS — D47.2 MGUS (MONOCLONAL GAMMOPATHY OF UNKNOWN SIGNIFICANCE): Primary | ICD-10-CM

## 2024-09-03 PROCEDURE — 1159F MED LIST DOCD IN RCRD: CPT | Mod: HCNC,CPTII,95, | Performed by: INTERNAL MEDICINE

## 2024-09-03 PROCEDURE — 3044F HG A1C LEVEL LT 7.0%: CPT | Mod: HCNC,CPTII,95, | Performed by: INTERNAL MEDICINE

## 2024-09-03 PROCEDURE — 3066F NEPHROPATHY DOC TX: CPT | Mod: HCNC,CPTII,95, | Performed by: INTERNAL MEDICINE

## 2024-09-03 PROCEDURE — 3061F NEG MICROALBUMINURIA REV: CPT | Mod: HCNC,CPTII,95, | Performed by: INTERNAL MEDICINE

## 2024-09-03 PROCEDURE — 1160F RVW MEDS BY RX/DR IN RCRD: CPT | Mod: HCNC,CPTII,95, | Performed by: INTERNAL MEDICINE

## 2024-09-03 PROCEDURE — 99214 OFFICE O/P EST MOD 30 MIN: CPT | Mod: HCNC,95,, | Performed by: INTERNAL MEDICINE

## 2024-09-03 NOTE — PROGRESS NOTES
Subjective:       Patient ID: Marilou Daniel is a 48 y.o. female.    Chief Complaint: Results and Anemia    HPI:  48-year-old female history of lupus patient is scheduled for right knee replacement.  Patient had bone marrow performed in January 20, 2023 at demonstrated 5% 10% plasma cells she has a small abnormal paraprotein IgA.  In returns for clearance for surgery ECOG status 1    Past Medical History:   Diagnosis Date    Anemia     Arthritis     Connective tissue disease 1999    COVID-19 in immunocompromised patient 02/05/2021    Diabetes mellitus     Drug induced insomnia 12/15/2020    R/T chronic steroid use for SLE.    Encounter for preventive health examination 9/2/2024    Gastroesophageal reflux disease 03/23/2020    Hypertension     Lupus 1999    Situational anxiety 08/24/2021    Thyroid nodule greater than or equal to 1 cm in diameter incidentally noted on imaging study 11/19/2020    Vasculitis      Family History   Problem Relation Name Age of Onset    Lung cancer Mother Nichole 59        +hx of smoking    Cancer Father Jack         type uk? stomach?    Breast cancer Other Nimo 58        UL mastect, mets to back & lungs    Breast cancer Other Roz 63        chemo, XRT, UL mastect?    Autism Other Angel Jr     Asthma Other Ирина     Obesity Other Ирина     Liver cancer Other Afshin 58        mets to lungs, pancreas, stomach    Cancer Other Reji         type uk?     Social History     Socioeconomic History    Marital status:    Tobacco Use    Smoking status: Never    Smokeless tobacco: Never   Substance and Sexual Activity    Alcohol use: Never    Drug use: Never    Sexual activity: Yes     Partners: Male     Birth control/protection: See Surgical Hx     Social Determinants of Health     Financial Resource Strain: Low Risk  (8/26/2024)    Overall Financial Resource Strain (CARDIA)     Difficulty of Paying Living Expenses: Not hard at all   Food Insecurity: No Food Insecurity  (8/26/2024)    Hunger Vital Sign     Worried About Running Out of Food in the Last Year: Never true     Ran Out of Food in the Last Year: Never true   Transportation Needs: No Transportation Needs (8/26/2024)    PRAPARE - Transportation     Lack of Transportation (Medical): No     Lack of Transportation (Non-Medical): No   Physical Activity: Insufficiently Active (8/26/2024)    Exercise Vital Sign     Days of Exercise per Week: 1 day     Minutes of Exercise per Session: 30 min   Stress: No Stress Concern Present (8/26/2024)    [employer] [place] [employer] - Occupational Stress Questionnaire     Feeling of Stress : Not at all   Housing Stability: Low Risk  (8/26/2024)    Housing Stability Vital Sign     Unable to Pay for Housing in the Last Year: No     Homeless in the Last Year: No     Past Surgical History:   Procedure Laterality Date    ABLATION      COLONOSCOPY N/A 6/24/2020    Procedure: COLONOSCOPY;  Surgeon: Nevin Penny MD;  Location: [facility];  Service: Endoscopy;  Laterality: N/A;    COLONOSCOPY N/A 3/28/2024    Procedure: COLONOSCOPY;  Surgeon: José Manuel Huerta MD;  Location: [facility];  Service: Endoscopy;  Laterality: N/A;    ESOPHAGOGASTRODUODENOSCOPY N/A 6/24/2020    Procedure: ESOPHAGOGASTRODUODENOSCOPY (EGD);  Surgeon: Nevin Penyn MD;  Location: [facility];  Service: Endoscopy;  Laterality: N/A;    RIGHT HEART CATHETERIZATION      TUBAL LIGATION      WRIST SURGERY Bilateral        Labs:  Lab Results   Component Value Date    WBC 6.68 08/28/2024    HGB 9.1 (L) 08/28/2024    HCT 28.5 (L) 08/28/2024    MCV 88 08/28/2024     08/28/2024     BMP  Lab Results   Component Value Date     08/28/2024    K 4.2 08/28/2024     (H) 08/28/2024    CO2 24 08/28/2024    BUN 11 08/28/2024    CREATININE 0.8 08/28/2024    CALCIUM 9.4 08/28/2024    ANIONGAP 8 08/28/2024    ESTGFRAFRICA >60.0 03/29/2022    EGFRNONAA >60.0 03/29/2022     Lab Results   Component Value Date    ALT 10  08/28/2024    AST 16 08/28/2024    ALKPHOS 79 08/28/2024    BILITOT 0.4 08/28/2024       Lab Results   Component Value Date    IRON 63 08/28/2024    TIBC 265 08/28/2024    FERRITIN 522 (H) 08/28/2024     Lab Results   Component Value Date    NWMGIGLO07 338 08/28/2024     Lab Results   Component Value Date    FOLATE 6.8 01/02/2024     Lab Results   Component Value Date    TSH 0.468 01/02/2024         Review of Systems   Constitutional:  Positive for fatigue. Negative for activity change, appetite change, chills, diaphoresis, fever and unexpected weight change.   HENT:  Negative for congestion, dental problem, drooling, ear discharge, ear pain, facial swelling, hearing loss, mouth sores, nosebleeds, postnasal drip, rhinorrhea, sinus pressure, sneezing, sore throat, tinnitus, trouble swallowing and voice change.    Eyes:  Negative for photophobia, pain, discharge, redness, itching and visual disturbance.   Respiratory:  Negative for cough, choking, chest tightness, shortness of breath, wheezing and stridor.    Cardiovascular:  Negative for chest pain, palpitations and leg swelling.   Gastrointestinal:  Negative for abdominal distention, abdominal pain, anal bleeding, blood in stool, constipation, diarrhea, nausea, rectal pain and vomiting.   Endocrine: Negative for cold intolerance, heat intolerance, polydipsia, polyphagia and polyuria.   Genitourinary:  Negative for decreased urine volume, difficulty urinating, dyspareunia, dysuria, enuresis, flank pain, frequency, genital sores, hematuria, menstrual problem, pelvic pain, urgency, vaginal bleeding, vaginal discharge and vaginal pain.   Musculoskeletal:  Positive for arthralgias, gait problem and myalgias. Negative for back pain, joint swelling, neck pain and neck stiffness.   Skin:  Negative for color change, pallor and rash.   Allergic/Immunologic: Negative for environmental allergies, food allergies and immunocompromised state.   Neurological:  Positive for  weakness. Negative for dizziness, tremors, seizures, syncope, facial asymmetry, speech difficulty, light-headedness, numbness and headaches.   Hematological:  Negative for adenopathy. Does not bruise/bleed easily.   Psychiatric/Behavioral:  Negative for agitation, behavioral problems, confusion, decreased concentration, dysphoric mood, hallucinations, self-injury, sleep disturbance and suicidal ideas. The patient is not nervous/anxious and is not hyperactive.        Objective:      Physical Exam  Constitutional:       Appearance: Normal appearance. She is obese.             Assessment:      1. MGUS (monoclonal gammopathy of unknown significance)    2. Other forms of systemic lupus erythematosus, unspecified organ involvement status    3. Immunocompromised state due to drug therapy    4. Avascular necrosis of bones of both hips           Med Onc Chart Routing      Follow up with physician . Return to clinic in approximately 6-8 weeks with CT-directed bone marrow   Follow up with [facility]    Infusion scheduling note    Injection scheduling note    Labs    Imaging    Pharmacy appointment    Other referrals                   Plan:     The patient location is:  Home  The chief complaint leading to consultation is:  Anemia    Visit type: audiovisual    Face to Face time with patient: 25 minutes of total time spent on the encounter, which includes face to face time and non-face to face time preparing to see the patient (eg, review of tests), Obtaining and/or reviewing separately obtained history, Documenting clinical information in the electronic or other health record, Independently interpreting results (not separately reported) and communicating results to the patient/family/caregiver, or Care coordination (not separately reported).         Each patient to whom he or she provides medical services by telemedicine is:  (1) informed of the relationship between the physician and patient and the respective role of any other health  care provider with respect to management of the patient; and (2) notified that he or she may decline to receive medical services by telemedicine and may withdraw from such care at any time.    Notes:   Reviewed information with patient.  At this point IgA monoclonal gammopathy of undetermined significance.  I do recommend a repeat bone marrow compared to 18 months ago.  FROM MY STANDPOINT THE PATIENT IS CLEARED FOR SURGERY FOR KNEE REPLACEMENT THIS CAN ALL BE DONE AFTERWARDS HER HEMOGLOBIN LEVEL IS RELATIVELY STABLE.  IF HE DOES NEED BLOOD TRANSFUSIONS WOULD DO SO WOULD LIKE TO TRY TO USE IRRADIATED BLOOD PRODUCTS POSSIBLE FOR POTENTIAL TREATMENTS GOING FOLLOWED WITH HER MONOCLONAL GAMMOPATHY.  Return to clinic to see me after 6 weeks for repeat bone marrow biopsy if patient is medically stable nor later if needed      Dale Powell Jr, MD FACP     #COPD #homeOX  - h/o of COPD on home oxygen 3.5L  - CT chest with emphysema and atelectasis + pulm nodules and right paratracheal lymph nodes   - s/p albuterol PRN with NC, improved oxygen to 99%  - chest PT, incentive spirometry   - abx zosyn and azithro  - wean off oxygen as tolerated

## 2024-09-04 ENCOUNTER — OFFICE VISIT (OUTPATIENT)
Dept: PRIMARY CARE CLINIC | Facility: CLINIC | Age: 49
End: 2024-09-04
Payer: MEDICARE

## 2024-09-04 ENCOUNTER — TELEPHONE (OUTPATIENT)
Dept: HEMATOLOGY/ONCOLOGY | Facility: CLINIC | Age: 49
End: 2024-09-04
Payer: MEDICARE

## 2024-09-04 ENCOUNTER — TELEPHONE (OUTPATIENT)
Dept: PRIMARY CARE CLINIC | Facility: CLINIC | Age: 49
End: 2024-09-04
Payer: MEDICARE

## 2024-09-04 DIAGNOSIS — D47.2 MGUS (MONOCLONAL GAMMOPATHY OF UNKNOWN SIGNIFICANCE): Chronic | ICD-10-CM

## 2024-09-04 DIAGNOSIS — I15.2 HYPERTENSION ASSOCIATED WITH DIABETES: Primary | Chronic | ICD-10-CM

## 2024-09-04 DIAGNOSIS — D64.9 ANEMIA, UNSPECIFIED TYPE: Chronic | ICD-10-CM

## 2024-09-04 DIAGNOSIS — E11.59 HYPERTENSION ASSOCIATED WITH DIABETES: Primary | Chronic | ICD-10-CM

## 2024-09-04 DIAGNOSIS — G89.29 CHRONIC PAIN OF RIGHT KNEE: Chronic | ICD-10-CM

## 2024-09-04 DIAGNOSIS — M25.561 CHRONIC PAIN OF RIGHT KNEE: Chronic | ICD-10-CM

## 2024-09-04 DIAGNOSIS — E11.3212 TYPE 2 DIABETES MELLITUS WITH LEFT EYE AFFECTED BY MILD NONPROLIFERATIVE RETINOPATHY AND MACULAR EDEMA, WITHOUT LONG-TERM CURRENT USE OF INSULIN: ICD-10-CM

## 2024-09-04 PROCEDURE — 1160F RVW MEDS BY RX/DR IN RCRD: CPT | Mod: HCNC,CPTII,95, | Performed by: INTERNAL MEDICINE

## 2024-09-04 PROCEDURE — 99213 OFFICE O/P EST LOW 20 MIN: CPT | Mod: HCNC,95,, | Performed by: INTERNAL MEDICINE

## 2024-09-04 PROCEDURE — 3066F NEPHROPATHY DOC TX: CPT | Mod: HCNC,CPTII,95, | Performed by: INTERNAL MEDICINE

## 2024-09-04 PROCEDURE — 1159F MED LIST DOCD IN RCRD: CPT | Mod: HCNC,CPTII,95, | Performed by: INTERNAL MEDICINE

## 2024-09-04 PROCEDURE — 3044F HG A1C LEVEL LT 7.0%: CPT | Mod: HCNC,CPTII,95, | Performed by: INTERNAL MEDICINE

## 2024-09-04 PROCEDURE — 3061F NEG MICROALBUMINURIA REV: CPT | Mod: HCNC,CPTII,95, | Performed by: INTERNAL MEDICINE

## 2024-09-04 NOTE — TELEPHONE ENCOUNTER
Faxed requested medical record to Meryl at the  [facility]. :requested clinic notes and recent lab work.  Fax to : [phone]

## 2024-09-04 NOTE — TELEPHONE ENCOUNTER
Preoperative assessment sent via fax at [phone] as per message below.   ----- Message from Dulce Garza sent at 9/4/2024 11:09 AM CDT -----  Contact: Gabby with [facility] phone [phone] fax [phone]  Calling to get copies of the pre op clearance. Please send. Thanks.

## 2024-09-04 NOTE — TELEPHONE ENCOUNTER
----- Message from Yamilka Catarina Temple sent at 9/4/2024 11:44 AM CDT -----  Regarding: [facility]inc Notes  Contact: [phone]   Gabby calling from the  [facility] is requesting clinic notes and recent lab work. Please fax to : [phone] ..Thanks

## 2024-09-04 NOTE — PROGRESS NOTES
Marilou Daniel  09/04/2024  [id number]    Alissa Bautista MD  Patient Care Team:  Alissa Bautista MD as PCP - General (Internal Medicine)  Joe Yo RD as Dietitian (Endocrinology)  Liza Moon RD, CDE as Dietitian (Diabetes)  Travis Milligan OD (Ophthalmology)  Adeline Marrero as Digital Medicine Health   Radha Watson, PharmD as Hypertension Digital Medicine Clinician (Pharmacist)  Alissa Bautista MD as Hypertension Digital Medicine Responsible Provider (Internal Medicine)  [employer] Man[employer] as Hypertension Digital Medicine Contract      The patient location is:  Patient Home   The chief complaint leading to consultation is: f/u on diabetes/f/u on pre-op/anemia    Visit Type: Virtual visit with synchronous audio and video  Each patient to whom he or she provides medical services by telemedicine is:  (1) informed of the relationship between the physician and patient and the respective role of any other health care provider with respect to management of the patient; and (2) notified that he or she may decline to receive medical services by telemedicine and may withdraw from such care at any time.    Ms. Marilou Daniel is a 48 year old female presents     Medical issues include HTN, anemia, MGUS, SLE, leukocytoclastic vasculitis, chronic pain, AVN B hips, DM2, anxiety, hearing loss    History of Present Illness    CHIEF COMPLAINT:  Ms. Daniel presents today to f/u on preoperative evaluation and pre-op medication management.    UPCOMING KNEE SURGERY:  She is scheduled for outpatient knee surgery next week. It will be a same-day procedure without hospital admission. Next O65+ visit is scheduled for the end of October, based on the expected recovery period.    PREOPERATIVE EVALUATION:  She reports completing an EKG and labs during her preoperative visit with Nurse Cristal Owusu on August 15th, but that her surgeon's office has not yet  received these results. She denies recent cardiologist visits, mentioning cardiac clearance and a normal EKG from about six months ago for a previous surgery in April. She was seen by Heme/Onc Dr. Powell September 3rd. Dr. Powell notes her anemia is stable and should not be a barrier to proceeding with surgery but if her surgeon finds transfusion warranted, recommends TO TRY TO USE IRRADIATED BLOOD PRODUCTS IF POSSIBLE.    MEDICATION MANAGEMENT:  She has stopped CellCept 10 days before the scheduled procedure as instructed by her rheumatologist. Her last dose of Trulicity was on Thursday. Plaquenil, aspirin, and prednisone have also been discontinued as per surgical instructions. She reports no longer using insulin, having transitioned off after starting Trulicity.    CARDIOVASCULAR:  She reports well-controlled blood pressure with digital medicine for approximately two years. Recent readings range from 125/73 to 141/82.    DIABETES MANAGEMENT:  She uses a Dexcom for continuous glucose monitoring. Blood sugar levels have been consistently stable, ranging from 113 to 150 mg/dL, including post-prandial measurements. Trulicity initiation has led to the discontinuation of insulin therapy.          From LOV w me 7/14/23  Reports Home BP's have been elevated - in process of onboarding w Digital HTN program  Taking clonidine 0.1 one or two tablets 4x daily due to SBP consistently over 150 DBP over 90     Has been able to wean off of daily oral steriod  No longer getting steriod injections given development of B hip AVN     Chronic pain: Neck back hips (L>R) L knee  Plan for possible B hip replacements - starting w L  Participating in aqua therapy to strengthen L knee     Under a lot of stress - family, chronic pain, chronic illness   Long standing insomnia - takes ambien and pain medicine to sleep  Has been losing weight gradually - though wt today actually higher than earlier this year  No tremor, no diarrhea -  constipation more of an issue w chronic opiates     Saw her Rheumatologist Dr. Wayne this morning - extensive labwork ordered - plans to have drawn later today    Upcoming appointments: 9/11/2024 Damon Dale MD right total knee arthroplasty   Future Appointments       Date Provider Specialty Appt Notes    10/30/2024 Alissa Bautista MD Primary Care f/u    12/26/2024  Lab lab    12/30/2024 Dale Powell MD Hematology and Oncology 4 mo prior lab           The following were reviewed: Active problem list, medication list, allergies, family history, social history, and Health Maintenance.     History:  Review of patient's allergies indicates:   Allergen Reactions    Azathioprine Nausea And Vomiting     Nausea, vomiting, diarrhea dose and early in the course of therapy    Olmesartan Shortness Of Breath    Feraheme [ferumoxytol] Itching     Shortness of breath, rash    Oxycodone Itching     Other reaction(s): Unknown     Medications:  Current Outpatient Medications on File Prior to Visit   Medication Sig Dispense Refill    atorvastatin (LIPITOR) 10 MG tablet       betamethasone dipropionate 0.05 % cream APPLY A SMALL AMOUNT TO AFFECTED AREA TOPICALLY TWICE A DAY FOR 2 TO 3 WEEKS AT A TIME AS NEEDED FLARE UPS AVOID FACE & GROIN AREA      blood sugar diagnostic (TRUE METRIX GLUCOSE TEST STRIP) Strp Test 4 times daily. 100 strip 0    blood-glucose meter (TRUE METRIX GLUCOSE METER) Misc Use as directed 1 each 0    blood-glucose sensor (DEXCOM G7 SENSOR) Myrtle 1 each by Misc.(Non-Drug; Combo Route) route every 10 days. 3 each 11    blood-glucose transmitter (DEXCOM G5 TRANSMITTER) Myrtle 1 each by Misc.(Non-Drug; Combo Route) route every 3 (three) months. 1 each 3    cloNIDine (CATAPRES) 0.1 MG tablet Take 2 tablets (0.2 mg total) by mouth 3 (three) times daily. Take 2 (0.1 mg) tablets three times a day 180 tablet 0    dapsone 100 MG Tab Take 100 mg by mouth once daily at 6am.      doxazosin (CARDURA) 2 MG tablet  Take 4 mg by mouth every 12 (twelve) hours. Take 2 (2mg) tablets two times a day      dulaglutide (TRULICITY) 1.5 mg/0.5 mL pen injector Inject 1.5 mg into the skin every 7 days. 4 pen 11    empagliflozin (JARDIANCE) 10 mg tablet Take 1 tablet (10 mg total) by mouth once daily. 30 tablet 11    ergocalciferol (ERGOCALCIFEROL) 50,000 unit Cap Take 50,000 Units by mouth.      famotidine (PEPCID) 40 MG tablet TAKE ONE TABLET BY MOUTH ONCE A DAY 30 tablet 2    gabapentin (NEURONTIN) 800 MG tablet Neurontin 800 mg tablet   Take 1 tablet 3 times a day by oral route.      HYDROcodone-acetaminophen (NORCO)  mg per tablet Norco 10 mg-325 mg tablet   Take 1 tablet 3 times a day by oral route as needed.      hydrocortisone 2.5 % ointment APPLY TOPICALLY TO FACE TWICE A DAY AS NEEDED FOR 1 TO 2 WEEKS AT A TIME      hydroxychloroquine (PLAQUENIL) 200 mg tablet Take 200 mg by mouth 2 (two) times daily.       hydrOXYzine HCL (ATARAX) 25 MG tablet TAKE ONE TABLET BY MOUTH EVERY 12 HOURS AS NEEDED FOR ITCHING 30 tablet 5    labetaloL (NORMODYNE) 300 MG tablet Take 1 tablet (300 mg total) by mouth 2 (two) times daily. 60 tablet 11    LORazepam (ATIVAN) 0.5 MG tablet Take 1 tablet (0.5 mg total) by mouth every 12 (twelve) hours as needed for Anxiety. 30 tablet 2    mycophenolate (CELLCEPT) 250 mg Cap 3,000 mg once daily.      NIFEdipine (PROCARDIA-XL) 30 MG (OSM) 24 hr tablet Take 1 tablet (30 mg total) by mouth every 12 (twelve) hours. 60 tablet 11    ondansetron (ZOFRAN-ODT) 4 MG TbDL DISSOLVE 1 TABLET BY MOUTH EVERY 8 HOURS AS NEEDED 30 tablet 1    pantoprazole (PROTONIX) 40 MG tablet Take 1 tablet (40 mg total) by mouth 2 (two) times daily for 90 days, THEN 1 tablet (40 mg total) once daily. 360 tablet 0    tiZANidine (ZANAFLEX) 4 MG tablet TK 1 T PO TID PRN      TRUEPLUS LANCETS 33 gauge Misc Inject 1 lancet as directed 4 (four) times daily. 100 each 11    zolpidem (AMBIEN) 10 mg Tab TAKE 1/2 to 1 TABLET BY MOUTH AT BEDTIME  AS NEEDED FOR INSOMNIA 30 tablet 0    [DISCONTINUED] amLODIPine (NORVASC) 10 MG tablet Take 1 tablet (10 mg total) by mouth once daily. 90 tablet 3     No current facility-administered medications on file prior to visit.       Medications have been reviewed and reconciled with patient at visit today.    Exam: VS 09/03/24 /84 P 81    BP Readings from Last 3 Encounters:   08/28/24 (!) 154/81   08/26/24 (!) 122/58   08/15/24 138/70      Wt Readings from Last 3 Encounters:   08/28/24 0940 92.6 kg (204 lb 2.3 oz)   08/26/24 1010 91.5 kg (201 lb 11.5 oz)   08/15/24 1445 90.8 kg (200 lb 2.8 oz)      Physical Exam  Vitals reviewed.   Constitutional:       General: She is not in acute distress.     Appearance: Normal appearance. She is not ill-appearing.   HENT:      Head: Normocephalic and atraumatic.      Right Ear: External ear normal.      Left Ear: External ear normal.      Nose: Nose normal.   Eyes:      Extraocular Movements: Extraocular movements intact.      Conjunctiva/sclera: Conjunctivae normal.      Comments: glasses   Cardiovascular:      Rate and Rhythm: Normal rate.   Pulmonary:      Effort: Pulmonary effort is normal. No respiratory distress.   Skin:     General: Skin is warm and dry.   Neurological:      Mental Status: She is alert and oriented to person, place, and time. Mental status is at baseline.   Psychiatric:         Mood and Affect: Mood normal.         Behavior: Behavior normal.         Thought Content: Thought content normal.        Laboratory Reviewed  Lab Results   Component Value Date    WBC 6.68 08/28/2024    HGB 9.1 (L) 08/28/2024    HCT 28.5 (L) 08/28/2024     08/28/2024    MCV 88 08/28/2024    CHOL 150 08/28/2024    TRIG 40 08/28/2024    HDL 46 08/28/2024    LDLCALC 96.0 08/28/2024    ALT 10 08/28/2024    AST 16 08/28/2024     08/28/2024    K 4.2 08/28/2024     (H) 08/28/2024    CREATININE 0.8 08/28/2024    BUN 11 08/28/2024    CO2 24 08/28/2024    TSH 0.468  "01/02/2024    FREET4 1.03 01/02/2024    INR 1.0 08/16/2024    HGBA1C 4.6 08/28/2024    CRP 4.7 11/29/2022     Lab Results   Component Value Date    CALCIUM 9.4 08/28/2024    PHOS 3.6 07/14/2023      Lab Results   Component Value Date    SYYWTUXV77 338 08/28/2024     Lab Results   Component Value Date    FOLATE 6.8 01/02/2024      Lab Results   Component Value Date    IRON 63 08/28/2024    TRANSFERRIN 179 (L) 08/28/2024    TIBC 265 08/28/2024    FESATURATED 24 08/28/2024      Lab Results   Component Value Date    EGFRNORACEVR >60 08/28/2024    ALBUMIN 3.8 08/28/2024    BNP 53 04/30/2021   No results found for: "EYFTSCSN67UX"       Assessment:   48 y.o. female with multiple co-morbid illnesses here for continued follow up of medical problems.      The primary encounter diagnosis was Hypertension associated with diabetes. 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      Plan:   1. Hypertension associated with diabetes  Assessment & Plan:  BP improved w current Rx - cont monitor closely      2. MGUS (monoclonal gammopathy of unknown significance)  Assessment & Plan:  Followed by Heme/Onc       3. Anemia, unspecified type  Assessment & Plan:  Followed by Heme/Onc - stable - cont monitor closely      4. 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 Chronic pain of right knee  Assessment & Plan:  Scheduled for right total knee arthroplasty 9/11/2024 Damon Dale MD   Pre-op assessment completed O65+ VIVIANE Owusu August 15 - please ensure copy " has been received by office of Dr. Dale           Health Maintenance         Date Due Completion Date    Foot Exam Never done ---    TETANUS VACCINE Never done ---    Shingles Vaccine (1 of 2) Never done ---    Influenza Vaccine (1) Never done ---    COVID-19 Vaccine (5 - 2023-24 season) 09/01/2024 3/16/2024    Mammogram 01/05/2025 1/5/2024    Hemoglobin A1c 02/28/2025 8/28/2024    Eye Exam 03/20/2025 3/20/2024    High Dose Statin 08/26/2025 8/26/2024    Diabetes Urine Screening 08/28/2025 8/28/2024    Lipid Panel 08/28/2025 8/28/2024    Cervical Cancer Screening 09/29/2026 9/29/2021    Colorectal Cancer Screening 03/28/2029 3/28/2024            -Patient's lab results were reviewed and discussed with patient  -Treatment options and alternatives were discussed with the patient. Patient expressed understanding. Patient was given the opportunity to ask questions and be an active participant in their medical care. Patient had no further questions or concerns at this time.     Follow up: Follow up in about 8 weeks (around 10/30/2024) for Follow Up as scheduled.    Care Plan/Goals: Reviewed No   Goals        Blood Pressure < 130/80      Exercise at least 150 minutes per week.      Take at least one BP reading per week at various times of the day           Patient goals and care plan are included in After visit summary.    TOTAL TIME evaluating and managing this patient for this encounter was  25 minutes. This time was spent personally by me on some of the following activities: review of patient's past medical history, assessing age-appropriate health maintenance needs, review of any interval history, review and interpretation of lab results, review and interpretation of imaging test results, review and interpretation of cardiology test results, reviewing consulting specialist notes, obtaining history from the patient and family, examination of the patient, medication reconciliation, managing and/or ordering  prescription medications, ordering imaging tests, ordering referral to subspecialty provider(s), educating patient and answering their questions about diagnosis, treatment plan, and goals of treatment, discussing planned follow-up and final documentation of the visit. This time was exclusive of any separately billable procedures for this patient and exclusive of time spent treating any other patients.     This note was generated with the assistance of ambient listening technology. Verbal consent was obtained by the patient and accompanying visitor(s) for the recording of patient appointment to facilitate this note. I attest to having reviewed and edited the generated note for accuracy, though some syntax or spelling errors may persist. Please contact the author of this note for any clarification.

## 2024-09-05 ENCOUNTER — TELEPHONE (OUTPATIENT)
Dept: PRIMARY CARE CLINIC | Facility: CLINIC | Age: 49
End: 2024-09-05
Payer: MEDICARE

## 2024-09-05 PROBLEM — E11.9 TYPE 2 DIABETES MELLITUS WITHOUT COMPLICATION, WITH LONG-TERM CURRENT USE OF INSULIN: Status: RESOLVED | Noted: 2019-09-10 | Resolved: 2024-09-05

## 2024-09-05 PROBLEM — Z79.4 DIABETES MELLITUS DUE TO UNDERLYING CONDITION WITH COMPLICATION, WITH LONG-TERM CURRENT USE OF INSULIN: Status: RESOLVED | Noted: 2022-03-29 | Resolved: 2024-09-05

## 2024-09-05 PROBLEM — M25.561 KNEE PAIN, RIGHT: Chronic | Status: ACTIVE | Noted: 2024-08-27

## 2024-09-05 PROBLEM — D47.2 MGUS (MONOCLONAL GAMMOPATHY OF UNKNOWN SIGNIFICANCE): Chronic | Status: ACTIVE | Noted: 2024-03-19

## 2024-09-05 PROBLEM — Z00.00 ENCOUNTER FOR PREVENTIVE HEALTH EXAMINATION: Status: RESOLVED | Noted: 2024-09-02 | Resolved: 2024-09-05

## 2024-09-05 PROBLEM — E08.8 DIABETES MELLITUS DUE TO UNDERLYING CONDITION WITH COMPLICATION, WITH LONG-TERM CURRENT USE OF INSULIN: Status: RESOLVED | Noted: 2022-03-29 | Resolved: 2024-09-05

## 2024-09-05 PROBLEM — Z79.4 TYPE 2 DIABETES MELLITUS WITHOUT COMPLICATION, WITH LONG-TERM CURRENT USE OF INSULIN: Status: RESOLVED | Noted: 2019-09-10 | Resolved: 2024-09-05

## 2024-09-05 NOTE — TELEPHONE ENCOUNTER
Returned call to pt re: message below, preoperative documents needed; EKG faxed as requested to [phone],      ----- Message from Ashley Reji sent at 9/5/2024 12:23 PM CDT -----  Pt called, stating that she needs her recent EKG results [facility][Fax:[phone]] for her upcoming surgery. Pt also asked if she could receive a call at [phone].Please advise.

## 2024-09-06 ENCOUNTER — TELEPHONE (OUTPATIENT)
Dept: RADIOLOGY | Facility: HOSPITAL | Age: 49
End: 2024-09-06
Payer: MEDICARE

## 2024-09-06 NOTE — ASSESSMENT & PLAN NOTE
Scheduled for right total knee arthroplasty 9/11/2024 Damon Dale MD   Pre-op assessment completed O65+ NP Umer August 15 - please ensure copy has been received by office of Dr. Dale

## 2024-09-06 NOTE — TELEPHONE ENCOUNTER
Interventional Radiology  Called and LVM for pt. to return my call to [phone].  I called to schedule a bone marrow biopsy.

## 2024-09-16 ENCOUNTER — TELEPHONE (OUTPATIENT)
Dept: RADIOLOGY | Facility: HOSPITAL | Age: 49
End: 2024-09-16
Payer: MEDICARE

## 2024-09-16 NOTE — TELEPHONE ENCOUNTER
Interventional Radiology:    LVM on pt's phone to call back to schedule proceudre.     Talked to pt when I called pt's 's phone number. Pt states that she is having a TKA on 9/18 and would like for us to call her back about mid October to schedule procedure.     Put a note on scheduling sheet to call pt back in October.

## 2024-09-25 ENCOUNTER — PATIENT MESSAGE (OUTPATIENT)
Dept: INFUSION THERAPY | Facility: HOSPITAL | Age: 49
End: 2024-09-25
Payer: MEDICARE

## 2024-10-18 ENCOUNTER — TELEPHONE (OUTPATIENT)
Dept: RADIOLOGY | Facility: HOSPITAL | Age: 49
End: 2024-10-18
Payer: MEDICARE

## 2024-10-18 NOTE — TELEPHONE ENCOUNTER
Interventional Radiology  Called and LVM for pt. to return my call to [phone].  I called to schedule a bone marrow biopsy.

## 2024-10-23 ENCOUNTER — TELEPHONE (OUTPATIENT)
Dept: PRIMARY CARE CLINIC | Facility: CLINIC | Age: 49
End: 2024-10-23
Payer: MEDICARE

## 2024-10-23 DIAGNOSIS — Z79.4 DIABETES MELLITUS DUE TO UNDERLYING CONDITION WITH COMPLICATION, WITH LONG-TERM CURRENT USE OF INSULIN: ICD-10-CM

## 2024-10-23 DIAGNOSIS — E08.8 DIABETES MELLITUS DUE TO UNDERLYING CONDITION WITH COMPLICATION, WITH LONG-TERM CURRENT USE OF INSULIN: ICD-10-CM

## 2024-10-23 DIAGNOSIS — K21.9 GASTROESOPHAGEAL REFLUX DISEASE, UNSPECIFIED WHETHER ESOPHAGITIS PRESENT: ICD-10-CM

## 2024-10-23 DIAGNOSIS — I10 ESSENTIAL HYPERTENSION: ICD-10-CM

## 2024-10-23 DIAGNOSIS — E11.3212 TYPE 2 DIABETES MELLITUS WITH LEFT EYE AFFECTED BY MILD NONPROLIFERATIVE RETINOPATHY AND MACULAR EDEMA, WITHOUT LONG-TERM CURRENT USE OF INSULIN: Primary | ICD-10-CM

## 2024-10-23 DIAGNOSIS — K21.9 GASTROESOPHAGEAL REFLUX DISEASE WITHOUT ESOPHAGITIS: ICD-10-CM

## 2024-10-23 DIAGNOSIS — I15.2 HYPERTENSION ASSOCIATED WITH DIABETES: Chronic | ICD-10-CM

## 2024-10-23 DIAGNOSIS — E11.9 TYPE 2 DIABETES MELLITUS WITHOUT COMPLICATION, WITH LONG-TERM CURRENT USE OF INSULIN: ICD-10-CM

## 2024-10-23 DIAGNOSIS — Z79.4 TYPE 2 DIABETES MELLITUS WITHOUT COMPLICATION, WITH LONG-TERM CURRENT USE OF INSULIN: ICD-10-CM

## 2024-10-23 DIAGNOSIS — E11.59 HYPERTENSION ASSOCIATED WITH DIABETES: Chronic | ICD-10-CM

## 2024-10-23 RX ORDER — DULAGLUTIDE 1.5 MG/.5ML
1.5 INJECTION, SOLUTION SUBCUTANEOUS
Qty: 4 PEN | Refills: 11 | Status: SHIPPED | OUTPATIENT
Start: 2024-10-23 | End: 2025-10-23

## 2024-10-23 RX ORDER — PANTOPRAZOLE SODIUM 40 MG/1
TABLET, DELAYED RELEASE ORAL
Qty: 360 TABLET | Refills: 0 | Status: CANCELLED | OUTPATIENT
Start: 2024-10-23 | End: 2025-07-19

## 2024-10-23 RX ORDER — PANTOPRAZOLE SODIUM 40 MG/1
TABLET, DELAYED RELEASE ORAL
Qty: 360 TABLET | Refills: 1 | Status: SHIPPED | OUTPATIENT
Start: 2024-10-23 | End: 2025-07-20

## 2024-10-26 DIAGNOSIS — E78.5 HYPERLIPIDEMIA ASSOCIATED WITH TYPE 2 DIABETES MELLITUS: ICD-10-CM

## 2024-10-26 DIAGNOSIS — E11.69 HYPERLIPIDEMIA ASSOCIATED WITH TYPE 2 DIABETES MELLITUS: ICD-10-CM

## 2024-10-26 DIAGNOSIS — E53.8 B12 DEFICIENCY: Primary | ICD-10-CM

## 2024-10-26 DIAGNOSIS — E55.9 VITAMIN D DEFICIENCY: ICD-10-CM

## 2024-10-26 RX ORDER — LABETALOL 300 MG/1
300 TABLET, FILM COATED ORAL 2 TIMES DAILY
Qty: 60 TABLET | Refills: 11 | Status: SHIPPED | OUTPATIENT
Start: 2024-10-26 | End: 2024-10-30 | Stop reason: SDUPTHER

## 2024-10-26 RX ORDER — ERGOCALCIFEROL 1.25 MG/1
50000 CAPSULE ORAL
Qty: 4 CAPSULE | Refills: 0 | Status: SHIPPED | OUTPATIENT
Start: 2024-10-26 | End: 2024-10-30

## 2024-10-26 RX ORDER — BLOOD-GLUCOSE SENSOR
1 EACH MISCELLANEOUS
Qty: 3 EACH | Refills: 11 | Status: SHIPPED | OUTPATIENT
Start: 2024-10-26 | End: 2025-10-26

## 2024-10-26 RX ORDER — ATORVASTATIN CALCIUM 10 MG/1
10 TABLET, FILM COATED ORAL NIGHTLY
Qty: 30 TABLET | Refills: 0 | Status: SHIPPED | OUTPATIENT
Start: 2024-10-26 | End: 2024-10-30

## 2024-10-26 RX ORDER — FAMOTIDINE 40 MG/1
40 TABLET, FILM COATED ORAL DAILY PRN
Qty: 30 TABLET | Refills: 2 | Status: SHIPPED | OUTPATIENT
Start: 2024-10-26

## 2024-10-26 RX ORDER — CLONIDINE HYDROCHLORIDE 0.1 MG/1
0.2 TABLET ORAL 3 TIMES DAILY
Qty: 180 TABLET | Refills: 0 | Status: SHIPPED | OUTPATIENT
Start: 2024-10-26 | End: 2024-11-25

## 2024-10-28 ENCOUNTER — TELEPHONE (OUTPATIENT)
Dept: PRIMARY CARE CLINIC | Facility: CLINIC | Age: 49
End: 2024-10-28
Payer: MEDICARE

## 2024-10-28 DIAGNOSIS — F19.982 DRUG-INDUCED INSOMNIA: ICD-10-CM

## 2024-10-28 RX ORDER — ZOLPIDEM TARTRATE 10 MG/1
TABLET ORAL
Qty: 30 TABLET | Refills: 0 | OUTPATIENT
Start: 2024-10-28

## 2024-10-28 RX ORDER — ZOLPIDEM TARTRATE 10 MG/1
TABLET ORAL
Qty: 30 TABLET | Refills: 0 | Status: SHIPPED | OUTPATIENT
Start: 2024-10-28

## 2024-10-29 ENCOUNTER — LAB VISIT (OUTPATIENT)
Dept: LAB | Facility: HOSPITAL | Age: 49
End: 2024-10-29
Attending: INTERNAL MEDICINE
Payer: MEDICARE

## 2024-10-29 DIAGNOSIS — E11.69 HYPERLIPIDEMIA ASSOCIATED WITH TYPE 2 DIABETES MELLITUS: ICD-10-CM

## 2024-10-29 DIAGNOSIS — E78.5 HYPERLIPIDEMIA ASSOCIATED WITH TYPE 2 DIABETES MELLITUS: ICD-10-CM

## 2024-10-29 DIAGNOSIS — E53.8 B12 DEFICIENCY: ICD-10-CM

## 2024-10-29 DIAGNOSIS — E55.9 VITAMIN D DEFICIENCY: ICD-10-CM

## 2024-10-29 LAB
25(OH)D3+25(OH)D2 SERPL-MCNC: 30 NG/ML (ref 30–96)
CHOLEST SERPL-MCNC: 140 MG/DL (ref 120–199)
CHOLEST/HDLC SERPL: 3.9 {RATIO} (ref 2–5)
ESTIMATED AVG GLUCOSE: 103 MG/DL (ref 68–131)
HBA1C MFR BLD: 5.2 % (ref 4–5.6)
HDLC SERPL-MCNC: 36 MG/DL (ref 40–75)
HDLC SERPL: 25.7 % (ref 20–50)
LDLC SERPL CALC-MCNC: 82.4 MG/DL (ref 63–159)
NONHDLC SERPL-MCNC: 104 MG/DL
TRIGL SERPL-MCNC: 108 MG/DL (ref 30–150)
VIT B12 SERPL-MCNC: 605 PG/ML (ref 210–950)

## 2024-10-29 PROCEDURE — 82607 VITAMIN B-12: CPT | Mod: HCNC | Performed by: INTERNAL MEDICINE

## 2024-10-29 PROCEDURE — 83036 HEMOGLOBIN GLYCOSYLATED A1C: CPT | Mod: HCNC | Performed by: INTERNAL MEDICINE

## 2024-10-29 PROCEDURE — 82306 VITAMIN D 25 HYDROXY: CPT | Mod: HCNC | Performed by: INTERNAL MEDICINE

## 2024-10-29 PROCEDURE — 36415 COLL VENOUS BLD VENIPUNCTURE: CPT | Mod: HCNC,PN | Performed by: INTERNAL MEDICINE

## 2024-10-29 PROCEDURE — 80061 LIPID PANEL: CPT | Mod: HCNC | Performed by: INTERNAL MEDICINE

## 2024-10-30 ENCOUNTER — TELEPHONE (OUTPATIENT)
Dept: PRIMARY CARE CLINIC | Facility: CLINIC | Age: 49
End: 2024-10-30
Payer: MEDICARE

## 2024-10-30 ENCOUNTER — OFFICE VISIT (OUTPATIENT)
Dept: PRIMARY CARE CLINIC | Facility: CLINIC | Age: 49
End: 2024-10-30
Payer: MEDICARE

## 2024-10-30 VITALS — HEART RATE: 88 BPM | SYSTOLIC BLOOD PRESSURE: 148 MMHG | DIASTOLIC BLOOD PRESSURE: 98 MMHG

## 2024-10-30 DIAGNOSIS — I10 ESSENTIAL HYPERTENSION: ICD-10-CM

## 2024-10-30 DIAGNOSIS — F19.982 DRUG-INDUCED INSOMNIA: Primary | ICD-10-CM

## 2024-10-30 DIAGNOSIS — E08.8 DIABETES MELLITUS DUE TO UNDERLYING CONDITION WITH COMPLICATION, WITH LONG-TERM CURRENT USE OF INSULIN: ICD-10-CM

## 2024-10-30 DIAGNOSIS — Z79.4 DIABETES MELLITUS DUE TO UNDERLYING CONDITION WITH COMPLICATION, WITH LONG-TERM CURRENT USE OF INSULIN: ICD-10-CM

## 2024-10-30 DIAGNOSIS — I15.2 HYPERTENSION ASSOCIATED WITH DIABETES: ICD-10-CM

## 2024-10-30 DIAGNOSIS — E11.9 TYPE 2 DIABETES MELLITUS WITHOUT COMPLICATION, WITH LONG-TERM CURRENT USE OF INSULIN: ICD-10-CM

## 2024-10-30 DIAGNOSIS — Z79.4 TYPE 2 DIABETES MELLITUS WITHOUT COMPLICATION, WITH LONG-TERM CURRENT USE OF INSULIN: ICD-10-CM

## 2024-10-30 DIAGNOSIS — E11.59 HYPERTENSION ASSOCIATED WITH DIABETES: ICD-10-CM

## 2024-10-30 DIAGNOSIS — E11.3212 TYPE 2 DIABETES MELLITUS WITH LEFT EYE AFFECTED BY MILD NONPROLIFERATIVE RETINOPATHY AND MACULAR EDEMA, WITHOUT LONG-TERM CURRENT USE OF INSULIN: ICD-10-CM

## 2024-10-30 PROCEDURE — 3077F SYST BP >= 140 MM HG: CPT | Mod: HCNC,CPTII,95, | Performed by: INTERNAL MEDICINE

## 2024-10-30 PROCEDURE — 3061F NEG MICROALBUMINURIA REV: CPT | Mod: HCNC,CPTII,95, | Performed by: INTERNAL MEDICINE

## 2024-10-30 PROCEDURE — 3044F HG A1C LEVEL LT 7.0%: CPT | Mod: HCNC,CPTII,95, | Performed by: INTERNAL MEDICINE

## 2024-10-30 PROCEDURE — 3066F NEPHROPATHY DOC TX: CPT | Mod: HCNC,CPTII,95, | Performed by: INTERNAL MEDICINE

## 2024-10-30 PROCEDURE — 3080F DIAST BP >= 90 MM HG: CPT | Mod: HCNC,CPTII,95, | Performed by: INTERNAL MEDICINE

## 2024-10-30 PROCEDURE — 99215 OFFICE O/P EST HI 40 MIN: CPT | Mod: HCNC,95,, | Performed by: INTERNAL MEDICINE

## 2024-10-30 PROCEDURE — 1159F MED LIST DOCD IN RCRD: CPT | Mod: HCNC,CPTII,95, | Performed by: INTERNAL MEDICINE

## 2024-10-30 RX ORDER — ERGOCALCIFEROL 1.25 MG/1
50000 CAPSULE ORAL
Qty: 12 CAPSULE | Refills: 1 | Status: SHIPPED | OUTPATIENT
Start: 2024-10-30 | End: 2025-04-28

## 2024-10-30 RX ORDER — HYDROXYZINE HYDROCHLORIDE 25 MG/1
25 TABLET, FILM COATED ORAL 2 TIMES DAILY PRN
COMMUNITY

## 2024-10-30 RX ORDER — DULAGLUTIDE 1.5 MG/.5ML
1.5 INJECTION, SOLUTION SUBCUTANEOUS
Qty: 12 PEN | Refills: 1 | Status: SHIPPED | OUTPATIENT
Start: 2024-10-30 | End: 2024-12-04 | Stop reason: SDUPTHER

## 2024-10-30 RX ORDER — SUVOREXANT 10 MG/1
1 TABLET, FILM COATED ORAL NIGHTLY
Qty: 30 TABLET | Refills: 0 | Status: SHIPPED | OUTPATIENT
Start: 2024-10-30 | End: 2024-12-04

## 2024-10-30 RX ORDER — LABETALOL 300 MG/1
300 TABLET, FILM COATED ORAL 2 TIMES DAILY
Qty: 180 TABLET | Refills: 1 | Status: SHIPPED | OUTPATIENT
Start: 2024-10-30 | End: 2025-04-28

## 2024-10-30 RX ORDER — ATORVASTATIN CALCIUM 20 MG/1
20 TABLET, FILM COATED ORAL NIGHTLY
Qty: 90 TABLET | Refills: 1 | Status: SHIPPED | OUTPATIENT
Start: 2024-10-30 | End: 2025-04-28

## 2024-10-30 RX ORDER — NIFEDIPINE 30 MG/1
30 TABLET, EXTENDED RELEASE ORAL EVERY 12 HOURS
Qty: 180 TABLET | Refills: 1 | Status: SHIPPED | OUTPATIENT
Start: 2024-10-30 | End: 2024-12-04

## 2024-10-30 NOTE — PROGRESS NOTES
Marilou Daniel  10/30/2024  [id number]    Alissa Bautista MD  Patient Care Team:  Alissa Bautista MD as PCP - General (Internal Medicine)  Joe Yo RD as Dietitian (Endocrinology)  Liza Moon RD, CDE as Dietitian (Diabetes)  Travis Milligan, STEPHANIE (Ophthalmology)  Radha Watson, PharmD as Hypertension Digital Medicine Clinician (Pharmacist)  Alissa Bautista MD as Hypertension Digital Medicine Responsible Provider (Internal Medicine)  Medicare, [employer] Managed as Hypertension Digital Medicine Contract    The patient location is:  Patient Home   The chief complaint leading to consultation is: reconcile medications/review labwork    Visit Type: Virtual visit with synchronous audio and video  Each patient to whom he or she provides medical services by telemedicine is:  (1) informed of the relationship between the physician and patient and the respective role of any other health care provider with respect to management of the patient; and (2) notified that he or she may decline to receive medical services by telemedicine and may withdraw from such care at any time.    History of Present Illness: Ms. Marilou Daniel is a 48 year old female presents for scheduled f/u.  She was previously followed primarily by NP Diane Dominguez. Our last encounter 9/04/24    Medical issues include HTN, anemia, MGUS, SLE, leukocytoclastic vasculitis, chronic pain, AVN B hips, DM2 insulin dependent, anxiety, hearing loss    History of Present Illness    CHIEF COMPLAINT:  Ms. Daniel presents today for virtual follow-up after knee replacement surgery.    ORTHOPEDIC:  She underwent right knee replacement surgery on September 18th, approximately one month ago. She reports feeling stronger, attending physical therapy twice weekly, and performing exercises at home on non-therapy days. She walks independently within her home but uses a cane outdoors due to uneven terrain. She is currently  unable to drive due to the surgery, limiting her mobility.    MEDICATIONS:  She reports difficulty obtaining medication refills due to communication issues following staff changes. She uses two pharmacies: [facility] for regular medications and [facility] for three-month supplies. She recently picked up refills for all prescribed medications except for Pepcid.     SLEEP DISORDER:  She reports long-standing sleep issues attributed to prolonged steroid use over 24 years. She is currently taking a high dose of Ambien, as numerous other sleep medications were ineffective, leading to periods of sleeplessness lasting up to 3-4 days. Discussed concern about high dose Ambien but she emphasizes its necessity, citing increased cardiac stress when sleep-deprived. She is, however, open to exploring safer alternative sleep medications.    HYPERTENSION:  She reports elevated blood pressure since her surgery, which was previously well-controlled. She is currently taking multiple antihypertensive medications, including doxazosin (Cardura), with a regimen of up to six medications daily. Dr. Clark initially prescribed her blood pressure medications, with additional medications added by another provider due to ongoing control issues.    DIABETES:  She is currently taking Trulicity for diabetes management and using Dexcom for glucose monitoring. She reports reflux as a side effect of Trulicity. Her recent A1C level is 5.2, which she acknowledges as excellent. Discussed decreasing Trulicity.    PAIN MANAGEMENT:  She is currently taking Norco, gabapentin, and tizanidine for pain management under the care of Dr. [facility] at [facility].    LUPUS:  She takes Dapsone, Plaquenil, and CellCept for lupus management, prescribed by Dr. Wayne. She experiences sun sensitivity due to her condition, limiting her ability to enjoy sunlight exposure.    LABS:  Her vitamin D level is 30, borderline, has been out of vitamin D supplements for  a period. Discussed concern about her lipid panel results and high ASCVD risk score, with the implications of being at high risk for heart attack or stroke in the next 10 years.    PHQ-4 Score: 0     External appointments:  10/29/24 Leonard Clark resistant HTN  [facility] cervical/lumbar radiculopathy    Upcoming appointments:  Future Appointments       Date Provider Specialty Appt Notes    12/4/2024 Alissa Bautista MD Primary Care  Arrive at: Telehealth f/u one month    12/26/2024  Lab lab    12/30/2024 Dale Powell MD Hematology and Oncology 4 mo prior lab           The following were reviewed: Active problem list, medication list, allergies, family history, social history, and Health Maintenance.     History:  Review of patient's allergies indicates:   Allergen Reactions    Azathioprine Nausea And Vomiting     Nausea, vomiting, diarrhea dose and early in the course of therapy    Olmesartan Shortness Of Breath    Feraheme [ferumoxytol] Itching     Shortness of breath, rash    Oxycodone Itching     Other reaction(s): Unknown     Medications:  Current Outpatient Medications on File Prior to Visit   Medication Sig Dispense Refill    blood sugar diagnostic (TRUE METRIX GLUCOSE TEST STRIP) Strp Test 4 times daily. 100 strip 0    blood-glucose meter (TRUE METRIX GLUCOSE METER) Misc Use as directed 1 each 0    blood-glucose sensor (DEXCOM G7 SENSOR) Myrtle 1 each by Misc.(Non-Drug; Combo Route) route every 10 days. 3 each 11    blood-glucose transmitter (DEXCOM G5 TRANSMITTER) Myrtle 1 each by Misc.(Non-Drug; Combo Route) route every 3 (three) months. 1 each 3    cloNIDine (CATAPRES) 0.1 MG tablet Take 2 tablets (0.2 mg total) by mouth 3 (three) times daily. 180 tablet 0    dapsone 100 MG Tab Take 100 mg by mouth once daily at 6am.      doxazosin (CARDURA) 2 MG tablet Take 4 mg by mouth every 12 (twelve) hours. Take 2 (2mg) tablets two times a day      famotidine (PEPCID) 40 MG tablet Take 1 tablet (40 mg total)  by mouth daily as needed for Heartburn. 30 tablet 2    gabapentin (NEURONTIN) 800 MG tablet Neurontin 800 mg tablet   Take 1 tablet 3 times a day by oral route.      HYDROcodone-acetaminophen (NORCO)  mg per tablet Norco 10 mg-325 mg tablet   Take 1 tablet 3 times a day by oral route as needed.      hydrocortisone 2.5 % ointment APPLY TOPICALLY TO FACE TWICE A DAY AS NEEDED FOR 1 TO 2 WEEKS AT A TIME      hydroxychloroquine (PLAQUENIL) 200 mg tablet Take 200 mg by mouth 2 (two) times daily.       mycophenolate (CELLCEPT) 250 mg Cap 3,000 mg once daily.      tiZANidine (ZANAFLEX) 4 MG tablet TK 1 T PO TID PRN      TRUEPLUS LANCETS 33 gauge Misc Inject 1 lancet as directed 4 (four) times daily. 100 each 11    zolpidem (AMBIEN) 10 mg Tab TAKE 1/2 TO 1 TABLET BY MOUTH AT BEDTIME AS NEEDED FOR INSOMNIA 30 tablet 0    hydrOXYzine HCL (ATARAX) 25 MG tablet Take 25 mg by mouth 2 (two) times daily as needed for Itching.      [DISCONTINUED] amLODIPine (NORVASC) 10 MG tablet Take 1 tablet (10 mg total) by mouth once daily. 90 tablet 3     No current facility-administered medications on file prior to visit.     Medications have been reviewed and reconciled with patient at visit today.    Exam:  Vitals:    10/30/24 0905   BP: (!) 148/98   Pulse: 88     BP Readings from Last 3 Encounters:   10/30/24 (!) 148/98   08/28/24 (!) 154/81   08/26/24 (!) 122/58      Wt Readings from Last 3 Encounters:   08/28/24 0940 92.6 kg (204 lb 2.3 oz)   08/26/24 1010 91.5 kg (201 lb 11.5 oz)   08/15/24 1445 90.8 kg (200 lb 2.8 oz)       Physical Exam  Vitals reviewed.   Constitutional:       General: She is not in acute distress.     Appearance: Normal appearance. She is obese.   HENT:      Head: Normocephalic and atraumatic.      Right Ear: External ear normal.      Left Ear: External ear normal.      Nose: Nose normal.      Mouth/Throat:      Mouth: Mucous membranes are moist.      Pharynx: Oropharynx is clear.   Eyes:      General: No  scleral icterus.     Extraocular Movements: Extraocular movements intact.      Conjunctiva/sclera: Conjunctivae normal.   Cardiovascular:      Rate and Rhythm: Normal rate.   Pulmonary:      Effort: Pulmonary effort is normal. No respiratory distress.   Neurological:      Mental Status: She is alert. Mental status is at baseline.   Psychiatric:         Mood and Affect: Mood normal.         Behavior: Behavior normal.         Thought Content: Thought content normal.        Laboratory Reviewed  Lab Results   Component Value Date    WBC 6.68 08/28/2024    HGB 9.1 (L) 08/28/2024    HCT 28.5 (L) 08/28/2024     08/28/2024    MCV 88 08/28/2024    CHOL 140 10/29/2024    TRIG 108 10/29/2024    HDL 36 (L) 10/29/2024    LDLCALC 82.4 10/29/2024    ALT 10 08/28/2024    AST 16 08/28/2024     08/28/2024    K 4.2 08/28/2024     (H) 08/28/2024    CREATININE 0.8 08/28/2024    BUN 11 08/28/2024    CO2 24 08/28/2024    TSH 0.468 01/02/2024    FREET4 1.03 01/02/2024    INR 1.0 08/16/2024    HGBA1C 5.2 10/29/2024    CRP 4.7 11/29/2022     Lab Results   Component Value Date    CALCIUM 9.4 08/28/2024    PHOS 3.6 07/14/2023      Lab Results   Component Value Date    FRAPKUTB54 605 10/29/2024     Lab Results   Component Value Date    FOLATE 6.8 01/02/2024      Lab Results   Component Value Date    IRON 63 08/28/2024    TRANSFERRIN 179 (L) 08/28/2024    TIBC 265 08/28/2024    FESATURATED 24 08/28/2024      Lab Results   Component Value Date    EGFRNORACEVR >60 08/28/2024    ALBUMIN 3.8 08/28/2024    BNP 53 04/30/2021     Lab Results   Component Value Date    VMJHPVQI79NU 30 10/29/2024      Assessment:   49 y.o. female with multiple co-morbid illnesses here for continued follow up of medical problems.      The primary encounter diagnosis was Drug-induced insomnia. 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      Plan:   1. Drug-induced insomnia  -     suvorexant (BELSOMRA) 10 mg Tab; Take 1 tablet by mouth every evening.  Dispense: 30 tablet; Refill: 0    2. Diabetes mellitus due to underlying condition with complication, with long-term current use of insulin  -     dulaglutide (TRULICITY) 1.5 mg/0.5 mL pen injector; Inject 1.5 mg into the skin every 7 days.  Dispense: 12 pen ; Refill: 1    3. Type 2 diabetes mellitus with left eye affected by mild nonproliferative retinopathy and macular edema, without long-term current use of insulin  -     dulaglutide (TRULICITY) 1.5 mg/0.5 mL pen injector; Inject 1.5 mg into the skin every 7 days.  Dispense: 12 pen ; Refill: 1    4. Type 2 diabetes mellitus without complication, with long-term current use of insulin  -     dulaglutide (TRULICITY) 1.5 mg/0.5 mL pen injector; Inject 1.5 mg into the skin every 7 days.  Dispense: 12 pen ; Refill: 1    5. Essential hypertension  -     labetaloL (NORMODYNE) 300 MG tablet; Take 1 tablet (300 mg total) by mouth 2 (two) times daily.  Dispense: 180 tablet; Refill: 1    6. Hypertension associated with diabetes  -     NIFEdipine (PROCARDIA-XL) 30 MG (OSM) 24 hr tablet; Take 1 tablet (30 mg total) by mouth every 12 (twelve) hours.  Dispense: 180 tablet; Refill: 1    Other orders  -     atorvastatin (LIPITOR) 20 MG tablet; Take 1 tablet (20 mg total) by mouth every evening.  Dispense: 90 tablet; Refill: 1  -     ergocalciferol (ERGOCALCIFEROL) 50,000 unit Cap; Take 1 capsule (50,000 Units total) by mouth every 7 days.  Dispense: 12 capsule; Refill: 1         Health Maintenance         Date Due Completion Date    Foot Exam Never done ---    TETANUS VACCINE Never done  ---    Shingles Vaccine (1 of 2) Never done ---    Influenza Vaccine (1) Never done ---    COVID-19 Vaccine (5 - 2024-25 season) 09/01/2024 3/16/2024    Mammogram 01/05/2025 1/5/2024    Eye Exam 03/20/2025 3/20/2024    Hemoglobin A1c 04/29/2025 10/29/2024    Diabetes Urine Screening 08/28/2025 8/28/2024    Lipid Panel 10/29/2025 10/29/2024    High Dose Statin 10/30/2025 10/30/2024    Cervical Cancer Screening 09/29/2026 9/29/2021    Colorectal Cancer Screening 03/28/2029 3/28/2024    RSV Vaccine (Age 60+ and Pregnant patients) (1 - 1-dose 75+ series) 11/23/2048 ---          -Patient's lab results were reviewed and discussed with patient  -Treatment options and alternatives were discussed with the patient. Patient expressed understanding. Patient was given the opportunity to ask questions and be an active participant in their medical care. Patient had no further questions or concerns at this time.     Follow up: Follow up in about 5 weeks (around 12/4/2024) for Follow Up early Dec w me or VIVIANE Grande.    Care Plan/Goals: Reviewed No   Goals        Blood Pressure < 130/80      Exercise at least 150 minutes per week.      Take at least one BP reading per week at various times of the day           Patient goals and care plan are included in After visit summary.    TOTAL TIME evaluating and managing this patient for this encounter was 40 minutes. This time was spent personally by me on some of the following activities: review of patient's past medical history, assessing age-appropriate health maintenance needs, review of any interval history, review and interpretation of lab results, review and interpretation of imaging test results, review and interpretation of cardiology test results, reviewing consulting specialist notes, obtaining history from the patient and family, examination of the patient, medication reconciliation, managing and/or ordering prescription medications, ordering imaging tests, ordering referral to  subspecialty provider(s), educating patient and answering their questions about diagnosis, treatment plan, and goals of treatment, discussing planned follow-up and final documentation of the visit. This time was exclusive of any separately billable procedures for this patient and exclusive of time spent treating any other patients.     This note was generated with the assistance of ambient listening technology. Verbal consent was obtained by the patient and accompanying visitor(s) for the recording of patient appointment to facilitate this note. I attest to having reviewed and edited the generated note for accuracy, though some syntax or spelling errors may persist. Please contact the author of this note for any clarification.

## 2024-10-30 NOTE — PATIENT INSTRUCTIONS
If you are feeling unwell, we'd like to be the first ones to know here at [facility]! Please give us a call. Same day appointments are our top priority to keep you well and out of the emergency rooms and hospitals. Call [phone] for our direct line. After hours advice is always available. Please call [phone] after hours to speak to the on-call team.      Recommend Tetanus, Shingles, and Covid vaccines that can be scheduled at your pharmacy of choice.  Recommend Flu vaccine that can be scheduled in the office here or at your pharmacy of choice.      If insurance approves the suvorexant (Belsomra) 10 mg:   Take with Ambien 5 mg (1/2 of the Ambien 10 mg tab) x 1 week   After 1 week increase suvorexant (Belsomra) to 20 mg (2 of the Belsomra 10 mg tab) and hopefully can discontinue Ambien (though may need to continue taper with lower dose sublingual Ambien 3.5 to 1.75 mg)    Let us know dose of clonidine patch prescribed by Cards Dr. Clark  Let us know when you've picked up the famotidine

## 2024-11-01 ENCOUNTER — PATIENT MESSAGE (OUTPATIENT)
Dept: PRIMARY CARE CLINIC | Facility: CLINIC | Age: 49
End: 2024-11-01
Payer: MEDICARE

## 2024-11-08 NOTE — TELEPHONE ENCOUNTER
----- Message from Ashley sent at 11/8/2024  8:37 AM CST -----  Regarding: Rx  Eufemia with [facility] called on behalf of the patient, asking if they could receive a call at [phone] to discuss pt's 10 mg Belsomra prescription. Please advise.

## 2024-11-08 NOTE — TELEPHONE ENCOUNTER
PC with Nichole- verified that Ambien rx given by Dr. Bautista and advised on instructions with new rx of Belsomra     Belsomra covered with restrictions- will let us know if needs PA

## 2024-11-19 ENCOUNTER — PATIENT MESSAGE (OUTPATIENT)
Dept: GASTROENTEROLOGY | Facility: CLINIC | Age: 49
End: 2024-11-19
Payer: MEDICARE

## 2024-11-27 DIAGNOSIS — K21.9 GASTROESOPHAGEAL REFLUX DISEASE WITHOUT ESOPHAGITIS: ICD-10-CM

## 2024-11-27 DIAGNOSIS — K21.9 GASTROESOPHAGEAL REFLUX DISEASE, UNSPECIFIED WHETHER ESOPHAGITIS PRESENT: ICD-10-CM

## 2024-11-27 DIAGNOSIS — F19.982 DRUG-INDUCED INSOMNIA: ICD-10-CM

## 2024-11-30 RX ORDER — PANTOPRAZOLE SODIUM 40 MG/1
40 TABLET, DELAYED RELEASE ORAL DAILY
Qty: 90 TABLET | Refills: 0 | Status: SHIPPED | OUTPATIENT
Start: 2025-01-25 | End: 2025-04-25

## 2024-12-02 ENCOUNTER — TELEPHONE (OUTPATIENT)
Dept: PRIMARY CARE CLINIC | Facility: CLINIC | Age: 49
End: 2024-12-02
Payer: MEDICARE

## 2024-12-02 DIAGNOSIS — F19.982 DRUG-INDUCED INSOMNIA: ICD-10-CM

## 2024-12-02 NOTE — TELEPHONE ENCOUNTER
Attempted to call pt regarding refill request for zolpidem and to inquire about Belsomra and Suvoraxant and how she was doing with them if she received them.    No answer - left message to return call to clinic.

## 2024-12-03 NOTE — TELEPHONE ENCOUNTER
Call to pt, no ans, msg left to return call re: PCP's questions below:  Alissa Bautista MD Sent: 12/2/2024   7:00 AM CST   Subject: Ambien                          Did she get the Belsomra? Suvorexant? If so, how did she do with it?

## 2024-12-04 ENCOUNTER — TELEPHONE (OUTPATIENT)
Dept: PRIMARY CARE CLINIC | Facility: CLINIC | Age: 49
End: 2024-12-04
Payer: MEDICARE

## 2024-12-04 ENCOUNTER — OFFICE VISIT (OUTPATIENT)
Dept: PRIMARY CARE CLINIC | Facility: CLINIC | Age: 49
End: 2024-12-04
Payer: MEDICARE

## 2024-12-04 DIAGNOSIS — I15.2 HYPERTENSION ASSOCIATED WITH DIABETES: Primary | Chronic | ICD-10-CM

## 2024-12-04 DIAGNOSIS — E11.3212 TYPE 2 DIABETES MELLITUS WITH LEFT EYE AFFECTED BY MILD NONPROLIFERATIVE RETINOPATHY AND MACULAR EDEMA, WITHOUT LONG-TERM CURRENT USE OF INSULIN: Chronic | ICD-10-CM

## 2024-12-04 DIAGNOSIS — E11.59 HYPERTENSION ASSOCIATED WITH DIABETES: Primary | Chronic | ICD-10-CM

## 2024-12-04 DIAGNOSIS — K21.9 GASTROESOPHAGEAL REFLUX DISEASE WITHOUT ESOPHAGITIS: Chronic | ICD-10-CM

## 2024-12-04 DIAGNOSIS — M25.561 RIGHT KNEE PAIN, UNSPECIFIED CHRONICITY: Chronic | ICD-10-CM

## 2024-12-04 DIAGNOSIS — M32.8 OTHER FORMS OF SYSTEMIC LUPUS ERYTHEMATOSUS, UNSPECIFIED ORGAN INVOLVEMENT STATUS: Chronic | ICD-10-CM

## 2024-12-04 DIAGNOSIS — F19.982 DRUG-INDUCED INSOMNIA: Chronic | ICD-10-CM

## 2024-12-04 PROCEDURE — 3066F NEPHROPATHY DOC TX: CPT | Mod: HCNC,CPTII,95, | Performed by: INTERNAL MEDICINE

## 2024-12-04 PROCEDURE — 3061F NEG MICROALBUMINURIA REV: CPT | Mod: HCNC,CPTII,95, | Performed by: INTERNAL MEDICINE

## 2024-12-04 PROCEDURE — 99215 OFFICE O/P EST HI 40 MIN: CPT | Mod: HCNC,95,, | Performed by: INTERNAL MEDICINE

## 2024-12-04 PROCEDURE — 3044F HG A1C LEVEL LT 7.0%: CPT | Mod: HCNC,CPTII,95, | Performed by: INTERNAL MEDICINE

## 2024-12-04 RX ORDER — DULAGLUTIDE 1.5 MG/.5ML
1.5 INJECTION, SOLUTION SUBCUTANEOUS
Qty: 12 PEN | Refills: 0 | Status: SHIPPED | OUTPATIENT
Start: 2024-12-04 | End: 2025-03-04

## 2024-12-04 RX ORDER — CLONIDINE HYDROCHLORIDE 0.1 MG/1
0.2 TABLET ORAL 3 TIMES DAILY
Qty: 180 TABLET | Refills: 0 | Status: CANCELLED | OUTPATIENT
Start: 2024-12-04 | End: 2025-01-03

## 2024-12-04 RX ORDER — NIFEDIPINE 90 MG/1
90 TABLET, EXTENDED RELEASE ORAL DAILY
COMMUNITY

## 2024-12-04 RX ORDER — ZOLPIDEM TARTRATE 10 MG/1
10 TABLET ORAL NIGHTLY
Qty: 90 TABLET | Refills: 0 | Status: SHIPPED | OUTPATIENT
Start: 2024-12-04 | End: 2025-06-02

## 2024-12-04 RX ORDER — ZOLPIDEM TARTRATE 10 MG/1
TABLET ORAL
Qty: 777 TABLET | Refills: 0 | OUTPATIENT
Start: 2024-12-04

## 2024-12-04 RX ORDER — CLONIDINE 0.2 MG/24H
1 PATCH, EXTENDED RELEASE TRANSDERMAL
COMMUNITY

## 2024-12-04 RX ORDER — ZOLPIDEM TARTRATE 10 MG/1
TABLET ORAL
Qty: 30 TABLET | Refills: 0 | OUTPATIENT
Start: 2024-12-04

## 2024-12-04 NOTE — PROGRESS NOTES
Marilou Daniel  12/04/2024  [id number]    Alissa Bautista MD  Patient Care Team:  Alissa Bautista MD as PCP - General (Internal Medicine)  Joe Yo RD as Dietitian (Endocrinology)  Liza Moon RD, CDE as Dietitian (Diabetes)  Travis Milligan OD (Ophthalmology)  Radha Watson, PharmD as Hypertension Digital Medicine Clinician (Pharmacist)  Alissa Bautista MD as Hypertension Digital Medicine Responsible Provider (Internal Medicine)  Medicare, [employer] Managed as Hypertension Digital Medicine Contract    The patient location is:  Patient Home   The chief complaint leading to consultation is: f/u insomnia, diabetes, HTN, s/p R TKR    Visit Type: Virtual visit with synchronous audio and video  Each patient to whom he or she provides medical services by telemedicine is:  (1) informed of the relationship between the physician and patient and the respective role of any other health care provider with respect to management of the patient; and (2) notified that he or she may decline to receive medical services by telemedicine and may withdraw from such care at any time.    Ms. Marilou Daniel is a 49 year old female presents for scheduled f/u.    Medical issues include HTN, anemia, MGUS, SLE, leukocytoclastic vasculitis, chronic pain, AVN B hips, DM2 insulin dependent, anxiety, hearing loss, chronic insomnia.    S/p R TKR 9/11/24 -was seen by Dr. Dale yesterday for concern of possible joint infection    History of Present Illness    CHIEF COMPLAINT:  Ms. Daniel presents today for knee issues and blood pressure management.    KNEE ISSUES:  She reports seeing surgeon Dr. Dale yesterday for R knee issues. The surgeon took a culture and kiana blood to check for MRSA. Results are pending and expected in a few days.    HYPERTENSION:  She reports her blood pressure has been elevated since her recent surgery. Her digital blood pressure cuff has not been functioning  properly, preventing accurate digital readings since December 1st but she has been tracking with another home monitor. November 6th, her cardiologist, Dr. Clark added 0.2 mg weekly clonidine patch. November 20th, Dr. Clark increased her nifedipine dosage to 90 mg daily.     SLEEP DISORDER:  She tried the Belsomra for 3 days but didn't like how quickly she fell asleep, reports waking up at 3 am and grogginess the next day. She prefers to continue Ambien 10 mg nightly. She has been taking this dose nightly for a very long time and denies any adverse side effects or abnormal sleep/wake behaviors. Reports Ambien 5 mg ineffective.     DIABETES:  She reports experiencing reflux and nausea prior to Trulicity use but note her symptoms increased with addition of Trulicity requiring addition of famotidine to the protonix. My recommendation is to decrease the Trulicity dose back to 0.75 weekly. She expresses concern about reducing the Trulicity dose due to how effective it has been in helping her manage her diabetes. Her recent A1c is 5.2%, within normal range but she is apprehensive about potential difficulties in glycemic control if the Trulicity dose is reduced. We agreed to revisit this at a later date, while she is dealing with blood pressure and R knee issues, and to continue current 1.5 weekly dose of Trulicity for now.    Upcoming appointments:  Future Appointments       Date Provider Specialty Appt Notes    12/26/2024  Lab lab    12/30/2024 Dale Powell MD Hematology and Oncology 4 mo prior lab    12/30/2024 Judy Grande, FNP-C Primary Care  Arrive at: Telehealth 1 mo F/U           The following were reviewed: Active problem list, medication list, allergies, family history, social history, and Health Maintenance.     History:  Review of patient's allergies indicates:   Allergen Reactions    Azathioprine Nausea And Vomiting     Nausea, vomiting, diarrhea dose and early in the course of therapy    Olmesartan  Shortness Of Breath    Belsomra [suvorexant] Other (See Comments)     Did not like sudden sleep on-set    Feraheme [ferumoxytol] Itching     Shortness of breath, rash    Oxycodone Itching     Other reaction(s): Unknown       Medications:  Current Outpatient Medications on File Prior to Visit   Medication Sig Dispense Refill    atorvastatin (LIPITOR) 20 MG tablet Take 1 tablet (20 mg total) by mouth every evening. 90 tablet 1    blood sugar diagnostic (TRUE METRIX GLUCOSE TEST STRIP) Strp Test 4 times daily. 100 strip 0    blood-glucose meter (TRUE METRIX GLUCOSE METER) Misc Use as directed 1 each 0    blood-glucose sensor (DEXCOM G7 SENSOR) Myrtle 1 each by Misc.(Non-Drug; Combo Route) route every 10 days. 3 each 11    blood-glucose transmitter (DEXCOM G5 TRANSMITTER) Myrtle 1 each by Misc.(Non-Drug; Combo Route) route every 3 (three) months. 1 each 3    cloNIDine (CATAPRES) 0.1 MG tablet Take 2 tablets (0.2 mg total) by mouth 3 (three) times daily. 180 tablet 0    cloNIDine 0.2 mg/24 hr td ptwk (CATAPRES) 0.2 mg/24 hr Place 1 patch onto the skin every 7 days.      dapsone 100 MG Tab Take 100 mg by mouth once daily at 6am.      doxazosin (CARDURA) 2 MG tablet Take 4 mg by mouth every 12 (twelve) hours. Take 2 (2mg) tablets two times a day      ergocalciferol (ERGOCALCIFEROL) 50,000 unit Cap Take 1 capsule (50,000 Units total) by mouth every 7 days. 12 capsule 1    famotidine (PEPCID) 40 MG tablet Take 1 tablet (40 mg total) by mouth daily as needed for Heartburn. 30 tablet 2    gabapentin (NEURONTIN) 800 MG tablet Neurontin 800 mg tablet   Take 1 tablet 3 times a day by oral route.      HYDROcodone-acetaminophen (NORCO)  mg per tablet Norco 10 mg-325 mg tablet   Take 1 tablet 3 times a day by oral route as needed.      hydrocortisone 2.5 % ointment APPLY TOPICALLY TO FACE TWICE A DAY AS NEEDED FOR 1 TO 2 WEEKS AT A TIME      hydroxychloroquine (PLAQUENIL) 200 mg tablet Take 200 mg by mouth 2 (two) times daily.        hydrOXYzine HCL (ATARAX) 25 MG tablet Take 25 mg by mouth 2 (two) times daily as needed for Itching.      labetaloL (NORMODYNE) 300 MG tablet Take 1 tablet (300 mg total) by mouth 2 (two) times daily. 180 tablet 1    mycophenolate (CELLCEPT) 250 mg Cap 3,000 mg once daily.      NIFEdipine (ADALAT CC) 90 MG TbSR Take 90 mg by mouth once daily.      [START ON 1/25/2025] pantoprazole (PROTONIX) 40 MG tablet Take 1 tablet (40 mg total) by mouth once daily. 90 tablet 0    tiZANidine (ZANAFLEX) 4 MG tablet TK 1 T PO TID PRN      TRUEPLUS LANCETS 33 gauge Misc Inject 1 lancet as directed 4 (four) times daily. 100 each 11    [DISCONTINUED] amLODIPine (NORVASC) 10 MG tablet Take 1 tablet (10 mg total) by mouth once daily. 90 tablet 3     No current facility-administered medications on file prior to visit.     Medications have been reviewed and reconciled with patient at visit today.    Exam:  BP Readings from Last 3 Encounters:   10/30/24 (!) 148/98   08/28/24 (!) 154/81   08/26/24 (!) 122/58      Wt Readings from Last 3 Encounters:   08/28/24 0940 92.6 kg (204 lb 2.3 oz)   08/26/24 1010 91.5 kg (201 lb 11.5 oz)   08/15/24 1445 90.8 kg (200 lb 2.8 oz)      Physical Exam  Vitals reviewed.   Constitutional:       General: She is not in acute distress.     Appearance: Normal appearance. She is obese.   HENT:      Head: Normocephalic and atraumatic.      Right Ear: External ear normal.      Left Ear: External ear normal.      Nose: Nose normal.      Mouth/Throat:      Mouth: Mucous membranes are moist.      Pharynx: Oropharynx is clear.   Eyes:      General: No scleral icterus.     Extraocular Movements: Extraocular movements intact.      Conjunctiva/sclera: Conjunctivae normal.      Comments: glasses   Cardiovascular:      Rate and Rhythm: Normal rate.   Pulmonary:      Effort: Pulmonary effort is normal. No respiratory distress.   Skin:     General: Skin is warm and dry.   Neurological:      Mental Status: She is alert and  oriented to person, place, and time. Mental status is at baseline.   Psychiatric:         Behavior: Behavior normal.         Thought Content: Thought content normal.        Laboratory Reviewed  Lab Results   Component Value Date    WBC 6.68 08/28/2024    HGB 9.1 (L) 08/28/2024    HCT 28.5 (L) 08/28/2024     08/28/2024    MCV 88 08/28/2024    CHOL 140 10/29/2024    TRIG 108 10/29/2024    HDL 36 (L) 10/29/2024    LDLCALC 82.4 10/29/2024    ALT 10 08/28/2024    AST 16 08/28/2024     08/28/2024    K 4.2 08/28/2024     (H) 08/28/2024    CREATININE 0.8 08/28/2024    BUN 11 08/28/2024    CO2 24 08/28/2024    TSH 0.468 01/02/2024    FREET4 1.03 01/02/2024    INR 1.0 08/16/2024    HGBA1C 5.2 10/29/2024    CRP 4.7 11/29/2022     Lab Results   Component Value Date    CALCIUM 9.4 08/28/2024    PHOS 3.6 07/14/2023      Lab Results   Component Value Date    DTDYABHB31 605 10/29/2024     Lab Results   Component Value Date    FOLATE 6.8 01/02/2024      Lab Results   Component Value Date    IRON 63 08/28/2024    TRANSFERRIN 179 (L) 08/28/2024    TIBC 265 08/28/2024    FESATURATED 24 08/28/2024      Lab Results   Component Value Date    EGFRNORACEVR >60 08/28/2024    ALBUMIN 3.8 08/28/2024    BNP 53 04/30/2021     Lab Results   Component Value Date    RAVWARRR60ZK 30 10/29/2024          Assessment:   49 y.o. female with multiple co-morbid illnesses here for continued follow up of medical problems.      The primary encounter diagnosis was Hypertension associated with diabetes. 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      Plan:   1. Hypertension associated with  diabetes  Assessment & Plan:  Being followed by external cardiologist Dr. Clark - clarify if still taking oral clonidine since addition of clonidine patch  Pt plans to bring digital monitor to [place] when R knee allows for driving or someone to bring her      2. Other forms of systemic lupus erythematosus, unspecified organ involvement status  Assessment & Plan:  Followed by external Rheumatologist Dr. Wayne      3. Gastroesophageal reflux disease without esophagitis  Overview:  Treat like GERD with nexium    Assessment & Plan:  Taking both PPI and H2 blocker - recommend decrease Trulicity but pt concerned will have increased difficulty managing diabetes which is currently well controlled       4. Right knee pain, unspecified chronicity  Assessment & Plan:  S/p R TKR w external Orthopedist - concern now of possible joint infection - pt to cont close f/u w Ortho      5. Drug-induced insomnia  Assessment & Plan:  Did not like the Belsomra - would like her to retry w 20 mg dose but she is hesitant to do so - long-term dependence on ambien 10 mg - denies any adverse side effects    Orders:  -     zolpidem (AMBIEN) 10 mg Tab; Take 1 tablet (10 mg total) by mouth every evening. For sleep.  Dispense: 90 tablet; Refill: 0    6. 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  Dispense: 12 pen ; Refill: 0         Health Maintenance         Date Due Completion Date    Foot Exam Never done ---    TETANUS VACCINE Never done ---    Shingles Vaccine (1 of 2) Never done ---    Influenza Vaccine (1) Never done ---    COVID-19 Vaccine  (5 - 2024-25 season) 09/01/2024 3/16/2024    Mammogram 01/05/2025 1/5/2024    Eye Exam 03/20/2025 3/20/2024    Hemoglobin A1c 04/29/2025 10/29/2024    Diabetes Urine Screening 08/28/2025 8/28/2024    Lipid Panel 10/29/2025 10/29/2024    High Dose Statin 10/30/2025 10/30/2024    Cervical Cancer Screening 09/29/2026 9/29/2021    Colorectal Cancer Screening 03/28/2029 3/28/2024    RSV Vaccine (Age 60+ and Pregnant patients) (1 - 1-dose 75+ series) 11/23/2048 ---            -Patient's lab results were reviewed and discussed with patient  -Treatment options and alternatives were discussed with the patient. Patient expressed understanding. Patient was given the opportunity to ask questions and be an active participant in their medical care. Patient had no further questions or concerns at this time.     Follow up: Follow up in about 1 month (around 1/4/2025) for  Virtual Visit w VIVIANE Grande (and in 8 week/2 mos w me in clinic please).    Care Plan/Goals: Reviewed No   Goals        Blood Pressure < 130/80      Exercise at least 150 minutes per week.      Take at least one BP reading per week at various times of the day             After visit summary printed and given to patient upon discharge.  Patient goals and care plan are included in After visit summary.    TOTAL TIME evaluating and managing this patient for this encounter was greater than    43 minutes. This time was spent personally by me on some of the following activities: review of patient's past medical history, assessing age-appropriate health maintenance needs, review of any interval history, review and interpretation of lab results, review and interpretation of imaging test results, review and interpretation of cardiology test results, reviewing consulting specialist notes, obtaining history from the patient and family, examination of the patient, medication reconciliation, managing and/or ordering prescription medications, ordering imaging tests, ordering  referral to subspecialty provider(s), educating patient and answering their questions about diagnosis, treatment plan, and goals of treatment, discussing planned follow-up and final documentation of the visit. This time was exclusive of any separately billable procedures for this patient and exclusive of time spent treating any other patients.     This note was generated with the assistance of ambient listening technology. Verbal consent was obtained by the patient and accompanying visitor(s) for the recording of patient appointment to facilitate this note. I attest to having reviewed and edited the generated note for accuracy, though some syntax or spelling errors may persist. Please contact the author of this note for any clarification.

## 2024-12-04 NOTE — PATIENT INSTRUCTIONS
If you are feeling unwell, we'd like to be the first ones to know here at [facility]! Please give us a call. Same day appointments are our top priority to keep you well and out of the emergency rooms and hospitals. Call [phone] for our direct line. After hours advice is always available. Please call [phone] after hours to speak to the on-call team.      Recommend Tetanus, Shingles, and Covid vaccines that can be scheduled at your pharmacy of choice.  Recommend Flu vaccine that can be scheduled in the office here or at your pharmacy.

## 2024-12-05 PROBLEM — M32.9 SYSTEMIC LUPUS ERYTHEMATOSUS: Chronic | Status: ACTIVE | Noted: 2019-09-10

## 2024-12-05 PROBLEM — E11.3212 TYPE 2 DIABETES MELLITUS WITH LEFT EYE AFFECTED BY MILD NONPROLIFERATIVE RETINOPATHY AND MACULAR EDEMA, WITHOUT LONG-TERM CURRENT USE OF INSULIN: Chronic | Status: ACTIVE | Noted: 2024-08-27

## 2024-12-05 NOTE — ASSESSMENT & PLAN NOTE
Once again discussed decreasing Trulicity dose but she is very hesitant to do so - advise needs to cont close f/u w Ophthalmology and if retinopathy worsening advise D/C Trulicity

## 2024-12-05 NOTE — ASSESSMENT & PLAN NOTE
S/p R TKR w external Orthopedist - concern now of possible joint infection - pt to cont close f/u w Ortho

## 2024-12-05 NOTE — ASSESSMENT & PLAN NOTE
Taking both PPI and H2 blocker - recommend decrease Trulicity but pt concerned will have increased difficulty managing diabetes which is currently well controlled

## 2024-12-05 NOTE — ASSESSMENT & PLAN NOTE
Being followed by external cardiologist Dr. Clark - clarify if still taking oral clonidine since addition of clonidine patch  Pt plans to bring digital monitor to [place] when R knee allows for driving or someone to bring her

## 2024-12-05 NOTE — ASSESSMENT & PLAN NOTE
Did not like the Belsomra - would like her to retry w 20 mg dose but she is hesitant to do so - long-term dependence on ambien 10 mg - denies any adverse side effects

## 2024-12-06 ENCOUNTER — TELEPHONE (OUTPATIENT)
Dept: INFUSION THERAPY | Facility: HOSPITAL | Age: 49
End: 2024-12-06
Payer: MEDICARE

## 2024-12-06 NOTE — TELEPHONE ENCOUNTER
I contacted patient due to scheduling conflict I advised I had moved her 12/30 appointment with Dr Centeno to Samm Chavez NP on 12/30  Patient wanted to see Dr Powell at his 1 st available appointment I booked her to see him on 1/15. And moved her lab 1/9 so it would be closer to her appointment.

## 2024-12-30 ENCOUNTER — OFFICE VISIT (OUTPATIENT)
Dept: PRIMARY CARE CLINIC | Facility: CLINIC | Age: 49
End: 2024-12-30
Payer: MEDICARE

## 2024-12-30 DIAGNOSIS — R35.0 FREQUENCY OF URINATION: Primary | ICD-10-CM

## 2024-12-30 DIAGNOSIS — E11.3212 TYPE 2 DIABETES MELLITUS WITH LEFT EYE AFFECTED BY MILD NONPROLIFERATIVE RETINOPATHY AND MACULAR EDEMA, WITHOUT LONG-TERM CURRENT USE OF INSULIN: Chronic | ICD-10-CM

## 2024-12-30 DIAGNOSIS — E11.59 HYPERTENSION ASSOCIATED WITH DIABETES: Chronic | ICD-10-CM

## 2024-12-30 DIAGNOSIS — G89.29 CHRONIC PAIN OF RIGHT KNEE: Chronic | ICD-10-CM

## 2024-12-30 DIAGNOSIS — K21.9 GASTROESOPHAGEAL REFLUX DISEASE WITHOUT ESOPHAGITIS: ICD-10-CM

## 2024-12-30 DIAGNOSIS — M25.561 CHRONIC PAIN OF RIGHT KNEE: Chronic | ICD-10-CM

## 2024-12-30 DIAGNOSIS — I15.2 HYPERTENSION ASSOCIATED WITH DIABETES: Chronic | ICD-10-CM

## 2024-12-30 PROCEDURE — 1159F MED LIST DOCD IN RCRD: CPT | Mod: HCNC,CPTII,95,

## 2024-12-30 PROCEDURE — 3061F NEG MICROALBUMINURIA REV: CPT | Mod: HCNC,CPTII,95,

## 2024-12-30 PROCEDURE — 3044F HG A1C LEVEL LT 7.0%: CPT | Mod: HCNC,CPTII,95,

## 2024-12-30 PROCEDURE — 1160F RVW MEDS BY RX/DR IN RCRD: CPT | Mod: HCNC,CPTII,95,

## 2024-12-30 PROCEDURE — 99214 OFFICE O/P EST MOD 30 MIN: CPT | Mod: 95,HCNC,,

## 2024-12-30 PROCEDURE — 3066F NEPHROPATHY DOC TX: CPT | Mod: HCNC,CPTII,95,

## 2024-12-30 RX ORDER — PANTOPRAZOLE SODIUM 40 MG/1
40 TABLET, DELAYED RELEASE ORAL DAILY
Qty: 90 TABLET | Refills: 0 | Status: SHIPPED | OUTPATIENT
Start: 2025-01-25 | End: 2025-04-25

## 2024-12-30 NOTE — ASSESSMENT & PLAN NOTE
Right TKR e/ external orthopedist  Finished abx for possible infection  F/U w/ ortho next week  Recommend continue increasing mobility  Discuss concern about possible compression device to aid in swelling.

## 2024-12-30 NOTE — ASSESSMENT & PLAN NOTE
Hemoglobin A1C 4.0 - 5.6 % 5.2 4.6 CM 6.1 High  CM 5.9 High    Taking Trulicity 1.5 w/o issues at this time.   Recommend close f/u w/ opthalmology

## 2024-12-30 NOTE — ASSESSMENT & PLAN NOTE
148/88 today  Nifedipine 90 mg causing swelling bilateral lower legs  Taking two 30 mg Nifedipine, not taking 90 mg   Recommend elevating

## 2024-12-30 NOTE — PROGRESS NOTES
Primary Care Telemedicine Appointment      The patient location is:  Patient Home   The chief complaint leading to consultation is: monthly follow up  Total time spent on medical decision making was 30 min.    Visit type: Virtual visit with synchronous audio only and video  Each patient to whom he or she provides medical services by telemedicine is:  (1) informed of the relationship between the physician and patient and the respective role of any other health care provider with respect to management of the patient; and (2) notified that he or she may decline to receive medical services by telemedicine and may withdraw from such care at any time.      Subjective:      Patient ID: Marilou Daniel is a 49 y.o. female     Chief Complaint: No chief complaint on file.    Prior to this visit, patient's last encounter with PCP was 12/4/2024.        CHIEF COMPLAINT:  Marilou presents today for follow-up regarding frequent urination and knee pain post-surgery.    URINARY SYMPTOMS:  She reports frequent urination occurring throughout the day, waking up 5-6 times to void. She feels her bladder is not emptying completely. Symptoms have been ongoing for 4-5 months. She denies burning sensation, vaginal discharge, discoloration, or unusual odor. Urinalysis showed leukocytes but was negative for nitrites and bacteria.    POST-OPERATIVE KNEE:  She reports persistent pain and swelling in the knee following total knee replacement surgery. Swelling increases with weight-bearing activities. She has been advised to manage swelling with elevation rather than bracing.    HYPERTENSION:  She reports variable blood pressure readings at home with fluctuations between high and low values. She continues Clonidine patches, Labetalol, and Doxazosin 2 mg for management. She experiences significant ankle and leg edema as medication side effects, particularly with dose increases, which impairs mobility and weight-bearing  activities.    DIABETES AND GERD:  She continues Trulicity 1.5 mg weekly for diabetes management. She has a history of reflux predating Trulicity initiation. Previously managed with Nexium, now taking Pantoprazole due to insurance changes and better therapeutic response.    MEDICATIONS:  She takes vitamin D weekly and Pantoprazole for acid reflux.       Cardiology Dr. Clark increased nifedipine dosage to 90mg daily + weekly clonidine patch 0.2 mg.     Medical issues include HTN, anemia, MGUS, SLE, leukocytoclastic vasculitis, chronic pain, AVN B hips, DM2 insulin dependent, anxiety, hearing loss, chronic insomnia.     Past Medical History:   Diagnosis Date    Anemia     Arthritis     Connective tissue disease 1999    COVID-19 in immunocompromised patient 02/05/2021    Diabetes mellitus     Diabetes mellitus due to underlying condition with complication, with long-term current use of insulin 03/29/2022    Drug induced insomnia 12/15/2020    R/T chronic steroid use for SLE.    Encounter for preventive health examination 09/02/2024    Gastroesophageal reflux disease 03/23/2020    Hypertension     Lupus 1999    Situational anxiety 08/24/2021    Thyroid nodule greater than or equal to 1 cm in diameter incidentally noted on imaging study 11/19/2020    Vasculitis        Review of Systems   Constitutional:  Negative for activity change, appetite change and fatigue.   Genitourinary:  Positive for frequency.   Musculoskeletal:  Positive for joint swelling (right knee).   Skin:  Positive for wound (right knee scar).       Objective:   There were no vitals filed for this visit.      There is no height or weight on file to calculate BMI.  BP Readings from Last 3 Encounters:   10/30/24 (!) 148/98   08/28/24 (!) 154/81   08/26/24 (!) 122/58      Wt Readings from Last 3 Encounters:   08/28/24 0940 92.6 kg (204 lb 2.3 oz)   08/26/24 1010 91.5 kg (201 lb 11.5 oz)   08/15/24 1445 90.8 kg (200 lb 2.8 oz)        Physical Exam  Vitals  reviewed.   Constitutional:       Appearance: Normal appearance. She is normal weight.   HENT:      Head: Normocephalic.   Pulmonary:      Effort: Pulmonary effort is normal.   Musculoskeletal:      Cervical back: Normal range of motion and neck supple.      Right knee: Swelling present. Decreased range of motion. Tenderness present.   Skin:     General: Skin is warm and dry.      Findings: Wound (healing surgical incison to right knee) present.   Neurological:      General: No focal deficit present.      Mental Status: She is alert and oriented to person, place, and time. Mental status is at baseline.      Motor: Motor function is intact.      Gait: Gait is intact.   Psychiatric:         Mood and Affect: Mood normal.         Behavior: Behavior normal.         Thought Content: Thought content normal.         Judgment: Judgment normal.         Lab Results   Component Value Date    WBC 6.68 08/28/2024    HGB 9.1 (L) 08/28/2024    HCT 28.5 (L) 08/28/2024     08/28/2024    CHOL 140 10/29/2024    TRIG 108 10/29/2024    HDL 36 (L) 10/29/2024    ALT 10 08/28/2024    AST 16 08/28/2024     08/28/2024    K 4.2 08/28/2024     (H) 08/28/2024    CREATININE 0.8 08/28/2024    BUN 11 08/28/2024    CO2 24 08/28/2024    TSH 0.468 01/02/2024    INR 1.0 08/16/2024    HGBA1C 5.2 10/29/2024         Assessment:   49 y.o. female with multiple co-morbid illnesses here for continued follow up of medical problems.      The primary encounter diagnosis was Frequency of urination. 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    Plan:     Health Maintenance         Date Due Completion Date    Foot Exam Never done ---    TETANUS VACCINE Never done ---     Shingles Vaccine (1 of 2) Never done ---    Influenza Vaccine (1) Never done ---    COVID-19 Vaccine (5 - 2024-25 season) 09/01/2024 3/16/2024    Mammogram 01/05/2025 1/5/2024    Eye Exam 03/20/2025 3/20/2024    Hemoglobin A1c 04/29/2025 10/29/2024    Diabetes Urine Screening 08/28/2025 8/28/2024    Lipid Panel 10/29/2025 10/29/2024    High Dose Statin 12/30/2025 12/30/2024    Cervical Cancer Screening 09/29/2026 9/29/2021    Colorectal Cancer Screening 03/28/2029 3/28/2024    RSV Vaccine (Age 60+ and Pregnant patients) (1 - 1-dose 75+ series) 11/23/2048 ---            1. Frequency of urination  Assessment & Plan:  Recommend 2 hours toileting  Limiting fluid intake especially at night       2. Chronic pain of right knee  Assessment & Plan:  Right TKR e/ external orthopedist  Finished abx for possible infection  F/U w/ ortho next week  Recommend continue increasing mobility  Discuss concern about possible compression device to aid in swelling.      3. Hypertension associated with diabetes  Assessment & Plan:  148/88 today  Nifedipine 90 mg causing swelling bilateral lower legs  Taking two 30 mg Nifedipine, not taking 90 mg   Recommend elevating      4. Gastroesophageal reflux disease without esophagitis  Overview:  Take Protonix and Pepcid due to reflux w/ increasing intermittent s/s from Trulicity   Continue to closely monitor   Discuss diet change     Orders:  -     pantoprazole (PROTONIX) 40 MG tablet; Take 1 tablet (40 mg total) by mouth once daily.  Dispense: 90 tablet; Refill: 0    5. 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   Recommend close f/u w/ opthalmology              No follow-ups on file.     This note was generated with the assistance of ambient listening technology.  Verbal consent was obtained by the patient and accompanying visitor(s) for the recording of patient appointment to facilitate this note. I attest to having reviewed and edited the generated note for accuracy, though some syntax or spelling errors may persist. Please contact the author of this note for any clarification.             REBECCA Gonzalez, FNP-C  [facility] [address] [facility]  [place] [address]   [phone]   [phone] fax

## 2025-01-10 ENCOUNTER — LAB VISIT (OUTPATIENT)
Dept: LAB | Facility: HOSPITAL | Age: 50
End: 2025-01-10
Attending: INTERNAL MEDICINE
Payer: MEDICARE

## 2025-01-10 DIAGNOSIS — D50.8 IRON DEFICIENCY ANEMIA SECONDARY TO INADEQUATE DIETARY IRON INTAKE: ICD-10-CM

## 2025-01-10 DIAGNOSIS — D47.2 MGUS (MONOCLONAL GAMMOPATHY OF UNKNOWN SIGNIFICANCE): ICD-10-CM

## 2025-01-10 DIAGNOSIS — Q93.3 DELETION OF SHORT ARM OF CHROMOSOME 4: ICD-10-CM

## 2025-01-10 LAB
ALBUMIN SERPL BCP-MCNC: 4.6 G/DL (ref 3.5–5.2)
ALP SERPL-CCNC: 72 U/L (ref 40–150)
ALT SERPL W/O P-5'-P-CCNC: 12 U/L (ref 10–44)
ANION GAP SERPL CALC-SCNC: 8 MMOL/L (ref 8–16)
AST SERPL-CCNC: 13 U/L (ref 10–40)
B2 MICROGLOB SERPL-MCNC: 2 UG/ML (ref 0–2.5)
BASOPHILS # BLD AUTO: 0.05 K/UL (ref 0–0.2)
BASOPHILS # BLD AUTO: 0.05 K/UL (ref 0–0.2)
BASOPHILS NFR BLD: 0.6 % (ref 0–1.9)
BASOPHILS NFR BLD: 0.6 % (ref 0–1.9)
BILIRUB SERPL-MCNC: 0.6 MG/DL (ref 0.1–1)
BUN SERPL-MCNC: 8 MG/DL (ref 6–20)
CALCIUM SERPL-MCNC: 9.8 MG/DL (ref 8.7–10.5)
CHLORIDE SERPL-SCNC: 106 MMOL/L (ref 95–110)
CO2 SERPL-SCNC: 25 MMOL/L (ref 23–29)
CREAT SERPL-MCNC: 0.9 MG/DL (ref 0.5–1.4)
DIFFERENTIAL METHOD BLD: ABNORMAL
DIFFERENTIAL METHOD BLD: ABNORMAL
EOSINOPHIL # BLD AUTO: 0.2 K/UL (ref 0–0.5)
EOSINOPHIL # BLD AUTO: 0.2 K/UL (ref 0–0.5)
EOSINOPHIL NFR BLD: 2 % (ref 0–8)
EOSINOPHIL NFR BLD: 2 % (ref 0–8)
ERYTHROCYTE [DISTWIDTH] IN BLOOD BY AUTOMATED COUNT: 13.7 % (ref 11.5–14.5)
ERYTHROCYTE [DISTWIDTH] IN BLOOD BY AUTOMATED COUNT: 13.7 % (ref 11.5–14.5)
EST. GFR  (NO RACE VARIABLE): >60 ML/MIN/1.73 M^2
FERRITIN SERPL-MCNC: 660 NG/ML (ref 20–300)
GLUCOSE SERPL-MCNC: 124 MG/DL (ref 70–110)
HAPTOGLOB SERPL-MCNC: 124 MG/DL (ref 30–250)
HCT VFR BLD AUTO: 29.3 % (ref 37–48.5)
HCT VFR BLD AUTO: 29.3 % (ref 37–48.5)
HGB BLD-MCNC: 9.5 G/DL (ref 12–16)
HGB BLD-MCNC: 9.5 G/DL (ref 12–16)
IMM GRANULOCYTES # BLD AUTO: 0.03 K/UL (ref 0–0.04)
IMM GRANULOCYTES # BLD AUTO: 0.03 K/UL (ref 0–0.04)
IMM GRANULOCYTES NFR BLD AUTO: 0.4 % (ref 0–0.5)
IMM GRANULOCYTES NFR BLD AUTO: 0.4 % (ref 0–0.5)
IRON SERPL-MCNC: 47 UG/DL (ref 30–160)
LDH SERPL L TO P-CCNC: 177 U/L (ref 110–260)
LYMPHOCYTES # BLD AUTO: 2.3 K/UL (ref 1–4.8)
LYMPHOCYTES # BLD AUTO: 2.3 K/UL (ref 1–4.8)
LYMPHOCYTES NFR BLD: 28.5 % (ref 18–48)
LYMPHOCYTES NFR BLD: 28.5 % (ref 18–48)
MCH RBC QN AUTO: 29.1 PG (ref 27–31)
MCH RBC QN AUTO: 29.1 PG (ref 27–31)
MCHC RBC AUTO-ENTMCNC: 32.4 G/DL (ref 32–36)
MCHC RBC AUTO-ENTMCNC: 32.4 G/DL (ref 32–36)
MCV RBC AUTO: 90 FL (ref 82–98)
MCV RBC AUTO: 90 FL (ref 82–98)
MONOCYTES # BLD AUTO: 0.5 K/UL (ref 0.3–1)
MONOCYTES # BLD AUTO: 0.5 K/UL (ref 0.3–1)
MONOCYTES NFR BLD: 5.7 % (ref 4–15)
MONOCYTES NFR BLD: 5.7 % (ref 4–15)
NEUTROPHILS # BLD AUTO: 5 K/UL (ref 1.8–7.7)
NEUTROPHILS # BLD AUTO: 5 K/UL (ref 1.8–7.7)
NEUTROPHILS NFR BLD: 62.8 % (ref 38–73)
NEUTROPHILS NFR BLD: 62.8 % (ref 38–73)
NRBC BLD-RTO: 0 /100 WBC
NRBC BLD-RTO: 0 /100 WBC
PLATELET # BLD AUTO: 332 K/UL (ref 150–450)
PLATELET # BLD AUTO: 332 K/UL (ref 150–450)
PMV BLD AUTO: 11.1 FL (ref 9.2–12.9)
PMV BLD AUTO: 11.1 FL (ref 9.2–12.9)
POTASSIUM SERPL-SCNC: 4.8 MMOL/L (ref 3.5–5.1)
PROT SERPL-MCNC: 7.9 G/DL (ref 6–8.4)
RBC # BLD AUTO: 3.27 M/UL (ref 4–5.4)
RBC # BLD AUTO: 3.27 M/UL (ref 4–5.4)
SATURATED IRON: 17 % (ref 20–50)
SODIUM SERPL-SCNC: 139 MMOL/L (ref 136–145)
TOTAL IRON BINDING CAPACITY: 274 UG/DL (ref 250–450)
TRANSFERRIN SERPL-MCNC: 185 MG/DL (ref 200–375)
WBC # BLD AUTO: 8.01 K/UL (ref 3.9–12.7)
WBC # BLD AUTO: 8.01 K/UL (ref 3.9–12.7)

## 2025-01-10 PROCEDURE — 88271 CYTOGENETICS DNA PROBE: CPT | Mod: HCNC | Performed by: INTERNAL MEDICINE

## 2025-01-10 PROCEDURE — 83521 IG LIGHT CHAINS FREE EACH: CPT | Mod: HCNC | Performed by: INTERNAL MEDICINE

## 2025-01-10 PROCEDURE — 80053 COMPREHEN METABOLIC PANEL: CPT | Mod: HCNC | Performed by: INTERNAL MEDICINE

## 2025-01-10 PROCEDURE — 84165 PROTEIN E-PHORESIS SERUM: CPT | Mod: HCNC | Performed by: INTERNAL MEDICINE

## 2025-01-10 PROCEDURE — 88274 CYTOGENETICS 25-99: CPT | Mod: 59,HCNC | Performed by: INTERNAL MEDICINE

## 2025-01-10 PROCEDURE — 82728 ASSAY OF FERRITIN: CPT | Mod: HCNC | Performed by: INTERNAL MEDICINE

## 2025-01-10 PROCEDURE — 84165 PROTEIN E-PHORESIS SERUM: CPT | Mod: 26,HCNC,, | Performed by: PATHOLOGY

## 2025-01-10 PROCEDURE — 85025 COMPLETE CBC W/AUTO DIFF WBC: CPT | Mod: HCNC | Performed by: INTERNAL MEDICINE

## 2025-01-10 PROCEDURE — 83010 ASSAY OF HAPTOGLOBIN QUANT: CPT | Mod: HCNC | Performed by: INTERNAL MEDICINE

## 2025-01-10 PROCEDURE — 83615 LACTATE (LD) (LDH) ENZYME: CPT | Mod: HCNC | Performed by: INTERNAL MEDICINE

## 2025-01-10 PROCEDURE — 83540 ASSAY OF IRON: CPT | Mod: HCNC | Performed by: INTERNAL MEDICINE

## 2025-01-10 PROCEDURE — 82232 ASSAY OF BETA-2 PROTEIN: CPT | Mod: HCNC | Performed by: INTERNAL MEDICINE

## 2025-01-10 PROCEDURE — 88185 FLOWCYTOMETRY/TC ADD-ON: CPT | Mod: 91,HCNC | Performed by: INTERNAL MEDICINE

## 2025-01-10 PROCEDURE — 88271 CYTOGENETICS DNA PROBE: CPT | Mod: 59,HCNC | Performed by: INTERNAL MEDICINE

## 2025-01-13 LAB
ALBUMIN SERPL ELPH-MCNC: 4.66 G/DL (ref 3.35–5.55)
ALPHA1 GLOB SERPL ELPH-MCNC: 0.32 G/DL (ref 0.17–0.41)
ALPHA2 GLOB SERPL ELPH-MCNC: 0.59 G/DL (ref 0.43–0.99)
B-GLOBULIN SERPL ELPH-MCNC: 1.07 G/DL (ref 0.5–1.1)
GAMMA GLOB SERPL ELPH-MCNC: 1.07 G/DL (ref 0.67–1.58)
KAPPA LC SER QL IA: 3.43 MG/DL (ref 0.33–1.94)
KAPPA LC/LAMBDA SER IA: 1.58 (ref 0.26–1.65)
LAMBDA LC SER QL IA: 2.17 MG/DL (ref 0.57–2.63)
PCPDS FINAL DIAGNOSIS: NORMAL
PCPDS PRE-ANALYSIS PRE-SORT: NORMAL
PROT SERPL-MCNC: 7.7 G/DL (ref 6–8.4)

## 2025-01-15 ENCOUNTER — OFFICE VISIT (OUTPATIENT)
Dept: HEMATOLOGY/ONCOLOGY | Facility: CLINIC | Age: 50
End: 2025-01-15
Payer: MEDICARE

## 2025-01-15 ENCOUNTER — TELEPHONE (OUTPATIENT)
Dept: RESEARCH | Facility: HOSPITAL | Age: 50
End: 2025-01-15
Payer: MEDICARE

## 2025-01-15 ENCOUNTER — PATIENT MESSAGE (OUTPATIENT)
Dept: RESEARCH | Facility: HOSPITAL | Age: 50
End: 2025-01-15
Payer: MEDICARE

## 2025-01-15 ENCOUNTER — PATIENT MESSAGE (OUTPATIENT)
Dept: INFUSION THERAPY | Facility: HOSPITAL | Age: 50
End: 2025-01-15
Payer: MEDICARE

## 2025-01-15 VITALS
BODY MASS INDEX: 27.55 KG/M2 | WEIGHT: 175.5 LBS | RESPIRATION RATE: 20 BRPM | HEART RATE: 92 BPM | SYSTOLIC BLOOD PRESSURE: 147 MMHG | HEIGHT: 67 IN | OXYGEN SATURATION: 98 % | TEMPERATURE: 98 F | DIASTOLIC BLOOD PRESSURE: 87 MMHG

## 2025-01-15 DIAGNOSIS — M32.8 OTHER FORMS OF SYSTEMIC LUPUS ERYTHEMATOSUS, UNSPECIFIED ORGAN INVOLVEMENT STATUS: Chronic | ICD-10-CM

## 2025-01-15 DIAGNOSIS — Z79.899 IMMUNOCOMPROMISED STATE DUE TO DRUG THERAPY: ICD-10-CM

## 2025-01-15 DIAGNOSIS — D47.2 MGUS (MONOCLONAL GAMMOPATHY OF UNKNOWN SIGNIFICANCE): Primary | ICD-10-CM

## 2025-01-15 DIAGNOSIS — D84.821 IMMUNOCOMPROMISED STATE DUE TO DRUG THERAPY: ICD-10-CM

## 2025-01-15 DIAGNOSIS — D50.8 IRON DEFICIENCY ANEMIA SECONDARY TO INADEQUATE DIETARY IRON INTAKE: ICD-10-CM

## 2025-01-15 LAB — PATHOLOGIST INTERPRETATION SPE: NORMAL

## 2025-01-15 PROCEDURE — 3079F DIAST BP 80-89 MM HG: CPT | Mod: HCNC,CPTII,S$GLB, | Performed by: INTERNAL MEDICINE

## 2025-01-15 PROCEDURE — 1160F RVW MEDS BY RX/DR IN RCRD: CPT | Mod: HCNC,CPTII,S$GLB, | Performed by: INTERNAL MEDICINE

## 2025-01-15 PROCEDURE — 3008F BODY MASS INDEX DOCD: CPT | Mod: HCNC,CPTII,S$GLB, | Performed by: INTERNAL MEDICINE

## 2025-01-15 PROCEDURE — 99999 PR PBB SHADOW E&M-EST. PATIENT-LVL V: CPT | Mod: PBBFAC,HCNC,, | Performed by: INTERNAL MEDICINE

## 2025-01-15 PROCEDURE — 1159F MED LIST DOCD IN RCRD: CPT | Mod: HCNC,CPTII,S$GLB, | Performed by: INTERNAL MEDICINE

## 2025-01-15 PROCEDURE — 99214 OFFICE O/P EST MOD 30 MIN: CPT | Mod: HCNC,S$GLB,, | Performed by: INTERNAL MEDICINE

## 2025-01-15 PROCEDURE — 3077F SYST BP >= 140 MM HG: CPT | Mod: HCNC,CPTII,S$GLB, | Performed by: INTERNAL MEDICINE

## 2025-01-15 RX ORDER — DIPHENHYDRAMINE HYDROCHLORIDE 50 MG/ML
50 INJECTION INTRAMUSCULAR; INTRAVENOUS ONCE AS NEEDED
OUTPATIENT
Start: 2025-01-15

## 2025-01-15 RX ORDER — EPINEPHRINE 0.3 MG/.3ML
0.3 INJECTION SUBCUTANEOUS ONCE AS NEEDED
OUTPATIENT
Start: 2025-01-15

## 2025-01-15 RX ORDER — HEPARIN 100 UNIT/ML
500 SYRINGE INTRAVENOUS
OUTPATIENT
Start: 2025-01-15

## 2025-01-15 RX ORDER — HEPARIN 100 UNIT/ML
500 SYRINGE INTRAVENOUS
OUTPATIENT
Start: 2025-01-22

## 2025-01-15 RX ORDER — SODIUM CHLORIDE 0.9 % (FLUSH) 0.9 %
10 SYRINGE (ML) INJECTION
OUTPATIENT
Start: 2025-01-15

## 2025-01-15 RX ORDER — DIPHENHYDRAMINE HYDROCHLORIDE 50 MG/ML
50 INJECTION INTRAMUSCULAR; INTRAVENOUS ONCE AS NEEDED
OUTPATIENT
Start: 2025-01-22

## 2025-01-15 RX ORDER — EPINEPHRINE 0.3 MG/.3ML
0.3 INJECTION SUBCUTANEOUS ONCE AS NEEDED
OUTPATIENT
Start: 2025-01-22

## 2025-01-15 RX ORDER — SODIUM CHLORIDE 0.9 % (FLUSH) 0.9 %
10 SYRINGE (ML) INJECTION
OUTPATIENT
Start: 2025-01-22

## 2025-01-15 NOTE — PROGRESS NOTES
Subjective:       Patient ID: Marilou Daniel is a 49 y.o. female.    Chief Complaint: Results and Anemia    HPI:  49-year-old female history of monoclonal gammopathy of undetermined significance low-level iron deficiency patient has had EGD in 2020 and colonoscopies in 2024.  Patient returns for clinical follow-up followed by Rheumatology for lupus    Past Medical History:   Diagnosis Date    Anemia     Arthritis     Connective tissue disease 1999    COVID-19 in immunocompromised patient 02/05/2021    Diabetes mellitus     Diabetes mellitus due to underlying condition with complication, with long-term current use of insulin 03/29/2022    Drug induced insomnia 12/15/2020    R/T chronic steroid use for SLE.    Encounter for preventive health examination 09/02/2024    Gastroesophageal reflux disease 03/23/2020    Hypertension     Lupus 1999    Situational anxiety 08/24/2021    Thyroid nodule greater than or equal to 1 cm in diameter incidentally noted on imaging study 11/19/2020    Vasculitis      Family History   Problem Relation Name Age of Onset    Lung cancer Mother Nichole 59        +hx of smoking    Cancer Father Jack         type uk? stomach?    Breast cancer Other Nimo 58        UL mastect, mets to back & lungs    Breast cancer Other Roz 63        chemo, XRT, UL mastect?    Autism Other Angel Jr     Asthma Other Ирина     Obesity Other Ирина     Liver cancer Other Afshin 58        mets to lungs, pancreas, stomach    Cancer Other Reji         type uk?     Social History     Socioeconomic History    Marital status:    Tobacco Use    Smoking status: Never    Smokeless tobacco: Never   Substance and Sexual Activity    Alcohol use: Never    Drug use: Never    Sexual activity: Yes     Partners: Male     Birth control/protection: See Surgical Hx     Social Drivers of Health     Financial Resource Strain: Low Risk  (8/26/2024)    Overall Financial Resource Strain (CARDIA)     Difficulty  of Paying Living Expenses: Not hard at all   Food Insecurity: No Food Insecurity (8/26/2024)    Hunger Vital Sign     Worried About Running Out of Food in the Last Year: Never true     Ran Out of Food in the Last Year: Never true   Transportation Needs: No Transportation Needs (8/26/2024)    PRAPARE - Transportation     Lack of Transportation (Medical): No     Lack of Transportation (Non-Medical): No   Physical Activity: Insufficiently Active (8/26/2024)    Exercise Vital Sign     Days of Exercise per Week: 1 day     Minutes of Exercise per Session: 30 min   Stress: No Stress Concern Present (8/26/2024)    [employer] - Occupational Stress Questionnaire     Feeling of Stress : Not at all   Housing Stability: Low Risk  (8/26/2024)    Housing Stability Vital Sign     Unable to Pay for Housing in the Last Year: No     Homeless in the Last Year: No     Past Surgical History:   Procedure Laterality Date    ABLATION      COLONOSCOPY N/A 06/24/2020    Procedure: COLONOSCOPY;  Surgeon: Nevin Penny MD;  Location: [facility];  Service: Endoscopy;  Laterality: N/A;    COLONOSCOPY N/A 03/28/2024    Procedure: COLONOSCOPY;  Surgeon: José Manuel Huerta MD;  Location: [facility];  Service: Endoscopy;  Laterality: N/A;    ESOPHAGOGASTRODUODENOSCOPY N/A 06/24/2020    Procedure: ESOPHAGOGASTRODUODENOSCOPY (EGD);  Surgeon: Nevin Penny MD;  Location: [facility];  Service: Endoscopy;  Laterality: N/A;    RIGHT HEART CATHETERIZATION      TOTAL KNEE ARTHROPLASTY Right     TUBAL LIGATION      WRIST SURGERY Bilateral        Labs:  Lab Results   Component Value Date    WBC 8.01 01/10/2025    WBC 8.01 01/10/2025    HGB 9.5 (L) 01/10/2025    HGB 9.5 (L) 01/10/2025    HCT 29.3 (L) 01/10/2025    HCT 29.3 (L) 01/10/2025    MCV 90 01/10/2025    MCV 90 01/10/2025     01/10/2025     01/10/2025     BMP  Lab Results   Component Value Date     01/10/2025    K 4.8 01/10/2025     01/10/2025     CO2 25 01/10/2025    BUN 8 01/10/2025    CREATININE 0.9 01/10/2025    CALCIUM 9.8 01/10/2025    ANIONGAP 8 01/10/2025    ESTGFRAFRICA >60.0 03/29/2022    EGFRNONAA >60.0 03/29/2022     Lab Results   Component Value Date    ALT 12 01/10/2025    AST 13 01/10/2025    ALKPHOS 72 01/10/2025    BILITOT 0.6 01/10/2025       Lab Results   Component Value Date    IRON 47 01/10/2025    TIBC 274 01/10/2025    FERRITIN 660 (H) 01/10/2025     Lab Results   Component Value Date    VXAKRUHI11 605 10/29/2024     Lab Results   Component Value Date    FOLATE 6.8 01/02/2024     Lab Results   Component Value Date    TSH 0.468 01/02/2024         Review of Systems   Constitutional:  Positive for fatigue. Negative for activity change, appetite change, chills, diaphoresis, fever and unexpected weight change.   HENT:  Negative for congestion, dental problem, drooling, ear discharge, ear pain, facial swelling, hearing loss, mouth sores, nosebleeds, postnasal drip, rhinorrhea, sinus pressure, sneezing, sore throat, tinnitus, trouble swallowing and voice change.    Eyes:  Negative for photophobia, pain, discharge, redness, itching and visual disturbance.   Respiratory:  Negative for cough, choking, chest tightness, shortness of breath, wheezing and stridor.    Cardiovascular:  Negative for chest pain, palpitations and leg swelling.   Gastrointestinal:  Negative for abdominal distention, abdominal pain, anal bleeding, blood in stool, constipation, diarrhea, nausea, rectal pain and vomiting.   Endocrine: Negative for cold intolerance, heat intolerance, polydipsia, polyphagia and polyuria.   Genitourinary:  Negative for decreased urine volume, difficulty urinating, dyspareunia, dysuria, enuresis, flank pain, frequency, genital sores, hematuria, menstrual problem, pelvic pain, urgency, vaginal bleeding, vaginal discharge and vaginal pain.   Musculoskeletal:  Negative for arthralgias, back pain, gait problem, joint swelling, myalgias, neck pain  and neck stiffness.   Skin:  Negative for color change, pallor and rash.   Allergic/Immunologic: Negative for environmental allergies, food allergies and immunocompromised state.   Neurological:  Positive for weakness. Negative for dizziness, tremors, seizures, syncope, facial asymmetry, speech difficulty, light-headedness, numbness and headaches.   Hematological:  Negative for adenopathy. Does not bruise/bleed easily.   Psychiatric/Behavioral:  Negative for agitation, behavioral problems, confusion, decreased concentration, dysphoric mood, hallucinations, self-injury, sleep disturbance and suicidal ideas. The patient is not nervous/anxious and is not hyperactive.        Objective:      Physical Exam  Vitals reviewed.   Constitutional:       General: She is not in acute distress.     Appearance: She is well-developed. She is not diaphoretic.   HENT:      Head: Normocephalic and atraumatic.      Right Ear: External ear normal.      Left Ear: External ear normal.      Nose: Nose normal.      Right Sinus: No maxillary sinus tenderness or frontal sinus tenderness.      Left Sinus: No maxillary sinus tenderness or frontal sinus tenderness.      Mouth/Throat:      Pharynx: No oropharyngeal exudate.   Eyes:      General: Lids are normal. No scleral icterus.        Right eye: No discharge.         Left eye: No discharge.      Conjunctiva/sclera: Conjunctivae normal.      Right eye: Right conjunctiva is not injected. No hemorrhage.     Left eye: Left conjunctiva is not injected. No hemorrhage.     Pupils: Pupils are equal, round, and reactive to light.   Neck:      Thyroid: No thyromegaly.      Vascular: No JVD.      Trachea: No tracheal deviation.   Cardiovascular:      Rate and Rhythm: Normal rate and regular rhythm.   Pulmonary:      Effort: Pulmonary effort is normal. No respiratory distress.      Breath sounds: Normal breath sounds. No stridor.   Chest:      Chest wall: No tenderness.   Abdominal:      General: Bowel  sounds are normal. There is no distension.      Palpations: Abdomen is soft. There is no hepatomegaly, splenomegaly or mass.      Tenderness: There is no abdominal tenderness. There is no rebound.   Musculoskeletal:         General: No tenderness. Normal range of motion.      Cervical back: Normal range of motion and neck supple.   Lymphadenopathy:      Cervical: No cervical adenopathy.      Upper Body:      Right upper body: No supraclavicular adenopathy.      Left upper body: No supraclavicular adenopathy.   Skin:     General: Skin is dry.      Findings: No erythema or rash.   Neurological:      Mental Status: She is alert and oriented to person, place, and time.      Cranial Nerves: No cranial nerve deficit.      Coordination: Coordination normal.   Psychiatric:         Behavior: Behavior normal.         Thought Content: Thought content normal.         Judgment: Judgment normal.             Assessment:      1. MGUS (monoclonal gammopathy of unknown significance)    2. Iron deficiency anemia secondary to inadequate dietary iron intake    3. Other forms of systemic lupus erythematosus, unspecified organ involvement status    4. Immunocompromised state due to drug therapy           Med Onc Chart Routing      Follow up with physician . Return 4-6 months CBC CMP serum iron TIBC ferritin SPEP and free light chain prior   Follow up with B[facility]    Infusion scheduling note    Injection scheduling note Ferric carboxymaltose x2   Labs    Imaging    Pharmacy appointment    Other referrals                   Plan:     Low-level iron deficiency.  Will proceed with intravenous iron intolerant and nonresponsive to oral iron proceed with follow-up in 4-6 months with laboratory studies prior.        Dale Powell Jr, MD FACP

## 2025-01-24 ENCOUNTER — TELEPHONE (OUTPATIENT)
Dept: PRIMARY CARE CLINIC | Facility: CLINIC | Age: 50
End: 2025-01-24
Payer: MEDICARE

## 2025-01-24 NOTE — TELEPHONE ENCOUNTER
----- Message from Alissa Bautista MD sent at 1/17/2025  3:54 PM CST -----  Regarding: reschedule  Please contact pt to reschedule appt she missed 1/17/25 w me for sometime prior to March 4th  She is taking controlled substance so needs to be seen min every 3 mos for me to cont to prescribe for her - TY

## 2025-02-11 ENCOUNTER — INFUSION (OUTPATIENT)
Dept: INFUSION THERAPY | Facility: HOSPITAL | Age: 50
End: 2025-02-11
Attending: INTERNAL MEDICINE
Payer: MEDICARE

## 2025-02-11 DIAGNOSIS — B99.9 ANEMIA OF INFECTION AND CHRONIC DISEASE: Primary | ICD-10-CM

## 2025-02-11 DIAGNOSIS — D63.8 ANEMIA OF INFECTION AND CHRONIC DISEASE: Primary | ICD-10-CM

## 2025-02-11 RX ORDER — METHYLPREDNISOLONE SOD SUCC 125 MG
40 VIAL (EA) INJECTION ONCE
Status: DISCONTINUED | OUTPATIENT
Start: 2025-02-11 | End: 2025-02-11 | Stop reason: HOSPADM

## 2025-02-11 RX ORDER — METHYLPREDNISOLONE SOD SUCC 125 MG
40 VIAL (EA) INJECTION ONCE
Start: 2025-02-19

## 2025-02-11 RX ORDER — SODIUM CHLORIDE 0.9 % (FLUSH) 0.9 %
10 SYRINGE (ML) INJECTION
Status: DISCONTINUED | OUTPATIENT
Start: 2025-02-11 | End: 2025-02-11 | Stop reason: HOSPADM

## 2025-02-11 RX ORDER — METHYLPREDNISOLONE SOD SUCC 125 MG
40 VIAL (EA) INJECTION ONCE
Status: CANCELLED
Start: 2025-02-11

## 2025-02-13 RX ORDER — EPINEPHRINE 0.3 MG/.3ML
0.3 INJECTION SUBCUTANEOUS ONCE AS NEEDED
OUTPATIENT
Start: 2025-02-18

## 2025-02-13 RX ORDER — METHYLPREDNISOLONE SOD SUCC 125 MG
40 VIAL (EA) INJECTION ONCE
OUTPATIENT
Start: 2025-02-18

## 2025-02-13 RX ORDER — HEPARIN 100 UNIT/ML
500 SYRINGE INTRAVENOUS
OUTPATIENT
Start: 2025-02-18

## 2025-02-13 RX ORDER — SODIUM CHLORIDE 0.9 % (FLUSH) 0.9 %
10 SYRINGE (ML) INJECTION
OUTPATIENT
Start: 2025-02-18

## 2025-02-13 RX ORDER — DIPHENHYDRAMINE HYDROCHLORIDE 50 MG/ML
50 INJECTION INTRAMUSCULAR; INTRAVENOUS ONCE AS NEEDED
OUTPATIENT
Start: 2025-02-18

## 2025-02-18 ENCOUNTER — INFUSION (OUTPATIENT)
Dept: INFUSION THERAPY | Facility: HOSPITAL | Age: 50
End: 2025-02-18
Attending: INTERNAL MEDICINE
Payer: MEDICARE

## 2025-02-18 VITALS
SYSTOLIC BLOOD PRESSURE: 152 MMHG | OXYGEN SATURATION: 98 % | HEART RATE: 71 BPM | TEMPERATURE: 98 F | RESPIRATION RATE: 18 BRPM | DIASTOLIC BLOOD PRESSURE: 87 MMHG

## 2025-02-18 DIAGNOSIS — B99.9 ANEMIA OF INFECTION AND CHRONIC DISEASE: Primary | ICD-10-CM

## 2025-02-18 DIAGNOSIS — D63.8 ANEMIA OF INFECTION AND CHRONIC DISEASE: Primary | ICD-10-CM

## 2025-02-18 PROCEDURE — 96375 TX/PRO/DX INJ NEW DRUG ADDON: CPT | Mod: HCNC

## 2025-02-18 PROCEDURE — 25000003 PHARM REV CODE 250: Mod: HCNC

## 2025-02-18 PROCEDURE — 63600175 PHARM REV CODE 636 W HCPCS: Mod: JZ,TB,HCNC

## 2025-02-18 PROCEDURE — 96365 THER/PROPH/DIAG IV INF INIT: CPT | Mod: HCNC

## 2025-02-18 RX ORDER — METHYLPREDNISOLONE SOD SUCC 125 MG
40 VIAL (EA) INJECTION ONCE
Status: COMPLETED | OUTPATIENT
Start: 2025-02-18 | End: 2025-02-18

## 2025-02-18 RX ORDER — SODIUM CHLORIDE 0.9 % (FLUSH) 0.9 %
10 SYRINGE (ML) INJECTION
Status: DISCONTINUED | OUTPATIENT
Start: 2025-02-18 | End: 2025-02-18 | Stop reason: HOSPADM

## 2025-02-18 RX ADMIN — METHYLPREDNISOLONE SODIUM SUCCINATE 40 MG: 125 INJECTION, POWDER, FOR SOLUTION INTRAMUSCULAR; INTRAVENOUS at 09:02

## 2025-02-18 RX ADMIN — FERRIC CARBOXYMALTOSE INJECTION 750 MG: 50 INJECTION, SOLUTION INTRAVENOUS at 09:02

## 2025-02-18 RX ADMIN — SODIUM CHLORIDE: 9 INJECTION, SOLUTION INTRAVENOUS at 09:02

## 2025-02-18 NOTE — PLAN OF CARE
Patient tolerated IV Injectafer well today; no adverse reaction noted.  No significant complaints voiced.  IV discontinued with catheter intact and pressure dressing applied to the site.  Has f/u appt(s) scheduled per MD request.  No questions or concerns voiced.        Problem: Adult Inpatient Plan of Care  Goal: Plan of Care Review  Outcome: Progressing  Flowsheets (Taken 2/18/2025 0951)  Plan of Care Reviewed With: patient  Goal: Patient-Specific Goal (Individualized)  Outcome: Progressing  Flowsheets (Taken 2/18/2025 0951)  Individualized Care Needs: pt requests feet elevated, with snack and drink provided  Anxieties, Fears or Concerns: Pt denies  Patient/Family-Specific Goals (Include Timeframe): Pt to tolerate IV iron infusion well today  Goal: Absence of Hospital-Acquired Illness or Injury  Outcome: Progressing  Intervention: Identify and Manage Fall Risk  Flowsheets (Taken 2/18/2025 0951)  Safety Promotion/Fall Prevention:   in recliner, wheels locked   nonskid shoes/socks when out of bed   room near unit station  Intervention: Prevent Infection  Flowsheets (Taken 2/18/2025 0951)  Infection Prevention:   hand hygiene promoted   personal protective equipment utilized  Goal: Optimal Comfort and Wellbeing  Outcome: Progressing  Intervention: Monitor Pain and Promote Comfort  Flowsheets (Taken 2/18/2025 0951)  Pain Management Interventions:   quiet environment facilitated   relaxation techniques promoted   warm blanket provided  Intervention: Provide Person-Centered Care  Flowsheets (Taken 2/18/2025 0951)  Trust Relationship/Rapport:   care explained   reassurance provided   thoughts/feelings acknowledged   choices provided   emotional support provided   empathic listening provided   questions answered   questions encouraged  Goal: Readiness for Transition of Care  Outcome: Progressing     Problem: Anemia  Goal: Anemia Symptom Improvement  Outcome: Progressing  Intervention: Monitor and Manage Anemia  Flowsheets  (Taken 2/18/2025 0153)  Safety Promotion/Fall Prevention:   in recliner, wheels locked   nonskid shoes/socks when out of bed   room near unit station

## 2025-02-18 NOTE — DISCHARGE INSTRUCTIONS
Thank you for letting me take care of YOU!!    Lito, RN and Cande, RN          [facility]  [address] [address]  [phone] phone     [phone] fax  Hours of Operation: Monday- Friday 8:00am- 5:00pm  After hours phone  [phone]  Hematology / Oncology Physicians on call:    Dr. Andre Castillo        Nurse Practitioners:    Tita Lama, VIVIANE Roche, IVON Thomas, VIVIANE Chavez, VIVIANE Gold, VIVIANE      Please don't hesitate to call if you have any concerns.

## 2025-02-21 DIAGNOSIS — Z00.00 ENCOUNTER FOR MEDICARE ANNUAL WELLNESS EXAM: ICD-10-CM

## 2025-02-25 ENCOUNTER — INFUSION (OUTPATIENT)
Dept: INFUSION THERAPY | Facility: HOSPITAL | Age: 50
End: 2025-02-25
Attending: INTERNAL MEDICINE
Payer: MEDICARE

## 2025-02-25 VITALS
DIASTOLIC BLOOD PRESSURE: 88 MMHG | TEMPERATURE: 97 F | HEART RATE: 91 BPM | OXYGEN SATURATION: 100 % | SYSTOLIC BLOOD PRESSURE: 149 MMHG | RESPIRATION RATE: 18 BRPM

## 2025-02-25 DIAGNOSIS — B99.9 ANEMIA OF INFECTION AND CHRONIC DISEASE: Primary | ICD-10-CM

## 2025-02-25 DIAGNOSIS — D63.8 ANEMIA OF INFECTION AND CHRONIC DISEASE: Primary | ICD-10-CM

## 2025-02-25 PROCEDURE — 25000003 PHARM REV CODE 250: Mod: HCNC | Performed by: INTERNAL MEDICINE

## 2025-02-25 PROCEDURE — 63600175 PHARM REV CODE 636 W HCPCS: Mod: JZ,TB,HCNC | Performed by: INTERNAL MEDICINE

## 2025-02-25 PROCEDURE — 96365 THER/PROPH/DIAG IV INF INIT: CPT | Mod: HCNC

## 2025-02-25 RX ORDER — DIPHENHYDRAMINE HYDROCHLORIDE 50 MG/ML
50 INJECTION INTRAMUSCULAR; INTRAVENOUS ONCE AS NEEDED
Status: DISCONTINUED | OUTPATIENT
Start: 2025-02-25 | End: 2025-02-25 | Stop reason: HOSPADM

## 2025-02-25 RX ORDER — METHYLPREDNISOLONE SOD SUCC 125 MG
40 VIAL (EA) INJECTION ONCE
Status: DISCONTINUED | OUTPATIENT
Start: 2025-02-25 | End: 2025-02-25 | Stop reason: HOSPADM

## 2025-02-25 RX ORDER — SODIUM CHLORIDE 0.9 % (FLUSH) 0.9 %
10 SYRINGE (ML) INJECTION
Status: DISCONTINUED | OUTPATIENT
Start: 2025-02-25 | End: 2025-02-25 | Stop reason: HOSPADM

## 2025-02-25 RX ORDER — EPINEPHRINE 0.3 MG/.3ML
0.3 INJECTION SUBCUTANEOUS ONCE AS NEEDED
Status: DISCONTINUED | OUTPATIENT
Start: 2025-02-25 | End: 2025-02-25 | Stop reason: HOSPADM

## 2025-02-25 RX ADMIN — FERRIC CARBOXYMALTOSE INJECTION 750 MG: 50 INJECTION, SOLUTION INTRAVENOUS at 09:02

## 2025-02-25 NOTE — PLAN OF CARE
Pt refused Steroid premedication, styas it makes her BP to high and her BP has been high since last dose.  Problem: Adult Inpatient Plan of Care  Goal: Plan of Care Review  Outcome: Progressing  Flowsheets (Taken 2/25/2025 0952)  Plan of Care Reviewed With: patient  Goal: Patient-Specific Goal (Individualized)  Outcome: Progressing  Flowsheets (Taken 2/25/2025 0952)  Individualized Care Needs: feet elevated pillow warm blanket and snack provided  Anxieties, Fears or Concerns: denies  Goal: Absence of Hospital-Acquired Illness or Injury  Outcome: Progressing  Intervention: Identify and Manage Fall Risk  Flowsheets (Taken 2/25/2025 0952)  Safety Promotion/Fall Prevention:   assistive device/personal item within reach   Fall Risk reviewed with patient/family   in recliner, wheels locked   medications reviewed   patient expresses understanding of fall risk and prevention  Intervention: Prevent Infection  Flowsheets (Taken 2/25/2025 0952)  Infection Prevention:   environmental surveillance performed   equipment surfaces disinfected   hand hygiene promoted   personal protective equipment utilized  Goal: Optimal Comfort and Wellbeing  Outcome: Progressing  Intervention: Monitor Pain and Promote Comfort  Flowsheets (Taken 2/25/2025 0952)  Pain Management Interventions:   position adjusted   quiet environment facilitated   relaxation techniques promoted   warm blanket provided  Intervention: Provide Person-Centered Care  Flowsheets (Taken 2/25/2025 0952)  Trust Relationship/Rapport:   care explained   reassurance provided   choices provided   thoughts/feelings acknowledged   emotional support provided   empathic listening provided   questions answered   questions encouraged  Goal: Readiness for Transition of Care  Outcome: Progressing     Problem: Anemia  Goal: Anemia Symptom Improvement  Outcome: Progressing  Intervention: Monitor and Manage Anemia  Flowsheets (Taken 2/25/2025 0952)  Safety Promotion/Fall Prevention:    assistive device/personal item within reach   Fall Risk reviewed with patient/family   in recliner, wheels locked   medications reviewed   patient expresses understanding of fall risk and prevention  Fatigue Management: frequent rest breaks encouraged

## 2025-03-06 DIAGNOSIS — F19.982 DRUG-INDUCED INSOMNIA: Chronic | ICD-10-CM

## 2025-03-06 RX ORDER — ZOLPIDEM TARTRATE 10 MG/1
10 TABLET ORAL NIGHTLY
Qty: 90 TABLET | Refills: 0 | Status: CANCELLED | OUTPATIENT
Start: 2025-03-06 | End: 2025-09-02

## 2025-03-07 ENCOUNTER — OFFICE VISIT (OUTPATIENT)
Dept: PRIMARY CARE CLINIC | Facility: CLINIC | Age: 50
End: 2025-03-07
Payer: MEDICARE

## 2025-03-07 VITALS — SYSTOLIC BLOOD PRESSURE: 140 MMHG | DIASTOLIC BLOOD PRESSURE: 78 MMHG | HEART RATE: 82 BPM

## 2025-03-07 DIAGNOSIS — E11.59 HYPERTENSION ASSOCIATED WITH DIABETES: Primary | Chronic | ICD-10-CM

## 2025-03-07 DIAGNOSIS — E11.9 TYPE 2 DIABETES MELLITUS WITHOUT COMPLICATION, WITH LONG-TERM CURRENT USE OF INSULIN: ICD-10-CM

## 2025-03-07 DIAGNOSIS — F19.982 DRUG-INDUCED INSOMNIA: Chronic | ICD-10-CM

## 2025-03-07 DIAGNOSIS — Z79.4 TYPE 2 DIABETES MELLITUS WITHOUT COMPLICATION, WITH LONG-TERM CURRENT USE OF INSULIN: ICD-10-CM

## 2025-03-07 DIAGNOSIS — I15.2 HYPERTENSION ASSOCIATED WITH DIABETES: Primary | Chronic | ICD-10-CM

## 2025-03-07 RX ORDER — ZOLPIDEM TARTRATE 10 MG/1
10 TABLET ORAL NIGHTLY
Qty: 90 TABLET | Refills: 0 | Status: SHIPPED | OUTPATIENT
Start: 2025-03-07 | End: 2025-06-05

## 2025-03-07 RX ORDER — DEXTROSE 4 G
TABLET,CHEWABLE ORAL
Qty: 1 EACH | Refills: 0 | Status: SHIPPED | OUTPATIENT
Start: 2025-03-07

## 2025-03-07 NOTE — ASSESSMENT & PLAN NOTE
Controlled this visit  Continue HTN management  Continue daily activity, low sodium diet, weight loss efforts  BP Readings from Last 3 Encounters:   03/07/25 (!) 140/78   02/25/25 (!) 149/88   02/18/25 (!) 152/87

## 2025-03-07 NOTE — TELEPHONE ENCOUNTER
Pt had virtual visit with VIVIANE Grande, on today.    NOY        ----- Message from Terri sent at 3/6/2025 11:38 AM CST -----  Regarding: Medication  Needs a refill of the Ambien. Original prescription  on . Pt is asking for refill to be called into [facility] in [facility]. Patient would like a call back if possible to know when script has been sent in.

## 2025-03-07 NOTE — PROGRESS NOTES
Primary Care Telemedicine Appointment      The patient location is:  Patient Home   The chief complaint leading to consultation is: medication refill  Total time spent on medical decision making was 17 min.    Visit type: Virtual visit with synchronous audio and video.  Each patient to whom he or she provides medical services by telemedicine is:  (1) informed of the relationship between the physician and patient and the respective role of any other health care provider with respect to management of the patient; and (2) notified that he or she may decline to receive medical services by telemedicine and may withdraw from such care at any time.      Subjective:      Patient ID: Marilou Daniel is a 49 y.o. female     Chief Complaint: Medication Refill    Prior to this visit, patient's last encounter with PCP was 12/30/2024.    History of Present Illness    CHIEF COMPLAINT:  Marilou presents today for medication refills.    SLEEP:  She reports consistent sleep issues without improvement or worsening. She currently takes Ambien 10 mg but is not taking melatonin. She previously tried a medication starting with 'b' that was ineffective after 3 days of use.    DIABETES:  Her glucose meter stopped working after 2 years of use. She still has test strips for the True Matrix meter but requires a new device. She uses the meter primarily when blood sugar readings are high or low.    MEDICAL HISTORY:  She has lupus managed by rheumatology, blood disorders followed by hematology, and cardiac issues requiring specialist care.    MEDICATIONS:  Most of her medications are on a 90-day supply.           Past Medical History:   Diagnosis Date    Anemia     Arthritis     Connective tissue disease 1999    COVID-19 in immunocompromised patient 02/05/2021    Diabetes mellitus     Diabetes mellitus due to underlying condition with complication, with long-term current use of insulin 03/29/2022    Drug induced insomnia 12/15/2020     R/T chronic steroid use for SLE.    Encounter for preventive health examination 09/02/2024    Gastroesophageal reflux disease 03/23/2020    Hypertension     Lupus 1999    Situational anxiety 08/24/2021    Thyroid nodule greater than or equal to 1 cm in diameter incidentally noted on imaging study 11/19/2020    Vasculitis        Review of Systems   Psychiatric/Behavioral:  Positive for sleep disturbance.        Objective:     Vitals:    03/07/25 1057   BP: (!) 140/78   Pulse: 82         There is no height or weight on file to calculate BMI.  BP Readings from Last 3 Encounters:   03/07/25 (!) 140/78   02/25/25 (!) 149/88   02/18/25 (!) 152/87      Wt Readings from Last 3 Encounters:   01/15/25 1315 79.6 kg (175 lb 7.8 oz)   08/28/24 0940 92.6 kg (204 lb 2.3 oz)   08/26/24 1010 91.5 kg (201 lb 11.5 oz)        Physical Exam  Vitals reviewed.   Constitutional:       Appearance: Normal appearance.   Pulmonary:      Effort: Pulmonary effort is normal.   Musculoskeletal:         General: Normal range of motion.   Neurological:      Mental Status: She is alert.   Psychiatric:         Mood and Affect: Mood normal.         Behavior: Behavior normal.         Thought Content: Thought content normal.         Judgment: Judgment normal.     Physical exam limited due to virtual visit.     Lab Results   Component Value Date    WBC 8.01 01/10/2025    WBC 8.01 01/10/2025    HGB 9.5 (L) 01/10/2025    HGB 9.5 (L) 01/10/2025    HCT 29.3 (L) 01/10/2025    HCT 29.3 (L) 01/10/2025     01/10/2025     01/10/2025    CHOL 140 10/29/2024    TRIG 108 10/29/2024    HDL 36 (L) 10/29/2024    ALT 12 01/10/2025    AST 13 01/10/2025     01/10/2025    K 4.8 01/10/2025     01/10/2025    CREATININE 0.9 01/10/2025    BUN 8 01/10/2025    CO2 25 01/10/2025    TSH 0.468 01/02/2024    INR 1.0 08/16/2024    HGBA1C 5.2 10/29/2024         Assessment:   49 y.o. female with multiple co-morbid illnesses here for continued follow up of  medical problems.      The primary encounter diagnosis was Hypertension associated with diabetes. Diagnoses of Drug-induced insomnia and Type 2 diabetes mellitus without complication, with long-term current use of insulin were also pertinent to this visit.    Plan:     Health Maintenance         Date Due Completion Date    Foot Exam Never done ---    TETANUS VACCINE Never done ---    Shingles Vaccine (1 of 2) Never done ---    Influenza Vaccine (1) Never done ---    COVID-19 Vaccine (5 - 2024-25 season) 09/01/2024 3/16/2024    Mammogram 01/05/2025 1/5/2024    Diabetic Eye Exam 03/20/2025 3/20/2024    Hemoglobin A1c 04/29/2025 10/29/2024    Diabetes Urine Screening 08/28/2025 8/28/2024    Lipid Panel 10/29/2025 10/29/2024    High Dose Statin 01/15/2026 1/15/2025    Cervical Cancer Screening 09/29/2026 9/29/2021    Colorectal Cancer Screening 03/28/2029 3/28/2024    RSV Vaccine (Age 60+ and Pregnant patients) (1 - 1-dose 75+ series) 11/23/2048 ---            1. Hypertension associated with diabetes  Assessment & Plan:  Controlled this visit  Continue HTN management  Continue daily activity, low sodium diet, weight loss efforts  BP Readings from Last 3 Encounters:   03/07/25 (!) 140/78   02/25/25 (!) 149/88   02/18/25 (!) 152/87         Orders:  -     blood-glucose meter (TRUE METRIX GLUCOSE METER) Misc; Use as directed  Dispense: 1 each; Refill: 0  -     blood sugar diagnostic (TRUE METRIX GLUCOSE TEST STRIP) Strp; Test 4 times daily.  Dispense: 100 strip; Refill: 0    2. Drug-induced insomnia  Assessment & Plan:  Long term dependence on Ambien---  Recommend trying Melatonin and patient refused  Tried Belsomra but patient states not affective   Discuss need to be seen at least every 30-90 days for management of controlled substance.    checked this visit.     Orders:  -     zolpidem (AMBIEN) 10 mg Tab; Take 1 tablet (10 mg total) by mouth every evening. For sleep.  Dispense: 90 tablet; Refill: 0    3. Type 2  diabetes mellitus without complication, with long-term current use of insulin  -     blood-glucose meter (TRUE METRIX GLUCOSE METER) Misc; Use as directed  Dispense: 1 each; Refill: 0  -     blood sugar diagnostic (TRUE METRIX GLUCOSE TEST STRIP) Strp; Test 4 times daily.  Dispense: 100 strip; Refill: 0     Discuss with patient about needing to be seen by a provider within 3 months to continue controlled substance refills and management. Patient verbalized understanding.     Follow up in about 3 months (around 6/7/2025) for Virtual Visit; chaya/ Michele early morning appointment .     This note was generated with the assistance of ambient listening technology. Verbal consent was obtained by the patient and accompanying visitor(s) for the recording of patient appointment to facilitate this note. I attest to having reviewed and edited the generated note for accuracy, though some syntax or spelling errors may persist. Please contact the author of this note for any clarification.             REBECCA Gonzalez, FNP-C  [facility] [address] [facility] [place] [address]   [phone]   [phone] fax

## 2025-03-07 NOTE — ASSESSMENT & PLAN NOTE
Long term dependence on Ambien---  Recommend trying Melatonin and patient refused  Tried Belsomra but patient states not affective   Discuss need to be seen at least every 30-90 days for management of controlled substance.    checked this visit.

## 2025-04-22 ENCOUNTER — OFFICE VISIT (OUTPATIENT)
Dept: PRIMARY CARE CLINIC | Facility: CLINIC | Age: 50
End: 2025-04-22
Payer: MEDICARE

## 2025-04-22 VITALS
BODY MASS INDEX: 27.04 KG/M2 | TEMPERATURE: 98 F | DIASTOLIC BLOOD PRESSURE: 72 MMHG | WEIGHT: 172.31 LBS | OXYGEN SATURATION: 97 % | HEART RATE: 64 BPM | HEIGHT: 67 IN | SYSTOLIC BLOOD PRESSURE: 124 MMHG

## 2025-04-22 DIAGNOSIS — M31.0 LEUKOCYTOCLASTIC VASCULITIS: ICD-10-CM

## 2025-04-22 DIAGNOSIS — M32.8 OTHER FORMS OF SYSTEMIC LUPUS ERYTHEMATOSUS, UNSPECIFIED ORGAN INVOLVEMENT STATUS: Chronic | ICD-10-CM

## 2025-04-22 DIAGNOSIS — G89.29 CHRONIC THORACIC BACK PAIN, UNSPECIFIED BACK PAIN LATERALITY: ICD-10-CM

## 2025-04-22 DIAGNOSIS — M87.052 AVASCULAR NECROSIS OF BONES OF BOTH HIPS: ICD-10-CM

## 2025-04-22 DIAGNOSIS — I15.2 HYPERTENSION ASSOCIATED WITH DIABETES: Chronic | ICD-10-CM

## 2025-04-22 DIAGNOSIS — M54.6 CHRONIC THORACIC BACK PAIN, UNSPECIFIED BACK PAIN LATERALITY: ICD-10-CM

## 2025-04-22 DIAGNOSIS — M87.051 AVASCULAR NECROSIS OF BONES OF BOTH HIPS: ICD-10-CM

## 2025-04-22 DIAGNOSIS — E11.3212 TYPE 2 DIABETES MELLITUS WITH LEFT EYE AFFECTED BY MILD NONPROLIFERATIVE RETINOPATHY AND MACULAR EDEMA, WITHOUT LONG-TERM CURRENT USE OF INSULIN: Chronic | ICD-10-CM

## 2025-04-22 DIAGNOSIS — F19.982 DRUG-INDUCED INSOMNIA: Chronic | ICD-10-CM

## 2025-04-22 DIAGNOSIS — Z12.31 ENCOUNTER FOR SCREENING MAMMOGRAM FOR MALIGNANT NEOPLASM OF BREAST: Primary | ICD-10-CM

## 2025-04-22 DIAGNOSIS — J30.2 SEASONAL ALLERGIC RHINITIS, UNSPECIFIED TRIGGER: ICD-10-CM

## 2025-04-22 DIAGNOSIS — E11.59 HYPERTENSION ASSOCIATED WITH DIABETES: Chronic | ICD-10-CM

## 2025-04-22 PROBLEM — E66.1 CLASS 1 DRUG-INDUCED OBESITY WITH SERIOUS COMORBIDITY AND BODY MASS INDEX (BMI) OF 31.0 TO 31.9 IN ADULT: Status: RESOLVED | Noted: 2022-09-16 | Resolved: 2025-04-22

## 2025-04-22 PROBLEM — E66.811 CLASS 1 DRUG-INDUCED OBESITY WITH SERIOUS COMORBIDITY AND BODY MASS INDEX (BMI) OF 31.0 TO 31.9 IN ADULT: Status: RESOLVED | Noted: 2022-09-16 | Resolved: 2025-04-22

## 2025-04-22 PROBLEM — E66.01 MORBID (SEVERE) OBESITY DUE TO EXCESS CALORIES: Status: RESOLVED | Noted: 2024-08-27 | Resolved: 2025-04-22

## 2025-04-22 PROCEDURE — 3074F SYST BP LT 130 MM HG: CPT | Mod: HCNC,CPTII,S$GLB, | Performed by: INTERNAL MEDICINE

## 2025-04-22 PROCEDURE — 1160F RVW MEDS BY RX/DR IN RCRD: CPT | Mod: HCNC,CPTII,S$GLB, | Performed by: INTERNAL MEDICINE

## 2025-04-22 PROCEDURE — 3078F DIAST BP <80 MM HG: CPT | Mod: HCNC,CPTII,S$GLB, | Performed by: INTERNAL MEDICINE

## 2025-04-22 PROCEDURE — 99999 PR PBB SHADOW E&M-EST. PATIENT-LVL V: CPT | Mod: PBBFAC,HCNC,, | Performed by: INTERNAL MEDICINE

## 2025-04-22 PROCEDURE — 99215 OFFICE O/P EST HI 40 MIN: CPT | Mod: HCNC,S$GLB,, | Performed by: INTERNAL MEDICINE

## 2025-04-22 PROCEDURE — 3044F HG A1C LEVEL LT 7.0%: CPT | Mod: HCNC,CPTII,S$GLB, | Performed by: INTERNAL MEDICINE

## 2025-04-22 PROCEDURE — 1159F MED LIST DOCD IN RCRD: CPT | Mod: HCNC,CPTII,S$GLB, | Performed by: INTERNAL MEDICINE

## 2025-04-22 PROCEDURE — 3008F BODY MASS INDEX DOCD: CPT | Mod: HCNC,CPTII,S$GLB, | Performed by: INTERNAL MEDICINE

## 2025-04-22 RX ORDER — TRIAMCINOLONE ACETONIDE 1 MG/G
1 OINTMENT TOPICAL 2 TIMES DAILY
COMMUNITY

## 2025-04-22 RX ORDER — CLONIDINE HYDROCHLORIDE 0.1 MG/1
0.2 TABLET ORAL 3 TIMES DAILY PRN
Qty: 90 TABLET | Refills: 1 | Status: SHIPPED | OUTPATIENT
Start: 2025-04-22 | End: 2025-10-19

## 2025-04-22 RX ORDER — DULAGLUTIDE 1.5 MG/.5ML
1.5 INJECTION, SOLUTION SUBCUTANEOUS
COMMUNITY

## 2025-04-22 RX ORDER — NIFEDIPINE 30 MG/1
30 TABLET, EXTENDED RELEASE ORAL 2 TIMES DAILY
COMMUNITY

## 2025-04-22 RX ORDER — MYCOPHENOLATE MOFETIL 500 MG/1
1500 TABLET ORAL 2 TIMES DAILY
COMMUNITY

## 2025-04-22 NOTE — PATIENT INSTRUCTIONS
If you are feeling unwell, we'd like to be the first ones to know here at [facility]! Please give us a call. Same day appointments are our top priority to keep you well and out of the emergency rooms and hospitals. Call [phone] for our direct line. After hours advice is always available. Please call [phone] after hours to speak to the on-call team.      Recommend Tetanus, Shingles, and Covid vaccines that can be scheduled at your pharmacy of choice.    If getting over the counter look for items containing  DEXTROMETHORPHAN-GUAIFENESIN       AVOID decongestants like psuedoephedrine and phenylephrine that raise blood pressure    Recommend also intra-nasal saline water flush at least 2x daily - can use as often as every 2 hrs

## 2025-04-22 NOTE — PROGRESS NOTES
Marilou Daniel  04/22/2025  [id number]    Alissa Bautista MD  Patient Care Team:  Alissa Bautista MD as PCP - General (Internal Medicine)  Joe Yo RD, CDCKARLIE as Dietitian (Endocrinology)  Liza Moon RD, CDE as Dietitian (Diabetes)  Travis Milligan, STEPHANIE (Ophthalmology)  Radha Watson, PharmD as Hypertension Digital Medicine Clinician (Pharmacist)  Alissa Bautista MD as Hypertension Digital Medicine Responsible Provider (Internal Medicine)  Medicare, [employer] Managed as Hypertension Digital Medicine Contract    Visit Type: Follow-up    Ms. Marilou Daniel is a 49 year old female presents for scheduled f/u.     Medical issues include HTN, anemia, MGUS, SLE, leukocytoclastic vasculitis, chronic pain, AVN B hips, DM2 insulin dependent, anxiety, hearing loss, chronic insomnia.    History of Present Illness    CHIEF COMPLAINT:  Ms. Daniel presents today for follow up of multiple chronic conditions including diabetes, hypertension, and allergic rhinitis.    ALLERGIC RHINITIS:  She reports experiencing allergic rhinitis symptoms for the past three weeks, including postnasal drainage and cough. Symptoms worsen with outdoor exposure, with nasal congestion exacerbating after one hour outside. She recently completed a 4-day course of steroids and an antihistamine for sinus and cough relief with some improvement.    HYPERTENSION:  She checks blood pressure 3 times daily and has been on digital blood pressure monitoring for three years, reporting at least one reading daily to the monitoring service. She takes clonidine 0.2mg as needed when systolic pressure exceeds 160 mmHg or diastolic pressure exceeds 90 mmHg. She continues labetalol 300mg twice daily, nifedipine, doxazosin and clonidine patch (amlodipine discontinued). She is followed by external cardiologist, chaya Estrada/EDUARDO.    DIABETES:  She uses Dexcom continuous glucose monitoring system and performs finger  stick glucose measurements to verify readings when values are notably high or low.    MUSCULOSKELETAL PAIN:  She experiences generalized joint pain, particularly in her knees and ankles. She has neuropathy confirmed by nerve testing. C/o burning sensation in her back.    CURRENT MEDICATIONS:  Other medications include Norco 10, hydroxyzine for itching related to Norco, Plaquenil 200mg twice daily, hydrocortisone ointment 2.5% for face, triamcinolone ointment 0.1% for other skin areas, gabapentin 800mg 3 times daily, famotidine daily for antireflux, atorvastatin 20mg for cholesterol and ambien 10 mg for sleep. Prior efforts to decrease ambien dose or try alternate sleep medication have been unsuccessful. Denies any parasomnias, compulsive behaviors or other adverse effects w ambien.       PHQ-4 Score: 1     From last (virtual) visit w me 12/04/24  S/p R TKR 9/11/24 -was seen by Dr. Dale yesterday for concern of possible joint infection  KNEE ISSUES:  Ortho Dr. Dale took a culture and kiana blood to check for MRSA yesterday. Results are pending and expected in a few days.  HYPERTENSION:  She reports her blood pressure has been elevated since her recent surgery. Her digital blood pressure cuff has not been functioning properly, preventing accurate digital readings since December 1st but she has been tracking with another home monitor. November 6th, external cardiologist, Dr. Clark, added 0.2 mg weekly clonidine patch. November 20th, Dr. Clark increased her nifedipine dosage to 90 mg daily.   SLEEP DISORDER:  She tried the Belsomra for 3 days but didn't like how quickly she fell asleep, reports waking up at 3 am and grogginess the next day. She prefers to continue Ambien 10 mg nightly. She has been taking this dose nightly for a very long time and denies any adverse side effects or abnormal sleep/wake behaviors. Reports Ambien 5 mg ineffective.   DIABETES:  She reports experiencing reflux and nausea prior to  Trulicity use but note her symptoms increased with addition of Trulicity requiring addition of famotidine to the protonix. My recommendation is to decrease the Trulicity dose back to 0.75 weekly. She expresses concern about reducing the Trulicity dose due to how effective it has been in helping her manage her diabetes. Her recent A1c is 5.2%, within normal range but she is apprehensive about potential difficulties in glycemic control if the Trulicity dose is reduced. We agreed to revisit this at a later date, while she is dealing with blood pressure and R knee issues, and to continue current 1.5 weekly dose of Trulicity for now.    Hosp/ED/Urgent Care:  4/12/25 Urgent Care URI    Recent appointments:   3/07/25 [facility]  2/25/25 Infusion ferric carboxymaltose (Injectafer)   2/11/25 Infusion ferric carboxymaltose (Injectafer)   1/15/25 Angelita Powell    Cancelled 1/17/25 appt w me    Recent external appointments:   3/28/25 Rheum Ho virtual [facility]    Upcoming appointments:  Future Appointments       Date Provider Specialty Appt Notes    4/30/2025  Radiology Encounter for screening mammogram for malignant neoplasm of breast [Z12.31]    6/23/2025  Lab james    6/26/2025 Karis Larkin PA-C Hematology and Oncology 4 mo prior lab    7/15/2025 Judy Grande, FNP-C Primary Care  Arrive at: Telehealth aw           The following were reviewed: Active problem list, medication list, allergies, family history, social history, and Health Maintenance.     Medications have been reviewed and reconciled with patient at visit today.    Exam:  Vitals:    04/22/25 0833   BP: 124/72   Pulse: 64   Temp: 97.5 °F (36.4 °C)     Weight: 78.1 kg (172 lb 4.6 oz)   Body mass index is 26.98 kg/m².    BP Readings from Last 3 Encounters:   04/22/25 124/72   03/07/25 (!) 140/78   02/25/25 (!) 149/88      Wt Readings from Last 3 Encounters:   04/22/25 0833 78.1 kg (172 lb 4.6 oz)   01/15/25 1315 79.6 kg (175 lb 7.8 oz)   08/28/24 0940 92.6 kg  (204 lb 2.3 oz)      Physical Exam  Vitals reviewed.   Constitutional:       General: She is not in acute distress.     Appearance: Normal appearance. She is not ill-appearing.   HENT:      Head: Normocephalic and atraumatic.      Right Ear: External ear normal.      Left Ear: External ear normal.      Nose: Nose normal.      Comments: Mild tenderness on palpation over maxillary and frontal sinuses     Mouth/Throat:      Mouth: Mucous membranes are moist.      Pharynx: Oropharynx is clear.   Eyes:      Extraocular Movements: Extraocular movements intact.      Conjunctiva/sclera: Conjunctivae normal.      Comments: glasses   Neck:      Vascular: No carotid bruit.   Cardiovascular:      Rate and Rhythm: Normal rate and regular rhythm.      Heart sounds: No murmur heard.  Pulmonary:      Effort: Respiratory distress present.      Breath sounds: No wheezing, rhonchi or rales.   Musculoskeletal:      Right lower leg: No edema.      Left lower leg: No edema.   Lymphadenopathy:      Cervical: No cervical adenopathy.   Skin:     General: Skin is warm and dry.   Neurological:      Mental Status: She is alert and oriented to person, place, and time. Mental status is at baseline.      Coordination: Coordination normal.      Comments: Ambulates independently w/o AD   Psychiatric:         Mood and Affect: Mood normal.         Behavior: Behavior normal.             Foot Exam  Protective Sensation (w/ 10 gram monofilament):  Right: Intact  Left: Intact    Visual Inspection:  Normal -  Bilateral    Pedal Pulses:   Right: Present  Left: Present    Laboratory Reviewed  Lab Results   Component Value Date    WBC 8.01 01/10/2025    WBC 8.01 01/10/2025    HGB 9.5 (L) 01/10/2025    HGB 9.5 (L) 01/10/2025    HCT 29.3 (L) 01/10/2025    HCT 29.3 (L) 01/10/2025     01/10/2025     01/10/2025    MCV 90 01/10/2025    MCV 90 01/10/2025    CHOL 140 10/29/2024    TRIG 108 10/29/2024    HDL 36 (L) 10/29/2024    LDLCALC 82.4 10/29/2024     ALT 12 01/10/2025    AST 13 01/10/2025     01/10/2025    K 4.8 01/10/2025     01/10/2025    CREATININE 0.9 01/10/2025    BUN 8 01/10/2025    CO2 25 01/10/2025    TSH 0.468 01/02/2024    FREET4 1.03 01/02/2024    INR 1.0 08/16/2024    HGBA1C 5.1 04/22/2025    CRP 4.7 11/29/2022     Lab Results   Component Value Date    CALCIUM 9.8 01/10/2025    PHOS 3.6 07/14/2023      Lab Results   Component Value Date    YABJXFBQ21 605 10/29/2024     Lab Results   Component Value Date    FOLATE 6.8 01/02/2024      Lab Results   Component Value Date    IRON 47 01/10/2025    TRANSFERRIN 185 (L) 01/10/2025    TIBC 274 01/10/2025    FESATURATED 17 (L) 01/10/2025      Lab Results   Component Value Date    EGFRNORACEVR >60 01/10/2025    ALBUMIN 4.6 01/10/2025    BNP 53 04/30/2021     Lab Results   Component Value Date    YZEERLYB71JN 30 10/29/2024        Assessment:   49 y.o. female with multiple co-morbid illnesses here for continued follow up of medical problems.      The primary encounter diagnosis was Encounter for screening mammogram for malignant neoplasm of breast. 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      Plan:   1. Encounter for screening mammogram for malignant neoplasm of breast  -     Mammo Digital Screening Bilat; Future; Expected date: 04/22/2025  -     Hemoglobin A1C; Future; Expected date: 04/22/2025    2. 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 Seasonal allergic rhinitis, unspecified trigger  Assessment & Plan:  If getting over the counter look for items containing  DEXTROMETHORPHAN-GUAIFENESIN       AVOID decongestants like psuedoephedrine and phenylephrine that raise blood pressure  Recommend also intra-nasal saline water flush at least 2x daily - can use as often as every 2 hrs       4. Other forms of systemic lupus erythematosus, unspecified organ involvement status  Assessment & Plan:  Cont current Rx and f/u w  external Rheumatologist Dr. Wayne - f/u labs ordered by Dr. Wayne to be done a@ [facility]      5. Drug-induced insomnia  Assessment & Plan:  Prior efforts to decrease ambien dose or try alternate sleep medication have been unsuccessful. Denies any parasomnias, compulsive behaviors or other adverse effects w ambien. Advised due to ambien a controlled substance need "face-to-face" encounter min Q 90 days - was able to clarify however that this can be w NP or MD      6. Hypertension associated with diabetes  Assessment & Plan:  BP good here today - followed by digital HTN program and external cardiologist Dr. Schmidt - note somewhat labile BP's - refilled "prn" clonidine   Diabetes well managed w trulicity, note sig weight loss also - has benefitted also w discontinuation of steriods      7. Chronic thoracic back pain, unspecified back pain laterality  Assessment & Plan:  followed by pain management, external Ortho       8. Avascular necrosis of bones of both hips  Assessment & Plan:  Cont steriod sparing Tx's - cont efforts to avoid further systemic steriod use - followed by pain management, external Ortho      9. Leukocytoclastic vasculitis  Overview:  Rash on skin    Assessment & Plan:  Cont current Rx and f/u w derm, heme/onc, " rheumatology      Other orders  -     cloNIDine (CATAPRES) 0.1 MG tablet; Take 2 tablets (0.2 mg total) by mouth 3 (three) times daily as needed (for SBP >160).  Dispense: 90 tablet; Refill: 1  -     dextromethorphan-guaiFENesin  mg (MUCINEX DM)  mg per 12 hr tablet; Take 1 tablet by mouth 2 (two) times daily as needed (cough sinus drip).  Dispense: 20 tablet; Refill: 0         Health Maintenance         Date Due Completion Date    TETANUS VACCINE Never done ---    Shingles Vaccine (1 of 2) Never done ---    Influenza Vaccine (1) Never done ---    COVID-19 Vaccine (5 - 2024-25 season) 09/01/2024 3/16/2024    Mammogram 01/05/2025 1/5/2024    Diabetic Eye Exam 03/20/2025 3/20/2024    Diabetes Urine Screening 08/28/2025 8/28/2024    Hemoglobin A1c 10/22/2025 4/22/2025    Lipid Panel 10/29/2025 10/29/2024    High Dose Statin 04/22/2026 4/22/2025    Foot Exam 04/22/2026 4/22/2025    Cervical Cancer Screening 09/29/2026 9/29/2021    Colorectal Cancer Screening 03/28/2029 3/28/2024    RSV Vaccine (Age 60+ and Pregnant patients) (1 - 1-dose 75+ series) 11/23/2048 ---            -Patient's lab results were reviewed and discussed with patient  -Treatment options and alternatives were discussed with the patient. Patient expressed understanding. Patient was given the opportunity to ask questions and be an active participant in their medical care. Patient had no further questions or concerns at this time.     Follow up: Follow up in about 3 months (around 7/22/2025) for Follow Up fro [facility] and f/u w me also for Ambien if necessary same day, Virtual Visit.    Care Plan/Goals: Reviewed Yes   Goals        Blood Pressure < 130/80      Exercise at least 150 minutes per week.      Take at least one BP reading per week at various times of the day             After visit summary printed and given to patient upon discharge.  Patient goals and care plan are included in After visit summary.    TOTAL TIME evaluating and managing  this patient for this encounter was 59 minutes. This time was spent personally by me on some of the following activities: review of patient's past medical history, assessing age-appropriate health maintenance needs, review of any interval history, review and interpretation of lab results, review and interpretation of imaging test results, review and interpretation of cardiology test results, reviewing consulting specialist notes, obtaining history from the patient and family, examination of the patient, medication reconciliation, managing and/or ordering prescription medications, ordering imaging tests, ordering referral to subspecialty provider(s), educating patient and answering their questions about diagnosis, treatment plan, and goals of treatment, discussing planned follow-up and final documentation of the visit. This time was exclusive of any separately billable procedures for this patient and exclusive of time spent treating any other patients.     This note was generated with the assistance of ambient listening technology. Verbal consent was obtained by the patient and accompanying visitor(s) for the recording of patient appointment to facilitate this note. I attest to having reviewed and edited the generated note for accuracy, though some syntax or spelling errors may persist. Please contact the author of this note for any clarification.

## 2025-04-23 ENCOUNTER — TELEPHONE (OUTPATIENT)
Dept: PRIMARY CARE CLINIC | Facility: CLINIC | Age: 50
End: 2025-04-23
Payer: MEDICARE

## 2025-04-23 LAB — HBA1C MFR BLD: 5.1 %

## 2025-04-24 ENCOUNTER — TELEPHONE (OUTPATIENT)
Dept: PRIMARY CARE CLINIC | Facility: CLINIC | Age: 50
End: 2025-04-24
Payer: MEDICARE

## 2025-04-24 ENCOUNTER — RESULTS FOLLOW-UP (OUTPATIENT)
Dept: PRIMARY CARE CLINIC | Facility: CLINIC | Age: 50
End: 2025-04-24

## 2025-04-24 NOTE — PROGRESS NOTES
Pt has [facility] - if message below not viewed please call w information below:   A1c looks good! Please continue w plan of care as discussed.    Please message or call with any questions or concerns!  Thank you!  Alissa Bautista MD, MPH  [facility]

## 2025-04-24 NOTE — TELEPHONE ENCOUNTER
Pt viewed results via [email or web address].          ----- Message from Alissa Bautista MD sent at 4/24/2025  7:56 AM CDT -----  Pt has [facility] - if message below not viewed please call w information below:   A1c looks good! Please continue w plan of care as discussed.    Please message or call with any questions or concerns!  Thank you!  Alissa Bautista MD, MPH  [facility]   ----- Message -----  From: Dori Garg  Sent: 4/23/2025   5:09 AM CDT  To: Alissa Bautista MD

## 2025-04-27 PROBLEM — G89.29 CHRONIC THORACIC BACK PAIN: Chronic | Status: ACTIVE | Noted: 2024-04-28

## 2025-04-27 PROBLEM — M87.052 AVASCULAR NECROSIS OF BONES OF BOTH HIPS: Chronic | Status: ACTIVE | Noted: 2022-03-29

## 2025-04-27 PROBLEM — M54.6 CHRONIC THORACIC BACK PAIN: Chronic | Status: ACTIVE | Noted: 2024-04-28

## 2025-04-27 PROBLEM — M87.051 AVASCULAR NECROSIS OF BONES OF BOTH HIPS: Chronic | Status: ACTIVE | Noted: 2022-03-29

## 2025-04-27 PROBLEM — D63.8 ANEMIA OF CHRONIC DISEASE: Chronic | Status: ACTIVE | Noted: 2022-12-14

## 2025-04-27 NOTE — ASSESSMENT & PLAN NOTE
Cont current Rx and f/u w  external Rheumatologist Dr. Wayne - f/u labs ordered by Dr. Wayne to be done a@ [facility]

## 2025-04-27 NOTE — ASSESSMENT & PLAN NOTE
Doing well w Trulicity advised again to maintain close f/u w Ophthal - f/u A1c linked to labs to be drawn at [facility]

## 2025-04-27 NOTE — ASSESSMENT & PLAN NOTE
If getting over the counter look for items containing  DEXTROMETHORPHAN-GUAIFENESIN       AVOID decongestants like psuedoephedrine and phenylephrine that raise blood pressure  Recommend also intra-nasal saline water flush at least 2x daily - can use as often as every 2 hrs

## 2025-04-27 NOTE — ASSESSMENT & PLAN NOTE
"BP good here today - followed by digital HTN program and external cardiologist Dr. Schmidt - note somewhat labile BP's - refilled "prn" clonidine   Diabetes well managed w trulicity, note sig weight loss also - has benefitted also w discontinuation of steriods  "

## 2025-04-27 NOTE — ASSESSMENT & PLAN NOTE
"Prior efforts to decrease ambien dose or try alternate sleep medication have been unsuccessful. Denies any parasomnias, compulsive behaviors or other adverse effects w ambien. Advised due to ambien a controlled substance need "face-to-face" encounter min Q 90 days - was able to clarify however that this can be w NP or MD  "

## 2025-04-27 NOTE — ASSESSMENT & PLAN NOTE
Cont steriod sparing Tx's - cont efforts to avoid further systemic steriod use - followed by pain management, external Ortho

## 2025-04-30 DIAGNOSIS — M32.8 OTHER FORMS OF SYSTEMIC LUPUS ERYTHEMATOSUS, UNSPECIFIED ORGAN INVOLVEMENT STATUS: Primary | Chronic | ICD-10-CM

## 2025-04-30 DIAGNOSIS — R35.0 FREQUENCY OF URINATION: ICD-10-CM

## 2025-04-30 DIAGNOSIS — R35.1 NOCTURIA MORE THAN TWICE PER NIGHT: ICD-10-CM

## 2025-04-30 NOTE — PROGRESS NOTES
Spoke w Ms. Daniel by phone     Reassured that labwork drawn @ [facility] 4/22/25 all basically stable or improved.  Still anemic w H/H 10/31.5   Small LE in urine otherwise wnl  CRP normal and ESR of 21 when adjusted for age < 30.5 wnl   Vit D elvel is slightly low @ 29.3  CMP wnl x glucose 145    Ms. Daniel denies pain or buring on urination,  Reports feeling she never completely empties her bladder  Reports urinary frequency and nocturia as often as every hour  She is in agreement w referral to Urology for further evaluation.

## 2025-05-01 ENCOUNTER — TELEPHONE (OUTPATIENT)
Dept: PRIMARY CARE CLINIC | Facility: CLINIC | Age: 50
End: 2025-05-01
Payer: MEDICARE

## 2025-05-07 DIAGNOSIS — K21.9 GASTROESOPHAGEAL REFLUX DISEASE WITHOUT ESOPHAGITIS: ICD-10-CM

## 2025-05-07 RX ORDER — FAMOTIDINE 40 MG/1
40 TABLET, FILM COATED ORAL DAILY PRN
Qty: 30 TABLET | Refills: 2 | Status: SHIPPED | OUTPATIENT
Start: 2025-05-07

## 2025-05-07 RX ORDER — DULAGLUTIDE 1.5 MG/.5ML
1.5 INJECTION, SOLUTION SUBCUTANEOUS
Qty: 4 PEN | Refills: 2 | Status: SHIPPED | OUTPATIENT
Start: 2025-05-07 | End: 2025-08-05

## 2025-05-08 RX ORDER — FAMOTIDINE 40 MG/1
TABLET, FILM COATED ORAL
Refills: 0 | OUTPATIENT
Start: 2025-05-08

## 2025-05-19 ENCOUNTER — HOSPITAL ENCOUNTER (EMERGENCY)
Facility: HOSPITAL | Age: 50
Discharge: HOME | End: 2025-05-20
Attending: EMERGENCY MEDICINE
Payer: MEDICARE

## 2025-05-19 DIAGNOSIS — R07.9 CHEST PAIN: ICD-10-CM

## 2025-05-19 DIAGNOSIS — K52.9 GASTROENTERITIS: ICD-10-CM

## 2025-05-19 DIAGNOSIS — R07.89 CHEST TIGHTNESS: ICD-10-CM

## 2025-05-19 DIAGNOSIS — R11.2 NAUSEA AND VOMITING, UNSPECIFIED VOMITING TYPE: ICD-10-CM

## 2025-05-19 DIAGNOSIS — K21.9 GASTROESOPHAGEAL REFLUX DISEASE, UNSPECIFIED WHETHER ESOPHAGITIS PRESENT: Primary | ICD-10-CM

## 2025-05-19 LAB
ABSOLUTE EOSINOPHIL (OHS): 0.01 K/UL
ABSOLUTE MONOCYTE (OHS): 0.24 K/UL (ref 0.3–1)
ABSOLUTE NEUTROPHIL COUNT (OHS): 15.4 K/UL (ref 1.8–7.7)
ALBUMIN SERPL BCP-MCNC: 5 G/DL (ref 3.5–5.2)
ALP SERPL-CCNC: 87 UNIT/L (ref 40–150)
ALT SERPL W/O P-5'-P-CCNC: 19 UNIT/L (ref 10–44)
ANION GAP (OHS): 15 MMOL/L (ref 8–16)
AST SERPL-CCNC: 30 UNIT/L (ref 11–45)
BASOPHILS # BLD AUTO: 0.04 K/UL
BASOPHILS NFR BLD AUTO: 0.2 %
BILIRUB SERPL-MCNC: 0.7 MG/DL (ref 0.1–1)
BNP SERPL-MCNC: 23 PG/ML (ref 0–99)
BUN SERPL-MCNC: 18 MG/DL (ref 6–20)
CALCIUM SERPL-MCNC: 10.5 MG/DL (ref 8.7–10.5)
CHLORIDE SERPL-SCNC: 107 MMOL/L (ref 95–110)
CO2 SERPL-SCNC: 19 MMOL/L (ref 23–29)
CREAT SERPL-MCNC: 1 MG/DL (ref 0.5–1.4)
ERYTHROCYTE [DISTWIDTH] IN BLOOD BY AUTOMATED COUNT: 12.9 % (ref 11.5–14.5)
GFR SERPLBLD CREATININE-BSD FMLA CKD-EPI: >60 ML/MIN/1.73/M2
GLUCOSE SERPL-MCNC: 135 MG/DL (ref 70–110)
HCT VFR BLD AUTO: 35.1 % (ref 37–48.5)
HCV AB SERPL QL IA: NEGATIVE
HGB BLD-MCNC: 12 GM/DL (ref 12–16)
HIV 1+2 AB+HIV1 P24 AG SERPL QL IA: NEGATIVE
IMM GRANULOCYTES # BLD AUTO: 0.03 K/UL (ref 0–0.04)
IMM GRANULOCYTES NFR BLD AUTO: 0.2 % (ref 0–0.5)
LYMPHOCYTES # BLD AUTO: 1.08 K/UL (ref 1–4.8)
MCH RBC QN AUTO: 29.4 PG (ref 27–31)
MCHC RBC AUTO-ENTMCNC: 34.2 G/DL (ref 32–36)
MCV RBC AUTO: 86 FL (ref 82–98)
NUCLEATED RBC (/100WBC) (OHS): 0 /100 WBC
PLATELET # BLD AUTO: 307 K/UL (ref 150–450)
PMV BLD AUTO: 10.9 FL (ref 9.2–12.9)
POTASSIUM SERPL-SCNC: 4.9 MMOL/L (ref 3.5–5.1)
PROT SERPL-MCNC: 9.3 GM/DL (ref 6–8.4)
RBC # BLD AUTO: 4.08 M/UL (ref 4–5.4)
RELATIVE EOSINOPHIL (OHS): 0.1 %
RELATIVE LYMPHOCYTE (OHS): 6.4 % (ref 18–48)
RELATIVE MONOCYTE (OHS): 1.4 % (ref 4–15)
RELATIVE NEUTROPHIL (OHS): 91.7 % (ref 38–73)
SODIUM SERPL-SCNC: 141 MMOL/L (ref 136–145)
TROPONIN I SERPL DL<=0.01 NG/ML-MCNC: <0.006 NG/ML
TROPONIN I SERPL DL<=0.01 NG/ML-MCNC: <0.006 NG/ML
WBC # BLD AUTO: 16.8 K/UL (ref 3.9–12.7)

## 2025-05-19 PROCEDURE — 96365 THER/PROPH/DIAG IV INF INIT: CPT | Mod: HCNC

## 2025-05-19 PROCEDURE — 99285 EMERGENCY DEPT VISIT HI MDM: CPT | Mod: 25,HCNC

## 2025-05-19 PROCEDURE — 85025 COMPLETE CBC W/AUTO DIFF WBC: CPT | Mod: HCNC | Performed by: NURSE PRACTITIONER

## 2025-05-19 PROCEDURE — 80053 COMPREHEN METABOLIC PANEL: CPT | Mod: HCNC | Performed by: NURSE PRACTITIONER

## 2025-05-19 PROCEDURE — 83880 ASSAY OF NATRIURETIC PEPTIDE: CPT | Mod: HCNC | Performed by: NURSE PRACTITIONER

## 2025-05-19 PROCEDURE — 63600175 PHARM REV CODE 636 W HCPCS: Mod: HCNC | Performed by: NURSE PRACTITIONER

## 2025-05-19 PROCEDURE — 86803 HEPATITIS C AB TEST: CPT | Mod: HCNC | Performed by: EMERGENCY MEDICINE

## 2025-05-19 PROCEDURE — 93010 ELECTROCARDIOGRAM REPORT: CPT | Mod: HCNC,,, | Performed by: INTERNAL MEDICINE

## 2025-05-19 PROCEDURE — 25500020 PHARM REV CODE 255: Mod: HCNC | Performed by: NURSE PRACTITIONER

## 2025-05-19 PROCEDURE — 87389 HIV-1 AG W/HIV-1&-2 AB AG IA: CPT | Mod: HCNC | Performed by: EMERGENCY MEDICINE

## 2025-05-19 PROCEDURE — 25000003 PHARM REV CODE 250: Mod: HCNC | Performed by: NURSE PRACTITIONER

## 2025-05-19 PROCEDURE — 84484 ASSAY OF TROPONIN QUANT: CPT | Mod: HCNC | Performed by: NURSE PRACTITIONER

## 2025-05-19 PROCEDURE — 96375 TX/PRO/DX INJ NEW DRUG ADDON: CPT | Mod: HCNC

## 2025-05-19 PROCEDURE — 93005 ELECTROCARDIOGRAM TRACING: CPT | Mod: HCNC

## 2025-05-19 RX ORDER — MORPHINE SULFATE 4 MG/ML
2 INJECTION, SOLUTION INTRAMUSCULAR; INTRAVENOUS
Refills: 0 | Status: COMPLETED | OUTPATIENT
Start: 2025-05-19 | End: 2025-05-19

## 2025-05-19 RX ORDER — DIPHENHYDRAMINE HYDROCHLORIDE 50 MG/ML
12.5 INJECTION, SOLUTION INTRAMUSCULAR; INTRAVENOUS
Status: COMPLETED | OUTPATIENT
Start: 2025-05-19 | End: 2025-05-19

## 2025-05-19 RX ORDER — FAMOTIDINE 10 MG/ML
20 INJECTION, SOLUTION INTRAVENOUS
Status: COMPLETED | OUTPATIENT
Start: 2025-05-19 | End: 2025-05-19

## 2025-05-19 RX ORDER — ONDANSETRON 4 MG/1
4 TABLET, ORALLY DISINTEGRATING ORAL EVERY 6 HOURS PRN
Qty: 30 TABLET | Refills: 0 | Status: SHIPPED | OUTPATIENT
Start: 2025-05-19

## 2025-05-19 RX ORDER — ONDANSETRON HYDROCHLORIDE 2 MG/ML
4 INJECTION, SOLUTION INTRAVENOUS
Status: COMPLETED | OUTPATIENT
Start: 2025-05-19 | End: 2025-05-19

## 2025-05-19 RX ORDER — PANTOPRAZOLE SODIUM 40 MG/1
40 TABLET, DELAYED RELEASE ORAL DAILY
Qty: 30 TABLET | Refills: 0 | Status: SHIPPED | OUTPATIENT
Start: 2025-05-19 | End: 2026-05-19

## 2025-05-19 RX ADMIN — PROMETHAZINE HYDROCHLORIDE 12.5 MG: 25 INJECTION INTRAMUSCULAR; INTRAVENOUS at 09:05

## 2025-05-19 RX ADMIN — DIPHENHYDRAMINE HYDROCHLORIDE 12.5 MG: 50 INJECTION INTRAMUSCULAR; INTRAVENOUS at 08:05

## 2025-05-19 RX ADMIN — ONDANSETRON 4 MG: 2 INJECTION INTRAMUSCULAR; INTRAVENOUS at 07:05

## 2025-05-19 RX ADMIN — FAMOTIDINE 20 MG: 10 INJECTION, SOLUTION INTRAVENOUS at 07:05

## 2025-05-19 RX ADMIN — MORPHINE SULFATE 2 MG: 4 INJECTION INTRAVENOUS at 08:05

## 2025-05-19 RX ADMIN — IOHEXOL 100 ML: 350 INJECTION, SOLUTION INTRAVENOUS at 10:05

## 2025-05-19 NOTE — FIRST PROVIDER EVALUATION
Medical screening examination initiated.  I have conducted a focused provider triage encounter, findings are as follows:    Brief history of present illness:  Vomiting started at 10:00 a.m.. Reports associated symptoms of chest pain and headache.  Patient reports unable to take blood pressure medicines today.     Vitals:    05/19/25 1719   BP: (!) 162/84   BP Location: Right arm   Pulse: 87   Resp: 16   Temp: 97.8 °F (36.6 °C)   TempSrc: Oral   SpO2: 100%   Weight: Comment: unable to stand per pt       Pertinent physical exam:  Hypertensive in triage.  Alert and awake    Brief workup plan:  Cardiac workup, further evaluation    Preliminary workup initiated; this workup will be continued and followed by the physician or advanced practice provider that is assigned to the patient when roomed.

## 2025-05-20 VITALS
HEART RATE: 81 BPM | SYSTOLIC BLOOD PRESSURE: 163 MMHG | DIASTOLIC BLOOD PRESSURE: 87 MMHG | OXYGEN SATURATION: 99 % | RESPIRATION RATE: 17 BRPM | TEMPERATURE: 98 F

## 2025-05-20 LAB
OHS QRS DURATION: 76 MS
OHS QTC CALCULATION: 447 MS

## 2025-05-20 NOTE — ED PROVIDER NOTES
SCRIBE #1 NOTE: I, Baron Jean Baptiste, am scribing for, and in the presence of, Jesus Wagner Jr., MD. I have scribed the entire note.       History     Chief Complaint   Patient presents with    Chest Pain     Chest tightness for last hour, elevated BP this morning, didn't take meds, c/o vomiting for hours. No diarrhea or fever.      Review of patient's allergies indicates:   Allergen Reactions    Azathioprine Nausea And Vomiting     Nausea, vomiting, diarrhea dose and early in the course of therapy    Olmesartan Shortness Of Breath    Belsomra [suvorexant] Other (See Comments)     Did not like sudden sleep on-set    Feraheme [ferumoxytol] Itching     Shortness of breath, rash    Oxycodone Itching     Other reaction(s): Unknown         History of Present Illness     HPI    5/19/2025, 10:53 PM  History obtained from the patient and medical records      History of Present Illness: Marilou Daniel is a 49 y.o. female patient with a PMHx of connective tissue diease, vasculitis, lupus, anemia, arthritis, HTN, DM, GERD, anxiety, and knee pain who presents to the Emergency Department for evaluation of hematemesis which onset earlier today. Pt reports she was at her granddaughter's graduated when she began to experience N/V (11.5 hours ago). Pt reports N/V persisted until 4 PM (7 hours ago) when she began to vomit blood. Associated sxs include chest tightness. Patient denies any diarrhea and all other sxs at this time. Pt denies any recent abnormal foods and has been unable to keep her daily medications down w/o vomiting. No further complaints or concerns at this time.       Arrival mode: Personal Transportation    PCP: Alissa Bautista MD        Past Medical History:  Past Medical History:   Diagnosis Date    Anemia     Arthritis     Class 1 drug-induced obesity with serious comorbidity and body mass index (BMI) of 31.0 to 31.9 in adult 09/16/2022    Connective tissue disease 1999    COVID-19 in  immunocompromised patient 02/05/2021    Diabetes mellitus     Diabetes mellitus due to underlying condition with complication, with long-term current use of insulin 03/29/2022    Drug induced insomnia 12/15/2020    R/T chronic steroid use for SLE.    Encounter for preventive health examination 09/02/2024    Gastroesophageal reflux disease 03/23/2020    Hypertension     Lupus 1999    Situational anxiety 08/24/2021    Thyroid nodule greater than or equal to 1 cm in diameter incidentally noted on imaging study 11/19/2020    Vasculitis        Past Surgical History:  Past Surgical History:   Procedure Laterality Date    ABLATION      COLONOSCOPY N/A 06/24/2020    Procedure: COLONOSCOPY;  Surgeon: Nevin Penny MD;  Location: [facility];  Service: Endoscopy;  Laterality: N/A;    COLONOSCOPY N/A 03/28/2024    Procedure: COLONOSCOPY;  Surgeon: José Manuel Huerta MD;  Location: [facility];  Service: Endoscopy;  Laterality: N/A;    ESOPHAGOGASTRODUODENOSCOPY N/A 06/24/2020    Procedure: ESOPHAGOGASTRODUODENOSCOPY (EGD);  Surgeon: Nevin Penny MD;  Location: [facility];  Service: Endoscopy;  Laterality: N/A;    RIGHT HEART CATHETERIZATION      TOTAL KNEE ARTHROPLASTY Right     TUBAL LIGATION      WRIST SURGERY Bilateral          Family History:  Family History   Problem Relation Name Age of Onset    Lung cancer Mother Nichole 59        +hx of smoking    Cancer Father [facility]? stomach?    Breast cancer Other Nimo 58        UL mastect, mets to back & lungs    Breast cancer Other Roz 63        chemo, XRT, UL mastect?    Autism Other Angel Jr     Asthma Other Ирина     Obesity Other Ирина     Liver cancer Other Afshin 58        mets to lungs, pancreas, stomach    Cancer Other Reji         type ?       Social History:  Social History     Tobacco Use    Smoking status: Never    Smokeless tobacco: Never   Substance and Sexual Activity    Alcohol use: Never    Drug use: Never    Sexual activity: Yes      Partners: Male     Birth control/protection: See Surgical Hx        Review of Systems     Review of Systems   Constitutional:  Negative for chills, diaphoresis and fever.   HENT:  Negative for congestion.    Respiratory:  Positive for chest tightness. Negative for cough and shortness of breath.    Cardiovascular:  Negative for chest pain.   Gastrointestinal:  Positive for nausea and vomiting. Negative for abdominal pain and diarrhea.        (+) hematemesis   Genitourinary:  Negative for dysuria.   Musculoskeletal:  Negative for back pain.   Skin:  Negative for rash.   Neurological:  Negative for dizziness, weakness and headaches.   All other systems reviewed and are negative.       Physical Exam     Initial Vitals [05/19/25 1719]   BP Pulse Resp Temp SpO2   (!) 162/84 87 16 97.8 °F (36.6 °C) 100 %      MAP       --          Physical Exam  Nursing Notes and Vital Signs Reviewed.  Constitutional: Patient is in no acute distress. Well-developed and well-nourished.  Head: Atraumatic. Normocephalic.  Eyes: EOM intact. Conjunctivae are not pale. No scleral icterus.  ENT: Mucous membranes are moist.   Neck: Supple. Full ROM.  Cardiovascular: Regular rate. Regular rhythm. No murmurs, rubs, or gallops.  Pulmonary/Chest: No respiratory distress. Clear to auscultation bilaterally. No wheezing or rales.  Abdominal: Soft and non-distended.  Generalized tenderness.  No rebound, guarding, or rigidity.  Musculoskeletal: Moves all extremities. No obvious deformities. No edema.  Skin: Warm and dry.  Neurological:  Alert, awake, and appropriate.  Normal speech.  No acute focal neurological deficits are appreciated.  Psychiatric: Normal affect. Good eye contact. Appropriate in content.     ED Course   Procedures  ED Vital Signs:  Vitals:    05/19/25 1719 05/19/25 2044 05/19/25 2103 05/19/25 2159   BP: (!) 162/84  (!) 192/91 (!) 206/99   Pulse: 87  62 66   Resp: 16 18 18 18   Temp: 97.8 °F (36.6 °C)      TempSrc: Oral      SpO2:  100%  99% 99%    05/19/25 2245 05/19/25 2247 05/19/25 2302 05/19/25 2332   BP: (!) 143/79  (!) 162/83 (!) 163/87   Pulse:  87 90 81   Resp:  17  17   Temp:       TempSrc:       SpO2:  97% 99% 99%       Abnormal Lab Results:  Labs Reviewed   COMPREHENSIVE METABOLIC PANEL - Abnormal       Result Value    Sodium 141      Potassium 4.9      Chloride 107      CO2 19 (*)     Glucose 135 (*)     BUN 18      Creatinine 1.0      Calcium 10.5      Protein Total 9.3 (*)     Albumin 5.0      Bilirubin Total 0.7      ALP 87      AST 30      ALT 19      Anion Gap 15      eGFR >60     CBC WITH DIFFERENTIAL - Abnormal    WBC 16.80 (*)     RBC 4.08      HGB 12.0      HCT 35.1 (*)     MCV 86      MCH 29.4      MCHC 34.2      RDW 12.9      Platelet Count 307      MPV 10.9      Nucleated RBC 0      Neut % 91.7 (*)     Lymph % 6.4 (*)     Mono % 1.4 (*)     Eos % 0.1      Basophil % 0.2      Imm Grans % 0.2      Neut # 15.40 (*)     Lymph # 1.08      Mono # 0.24 (*)     Eos # 0.01      Baso # 0.04      Imm Grans # 0.03     HEPATITIS C ANTIBODY - Normal    Hep C Ab Interp Negative     HIV 1 / 2 ANTIBODY - Normal    HIV 1/2 Ag/Ab Negative     TROPONIN I - Normal    Troponin-I <0.006     TROPONIN I - Normal    Troponin-I <0.006     B-TYPE NATRIURETIC PEPTIDE - Normal    BNP 23     CBC W/ AUTO DIFFERENTIAL    Narrative:     The following orders were created for panel order CBC auto differential.  Procedure                               Abnormality         Status                     ---------                               -----------         ------                     CBC with Differential[[id number]]       Abnormal            Final result                 Please view results for these tests on the individual orders.        All Lab Results:  Results for orders placed or performed during the hospital encounter of 05/19/25   EKG 12-lead    Collection Time: 05/19/25  5:22 PM   Result Value Ref Range    QRS Duration 76 ms    [facility] QTC Calculation  447 ms   Hepatitis C Antibody    Collection Time: 05/19/25  7:38 PM   Result Value Ref Range    Hep C Ab Interp Negative Negative   HIV 1/2 Ag/Ab (4th Gen)    Collection Time: 05/19/25  7:38 PM   Result Value Ref Range    HIV 1/2 Ag/Ab Negative Negative   Comprehensive metabolic panel    Collection Time: 05/19/25  7:38 PM   Result Value Ref Range    Sodium 141 136 - 145 mmol/L    Potassium 4.9 3.5 - 5.1 mmol/L    Chloride 107 95 - 110 mmol/L    CO2 19 (L) 23 - 29 mmol/L    Glucose 135 (H) 70 - 110 mg/dL    BUN 18 6 - 20 mg/dL    Creatinine 1.0 0.5 - 1.4 mg/dL    Calcium 10.5 8.7 - 10.5 mg/dL    Protein Total 9.3 (H) 6.0 - 8.4 gm/dL    Albumin 5.0 3.5 - 5.2 g/dL    Bilirubin Total 0.7 0.1 - 1.0 mg/dL    ALP 87 40 - 150 unit/L    AST 30 11 - 45 unit/L    ALT 19 10 - 44 unit/L    Anion Gap 15 8 - 16 mmol/L    eGFR >60 >60 mL/min/1.73/m2   Troponin I #1    Collection Time: 05/19/25  7:38 PM   Result Value Ref Range    Troponin-I <0.006 <=0.026 ng/mL   BNP    Collection Time: 05/19/25  7:38 PM   Result Value Ref Range    BNP 23 0 - 99 pg/mL   CBC with Differential    Collection Time: 05/19/25  7:38 PM   Result Value Ref Range    WBC 16.80 (H) 3.90 - 12.70 K/uL    RBC 4.08 4.00 - 5.40 M/uL    HGB 12.0 12.0 - 16.0 gm/dL    HCT 35.1 (L) 37.0 - 48.5 %    MCV 86 82 - 98 fL    MCH 29.4 27.0 - 31.0 pg    MCHC 34.2 32.0 - 36.0 g/dL    RDW 12.9 11.5 - 14.5 %    Platelet Count 307 150 - 450 K/uL    MPV 10.9 9.2 - 12.9 fL    Nucleated RBC 0 <=0 /100 WBC    Neut % 91.7 (H) 38 - 73 %    Lymph % 6.4 (L) 18 - 48 %    Mono % 1.4 (L) 4 - 15 %    Eos % 0.1 <=8 %    Basophil % 0.2 <=1.9 %    Imm Grans % 0.2 0.0 - 0.5 %    Neut # 15.40 (H) 1.8 - 7.7 K/uL    Lymph # 1.08 1 - 4.8 K/uL    Mono # 0.24 (L) 0.3 - 1 K/uL    Eos # 0.01 <=0.5 K/uL    Baso # 0.04 <=0.2 K/uL    Imm Grans # 0.03 0.00 - 0.04 K/uL   Troponin I #2    Collection Time: 05/19/25  9:54 PM   Result Value Ref Range    Troponin-I <0.006 <=0.026 ng/mL     *Note: Due to a large  number of results and/or encounters for the requested time period, some results have not been displayed. A complete set of results can be found in Results Review.       Imaging Results:  Imaging Results              CT Abdomen Pelvis With IV Contrast NO Oral Contrast (Final result)  Result time 05/19/25 22:49:38      Final result by Fox Muñoz MD (05/19/25 22:49:38)                   Impression:      1.  No acute process.  Recommend follow-up if symptoms persist    *All CT scans are performed using dose modulation techniques as appropriate to a performed exam including the following: automated exposure control; adjustment of the mA and/or kV according to patient size (this includes techniques or standardized protocols for targeted exams where dose is matched to indication / reason for exam; i.e. extremities or head); use of iterative reconstruction technique.  **If a simple renal cyst (Bosniak class I) is present, no further imaging or follow-up is recommended.    Finalized on: 5/19/2025 10:49 PM By:  Fox Muñoz MD CONSTANTINO PhD  [facility]# [id number]      2025-05-19 22:51:40.699     [facility]               Narrative:    EXAM:  CT ABDOMEN PELVIS WITH IV CONTRAST    CLINICAL HISTORY: Nausea and vomiting.      TECHNIQUE: Routine contrast-enhanced CT protocol of the abdomen and pelvis was performed after the intravenous administration of  100 mL of Isovue 370.    COMPARISON: No prior abdominal CT available for comparison.    FINDINGS:    Linear pleural thickening along the bifurcation left lung base.  Fatty infiltration liver with measuring up to 16 cm.  No splenomegaly.  Pancreas is grossly unremarkable.  No gallstones.  No hydronephrosis.  No adrenal masses.  No bowel obstruction or free fluid no secondary signs of appendicitis.  Normal appendix.  Uterus is grossly unremarkable.  No bowel obstruction or free fluid.  No vertebral body compression deformity.  Bladder is grossly unremarkable.  There is likewise unremarkable.  No  acute compression deformity.  No evidence for pancreatitis..  Glands gallbladder kidneys and spleen are grossly unremarkable.  Slight atherosclerotic changes.                                         CT Head Without Contrast (Final result)  Result time 05/19/25 19:31:36      Final result by Fox Muñoz MD (05/19/25 19:31:36)                   Impression:      1. No acute process seen.  Recommend follow-up if symptoms persist      All CT scans at [this location] are performed using dose modulation techniques as appropriate to a performed exam including the following: automated exposure control; adjustment of the mA and/or kV according to patient size (this includes techniques or standardized protocols for targeted exams where dose is matched to indication / reason for exam; i.e. extremities or head); use of iterative reconstruction technique.      Finalized on: 5/19/2025 7:31 PM By:  Fox Muñoz MD CONSTANTINO PhD  [facility]# [id number]      2025-05-19 19:33:40.094     [facility]               Narrative:    EXAM: CT HEAD WITHOUT CONTRAST    CLINICAL HISTORY: Pain    TECHNIQUE: Contiguous axial images were obtained from the skull base through the vertex without intravenous contrast.    COMPARISON: None available.    FINDINGS: No intracranial hemorrhage mass effect or midline shift.   No extra axial fluid collections.    The ventricles and sulci are unremarkable for age.  There is no evidence of hydrocephalus.    The paranasal sinuses and mastoid air cells are clear.  No fractures are identified.  No concerning osseous lesions.                                         X-Ray Chest AP Portable (Final result)  Result time 05/19/25 19:06:15      Final result by Carlo Pyle MD (05/19/25 19:06:15)                   Impression:     No acute findings.    Finalized on: 5/19/2025 7:06 PM By:  Carlo Pyle MD  [facility]# [id number]      2025-05-19 19:08:18.752     [facility]               Narrative:    EXAM: XR CHEST AP PORTABLE    CLINICAL HISTORY: Chest  pain    FINDINGS:  The heart is normal.  The lungs are clear.  No pleural fluid or pneumothorax.                                         The EKG was ordered, reviewed, and independently interpreted by the ED provider.  Interpretation time: 17:22  Rate: 79 BPM  Rhythm: normal sinus rhythm  Interpretation: Right atrial enlargement. No STEMI.             The Emergency Provider reviewed the vital signs and test results, which are outlined above.     ED Discussion     11:49 PM: Reassessed pt at this time. Discussed with patient and/or family/caretaker all pertinent ED information and results. Discussed pt dx and plan of tx. Gave the patient all f/u and return to the ED instructions. All questions and concerns were addressed at this time. Patient and/or family/caretaker expresses understanding of information and instructions, and is comfortable with plan to discharge. Pt is stable for discharge.     I discussed with patient and/or family/caretaker that evaluation in the ED does not suggest any emergent or life threatening medical conditions requiring immediate intervention beyond what was provided in the ED, and I believe patient is safe for discharge.  Regardless, an unremarkable evaluation in the ED does not preclude the development or presence of a serious of life threatening condition. As such, I instructed that the patient is to return immediately for any worsening or change in current symptoms.       Medical Decision Making  Regarding GERD/INDIGESTION, I recommended that the patient: maintain a healthy weight; avoid lying down after meals; avoid eating late at night; elevate the head of the bed by 6 inches; avoid wearing tight-fitting clothes; avoid foods that irritate the stomach (alcohol, fat, chocolate, caffeine, and spearmint or peppermint); talk to primary care provider if taking any medications that can make GERD symptoms worse (calcium channel blockers, theophylline, and anticholinergic medications); begin an  exercise program; stop-smoking if applicable; limit alcohol intake to no more than 2 drinks a day; take medications exactly as directed; and avoid over-the-counter nonsteroidal anti-inflammatory drugs, such as aspirin and ibuprofen. Instructed patient to contact primary care provider or return to the emergency department if any of the following signs or symptoms are present: trouble swallowing; pain when swallowing; feeling of food caught in chest or throat; pain in the neck, chest, or back; heartburn that causes emesis; hematemesis; black or tarry stools; increase in salivation; weight loss of more than 3% to 5% of total body weight in a month; hoarseness or sore throat that won't go away; and choking, coughing, or wheezing.     For NAUSEA/VOMITING, I advised patient on importance of staying well hydrated; eating frequent, small amounts of clear liquids; avoid solid foods until there has been no vomiting for six hours, and then work slowly back to a normal diet; and to use an OTC bismuth stomach remedy for upset stomach, nausea, indigestion, and diarrhea. We discussed signs and symptoms of dehydration and possible causes of N/V with patient (viral infections, medications, migraine headaches, food poisoning, allergies, and peptic ulcer disease).  Reiterated the importance of following up with primary care provider if no improvement is noted within 72 hours.         Amount and/or Complexity of Data Reviewed  Labs: ordered. Decision-making details documented in ED Course.  Radiology: ordered and independent interpretation performed. Decision-making details documented in ED Course.  ECG/medicine tests: ordered and independent interpretation performed. Decision-making details documented in ED Course.    Risk  OTC drugs.  Prescription drug management.  Parenteral controlled substances.  Risk Details: OTC drugs, prescription drugs and controlled substances considered.  Due to patient's symptoms improving and pain  controlled pain medications ordered appropriately.  DDX: MI, CAD, PUlmonary disease, PE, AAA, Pneumonia, Costochondritis, PTX, Liver disease among others.                  ED Medication(s):  Medications   ondansetron injection 4 mg (4 mg Intravenous Given 5/19/25 1941)   famotidine (PF) injection 20 mg (20 mg Intravenous Given 5/19/25 1941)   morphine injection 2 mg (2 mg Intravenous Given 5/19/25 2044)   diphenhydrAMINE injection 12.5 mg (12.5 mg Intravenous Given 5/19/25 2040)   promethazine (PHENERGAN) 12.5 mg in 0.9% NaCl 50 mL IVPB (0 mg Intravenous Stopped 5/19/25 2215)   iohexoL (OMNIPAQUE 350) injection 100 mL (100 mLs Intravenous Given 5/19/25 2209)       Discharge Medication List as of 5/19/2025 11:46 PM        START taking these medications    Details   ondansetron (ZOFRAN-ODT) 4 MG TbDL Take 1 tablet (4 mg total) by mouth every 6 (six) hours as needed., Starting Mon 5/19/2025, Print              Follow-up Information       Alissa Bautista MD. Schedule an appointment as soon as possible for a visit in 1 week.    Specialty: Internal Medicine  Why: As needed, If symptoms worsen  Contact information:  [address] [facility] [address]  [phone]               [facility] - Emergency Dept..    Specialty: Emergency Medicine  Why: As needed, If symptoms worsen  Contact information:  [address] [address] [phone]                               Scribe Attestation:   Scribe #1: I performed the above scribed service and the documentation accurately describes the services I performed. I attest to the accuracy of the note.     Attending:   Physician Attestation Statement for Scribe #1: I, Jesus Wagner Jr., MD, personally performed the services described in this documentation, as scribed by Baron Jean Baptiste, in my presence, and it is both accurate and complete.           Clinical Impression       ICD-10-CM ICD-9-CM   1. Gastroesophageal reflux disease,  unspecified whether esophagitis present  K21.9 530.81   2. Chest tightness  R07.89 786.59   3. Chest pain  R07.9 786.50   4. Gastroenteritis  K52.9 558.9   5. Nausea and vomiting, unspecified vomiting type  R11.2 787.01       Disposition:   Disposition: Discharged  Condition: Stable       Jesus Wagner Jr., MD  05/20/25 6721

## 2025-05-20 NOTE — DISCHARGE INSTRUCTIONS
Regarding GERD/INDIGESTION, I recommended that the patient: maintain a healthy weight; avoid lying down after meals; avoid eating late at night; elevate the head of the bed by 6 inches; avoid wearing tight-fitting clothes; avoid foods that irritate the stomach (alcohol, fat, chocolate, caffeine, and spearmint or peppermint); talk to primary care provider if taking any medications that can make GERD symptoms worse (calcium channel blockers, theophylline, and anticholinergic medications); begin an exercise program; stop-smoking if applicable; limit alcohol intake to no more than 2 drinks a day; take medications exactly as directed; and avoid over-the-counter nonsteroidal anti-inflammatory drugs, such as aspirin and ibuprofen. Instructed patient to contact primary care provider or return to the emergency department if any of the following signs or symptoms are present: trouble swallowing; pain when swallowing; feeling of food caught in chest or throat; pain in the neck, chest, or back; heartburn that causes emesis; hematemesis; black or tarry stools; increase in salivation; weight loss of more than 3% to 5% of total body weight in a month; hoarseness or sore throat that wont go away; and choking, coughing, or wheezing.    For NAUSEA/VOMITING, I advised patient on importance of staying well hydrated; eating frequent, small amounts of clear liquids; avoid solid foods until there has been no vomiting for six hours, and then work slowly back to a normal diet; and to use an OTC bismuth stomach remedy for upset stomach, nausea, indigestion, and diarrhea. We discussed signs and symptoms of dehydration and possible causes of N/V with patient (viral infections, medications, migraine headaches, food poisoning, allergies, and peptic ulcer disease).  Reiterated the importance of following up with primary care provider if no improvement is noted within 72 hours.

## 2025-05-21 ENCOUNTER — HOSPITAL ENCOUNTER (OUTPATIENT)
Dept: RADIOLOGY | Facility: HOSPITAL | Age: 50
Discharge: HOME | End: 2025-05-21
Attending: INTERNAL MEDICINE
Payer: MEDICARE

## 2025-05-21 DIAGNOSIS — Z12.31 ENCOUNTER FOR SCREENING MAMMOGRAM FOR BREAST CANCER: ICD-10-CM

## 2025-05-21 PROCEDURE — 77067 SCR MAMMO BI INCL CAD: CPT | Mod: TC,HCNC

## 2025-05-21 PROCEDURE — 77067 SCR MAMMO BI INCL CAD: CPT | Mod: 26,HCNC,, | Performed by: RADIOLOGY

## 2025-05-21 PROCEDURE — 77063 BREAST TOMOSYNTHESIS BI: CPT | Mod: 26,HCNC,, | Performed by: RADIOLOGY

## 2025-05-22 ENCOUNTER — RESULTS FOLLOW-UP (OUTPATIENT)
Dept: PRIMARY CARE CLINIC | Facility: CLINIC | Age: 50
End: 2025-05-22

## 2025-05-22 ENCOUNTER — TELEPHONE (OUTPATIENT)
Dept: PRIMARY CARE CLINIC | Facility: CLINIC | Age: 50
End: 2025-05-22
Payer: MEDICARE

## 2025-05-22 NOTE — PROGRESS NOTES
Pt has [facility] - if message below not viewed please call w information below:   Mammogram looks good - normal findings - no sign of cancer - recommend to repeat in 1-2 years.     Please message or call with any questions or concerns!  Thank you!  Alissa Bautista MD, MPH  [facility]

## 2025-05-26 ENCOUNTER — TELEPHONE (OUTPATIENT)
Dept: PRIMARY CARE CLINIC | Facility: CLINIC | Age: 50
End: 2025-05-26
Payer: MEDICARE

## 2025-05-26 NOTE — TELEPHONE ENCOUNTER
Informed pt of results, also followed up on recent ED visit. Pt denies any complaints at this time but is requesting phenergan suppositories for when she is unable to keep anything down or if the Zofran does not work.

## 2025-06-12 RX ORDER — PANTOPRAZOLE SODIUM 40 MG/1
40 TABLET, DELAYED RELEASE ORAL DAILY
Qty: 30 TABLET | Refills: 0 | Status: SHIPPED | OUTPATIENT
Start: 2025-06-12 | End: 2025-06-12

## 2025-06-12 RX ORDER — PANTOPRAZOLE SODIUM 40 MG/1
40 TABLET, DELAYED RELEASE ORAL DAILY
Qty: 30 TABLET | Refills: 0 | Status: SHIPPED | OUTPATIENT
Start: 2025-06-12 | End: 2026-06-12

## 2025-06-13 DIAGNOSIS — F19.982 DRUG-INDUCED INSOMNIA: Chronic | ICD-10-CM

## 2025-06-13 RX ORDER — ZOLPIDEM TARTRATE 10 MG/1
10 TABLET ORAL NIGHTLY
Qty: 30 TABLET | Refills: 0 | Status: SHIPPED | OUTPATIENT
Start: 2025-06-13 | End: 2025-07-13

## 2025-06-16 RX ORDER — DULAGLUTIDE 1.5 MG/.5ML
1.5 INJECTION, SOLUTION SUBCUTANEOUS
Qty: 4 PEN | Refills: 2 | Status: SHIPPED | OUTPATIENT
Start: 2025-06-16 | End: 2025-09-14

## 2025-06-18 NOTE — TELEPHONE ENCOUNTER
Refill Routing Note   Medication(s) are not appropriate for processing by [facility] for the following reason(s):        Non-participating provider    [facility] action(s):  Route               Appointments  past 12m or future 3m with PCP    Date Provider   Last Visit   4/22/2025 Alissa Bautista MD   Next Visit   Visit date not found Alissa Bautista MD   ED visits in past 90 days: 1        Note composed:1:04 PM 06/18/2025

## 2025-06-19 RX ORDER — ATORVASTATIN CALCIUM 20 MG/1
20 TABLET, FILM COATED ORAL NIGHTLY
Qty: 90 TABLET | Refills: 1 | Status: SHIPPED | OUTPATIENT
Start: 2025-06-19 | End: 2025-12-16

## 2025-06-20 ENCOUNTER — TELEPHONE (OUTPATIENT)
Dept: PRIMARY CARE CLINIC | Facility: CLINIC | Age: 50
End: 2025-06-20
Payer: MEDICARE

## 2025-06-20 NOTE — TELEPHONE ENCOUNTER
Confirmed that pt would like to have all medications filled through [facility]. She report that she would like to have meds sent there unless it is something that she needs immediately. I LASHAY will update in chart.

## 2025-06-23 ENCOUNTER — RESULTS FOLLOW-UP (OUTPATIENT)
Dept: HEMATOLOGY/ONCOLOGY | Facility: CLINIC | Age: 50
End: 2025-06-23

## 2025-06-23 ENCOUNTER — LAB VISIT (OUTPATIENT)
Dept: LAB | Facility: HOSPITAL | Age: 50
End: 2025-06-23
Attending: INTERNAL MEDICINE
Payer: MEDICARE

## 2025-06-23 DIAGNOSIS — D47.2 MGUS (MONOCLONAL GAMMOPATHY OF UNKNOWN SIGNIFICANCE): ICD-10-CM

## 2025-06-23 DIAGNOSIS — D50.8 IRON DEFICIENCY ANEMIA SECONDARY TO INADEQUATE DIETARY IRON INTAKE: ICD-10-CM

## 2025-06-23 LAB
ABSOLUTE EOSINOPHIL (OHS): 0.2 K/UL
ABSOLUTE MONOCYTE (OHS): 0.48 K/UL (ref 0.3–1)
ABSOLUTE NEUTROPHIL COUNT (OHS): 4.41 K/UL (ref 1.8–7.7)
ALBUMIN SERPL BCP-MCNC: 4.5 G/DL (ref 3.5–5.2)
ALP SERPL-CCNC: 79 UNIT/L (ref 40–150)
ALT SERPL W/O P-5'-P-CCNC: 13 UNIT/L (ref 10–44)
ANION GAP (OHS): 8 MMOL/L (ref 8–16)
AST SERPL-CCNC: 17 UNIT/L (ref 11–45)
BASOPHILS # BLD AUTO: 0.04 K/UL
BASOPHILS NFR BLD AUTO: 0.5 %
BILIRUB SERPL-MCNC: 0.5 MG/DL (ref 0.1–1)
BUN SERPL-MCNC: 17 MG/DL (ref 6–20)
CALCIUM SERPL-MCNC: 9.8 MG/DL (ref 8.7–10.5)
CHLORIDE SERPL-SCNC: 109 MMOL/L (ref 95–110)
CO2 SERPL-SCNC: 24 MMOL/L (ref 23–29)
CREAT SERPL-MCNC: 1 MG/DL (ref 0.5–1.4)
ERYTHROCYTE [DISTWIDTH] IN BLOOD BY AUTOMATED COUNT: 12.9 % (ref 11.5–14.5)
FERRITIN SERPL-MCNC: 1151 NG/ML (ref 20–300)
GFR SERPLBLD CREATININE-BSD FMLA CKD-EPI: >60 ML/MIN/1.73/M2
GLUCOSE SERPL-MCNC: 111 MG/DL (ref 70–110)
HCT VFR BLD AUTO: 31.3 % (ref 37–48.5)
HGB BLD-MCNC: 10.3 GM/DL (ref 12–16)
IMM GRANULOCYTES # BLD AUTO: 0.02 K/UL (ref 0–0.04)
IMM GRANULOCYTES NFR BLD AUTO: 0.3 % (ref 0–0.5)
IRON SATN MFR SERPL: 36 % (ref 20–50)
IRON SERPL-MCNC: 100 UG/DL (ref 30–160)
LYMPHOCYTES # BLD AUTO: 2.2 K/UL (ref 1–4.8)
MCH RBC QN AUTO: 29.9 PG (ref 27–31)
MCHC RBC AUTO-ENTMCNC: 32.9 G/DL (ref 32–36)
MCV RBC AUTO: 91 FL (ref 82–98)
NUCLEATED RBC (/100WBC) (OHS): 0 /100 WBC
PLATELET # BLD AUTO: 252 K/UL (ref 150–450)
PMV BLD AUTO: 10.8 FL (ref 9.2–12.9)
POTASSIUM SERPL-SCNC: 4.6 MMOL/L (ref 3.5–5.1)
PROT SERPL-MCNC: 7.9 GM/DL (ref 6–8.4)
RBC # BLD AUTO: 3.44 M/UL (ref 4–5.4)
RELATIVE EOSINOPHIL (OHS): 2.7 %
RELATIVE LYMPHOCYTE (OHS): 29.9 % (ref 18–48)
RELATIVE MONOCYTE (OHS): 6.5 % (ref 4–15)
RELATIVE NEUTROPHIL (OHS): 60.1 % (ref 38–73)
SODIUM SERPL-SCNC: 141 MMOL/L (ref 136–145)
TIBC SERPL-MCNC: 281 UG/DL (ref 250–450)
TRANSFERRIN SERPL-MCNC: 190 MG/DL (ref 200–375)
WBC # BLD AUTO: 7.35 K/UL (ref 3.9–12.7)

## 2025-06-23 PROCEDURE — 84132 ASSAY OF SERUM POTASSIUM: CPT | Mod: HCNC

## 2025-06-23 PROCEDURE — 82728 ASSAY OF FERRITIN: CPT | Mod: HCNC

## 2025-06-23 PROCEDURE — 83010 ASSAY OF HAPTOGLOBIN QUANT: CPT | Mod: HCNC

## 2025-06-23 PROCEDURE — 84165 PROTEIN E-PHORESIS SERUM: CPT | Mod: HCNC

## 2025-06-23 PROCEDURE — 36415 COLL VENOUS BLD VENIPUNCTURE: CPT | Mod: HCNC

## 2025-06-23 PROCEDURE — 85025 COMPLETE CBC W/AUTO DIFF WBC: CPT | Mod: HCNC

## 2025-06-23 PROCEDURE — 83521 IG LIGHT CHAINS FREE EACH: CPT | Mod: HCNC

## 2025-06-23 PROCEDURE — 84165 PROTEIN E-PHORESIS SERUM: CPT | Mod: 26,HCNC,, | Performed by: PATHOLOGY

## 2025-06-23 PROCEDURE — 83540 ASSAY OF IRON: CPT | Mod: HCNC

## 2025-06-24 LAB
ALBUMIN, SPE (OHS): 4.38 G/DL (ref 3.35–5.55)
ALPHA 1 GLOB (OHS): 0.3 GM/DL (ref 0.17–0.41)
ALPHA 2 GLOB (OHS): 0.58 GM/DL (ref 0.43–0.99)
BETA GLOB (OHS): 0.99 GM/DL (ref 0.5–1.1)
GAMMA GLOBULIN (OHS): 0.96 GM/DL (ref 0.67–1.58)
HAPTOGLOB SERPL-MCNC: 130 MG/DL (ref 30–250)
KAPPA LC FREE SER-MCNC: 1.38 MG/L (ref 0.26–1.65)
KAPPA LC FREE/LAMBDA FREE SER: 2.96 MG/DL (ref 0.33–1.94)
LAMBDA LC FREE SERPL-MCNC: 2.15 MG/DL (ref 0.57–2.63)
PATHOLOGIST REVIEW - SPE (OHS): NORMAL
PROT SERPL-MCNC: 7.2 GM/DL (ref 6–8.4)

## 2025-06-25 NOTE — PROGRESS NOTES
Subjective:       Patient ID: Marilou Daniel is a 49 y.o. female.    Chief Complaint: Results    HPI: Ms. Daniel is a 49 year old female who is following up for her history of MGUS and iron def anemia.  Anemia hx: been treated with iv iron injectafer, last one 2/25/25. Colonoscopy done 3/28/24 which was normal, recommended 5 year follow up. Intolerant to oral iron.  MGUS hx: bone marrow performed in January 20, 2023 at demonstrated 5% 10% plasma cells she has a small abnormal paraprotein IgA.   Pmhx: follows with rheumatology for SLE    Today: She states she has been well. She was taking oral iron. It was making her constipated, but she also was taking other medications that cause constipation as well, so hard to know which is culprit. She continues to take stool softeners. She does suffer from diverticulosis and reflux often. She denies any fevers, illnesses, bleeding. She takes vitamin b12 when she remembers.     Social History[1]    Past Medical History:   Diagnosis Date    Anemia     Arthritis     Class 1 drug-induced obesity with serious comorbidity and body mass index (BMI) of 31.0 to 31.9 in adult 09/16/2022    Connective tissue disease 1999    COVID-19 in immunocompromised patient 02/05/2021    Diabetes mellitus     Diabetes mellitus due to underlying condition with complication, with long-term current use of insulin 03/29/2022    Drug induced insomnia 12/15/2020    R/T chronic steroid use for SLE.    Encounter for preventive health examination 09/02/2024    Gastroesophageal reflux disease 03/23/2020    Hypertension     Lupus 1999    Situational anxiety 08/24/2021    Thyroid nodule greater than or equal to 1 cm in diameter incidentally noted on imaging study 11/19/2020    Vasculitis        Family History   Problem Relation Name Age of Onset    Lung cancer Mother Nichole 59        +hx of smoking    Cancer Father Jack         type uk? stomach?    Breast cancer Other Nimo 58        UL mastect,  mets to back & lungs    Breast cancer Other Roz 63        chemo, XRT, UL mastect?    Autism Other Angel Jr     Asthma Other Ирина     Obesity Other Ирина     Liver cancer Other Afshin 58        mets to lungs, pancreas, stomach    Cancer Other Reji         type uk?       Past Surgical History:   Procedure Laterality Date    ABLATION      COLONOSCOPY N/A 06/24/2020    Procedure: COLONOSCOPY;  Surgeon: Nevin Penny MD;  Location: [facility] ENDO;  Service: Endoscopy;  Laterality: N/A;    COLONOSCOPY N/A 03/28/2024    Procedure: COLONOSCOPY;  Surgeon: José Manuel Huerta MD;  Location: [facility] ENDO;  Service: Endoscopy;  Laterality: N/A;    ESOPHAGOGASTRODUODENOSCOPY N/A 06/24/2020    Procedure: ESOPHAGOGASTRODUODENOSCOPY (EGD);  Surgeon: Nevin Penny MD;  Location: [facility];  Service: Endoscopy;  Laterality: N/A;    RIGHT HEART CATHETERIZATION      TOTAL KNEE ARTHROPLASTY Right     TUBAL LIGATION      WRIST SURGERY Bilateral        Review of Systems   Constitutional:  Negative for activity change, appetite change, chills, diaphoresis, fatigue, fever and unexpected weight change.   HENT:  Negative for congestion, nosebleeds and rhinorrhea.    Respiratory:  Negative for cough and shortness of breath.    Cardiovascular:  Negative for chest pain and leg swelling.   Gastrointestinal:  Positive for constipation. Negative for abdominal pain, anal bleeding, blood in stool, diarrhea, nausea and vomiting.   Genitourinary:  Negative for hematuria.   Musculoskeletal:  Positive for arthralgias.         Medication List with Changes/Refills   Current Medications    ATORVASTATIN (LIPITOR) 20 MG TABLET    Take 1 tablet (20 mg total) by mouth every evening.    BLOOD SUGAR DIAGNOSTIC (TRUE METRIX GLUCOSE TEST STRIP) STRP    Test 4 times daily.    BLOOD-GLUCOSE METER (TRUE METRIX GLUCOSE METER) MISC    Use as directed    BLOOD-GLUCOSE SENSOR (DEXCOM G7 SENSOR) ARSH    1 each by Misc.(Non-Drug; Combo Route) route every 10 days.     BLOOD-GLUCOSE TRANSMITTER (DEXCOM G5 TRANSMITTER) ARSH    1 each by Misc.(Non-Drug; Combo Route) route every 3 (three) months.    CLONIDINE (CATAPRES) 0.1 MG TABLET    Take 2 tablets (0.2 mg total) by mouth 3 (three) times daily as needed (for SBP >160).    CLONIDINE 0.2 MG/24 HR TD PTWK (CATAPRES) 0.2 MG/24 HR    Place 1 patch onto the skin every 7 days.    DAPSONE 100 MG TAB    Take 100 mg by mouth once daily at 6am.    DOXAZOSIN (CARDURA) 2 MG TABLET    Take 4 mg by mouth every 12 (twelve) hours. Take 2 (2mg) tablets two times a day    DULAGLUTIDE (TRULICITY) 1.5 MG/0.5 ML PEN INJECTOR    Inject 1.5 mg into the skin every 7 days.    FAMOTIDINE (PEPCID) 40 MG TABLET    Take 1 tablet (40 mg total) by mouth daily as needed for Heartburn.    GABAPENTIN (NEURONTIN) 800 MG TABLET    Neurontin 800 mg tablet   Take 1 tablet 3 times a day by oral route.    HYDROCODONE-ACETAMINOPHEN (NORCO)  MG PER TABLET    Norco 10 mg-325 mg tablet   Take 1 tablet 3 times a day by oral route as needed.    HYDROCORTISONE 2.5 % OINTMENT    APPLY TOPICALLY TO FACE TWICE A DAY AS NEEDED FOR 1 TO 2 WEEKS AT A TIME    HYDROXYCHLOROQUINE (PLAQUENIL) 200 MG TABLET    Take 200 mg by mouth 2 (two) times daily.     HYDROXYZINE HCL (ATARAX) 25 MG TABLET    Take 25 mg by mouth 2 (two) times daily as needed for Itching.    LABETALOL (NORMODYNE) 300 MG TABLET    Take 1 tablet (300 mg total) by mouth 2 (two) times daily.    MYCOPHENOLATE (CELLCEPT) 500 MG TAB    Take 1,500 mg by mouth 2 (two) times daily.    NIFEDIPINE (ADALAT CC) 30 MG TBSR    Take 30 mg by mouth 2 (two) times a day.    ONDANSETRON (ZOFRAN-ODT) 4 MG TBDL    Take 1 tablet (4 mg total) by mouth every 6 (six) hours as needed.    PANTOPRAZOLE (PROTONIX) 40 MG TABLET    Take 1 tablet (40 mg total) by mouth once daily.    TIZANIDINE (ZANAFLEX) 4 MG TABLET    TK 1 T PO TID PRN    TRIAMCINOLONE ACETONIDE 0.1% (KENALOG) 0.1 % OINTMENT    Apply 1 application  topically 2 (two) times  daily.    TRUEPLUS LANCETS 33 GAUGE MISC    Inject 1 lancet as directed 4 (four) times daily.    ZOLPIDEM (AMBIEN) 10 MG TAB    Take 1 tablet (10 mg total) by mouth every evening.     Objective:   There were no vitals filed for this visit.    Physical Exam  Constitutional:       General: She is not in acute distress.     Appearance: She is not ill-appearing, toxic-appearing or diaphoretic.   Neurological:      Mental Status: She is alert.   Psychiatric:         Mood and Affect: Mood normal.          Physical exam limited due to video visit    Labs/Results:  Lab Results   Component Value Date    WBC 7.35 06/23/2025    RBC 3.44 (L) 06/23/2025    HGB 10.3 (L) 06/23/2025    HCT 31.3 (L) 06/23/2025    MCV 91 06/23/2025    MCH 29.9 06/23/2025    MCHC 32.9 06/23/2025    RDW 12.9 06/23/2025     06/23/2025    MPV 10.8 06/23/2025    GRAN 5.0 01/10/2025    GRAN 62.8 01/10/2025    GRAN 5.0 01/10/2025    GRAN 62.8 01/10/2025    LYMPH 29.9 06/23/2025    LYMPH 2.20 06/23/2025    MONO 6.5 06/23/2025    MONO 0.48 06/23/2025    EOS 2.7 06/23/2025    EOS 0.20 06/23/2025    BASO 0.05 01/10/2025    BASO 0.05 01/10/2025    EOSINOPHIL 2.0 01/10/2025    EOSINOPHIL 2.0 01/10/2025    BASOPHIL 0.5 06/23/2025    BASOPHIL 0.04 06/23/2025      Latest Reference Range & Units 06/23/25 12:47   Iron 30 - 160 ug/dL 100   TIBC 250 - 450 ug/dL 281   Transferrin 200 - 375 mg/dL 190 (L)   Ferritin 20.0 - 300.0 ng/mL 1,151.0 (H)   Iron Saturation 20 - 50 % 36   Haptoglobin 30 - 250 mg/dL 130     CMP  Sodium   Date Value Ref Range Status   06/23/2025 141 136 - 145 mmol/L Final   01/10/2025 139 136 - 145 mmol/L Final     Potassium   Date Value Ref Range Status   06/23/2025 4.6 3.5 - 5.1 mmol/L Final   01/10/2025 4.8 3.5 - 5.1 mmol/L Final     Chloride   Date Value Ref Range Status   06/23/2025 109 95 - 110 mmol/L Final   01/10/2025 106 95 - 110 mmol/L Final     CO2   Date Value Ref Range Status   06/23/2025 24 23 - 29 mmol/L Final   01/10/2025 25 23  - 29 mmol/L Final     Glucose   Date Value Ref Range Status   06/23/2025 111 (H) 70 - 110 mg/dL Final   01/10/2025 124 (H) 70 - 110 mg/dL Final     BUN   Date Value Ref Range Status   06/23/2025 17 6 - 20 mg/dL Final     Creatinine   Date Value Ref Range Status   06/23/2025 1.0 0.5 - 1.4 mg/dL Final     Calcium   Date Value Ref Range Status   06/23/2025 9.8 8.7 - 10.5 mg/dL Final   01/10/2025 9.8 8.7 - 10.5 mg/dL Final     Protein Total   Date Value Ref Range Status   06/23/2025 7.9 6.0 - 8.4 gm/dL Final   06/23/2025 7.2 6.0 - 8.4 gm/dL Final     Comment:     Serum protein electrophoresis and immunofixation results should be interpreted in clinical context in that some therapeutic agents can result in false positive results (example, daratumumab). Correlation with the patient's therapeutic regimen is required.     Total Protein   Date Value Ref Range Status   01/10/2025 7.9 6.0 - 8.4 g/dL Final     Albumin   Date Value Ref Range Status   06/23/2025 4.5 3.5 - 5.2 g/dL Final   01/10/2025 4.6 3.5 - 5.2 g/dL Final     Total Bilirubin   Date Value Ref Range Status   01/10/2025 0.6 0.1 - 1.0 mg/dL Final     Comment:     For infants and newborns, interpretation of results should be based  on gestational age, weight and in agreement with clinical  observations.    Premature Infant recommended reference ranges:  Up to 24 hours.............<8.0 mg/dL  Up to 48 hours............<12.0 mg/dL  3-5 days..................<15.0 mg/dL  6-29 days.................<15.0 mg/dL       Bilirubin Total   Date Value Ref Range Status   06/23/2025 0.5 0.1 - 1.0 mg/dL Final     Comment:     For infants and newborns, interpretation of results should be based   on gestational age, weight and in agreement with clinical   observations.    Premature Infant recommended reference ranges:   0-24 hours:  <8.0 mg/dL   24-48 hours: <12.0 mg/dL   3-5 days:    <15.0 mg/dL   6-29 days:   <15.0 mg/dL     Alkaline Phosphatase   Date Value Ref Range Status    01/10/2025 72 40 - 150 U/L Final     ALP   Date Value Ref Range Status   06/23/2025 79 40 - 150 unit/L Final     AST   Date Value Ref Range Status   06/23/2025 17 11 - 45 unit/L Final   01/10/2025 13 10 - 40 U/L Final     ALT   Date Value Ref Range Status   06/23/2025 13 10 - 44 unit/L Final   01/10/2025 12 10 - 44 U/L Final     Anion Gap   Date Value Ref Range Status   06/23/2025 8 8 - 16 mmol/L Final     eGFR   Date Value Ref Range Status   06/23/2025 >60 >60 mL/min/1.73/m2 Final     Comment:     Estimated GFR calculated using the CKD-EPI creatinine (2021) equation.   01/10/2025 >60 >60 mL/min/1.73 m^2 Final      Latest Reference Range & Units 06/23/25 12:47   Albumin grams/dl 3.35 - 5.55 g/dL 4.38   Alpha 1 Glob 0.17 - 0.41 gm/dl 0.30   Alpha 2 Glob 0.43 - 0.99 gm/dl 0.58   Beta Glob 0.50 - 1.10 gm/dl 0.99   Gamma Globulin 0.67 - 1.58 gm/dl 0.96   Pathologist Interpretation SPE  Normal total protein, normal pattern.           Interpreting Pathologist: Nilda Pérez MD       Kappa/Lambda FLC Ratio 0.26 - 1.65  1.38   Kappa Free Light Chain 0.33 - 1.94 mg/dL 2.96 (H)   LAMBDA FREE LIGHT CHAIN 0.57 - 2.63 mg/dL 2.15       Assessment:     Problem List Items Addressed This Visit       Immunocompromised state due to drug therapy    Anemia of chronic disease (Chronic)    Relevant Orders    CBC Auto Differential    Comprehensive Metabolic Panel    Ferritin    Iron and TIBC    Vitamin B12    Iron deficiency anemia secondary to inadequate dietary iron intake - Primary    Relevant Orders    CBC Auto Differential    Comprehensive Metabolic Panel    Ferritin    Iron and TIBC    Vitamin B12    MGUS (monoclonal gammopathy of unknown significance) (Chronic)     Other Visit Diagnoses         Nutritional anemia, unspecified        Relevant Orders    Vitamin B12          Plan:     Iron deficiency anemia secondary to inadequate dietary iron intake  --been treated with iv iron injectafer, last one 2/25/25. Colonoscopy done 3/28/24  which was normal, recommended 5 year follow up.  --encouraged to incorporate iron rich foods into diet  --intolerant to oral iron  --iron: 100, sat: 36%, ferritin: 1510-iron repleted    MGUS (monoclonal gammopathy of unknown significance)  --bone marrow performed in January 20, 2023 at demonstrated 5% 10% plasma cells she has a small abnormal paraprotein IgA.   --flc ratio: 1.38  --Normal total protein, normal pattern.     Anemia of chronic disease  --continue to follow with rheumatology for SLE    Follow-Up: 6 months with cbc cmp iron/tibc ferritin vitmain b12 prior -vv ok    Karis Larkin PA-C  Hematology Oncology    The patient location is: car  The chief complaint leading to consultation is: anemia  Visit type:  Synchronous audio video   Face to Face time with patient: 15 minutes of total time spent on the encounter, which includes face to face time and non-face to face time preparing to see the patient (eg, review of tests), Obtaining and/or reviewing separately obtained history, Documenting clinical information in the electronic or other health record, Independently interpreting results (not separately reported) and communicating results to the patient/family/caregiver, or Care coordination (not separately reported).   Each patient to whom he or she provides medical services by telemedicine is:  (1) informed of the relationship between the provider and patient and the respective role of any other health care provider with respect to management of the patient; and (2) notified that he or she may decline to receive medical services     Route Chart for Scheduling    Med Onc Chart Routing      Follow up with physician    Follow up with BECKIE . 6 months with cbc cmp iron/tibc ferritin vitamin b12 prior -vv ok   Infusion scheduling note    Injection scheduling note    Labs CBC, CMP, ferritin, iron and TIBC and vitamin B12   Scheduling:  Preferred lab:  Lab interval:     Imaging    Pharmacy appointment    Other  referrals                  Supportive Plan Information  OP FERRIC CARBOXYMALTOSE Q2W Dale Powell MD   Associated Diagnosis: Anemia of chronic disease   noted on 12/14/2022   Line of treatment: Supportive Care   Treatment goal: Supportive     Upcoming Treatment Dates - OP FERRIC CARBOXYMALTOSE Q2W    No upcoming days in selected categories.             [1]   Social History  Socioeconomic History    Marital status:    Tobacco Use    Smoking status: Never    Smokeless tobacco: Never   Substance and Sexual Activity    Alcohol use: Never    Drug use: Never    Sexual activity: Yes     Partners: Male     Birth control/protection: See Surgical Hx     Social Drivers of Health     Financial Resource Strain: Medium Risk (6/30/2025)    Overall Financial Resource Strain (CARDIA)     Difficulty of Paying Living Expenses: Somewhat hard   Food Insecurity: No Food Insecurity (6/30/2025)    Hunger Vital Sign     Worried About Running Out of Food in the Last Year: Never true     Ran Out of Food in the Last Year: Never true   Transportation Needs: No Transportation Needs (6/30/2025)    PRAPARE - Transportation     Lack of Transportation (Medical): No     Lack of Transportation (Non-Medical): No   Physical Activity: Insufficiently Active (6/30/2025)    Exercise Vital Sign     Days of Exercise per Week: 4 days     Minutes of Exercise per Session: 30 min   Stress: No Stress Concern Present (6/30/2025)    [employer] - Occupational Stress Questionnaire     Feeling of Stress : Only a little   Housing Stability: Low Risk  (6/30/2025)    Housing Stability Vital Sign     Unable to Pay for Housing in the Last Year: No     Number of Times Moved in the Last Year: 0     Homeless in the Last Year: No

## 2025-06-30 ENCOUNTER — OFFICE VISIT (OUTPATIENT)
Dept: HEMATOLOGY/ONCOLOGY | Facility: CLINIC | Age: 50
End: 2025-06-30
Payer: MEDICARE

## 2025-06-30 DIAGNOSIS — D63.8 ANEMIA OF CHRONIC DISEASE: Chronic | ICD-10-CM

## 2025-06-30 DIAGNOSIS — Z79.899 IMMUNOCOMPROMISED STATE DUE TO DRUG THERAPY: ICD-10-CM

## 2025-06-30 DIAGNOSIS — D53.9 NUTRITIONAL ANEMIA, UNSPECIFIED: ICD-10-CM

## 2025-06-30 DIAGNOSIS — D47.2 MGUS (MONOCLONAL GAMMOPATHY OF UNKNOWN SIGNIFICANCE): Chronic | ICD-10-CM

## 2025-06-30 DIAGNOSIS — D50.8 IRON DEFICIENCY ANEMIA SECONDARY TO INADEQUATE DIETARY IRON INTAKE: Primary | ICD-10-CM

## 2025-06-30 DIAGNOSIS — D84.821 IMMUNOCOMPROMISED STATE DUE TO DRUG THERAPY: ICD-10-CM

## 2025-06-30 PROCEDURE — 3044F HG A1C LEVEL LT 7.0%: CPT | Mod: CPTII,HCNC,95,

## 2025-06-30 PROCEDURE — 98006 SYNCH AUDIO-VIDEO EST MOD 30: CPT | Mod: HCNC,95,,

## 2025-07-08 RX ORDER — PROMETHAZINE HYDROCHLORIDE 25 MG/1
25 SUPPOSITORY RECTAL EVERY 6 HOURS PRN
Qty: 12 SUPPOSITORY | Refills: 0 | Status: SHIPPED | OUTPATIENT
Start: 2025-07-08

## 2025-07-15 ENCOUNTER — OFFICE VISIT (OUTPATIENT)
Dept: PRIMARY CARE CLINIC | Facility: CLINIC | Age: 50
End: 2025-07-15
Payer: MEDICARE

## 2025-07-15 DIAGNOSIS — E11.3212 TYPE 2 DIABETES MELLITUS WITH LEFT EYE AFFECTED BY MILD NONPROLIFERATIVE RETINOPATHY AND MACULAR EDEMA, WITHOUT LONG-TERM CURRENT USE OF INSULIN: Chronic | ICD-10-CM

## 2025-07-15 DIAGNOSIS — K12.1 STOMATITIS: ICD-10-CM

## 2025-07-15 DIAGNOSIS — Z00.00 ENCOUNTER FOR MEDICARE ANNUAL WELLNESS EXAM: Primary | ICD-10-CM

## 2025-07-15 DIAGNOSIS — M32.19 SYSTEMIC LUPUS ERYTHEMATOSUS WITH OTHER ORGAN INVOLVEMENT, UNSPECIFIED SLE TYPE: Chronic | ICD-10-CM

## 2025-07-15 DIAGNOSIS — R51.9 NONINTRACTABLE EPISODIC HEADACHE, UNSPECIFIED HEADACHE TYPE: ICD-10-CM

## 2025-07-15 DIAGNOSIS — D84.821 IMMUNOCOMPROMISED STATE DUE TO DRUG THERAPY: ICD-10-CM

## 2025-07-15 DIAGNOSIS — Z79.899 IMMUNOCOMPROMISED STATE DUE TO DRUG THERAPY: ICD-10-CM

## 2025-07-15 DIAGNOSIS — D50.8 IRON DEFICIENCY ANEMIA SECONDARY TO INADEQUATE DIETARY IRON INTAKE: ICD-10-CM

## 2025-07-15 DIAGNOSIS — K21.9 GASTROESOPHAGEAL REFLUX DISEASE WITHOUT ESOPHAGITIS: Chronic | ICD-10-CM

## 2025-07-15 DIAGNOSIS — F19.982 DRUG-INDUCED INSOMNIA: Chronic | ICD-10-CM

## 2025-07-15 DIAGNOSIS — E11.59 HYPERTENSION ASSOCIATED WITH DIABETES: Chronic | ICD-10-CM

## 2025-07-15 DIAGNOSIS — K59.03 DRUG-INDUCED CONSTIPATION: Chronic | ICD-10-CM

## 2025-07-15 DIAGNOSIS — D63.8 ANEMIA OF CHRONIC DISEASE: Chronic | ICD-10-CM

## 2025-07-15 DIAGNOSIS — M87.052 AVASCULAR NECROSIS OF BONES OF BOTH HIPS: Chronic | ICD-10-CM

## 2025-07-15 DIAGNOSIS — M87.051 AVASCULAR NECROSIS OF BONES OF BOTH HIPS: Chronic | ICD-10-CM

## 2025-07-15 DIAGNOSIS — F51.01 PRIMARY INSOMNIA: Chronic | ICD-10-CM

## 2025-07-15 DIAGNOSIS — I15.2 HYPERTENSION ASSOCIATED WITH DIABETES: Chronic | ICD-10-CM

## 2025-07-15 DIAGNOSIS — I70.0 AORTIC ATHEROSCLEROSIS: ICD-10-CM

## 2025-07-15 RX ORDER — TRIAMCINOLONE ACETONIDE 1 MG/G
PASTE DENTAL 2 TIMES DAILY
Qty: 5 G | Refills: 5 | Status: SHIPPED | OUTPATIENT
Start: 2025-07-15 | End: 2025-08-14

## 2025-07-15 RX ORDER — ZOLPIDEM TARTRATE 10 MG/1
10 TABLET ORAL NIGHTLY
Qty: 90 TABLET | Refills: 0 | Status: SHIPPED | OUTPATIENT
Start: 2025-07-15 | End: 2025-10-13

## 2025-07-15 RX ORDER — TRIAMCINOLONE ACETONIDE 1 MG/G
1 OINTMENT TOPICAL 2 TIMES DAILY
Qty: 453.6 G | Refills: 0 | Status: SHIPPED | OUTPATIENT
Start: 2025-07-15

## 2025-07-15 NOTE — ASSESSMENT & PLAN NOTE
Monitor BP, blood glucose and cholesterol  Maintain a low fat diet, low sodium diet  Recommend exercise at least 30 minutes daily.

## 2025-07-15 NOTE — ASSESSMENT & PLAN NOTE
Recommended age appropirate screening   Recommended vaccines   Review for Opioid Screening: Pt does have Rx for Opioids    Review for Substance Use Disorders: Patient does not use illicit substances as reported   Remain free of falls for the next 12 months. Barrier discussed.

## 2025-07-15 NOTE — ASSESSMENT & PLAN NOTE
/80  Followed by External cardiologist Dr. Schmidt  Controlled this visit  Continue HTN management   Continue daily activity, low sodium diet, weight loss efforts  BP Readings from Last 3 Encounters:   05/19/25 (!) 163/87   04/22/25 124/72   03/07/25 (!) 140/78       Diabetes managed w/ trulicity  Tolerating well  Advise to monitor blood glucose at home

## 2025-07-15 NOTE — PROGRESS NOTES
[facility] Medicare Virtual Enhanced Annual Wellness Visit      The patient location is:  [place]   The chief complaint leading to consultation is: Annual wellness visit   Total time spent on medical decision making was 53 min.    Visit type: Virtual visit with synchronous audio only and video  Each patient to whom he or she provides medical services by telemedicine is:  (1) informed of the relationship between the physician and patient and the respective role of any other health care provider with respect to management of the patient; and (2) notified that he or she may decline to receive medical services by telemedicine and may withdraw from such care at any time.            The patient location is: [place]  The chief complaint leading to consultation is: Annual wellness visit     Visit type: audiovisual    Face to Face time with patient: 49  53 minutes of total time spent on the encounter, which includes face to face time and non-face to face time preparing to see the patient (eg, review of tests), Obtaining and/or reviewing separately obtained history, Documenting clinical information in the electronic or other health record, Independently interpreting results (not separately reported) and communicating results to the patient/family/caregiver, or Care coordination (not separately reported).         Each patient to whom he or she provides medical services by telemedicine is:  (1) informed of the relationship between the physician and patient and the respective role of any other health care provider with respect to management of the patient; and (2) notified that he or she may decline to receive medical services by telemedicine and may withdraw from such care at any time.          Marilou Daniel presented for a follow-up Medicare AWV today. The following components were reviewed and updated:    Medical history  Family History  Social history  Allergies and Current Medications  Health Risk  Assessment  Health Maintenance  Care Team    **See Completed Assessments for Annual Wellness visit with in the encounter summary    The following assessments were completed:  Depression Screening  Cognitive function Screening  Timed Get Up Test  Whisper Test  Nutrition Screening  PAQ      Opioid documentation for eAWV      Patient does have a current opioid prescription.      Patient accepted further discussion regarding opioid medication use.      Patient is currently taking hydrocodone narcotic for back and neck pain.        Pain level today is 6/10.       In addition to narcotic pain medications, patient is also using (no other oral, topical, or alternative treatments) for pain control.       Patient is followed by a specialist currently for their pain and will not be referred today.       Patient's opioid risk potential based on ORT-OUD tool:       Robinson each box that applies   No   Yes     Family history of substance abuse   Alcohol [x] []   Illegal drugs [x] []   Rx drugs [x] []     Personal history of substance abuse   Alcohol [x] []   Illegal drugs [x] []   Rx drugs [x] []     Age between 16-45 years   [x]   []     Patient with ADD, OCD, Bipolar disorder, schizoprenia   [x]   []     Patient with depression   [x]   []                         Scoring total                                           0                      Non-opioid treatment options have been discussed today and added to the patient's after visit summary.         There were no vitals filed for this visit.  There is no height or weight on file to calculate BMI.          Physical Exam  Vitals reviewed.   Constitutional:       General: She is awake.      Appearance: Normal appearance. She is normal weight.   HENT:      Head: Normocephalic.      Right Ear: Tympanic membrane, ear canal and external ear normal.      Left Ear: Tympanic membrane, ear canal and external ear normal.      Nose: Nose normal.      Mouth/Throat:      Mouth: Mucous membranes  are moist.      Pharynx: Oropharynx is clear.   Eyes:      General: Lids are normal.      Extraocular Movements: Extraocular movements intact.      Conjunctiva/sclera: Conjunctivae normal.      Pupils: Pupils are equal, round, and reactive to light.      Comments: Glasses on   Pulmonary:      Effort: Pulmonary effort is normal.   Musculoskeletal:         General: Normal range of motion.      Cervical back: Normal range of motion and neck supple.   Skin:     General: Skin is warm and dry.      Capillary Refill: Capillary refill takes less than 2 seconds.   Neurological:      General: No focal deficit present.      Mental Status: She is alert and oriented to person, place, and time. Mental status is at baseline.      GCS: GCS eye subscore is 4. GCS verbal subscore is 5. GCS motor subscore is 6.      Motor: Motor function is intact.      Gait: Gait is intact.   Psychiatric:         Attention and Perception: Attention and perception normal.         Mood and Affect: Mood and affect normal.         Speech: Speech normal.         Behavior: Behavior normal. Behavior is cooperative.         Thought Content: Thought content normal.         Cognition and Memory: Cognition normal. She exhibits impaired recent memory.         Judgment: Judgment normal.        Physical exam limited due to virtual visit.     Health Maintenance         Date Due Completion Date    TETANUS VACCINE Never done ---    Shingles Vaccine (1 of 2) Never done ---    COVID-19 Vaccine (5 - 2024-25 season) 09/01/2024 3/16/2024    Diabetic Eye Exam 03/20/2025 3/20/2024    Diabetes Urine Screening 08/28/2025 8/28/2024    Influenza Vaccine (1) 09/01/2025 ---    Hemoglobin A1c 10/22/2025 4/22/2025    Lipid Panel 10/29/2025 10/29/2024    Foot Exam 04/22/2026 4/22/2025    Mammogram 05/21/2026 5/21/2025    High Dose Statin 06/19/2026 6/19/2025    Cervical Cancer Screening 09/29/2026 9/29/2021    Colorectal Cancer Screening 03/28/2029 3/28/2024    RSV Vaccine (Age 60+  and Pregnant patients) (1 - 1-dose 75+ series) 11/23/2048 ---              PROBLEM LIST ITEMS ADDRESSED THIS VISIT:    1. Encounter for Medicare annual wellness exam  Assessment & Plan:  Recommended age appropirate screening   Recommended vaccines   Review for Opioid Screening: Pt does have Rx for Opioids    Review for Substance Use Disorders: Patient does not use illicit substances as reported   Remain free of falls for the next 12 months. Barrier discussed.        2. Systemic lupus erythematosus with other organ involvement, unspecified SLE type  Assessment & Plan:  Stable  Cont current Rx and f/u w  external Rheumatologist Dr. Wayne    Orders:  -     triamcinolone acetonide 0.1% (KENALOG) 0.1 % ointment; Apply 1 application  topically 2 (two) times daily.  Dispense: 453.6 g; Refill: 0    3. Hypertension associated with diabetes  Assessment & Plan:  /80  Followed by External cardiologist Dr. Schmidt  Controlled this visit  Continue HTN management   Continue daily activity, low sodium diet, weight loss efforts  BP Readings from Last 3 Encounters:   05/19/25 (!) 163/87   04/22/25 124/72   03/07/25 (!) 140/78       Diabetes managed w/ trulicity  Tolerating well  Advise to monitor blood glucose at home       4. Aortic atherosclerosis  Overview:  2022  Abdominal aorta is normal in caliber without significant calcific atherosclerosis. 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     Assessment & Plan:  Monitor BP, blood glucose and cholesterol  Maintain a low fat diet, low sodium diet  Recommend exercise at least 30 minutes daily.        5. Immunocompromised state due to drug therapy  Assessment & Plan:  Monitor for S/S of infection  Stable; chronic   F/U with PCP      6. Anemia of chronic disease  Assessment & Plan:  Mild anemia  Lab Results   Component Value Date    WBC 7.35 06/23/2025    HGB 10.3 (L) 06/23/2025    RBC 3.44 (L) 06/23/2025    HCT 31.3 (L) 06/23/2025     06/23/2025    MCV 91 06/23/2025     CHOL 140 10/29/2024    TRIG 108 10/29/2024    HDL 36 (L) 10/29/2024    LDLCALC 82.4 10/29/2024    ALT 13 06/23/2025    AST 17 06/23/2025     06/23/2025    K 4.6 06/23/2025     06/23/2025    CREATININE 1.0 06/23/2025    BUN 17 06/23/2025    CO2 24 06/23/2025    TSH 0.468 01/02/2024    FREET4 1.03 01/02/2024    INR 1.0 08/16/2024    HGBA1C 5.1 04/22/2025    CRP 4.7 11/29/2022     Lab Results   Component Value Date    SGAWVKQX81 605 10/29/2024     Lab Results   Component Value Date    FOLATE 6.8 01/02/2024      Lab Results   Component Value Date    IRON 100 06/23/2025    TRANSFERRIN 190 (L) 06/23/2025    TIBC 281 06/23/2025    LABIRON 36 06/23/2025    FESATURATED 17 (L) 01/10/2025      Lab Results   Component Value Date    EGFRNORACEVR >60 06/23/2025    ALBUMIN 4.5 06/23/2025    BNP 23 05/19/2025         7. Iron deficiency anemia secondary to inadequate dietary iron intake  Assessment & Plan:  Stable  Mildly anemia; controlled      8. 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 Gastroesophageal reflux disease without esophagitis  Assessment & Plan:  Take Protonix and Pepcid due to reflux w/ increasing intermittent s/s from Trulicity   Continue to closely monitor   Discuss diet change       10. Drug-induced constipation  Assessment & Plan:  Stable  Relieved w/ stool softeners due to taking Norco       11. Avascular necrosis of bones of both hips  Assessment & Plan:  No longer taking any steroids  Managed by external orthopedic  Tolerating well w/ pain management treatment        12. Primary insomnia  Assessment & Plan:  Tolerating Ambien 10 mg   Denies any nightmares, compulsive behaviors        13. Nonintractable episodic headache, unspecified headache type  Assessment & Plan:  Stable w/ intermittent headaches   Continue  current treatment plan         14. Drug-induced insomnia  Assessment & Plan:  Tolerating Ambien 10 mg   Denies any nightmares, compulsive behaviors      Orders:  -     zolpidem (AMBIEN) 10 mg Tab; Take 1 tablet (10 mg total) by mouth every evening.  Dispense: 90 tablet; Refill: 0    15. Stomatitis  -     triamcinolone acetonide 0.1% (KENALOG) 0.1 % paste; Place onto teeth 2 (two) times daily.  Dispense: 5 g; Refill: 5         Provided Marilou with a 5-10 year written screening schedule and personal prevention plan. Recommendations were developed using the USPSTF age appropriate recommendations. Education, counseling, and referrals were provided as needed.  After Visit Summary printed and given to patient which includes a list of additional screenings\tests needed.    Future Appointments   Date Time Provider Department Center   8/20/2025  2:05 PM LABORATORY, [facility] LAB [facility]   8/25/2025  3:00 PM Dale Powell MD [facility] HEM ONC [facility]   10/7/2025  8:20 AM Alissa Bautista MD [facility] REBECCA Murphy, FNP-C  [facility]  [address] [facility] [place] [address]   [phone] ph  [phone] fax     I offered to discuss advanced care planning, including how to pick a person who would make decisions for you if you were unable to make them for yourself, called a health care power of , and what kind of decisions you might make such as use of life sustaining treatments such as ventilators and tube feeding when faced with a life limiting illness recorded on a living will that they will need to know. (How you want to be cared for as you near the end of your natural life)     X Patient is interested in learning more about how to make advanced directives.  I provided them paperwork and offered to discuss this with them.

## 2025-07-15 NOTE — ASSESSMENT & PLAN NOTE
Mild anemia  Lab Results   Component Value Date    WBC 7.35 06/23/2025    HGB 10.3 (L) 06/23/2025    RBC 3.44 (L) 06/23/2025    HCT 31.3 (L) 06/23/2025     06/23/2025    MCV 91 06/23/2025    CHOL 140 10/29/2024    TRIG 108 10/29/2024    HDL 36 (L) 10/29/2024    LDLCALC 82.4 10/29/2024    ALT 13 06/23/2025    AST 17 06/23/2025     06/23/2025    K 4.6 06/23/2025     06/23/2025    CREATININE 1.0 06/23/2025    BUN 17 06/23/2025    CO2 24 06/23/2025    TSH 0.468 01/02/2024    FREET4 1.03 01/02/2024    INR 1.0 08/16/2024    HGBA1C 5.1 04/22/2025    CRP 4.7 11/29/2022     Lab Results   Component Value Date    NTASGHNA25 605 10/29/2024     Lab Results   Component Value Date    FOLATE 6.8 01/02/2024      Lab Results   Component Value Date    IRON 100 06/23/2025    TRANSFERRIN 190 (L) 06/23/2025    TIBC 281 06/23/2025    LABIRON 36 06/23/2025    FESATURATED 17 (L) 01/10/2025      Lab Results   Component Value Date    EGFRNORACEVR >60 06/23/2025    ALBUMIN 4.5 06/23/2025    BNP 23 05/19/2025

## 2025-07-15 NOTE — PATIENT INSTRUCTIONS
Counseling and Referral of Other Preventative  (Italic type indicates deductible and co-insurance are waived)    Patient Name: Marilou Daniel  Today's Date: 7/15/2025    Health Maintenance       Date Due Completion Date    TETANUS VACCINE Never done ---    Shingles Vaccine (1 of 2) Never done ---    COVID-19 Vaccine (5 - 2024-25 season) 09/01/2024 3/16/2024    Diabetic Eye Exam 03/20/2025 3/20/2024    Diabetes Urine Screening 08/28/2025 8/28/2024    Influenza Vaccine (1) 09/01/2025 ---    Hemoglobin A1c 10/22/2025 4/22/2025    Lipid Panel 10/29/2025 10/29/2024    Foot Exam 04/22/2026 4/22/2025    Mammogram 05/21/2026 5/21/2025    High Dose Statin 06/19/2026 6/19/2025    Cervical Cancer Screening 09/29/2026 9/29/2021    Colorectal Cancer Screening 03/28/2029 3/28/2024    RSV Vaccine (Age 60+ and Pregnant patients) (1 - 1-dose 75+ series) 11/23/2048 ---        No orders of the defined types were placed in this encounter.    The following information is provided to all patients.  This information is to help you find resources for any of the problems found today that may be affecting your health:                  Living healthy guide: www.[facility].[place].gov      Understanding Diabetes: www.diabetes.org      Eating healthy: www.cdc.gov/healthyweight      CDC home safety checklist: www.cdc.gov/steadi/patient.html      Agency on Aging: www.goea.[place].[facility]      Alcoholics anonymous (AA): www.aa.org      Physical Activity: www.joseline.nih.gov/sy6yhra      Tobacco use: www.quitwithusla.org

## 2025-07-15 NOTE — ASSESSMENT & PLAN NOTE
Received blood work results, BNP significantly elevated 2,075  Discussed with cardiology, will likely need days of IV diuresis  I called patient and explained test results and recommended ED evaluation/likely admission, patient is agreeable  Shortness of breath is improving with prednisone, however given BNP concerned about heart failure component as well  He plans on going to the ED this afternoon  Stable  Continue trulicity  Upcoming diabetic eye exam

## 2025-07-15 NOTE — ASSESSMENT & PLAN NOTE
Take Protonix and Pepcid due to reflux w/ increasing intermittent s/s from Trulicity   Continue to closely monitor   Discuss diet change

## 2025-07-15 NOTE — ASSESSMENT & PLAN NOTE
No longer taking any steroids  Managed by external orthopedic  Tolerating well w/ pain management treatment

## 2025-08-12 ENCOUNTER — TELEPHONE (OUTPATIENT)
Dept: PRIMARY CARE CLINIC | Facility: CLINIC | Age: 50
End: 2025-08-12
Payer: MEDICARE

## 2025-08-19 ENCOUNTER — PATIENT MESSAGE (OUTPATIENT)
Dept: ADMINISTRATIVE | Facility: HOSPITAL | Age: 50
End: 2025-08-19
Payer: MEDICARE

## 2025-08-19 ENCOUNTER — TELEPHONE (OUTPATIENT)
Dept: PRIMARY CARE CLINIC | Facility: CLINIC | Age: 50
End: 2025-08-19
Payer: MEDICARE

## 2025-08-20 ENCOUNTER — LAB VISIT (OUTPATIENT)
Dept: LAB | Facility: HOSPITAL | Age: 50
End: 2025-08-20
Attending: INTERNAL MEDICINE
Payer: MEDICARE

## 2025-08-20 DIAGNOSIS — D50.8 IRON DEFICIENCY ANEMIA SECONDARY TO INADEQUATE DIETARY IRON INTAKE: ICD-10-CM

## 2025-08-20 DIAGNOSIS — D47.2 MGUS (MONOCLONAL GAMMOPATHY OF UNKNOWN SIGNIFICANCE): ICD-10-CM

## 2025-08-20 LAB
ABSOLUTE EOSINOPHIL (OHS): 0.68 K/UL
ABSOLUTE MONOCYTE (OHS): 0.63 K/UL (ref 0.3–1)
ABSOLUTE NEUTROPHIL COUNT (OHS): 4.93 K/UL (ref 1.8–7.7)
BASOPHILS # BLD AUTO: 0.03 K/UL
BASOPHILS NFR BLD AUTO: 0.4 %
BETA 2 MICROGLOBILIN (OHS): 1.8 UG/ML (ref 0–2.5)
ERYTHROCYTE [DISTWIDTH] IN BLOOD BY AUTOMATED COUNT: 13 % (ref 11.5–14.5)
HCT VFR BLD AUTO: 31.5 % (ref 37–48.5)
HGB BLD-MCNC: 10.8 GM/DL (ref 12–16)
IMM GRANULOCYTES # BLD AUTO: 0.02 K/UL (ref 0–0.04)
IMM GRANULOCYTES NFR BLD AUTO: 0.2 % (ref 0–0.5)
IRON SATN MFR SERPL: 15 % (ref 20–50)
IRON SERPL-MCNC: 40 UG/DL (ref 30–160)
LYMPHOCYTES # BLD AUTO: 1.73 K/UL (ref 1–4.8)
MCH RBC QN AUTO: 30.5 PG (ref 27–31)
MCHC RBC AUTO-ENTMCNC: 34.3 G/DL (ref 32–36)
MCV RBC AUTO: 89 FL (ref 82–98)
NUCLEATED RBC (/100WBC) (OHS): 0 /100 WBC
PLATELET # BLD AUTO: 209 K/UL (ref 150–450)
PMV BLD AUTO: 11 FL (ref 9.2–12.9)
RBC # BLD AUTO: 3.54 M/UL (ref 4–5.4)
RELATIVE EOSINOPHIL (OHS): 8.5 %
RELATIVE LYMPHOCYTE (OHS): 21.6 % (ref 18–48)
RELATIVE MONOCYTE (OHS): 7.9 % (ref 4–15)
RELATIVE NEUTROPHIL (OHS): 61.4 % (ref 38–73)
TIBC SERPL-MCNC: 274 UG/DL (ref 250–450)
TRANSFERRIN SERPL-MCNC: 185 MG/DL (ref 200–375)
WBC # BLD AUTO: 8.02 K/UL (ref 3.9–12.7)

## 2025-08-20 PROCEDURE — 86334 IMMUNOFIX E-PHORESIS SERUM: CPT | Mod: HCNC

## 2025-08-20 PROCEDURE — 83521 IG LIGHT CHAINS FREE EACH: CPT | Mod: HCNC

## 2025-08-20 PROCEDURE — 85025 COMPLETE CBC W/AUTO DIFF WBC: CPT | Mod: HCNC

## 2025-08-20 PROCEDURE — 83540 ASSAY OF IRON: CPT | Mod: HCNC

## 2025-08-20 PROCEDURE — 82232 ASSAY OF BETA-2 PROTEIN: CPT | Mod: HCNC

## 2025-08-20 PROCEDURE — 36415 COLL VENOUS BLD VENIPUNCTURE: CPT | Mod: HCNC

## 2025-08-20 PROCEDURE — 84165 PROTEIN E-PHORESIS SERUM: CPT | Mod: HCNC

## 2025-08-20 PROCEDURE — 82728 ASSAY OF FERRITIN: CPT | Mod: HCNC

## 2025-08-21 LAB
ALBUMIN, SPE (OHS): 4.25 G/DL (ref 3.35–5.55)
ALPHA 1 GLOB (OHS): 0.27 GM/DL (ref 0.17–0.41)
ALPHA 2 GLOB (OHS): 0.53 GM/DL (ref 0.43–0.99)
BETA GLOB (OHS): 0.94 GM/DL (ref 0.5–1.1)
FERRITIN SERPL-MCNC: 1018 NG/ML (ref 20–300)
GAMMA GLOBULIN (OHS): 1.01 GM/DL (ref 0.67–1.58)
KAPPA LC FREE SER-MCNC: 1.45 MG/L (ref 0.26–1.65)
KAPPA LC FREE/LAMBDA FREE SER: 3.36 MG/DL (ref 0.33–1.94)
LAMBDA LC FREE SERPL-MCNC: 2.32 MG/DL (ref 0.57–2.63)
PROT SERPL-MCNC: 7 GM/DL (ref 6–8.4)

## 2025-08-25 LAB
PATHOLOGIST INTERPRETATION - IFE SERUM (OHS): NORMAL
PATHOLOGIST REVIEW - SPE (OHS): NORMAL

## 2025-08-29 RX ORDER — DULAGLUTIDE 1.5 MG/.5ML
1.5 INJECTION, SOLUTION SUBCUTANEOUS
Qty: 4 PEN | Refills: 2 | Status: SHIPPED | OUTPATIENT
Start: 2025-08-29 | End: 2025-11-27

## 2025-09-02 ENCOUNTER — TELEPHONE (OUTPATIENT)
Dept: PHARMACY | Facility: CLINIC | Age: 50
End: 2025-09-02
Payer: MEDICARE